# Patient Record
Sex: FEMALE | Race: WHITE | NOT HISPANIC OR LATINO | Employment: OTHER | URBAN - METROPOLITAN AREA
[De-identification: names, ages, dates, MRNs, and addresses within clinical notes are randomized per-mention and may not be internally consistent; named-entity substitution may affect disease eponyms.]

---

## 2017-04-13 ENCOUNTER — APPOINTMENT (OUTPATIENT)
Dept: GENERAL RADIOLOGY | Facility: HOSPITAL | Age: 62
End: 2017-04-13

## 2017-04-13 ENCOUNTER — HOSPITAL ENCOUNTER (INPATIENT)
Facility: HOSPITAL | Age: 62
LOS: 5 days | Discharge: HOME | End: 2017-04-18
Attending: EMERGENCY MEDICINE | Admitting: FAMILY MEDICINE

## 2017-04-13 DIAGNOSIS — S72.001A CLOSED RIGHT HIP FRACTURE, INITIAL ENCOUNTER (HCC): ICD-10-CM

## 2017-04-13 DIAGNOSIS — S72.21XA SUBTROCHANTERIC FRACTURE OF RIGHT FEMUR, CLOSED, INITIAL ENCOUNTER (HCC): Primary | ICD-10-CM

## 2017-04-13 DIAGNOSIS — R52 PAIN: ICD-10-CM
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
## 2017-04-13 LAB
ABO GROUP BLD: NORMAL
ABO GROUP BLD: NORMAL
ALBUMIN SERPL-MCNC: 4.2 G/DL (ref 3.5–5.2)
ALBUMIN/GLOB SERPL: 1.4 G/DL
ALP SERPL-CCNC: 118 U/L (ref 40–129)
ALT SERPL W P-5'-P-CCNC: 17 U/L (ref 5–33)
ANION GAP SERPL CALCULATED.3IONS-SCNC: 14.5 MMOL/L
APTT PPP: 24.7 SECONDS (ref 24.3–38.1)
AST SERPL-CCNC: 22 U/L (ref 5–32)
BASOPHILS # BLD AUTO: 0.03 10*3/MM3 (ref 0–0.2)
BASOPHILS NFR BLD AUTO: 0.3 % (ref 0–2)
BILIRUB SERPL-MCNC: 0.4 MG/DL (ref 0.2–1.2)
BLD GP AB SCN SERPL QL: NEGATIVE
BUN BLD-MCNC: 12 MG/DL (ref 8–23)
BUN/CREAT SERPL: 11.5 (ref 7–25)
CALCIUM SPEC-SCNC: 9.1 MG/DL (ref 8.8–10.5)
CHLORIDE SERPL-SCNC: 103 MMOL/L (ref 98–107)
CO2 SERPL-SCNC: 24.5 MMOL/L (ref 22–29)
CREAT BLD-MCNC: 1.04 MG/DL (ref 0.57–1)
DEPRECATED RDW RBC AUTO: 42.3 FL (ref 37–54)
EOSINOPHIL # BLD AUTO: 0.12 10*3/MM3 (ref 0.1–0.3)
EOSINOPHIL NFR BLD AUTO: 1.3 % (ref 0–4)
ERYTHROCYTE [DISTWIDTH] IN BLOOD BY AUTOMATED COUNT: 13.4 % (ref 11.5–14.5)
GFR SERPL CREATININE-BSD FRML MDRD: 54 ML/MIN/1.73
GLOBULIN UR ELPH-MCNC: 2.9 GM/DL
GLUCOSE BLD-MCNC: 131 MG/DL (ref 65–99)
HCT VFR BLD AUTO: 44.2 % (ref 37–47)
HGB BLD-MCNC: 14.5 G/DL (ref 12–16)
IMM GRANULOCYTES # BLD: 0.03 10*3/MM3 (ref 0–0.03)
IMM GRANULOCYTES NFR BLD: 0.3 % (ref 0–0.5)
INR PPP: 0.94 (ref 0.9–1.1)
LYMPHOCYTES # BLD AUTO: 2.08 10*3/MM3 (ref 0.6–4.8)
LYMPHOCYTES NFR BLD AUTO: 23 % (ref 20–45)
MCH RBC QN AUTO: 28.3 PG (ref 27–31)
MCHC RBC AUTO-ENTMCNC: 32.8 G/DL (ref 31–37)
MCV RBC AUTO: 86.2 FL (ref 81–99)
MONOCYTES # BLD AUTO: 0.58 10*3/MM3 (ref 0–1)
MONOCYTES NFR BLD AUTO: 6.4 % (ref 3–8)
NEUTROPHILS # BLD AUTO: 6.2 10*3/MM3 (ref 1.5–8.3)
NEUTROPHILS NFR BLD AUTO: 68.7 % (ref 45–70)
NRBC BLD MANUAL-RTO: 0 /100 WBC (ref 0–0)
PLATELET # BLD AUTO: 291 10*3/MM3 (ref 140–500)
PMV BLD AUTO: 9.2 FL (ref 7.4–10.4)
POTASSIUM BLD-SCNC: 4.2 MMOL/L (ref 3.5–5.2)
PROT SERPL-MCNC: 7.1 G/DL (ref 6–8.5)
PROTHROMBIN TIME: 12.6 SECONDS (ref 12.1–15)
RBC # BLD AUTO: 5.13 10*6/MM3 (ref 4.2–5.4)
RH BLD: POSITIVE
RH BLD: POSITIVE
SODIUM BLD-SCNC: 142 MMOL/L (ref 136–145)
WBC NRBC COR # BLD: 9.04 10*3/MM3 (ref 4.8–10.8)

## 2017-04-13 PROCEDURE — 93010 ELECTROCARDIOGRAM REPORT: CPT | Performed by: INTERNAL MEDICINE

## 2017-04-13 PROCEDURE — 71010 HC CHEST PA OR AP: CPT

## 2017-04-13 PROCEDURE — 85730 THROMBOPLASTIN TIME PARTIAL: CPT | Performed by: EMERGENCY MEDICINE

## 2017-04-13 PROCEDURE — 80053 COMPREHEN METABOLIC PANEL: CPT | Performed by: EMERGENCY MEDICINE

## 2017-04-13 PROCEDURE — 99284 EMERGENCY DEPT VISIT MOD MDM: CPT

## 2017-04-13 PROCEDURE — 25010000002 HYDROMORPHONE PER 4 MG: Performed by: ORTHOPAEDIC SURGERY

## 2017-04-13 PROCEDURE — 86901 BLOOD TYPING SEROLOGIC RH(D): CPT

## 2017-04-13 PROCEDURE — 25010000002 HYDROMORPHONE PER 4 MG

## 2017-04-13 PROCEDURE — 86850 RBC ANTIBODY SCREEN: CPT | Performed by: ORTHOPAEDIC SURGERY

## 2017-04-13 PROCEDURE — 0QS606Z REPOSITION RIGHT UPPER FEMUR WITH INTRAMEDULLARY INTERNAL FIXATION DEVICE, OPEN APPROACH: ICD-10-PCS | Performed by: ORTHOPAEDIC SURGERY

## 2017-04-13 PROCEDURE — 85610 PROTHROMBIN TIME: CPT | Performed by: EMERGENCY MEDICINE

## 2017-04-13 PROCEDURE — 93005 ELECTROCARDIOGRAM TRACING: CPT | Performed by: FAMILY MEDICINE

## 2017-04-13 PROCEDURE — 73502 X-RAY EXAM HIP UNI 2-3 VIEWS: CPT

## 2017-04-13 PROCEDURE — 86900 BLOOD TYPING SEROLOGIC ABO: CPT | Performed by: ORTHOPAEDIC SURGERY

## 2017-04-13 PROCEDURE — 99284 EMERGENCY DEPT VISIT MOD MDM: CPT | Performed by: EMERGENCY MEDICINE

## 2017-04-13 PROCEDURE — 86900 BLOOD TYPING SEROLOGIC ABO: CPT

## 2017-04-13 PROCEDURE — 85025 COMPLETE CBC W/AUTO DIFF WBC: CPT | Performed by: EMERGENCY MEDICINE

## 2017-04-13 PROCEDURE — 86901 BLOOD TYPING SEROLOGIC RH(D): CPT | Performed by: ORTHOPAEDIC SURGERY

## 2017-04-13 RX ORDER — SODIUM CHLORIDE 0.9 % (FLUSH) 0.9 %
1-10 SYRINGE (ML) INJECTION AS NEEDED
Status: DISCONTINUED | OUTPATIENT
Start: 2017-04-13 | End: 2017-04-14

## 2017-04-13 RX ORDER — CALCIUM CARBONATE 200(500)MG
1 TABLET,CHEWABLE ORAL 2 TIMES DAILY PRN
Status: DISCONTINUED | OUTPATIENT
Start: 2017-04-13 | End: 2017-04-18 | Stop reason: HOSPADM

## 2017-04-13 RX ORDER — ONDANSETRON 2 MG/ML
4 INJECTION INTRAMUSCULAR; INTRAVENOUS EVERY 6 HOURS PRN
Status: DISCONTINUED | OUTPATIENT
Start: 2017-04-13 | End: 2017-04-18 | Stop reason: HOSPADM

## 2017-04-13 RX ORDER — SODIUM CHLORIDE, SODIUM LACTATE, POTASSIUM CHLORIDE, CALCIUM CHLORIDE 600; 310; 30; 20 MG/100ML; MG/100ML; MG/100ML; MG/100ML
30 INJECTION, SOLUTION INTRAVENOUS CONTINUOUS
Status: DISCONTINUED | OUTPATIENT
Start: 2017-04-13 | End: 2017-04-14

## 2017-04-13 RX ORDER — ADENOSINE 3 MG/ML
INJECTION, SOLUTION INTRAVENOUS
Status: DISCONTINUED
Start: 2017-04-13 | End: 2017-04-13 | Stop reason: WASHOUT

## 2017-04-13 RX ORDER — BISACODYL 5 MG/1
5 TABLET, DELAYED RELEASE ORAL DAILY PRN
Status: DISCONTINUED | OUTPATIENT
Start: 2017-04-13 | End: 2017-04-18 | Stop reason: HOSPADM

## 2017-04-13 RX ORDER — ACETAMINOPHEN 325 MG/1
650 TABLET ORAL EVERY 4 HOURS PRN
Status: DISCONTINUED | OUTPATIENT
Start: 2017-04-13 | End: 2017-04-14

## 2017-04-13 RX ORDER — ASPIRIN 81 MG/1
81 TABLET ORAL DAILY
COMMUNITY
End: 2017-04-18 | Stop reason: HOSPADM

## 2017-04-13 RX ORDER — LABETALOL HYDROCHLORIDE 5 MG/ML
INJECTION, SOLUTION INTRAVENOUS
Status: DISCONTINUED
Start: 2017-04-13 | End: 2017-04-13 | Stop reason: WASHOUT

## 2017-04-13 RX ORDER — SODIUM CHLORIDE 9 MG/ML
75 INJECTION, SOLUTION INTRAVENOUS CONTINUOUS
Status: DISCONTINUED | OUTPATIENT
Start: 2017-04-13 | End: 2017-04-14

## 2017-04-13 RX ORDER — ONDANSETRON 4 MG/1
4 TABLET, ORALLY DISINTEGRATING ORAL EVERY 6 HOURS PRN
Status: DISCONTINUED | OUTPATIENT
Start: 2017-04-13 | End: 2017-04-18 | Stop reason: HOSPADM

## 2017-04-13 RX ORDER — ONDANSETRON 4 MG/1
4 TABLET, FILM COATED ORAL EVERY 6 HOURS PRN
Status: DISCONTINUED | OUTPATIENT
Start: 2017-04-13 | End: 2017-04-14

## 2017-04-13 RX ADMIN — SODIUM CHLORIDE, POTASSIUM CHLORIDE, SODIUM LACTATE AND CALCIUM CHLORIDE 30 ML/HR: 600; 310; 30; 20 INJECTION, SOLUTION INTRAVENOUS at 20:39

## 2017-04-13 RX ADMIN — HYDROMORPHONE HYDROCHLORIDE 1 MG: 1 INJECTION, SOLUTION INTRAMUSCULAR; INTRAVENOUS; SUBCUTANEOUS at 23:17

## 2017-04-13 RX ADMIN — SODIUM CHLORIDE 75 ML/HR: 9 INJECTION, SOLUTION INTRAVENOUS at 22:16

## 2017-04-13 RX ADMIN — HYDROMORPHONE HYDROCHLORIDE 1 MG: 1 INJECTION, SOLUTION INTRAMUSCULAR; INTRAVENOUS; SUBCUTANEOUS at 20:39

## 2017-04-14 ENCOUNTER — ANESTHESIA EVENT (OUTPATIENT)
Dept: PERIOP | Facility: HOSPITAL | Age: 62
End: 2017-04-14

## 2017-04-14 ENCOUNTER — APPOINTMENT (OUTPATIENT)
Dept: GENERAL RADIOLOGY | Facility: HOSPITAL | Age: 62
End: 2017-04-14

## 2017-04-14 ENCOUNTER — ANESTHESIA (OUTPATIENT)
Dept: PERIOP | Facility: HOSPITAL | Age: 62
End: 2017-04-14

## 2017-04-14 PROBLEM — S72.21XA: Status: ACTIVE | Noted: 2017-04-14

## 2017-04-14 LAB
ANION GAP SERPL CALCULATED.3IONS-SCNC: 12.1 MMOL/L
BASOPHILS # BLD AUTO: 0.01 10*3/MM3 (ref 0–0.2)
BASOPHILS NFR BLD AUTO: 0.1 % (ref 0–2)
BUN BLD-MCNC: 16 MG/DL (ref 8–23)
BUN/CREAT SERPL: 12.8 (ref 7–25)
CALCIUM SPEC-SCNC: 7.6 MG/DL (ref 8.8–10.5)
CHLORIDE SERPL-SCNC: 103 MMOL/L (ref 98–107)
CO2 SERPL-SCNC: 24.9 MMOL/L (ref 22–29)
CREAT BLD-MCNC: 1.25 MG/DL (ref 0.57–1)
DEPRECATED RDW RBC AUTO: 45.3 FL (ref 37–54)
EOSINOPHIL # BLD AUTO: 0 10*3/MM3 (ref 0.1–0.3)
EOSINOPHIL NFR BLD AUTO: 0 % (ref 0–4)
ERYTHROCYTE [DISTWIDTH] IN BLOOD BY AUTOMATED COUNT: 13.7 % (ref 11.5–14.5)
GFR SERPL CREATININE-BSD FRML MDRD: 43 ML/MIN/1.73
GLUCOSE BLD-MCNC: 163 MG/DL (ref 65–99)
HCT VFR BLD AUTO: 30.1 % (ref 37–47)
HGB BLD-MCNC: 9.4 G/DL (ref 12–16)
IMM GRANULOCYTES # BLD: 0.02 10*3/MM3 (ref 0–0.03)
IMM GRANULOCYTES NFR BLD: 0.2 % (ref 0–0.5)
LYMPHOCYTES # BLD AUTO: 0.48 10*3/MM3 (ref 0.6–4.8)
LYMPHOCYTES # BLD MANUAL: 0.18 10*3/MM3 (ref 0.6–4.8)
LYMPHOCYTES NFR BLD AUTO: 5.3 % (ref 20–45)
LYMPHOCYTES NFR BLD MANUAL: 2 % (ref 20–45)
LYMPHOCYTES NFR BLD MANUAL: 6 % (ref 3–8)
MCH RBC QN AUTO: 28.1 PG (ref 27–31)
MCHC RBC AUTO-ENTMCNC: 31.2 G/DL (ref 31–37)
MCV RBC AUTO: 90.1 FL (ref 81–99)
MONOCYTES # BLD AUTO: 0.55 10*3/MM3 (ref 0–1)
MONOCYTES # BLD AUTO: 0.55 10*3/MM3 (ref 0–1)
MONOCYTES NFR BLD AUTO: 6 % (ref 3–8)
NEUTROPHILS # BLD AUTO: 8.07 10*3/MM3 (ref 1.5–8.3)
NEUTROPHILS # BLD AUTO: 8.4 10*3/MM3 (ref 1.5–8.3)
NEUTROPHILS NFR BLD AUTO: 88.4 % (ref 45–70)
NEUTROPHILS NFR BLD MANUAL: 87 % (ref 45–70)
NEUTS BAND NFR BLD MANUAL: 5 % (ref 0–5)
NRBC BLD MANUAL-RTO: 0 /100 WBC (ref 0–0)
PLATELET # BLD AUTO: 209 10*3/MM3 (ref 140–500)
PMV BLD AUTO: 9.2 FL (ref 7.4–10.4)
POTASSIUM BLD-SCNC: 3.6 MMOL/L (ref 3.5–5.2)
RBC # BLD AUTO: 3.34 10*6/MM3 (ref 4.2–5.4)
RBC MORPH BLD: NORMAL
SCAN SLIDE: NORMAL
SMALL PLATELETS BLD QL SMEAR: ADEQUATE
SODIUM BLD-SCNC: 140 MMOL/L (ref 136–145)
TROPONIN T SERPL-MCNC: <0.01 NG/ML (ref 0–0.03)
WBC MORPH BLD: NORMAL
WBC NRBC COR # BLD: 9.13 10*3/MM3 (ref 4.8–10.8)

## 2017-04-14 PROCEDURE — 25010000002 DEXAMETHASONE PER 1 MG: Performed by: NURSE ANESTHETIST, CERTIFIED REGISTERED

## 2017-04-14 PROCEDURE — 80048 BASIC METABOLIC PNL TOTAL CA: CPT | Performed by: FAMILY MEDICINE

## 2017-04-14 PROCEDURE — C1713 ANCHOR/SCREW BN/BN,TIS/BN: HCPCS | Performed by: ORTHOPAEDIC SURGERY

## 2017-04-14 PROCEDURE — 99254 IP/OBS CNSLTJ NEW/EST MOD 60: CPT | Performed by: ORTHOPAEDIC SURGERY

## 2017-04-14 PROCEDURE — P9041 ALBUMIN (HUMAN),5%, 50ML: HCPCS | Performed by: NURSE ANESTHETIST, CERTIFIED REGISTERED

## 2017-04-14 PROCEDURE — 25010000002 MIDAZOLAM PER 1 MG: Performed by: NURSE ANESTHETIST, CERTIFIED REGISTERED

## 2017-04-14 PROCEDURE — 27245 TREAT THIGH FRACTURE: CPT | Performed by: ORTHOPAEDIC SURGERY

## 2017-04-14 PROCEDURE — 25010000002 ALBUMIN HUMAN 5% PER 50 ML: Performed by: NURSE ANESTHETIST, CERTIFIED REGISTERED

## 2017-04-14 PROCEDURE — 73501 X-RAY EXAM HIP UNI 1 VIEW: CPT

## 2017-04-14 PROCEDURE — 27245 TREAT THIGH FRACTURE: CPT | Performed by: SPECIALIST/TECHNOLOGIST, OTHER

## 2017-04-14 PROCEDURE — 84484 ASSAY OF TROPONIN QUANT: CPT | Performed by: FAMILY MEDICINE

## 2017-04-14 PROCEDURE — 73552 X-RAY EXAM OF FEMUR 2/>: CPT

## 2017-04-14 PROCEDURE — 25010000003 CEFAZOLIN PER 500 MG: Performed by: ORTHOPAEDIC SURGERY

## 2017-04-14 PROCEDURE — 25010000002 HYDROMORPHONE PER 4 MG: Performed by: ORTHOPAEDIC SURGERY

## 2017-04-14 PROCEDURE — 25010000002 PROPOFOL 10 MG/ML EMULSION: Performed by: NURSE ANESTHETIST, CERTIFIED REGISTERED

## 2017-04-14 PROCEDURE — 94799 UNLISTED PULMONARY SVC/PX: CPT

## 2017-04-14 PROCEDURE — 25010000002 PHENYLEPHRINE PER 1 ML: Performed by: NURSE ANESTHETIST, CERTIFIED REGISTERED

## 2017-04-14 PROCEDURE — 85025 COMPLETE CBC W/AUTO DIFF WBC: CPT | Performed by: FAMILY MEDICINE

## 2017-04-14 PROCEDURE — 25010000002 ONDANSETRON PER 1 MG: Performed by: NURSE ANESTHETIST, CERTIFIED REGISTERED

## 2017-04-14 PROCEDURE — 85007 BL SMEAR W/DIFF WBC COUNT: CPT | Performed by: FAMILY MEDICINE

## 2017-04-14 DEVICE — LONG NAIL KIT R1.5, TI, RIGHT
Type: IMPLANTABLE DEVICE | Site: TROCHANTER | Status: FUNCTIONAL
Brand: GAMMA

## 2017-04-14 DEVICE — IMPLANTABLE DEVICE: Type: IMPLANTABLE DEVICE | Site: TROCHANTER | Status: FUNCTIONAL

## 2017-04-14 DEVICE — LOCKING SCREW, FULLY THREADED: Type: IMPLANTABLE DEVICE | Site: TROCHANTER | Status: FUNCTIONAL

## 2017-04-14 DEVICE — OPTIUM DBM PUTTY
Type: IMPLANTABLE DEVICE | Site: TROCHANTER | Status: FUNCTIONAL
Brand: OPTIUM®

## 2017-04-14 DEVICE — LAG SCREW, TI
Type: IMPLANTABLE DEVICE | Site: TROCHANTER | Status: FUNCTIONAL
Brand: GAMMA

## 2017-04-14 DEVICE — SET SCREW, TI
Type: IMPLANTABLE DEVICE | Site: TROCHANTER | Status: FUNCTIONAL
Brand: GAMMA

## 2017-04-14 RX ORDER — ASPIRIN 325 MG
325 TABLET ORAL EVERY 12 HOURS
Status: DISCONTINUED | OUTPATIENT
Start: 2017-04-15 | End: 2017-04-18 | Stop reason: HOSPADM

## 2017-04-14 RX ORDER — OXYCODONE HYDROCHLORIDE 5 MG/1
10 TABLET ORAL EVERY 4 HOURS PRN
Status: DISCONTINUED | OUTPATIENT
Start: 2017-04-14 | End: 2017-04-18 | Stop reason: HOSPADM

## 2017-04-14 RX ORDER — ONDANSETRON 2 MG/ML
4 INJECTION INTRAMUSCULAR; INTRAVENOUS ONCE AS NEEDED
Status: COMPLETED | OUTPATIENT
Start: 2017-04-14 | End: 2017-04-14

## 2017-04-14 RX ORDER — BUPIVACAINE HYDROCHLORIDE 5 MG/ML
INJECTION, SOLUTION EPIDURAL; INTRACAUDAL AS NEEDED
Status: DISCONTINUED | OUTPATIENT
Start: 2017-04-14 | End: 2017-04-14 | Stop reason: SURG

## 2017-04-14 RX ORDER — PROPOFOL 10 MG/ML
VIAL (ML) INTRAVENOUS CONTINUOUS PRN
Status: DISCONTINUED | OUTPATIENT
Start: 2017-04-14 | End: 2017-04-14 | Stop reason: SURG

## 2017-04-14 RX ORDER — OXYCODONE HCL 10 MG/1
10 TABLET, FILM COATED, EXTENDED RELEASE ORAL ONCE
Status: COMPLETED | OUTPATIENT
Start: 2017-04-14 | End: 2017-04-14

## 2017-04-14 RX ORDER — SODIUM CHLORIDE, SODIUM LACTATE, POTASSIUM CHLORIDE, CALCIUM CHLORIDE 600; 310; 30; 20 MG/100ML; MG/100ML; MG/100ML; MG/100ML
50 INJECTION, SOLUTION INTRAVENOUS CONTINUOUS
Status: DISCONTINUED | OUTPATIENT
Start: 2017-04-14 | End: 2017-04-14

## 2017-04-14 RX ORDER — MIDAZOLAM HYDROCHLORIDE 1 MG/ML
INJECTION INTRAMUSCULAR; INTRAVENOUS
Status: DISPENSED
Start: 2017-04-14 | End: 2017-04-14

## 2017-04-14 RX ORDER — ACETAMINOPHEN 500 MG
1000 TABLET ORAL 4 TIMES DAILY
Status: DISCONTINUED | OUTPATIENT
Start: 2017-04-14 | End: 2017-04-14

## 2017-04-14 RX ORDER — OXYCODONE HCL 10 MG/1
TABLET, FILM COATED, EXTENDED RELEASE ORAL
Status: COMPLETED
Start: 2017-04-14 | End: 2017-04-14

## 2017-04-14 RX ORDER — FAMOTIDINE 10 MG/ML
20 INJECTION, SOLUTION INTRAVENOUS
Status: DISCONTINUED | OUTPATIENT
Start: 2017-04-14 | End: 2017-04-14

## 2017-04-14 RX ORDER — MAGNESIUM HYDROXIDE 1200 MG/15ML
LIQUID ORAL AS NEEDED
Status: DISCONTINUED | OUTPATIENT
Start: 2017-04-14 | End: 2017-04-14 | Stop reason: HOSPADM

## 2017-04-14 RX ORDER — DEXAMETHASONE SODIUM PHOSPHATE 4 MG/ML
8 INJECTION, SOLUTION INTRA-ARTICULAR; INTRALESIONAL; INTRAMUSCULAR; INTRAVENOUS; SOFT TISSUE ONCE AS NEEDED
Status: COMPLETED | OUTPATIENT
Start: 2017-04-14 | End: 2017-04-14

## 2017-04-14 RX ORDER — SENNA AND DOCUSATE SODIUM 50; 8.6 MG/1; MG/1
2 TABLET, FILM COATED ORAL 2 TIMES DAILY
Status: DISCONTINUED | OUTPATIENT
Start: 2017-04-14 | End: 2017-04-18 | Stop reason: HOSPADM

## 2017-04-14 RX ORDER — PREGABALIN 75 MG/1
75 CAPSULE ORAL EVERY 12 HOURS SCHEDULED
Status: DISCONTINUED | OUTPATIENT
Start: 2017-04-14 | End: 2017-04-14

## 2017-04-14 RX ORDER — DULOXETIN HYDROCHLORIDE 60 MG/1
60 CAPSULE, DELAYED RELEASE ORAL DAILY
Status: DISCONTINUED | OUTPATIENT
Start: 2017-04-14 | End: 2017-04-18 | Stop reason: HOSPADM

## 2017-04-14 RX ORDER — ACETAMINOPHEN 500 MG
1000 TABLET ORAL EVERY 6 HOURS
Status: DISCONTINUED | OUTPATIENT
Start: 2017-04-14 | End: 2017-04-18 | Stop reason: HOSPADM

## 2017-04-14 RX ORDER — MIDAZOLAM HYDROCHLORIDE 1 MG/ML
2 INJECTION INTRAMUSCULAR; INTRAVENOUS
Status: DISCONTINUED | OUTPATIENT
Start: 2017-04-14 | End: 2017-04-14

## 2017-04-14 RX ORDER — MIDAZOLAM HYDROCHLORIDE 1 MG/ML
1 INJECTION INTRAMUSCULAR; INTRAVENOUS
Status: DISCONTINUED | OUTPATIENT
Start: 2017-04-14 | End: 2017-04-14

## 2017-04-14 RX ORDER — CELECOXIB 100 MG/1
CAPSULE ORAL
Status: COMPLETED
Start: 2017-04-14 | End: 2017-04-14

## 2017-04-14 RX ORDER — SODIUM CHLORIDE 9 MG/ML
500 INJECTION, SOLUTION INTRAVENOUS ONCE
Status: COMPLETED | OUTPATIENT
Start: 2017-04-14 | End: 2017-04-14

## 2017-04-14 RX ORDER — ACETAMINOPHEN 10 MG/ML
1000 INJECTION, SOLUTION INTRAVENOUS ONCE
Status: COMPLETED | OUTPATIENT
Start: 2017-04-14 | End: 2017-04-14

## 2017-04-14 RX ORDER — PREGABALIN 75 MG/1
150 CAPSULE ORAL ONCE
Status: COMPLETED | OUTPATIENT
Start: 2017-04-14 | End: 2017-04-14

## 2017-04-14 RX ORDER — CELECOXIB 100 MG/1
200 CAPSULE ORAL 2 TIMES DAILY
Status: DISCONTINUED | OUTPATIENT
Start: 2017-04-14 | End: 2017-04-14

## 2017-04-14 RX ORDER — ALBUMIN, HUMAN INJ 5% 5 %
250 SOLUTION INTRAVENOUS ONCE
Status: COMPLETED | OUTPATIENT
Start: 2017-04-14 | End: 2017-04-14

## 2017-04-14 RX ORDER — IPRATROPIUM BROMIDE AND ALBUTEROL SULFATE 2.5; .5 MG/3ML; MG/3ML
3 SOLUTION RESPIRATORY (INHALATION) ONCE AS NEEDED
Status: DISCONTINUED | OUTPATIENT
Start: 2017-04-14 | End: 2017-04-14 | Stop reason: HOSPADM

## 2017-04-14 RX ORDER — NALOXONE HCL 0.4 MG/ML
0.4 VIAL (ML) INJECTION AS NEEDED
Status: DISCONTINUED | OUTPATIENT
Start: 2017-04-14 | End: 2017-04-14 | Stop reason: HOSPADM

## 2017-04-14 RX ORDER — CELECOXIB 100 MG/1
400 CAPSULE ORAL ONCE
Status: COMPLETED | OUTPATIENT
Start: 2017-04-14 | End: 2017-04-14

## 2017-04-14 RX ORDER — ONDANSETRON 2 MG/ML
4 INJECTION INTRAMUSCULAR; INTRAVENOUS ONCE AS NEEDED
Status: DISCONTINUED | OUTPATIENT
Start: 2017-04-14 | End: 2017-04-14 | Stop reason: HOSPADM

## 2017-04-14 RX ORDER — ACETAMINOPHEN 10 MG/ML
INJECTION, SOLUTION INTRAVENOUS
Status: COMPLETED
Start: 2017-04-14 | End: 2017-04-14

## 2017-04-14 RX ORDER — SODIUM CHLORIDE 9 MG/ML
75 INJECTION, SOLUTION INTRAVENOUS CONTINUOUS
Status: DISCONTINUED | OUTPATIENT
Start: 2017-04-14 | End: 2017-04-15

## 2017-04-14 RX ORDER — PREGABALIN 75 MG/1
CAPSULE ORAL
Status: COMPLETED
Start: 2017-04-14 | End: 2017-04-14

## 2017-04-14 RX ORDER — DIPHENHYDRAMINE HYDROCHLORIDE 50 MG/ML
12.5 INJECTION INTRAMUSCULAR; INTRAVENOUS
Status: DISCONTINUED | OUTPATIENT
Start: 2017-04-14 | End: 2017-04-14 | Stop reason: HOSPADM

## 2017-04-14 RX ORDER — DEXAMETHASONE SODIUM PHOSPHATE 4 MG/ML
INJECTION, SOLUTION INTRA-ARTICULAR; INTRALESIONAL; INTRAMUSCULAR; INTRAVENOUS; SOFT TISSUE
Status: DISPENSED
Start: 2017-04-14 | End: 2017-04-14

## 2017-04-14 RX ORDER — FAMOTIDINE 10 MG/ML
INJECTION, SOLUTION INTRAVENOUS
Status: COMPLETED
Start: 2017-04-14 | End: 2017-04-14

## 2017-04-14 RX ORDER — OXYCODONE HYDROCHLORIDE AND ACETAMINOPHEN 5; 325 MG/1; MG/1
1 TABLET ORAL ONCE AS NEEDED
Status: DISCONTINUED | OUTPATIENT
Start: 2017-04-14 | End: 2017-04-14 | Stop reason: HOSPADM

## 2017-04-14 RX ORDER — SODIUM CHLORIDE 0.9 % (FLUSH) 0.9 %
1-10 SYRINGE (ML) INJECTION AS NEEDED
Status: DISCONTINUED | OUTPATIENT
Start: 2017-04-14 | End: 2017-04-14

## 2017-04-14 RX ORDER — FAMOTIDINE 20 MG/1
20 TABLET, FILM COATED ORAL
Status: DISCONTINUED | OUTPATIENT
Start: 2017-04-14 | End: 2017-04-14

## 2017-04-14 RX ORDER — MIDAZOLAM HYDROCHLORIDE 1 MG/ML
1 INJECTION INTRAMUSCULAR; INTRAVENOUS
Status: DISCONTINUED | OUTPATIENT
Start: 2017-04-14 | End: 2017-04-14 | Stop reason: HOSPADM

## 2017-04-14 RX ORDER — BACITRACIN ZINC 500 [USP'U]/G
OINTMENT TOPICAL AS NEEDED
Status: DISCONTINUED | OUTPATIENT
Start: 2017-04-14 | End: 2017-04-14 | Stop reason: HOSPADM

## 2017-04-14 RX ADMIN — DULOXETINE HYDROCHLORIDE 60 MG: 60 CAPSULE, DELAYED RELEASE ORAL at 16:44

## 2017-04-14 RX ADMIN — EPHEDRINE SULFATE 5 MG: 50 INJECTION INTRAMUSCULAR; INTRAVENOUS; SUBCUTANEOUS at 11:33

## 2017-04-14 RX ADMIN — EPHEDRINE SULFATE 10 MG: 50 INJECTION INTRAMUSCULAR; INTRAVENOUS; SUBCUTANEOUS at 10:57

## 2017-04-14 RX ADMIN — BUPIVACAINE HYDROCHLORIDE 15 MG: 5 INJECTION, SOLUTION EPIDURAL; INTRACAUDAL; PERINEURAL at 09:45

## 2017-04-14 RX ADMIN — ACETAMINOPHEN 1000 MG: 500 TABLET ORAL at 20:30

## 2017-04-14 RX ADMIN — CELECOXIB 400 MG: 100 CAPSULE ORAL at 08:14

## 2017-04-14 RX ADMIN — OXYCODONE HYDROCHLORIDE 10 MG: 10 TABLET, FILM COATED, EXTENDED RELEASE ORAL at 08:14

## 2017-04-14 RX ADMIN — PREGABALIN 150 MG: 75 CAPSULE ORAL at 08:14

## 2017-04-14 RX ADMIN — EPHEDRINE SULFATE 10 MG: 50 INJECTION INTRAMUSCULAR; INTRAVENOUS; SUBCUTANEOUS at 10:49

## 2017-04-14 RX ADMIN — OXYCODONE HCL 10 MG: 10 TABLET, FILM COATED, EXTENDED RELEASE ORAL at 08:14

## 2017-04-14 RX ADMIN — PHENYLEPHRINE HYDROCHLORIDE 100 MCG: 10 INJECTION INTRAVENOUS at 11:06

## 2017-04-14 RX ADMIN — ACETAMINOPHEN 1000 MG: 10 INJECTION, SOLUTION INTRAVENOUS at 08:15

## 2017-04-14 RX ADMIN — FAMOTIDINE 20 MG: 10 INJECTION, SOLUTION INTRAVENOUS at 09:26

## 2017-04-14 RX ADMIN — EPHEDRINE SULFATE 10 MG: 50 INJECTION INTRAMUSCULAR; INTRAVENOUS; SUBCUTANEOUS at 11:02

## 2017-04-14 RX ADMIN — PROPOFOL 50 MCG/KG/MIN: 10 INJECTION, EMULSION INTRAVENOUS at 10:20

## 2017-04-14 RX ADMIN — SODIUM CHLORIDE, POTASSIUM CHLORIDE, SODIUM LACTATE AND CALCIUM CHLORIDE 100 ML/HR: 600; 310; 30; 20 INJECTION, SOLUTION INTRAVENOUS at 14:43

## 2017-04-14 RX ADMIN — SODIUM CHLORIDE, POTASSIUM CHLORIDE, SODIUM LACTATE AND CALCIUM CHLORIDE: 600; 310; 30; 20 INJECTION, SOLUTION INTRAVENOUS at 11:36

## 2017-04-14 RX ADMIN — HYDROMORPHONE HYDROCHLORIDE 1 MG: 1 INJECTION, SOLUTION INTRAMUSCULAR; INTRAVENOUS; SUBCUTANEOUS at 02:27

## 2017-04-14 RX ADMIN — DOCUSATE SODIUM AND SENNOSIDES 2 TABLET: 8.6; 5 TABLET, FILM COATED ORAL at 19:02

## 2017-04-14 RX ADMIN — PHENYLEPHRINE HYDROCHLORIDE 100 MCG: 10 INJECTION INTRAVENOUS at 11:57

## 2017-04-14 RX ADMIN — PHENYLEPHRINE HYDROCHLORIDE 50 MCG: 10 INJECTION INTRAVENOUS at 11:45

## 2017-04-14 RX ADMIN — ONDANSETRON 4 MG: 2 INJECTION, SOLUTION INTRAMUSCULAR; INTRAVENOUS at 09:27

## 2017-04-14 RX ADMIN — ALBUMIN HUMAN 250 ML: 0.05 INJECTION, SOLUTION INTRAVENOUS at 13:30

## 2017-04-14 RX ADMIN — PHENYLEPHRINE HYDROCHLORIDE 100 MCG: 10 INJECTION INTRAVENOUS at 12:22

## 2017-04-14 RX ADMIN — HYDROMORPHONE HYDROCHLORIDE 1 MG: 1 INJECTION, SOLUTION INTRAMUSCULAR; INTRAVENOUS; SUBCUTANEOUS at 06:13

## 2017-04-14 RX ADMIN — ALBUMIN HUMAN 250 ML: 0.05 INJECTION, SOLUTION INTRAVENOUS at 14:23

## 2017-04-14 RX ADMIN — SODIUM CHLORIDE, POTASSIUM CHLORIDE, SODIUM LACTATE AND CALCIUM CHLORIDE: 600; 310; 30; 20 INJECTION, SOLUTION INTRAVENOUS at 10:00

## 2017-04-14 RX ADMIN — CEFAZOLIN SODIUM 2 G: 2 SOLUTION INTRAVENOUS at 19:02

## 2017-04-14 RX ADMIN — DEXAMETHASONE SODIUM PHOSPHATE 8 MG: 4 INJECTION, SOLUTION INTRAMUSCULAR; INTRAVENOUS at 09:28

## 2017-04-14 RX ADMIN — PHENYLEPHRINE HYDROCHLORIDE 100 MCG: 10 INJECTION INTRAVENOUS at 12:37

## 2017-04-14 RX ADMIN — CEFAZOLIN SODIUM 2 G: 2 SOLUTION INTRAVENOUS at 10:16

## 2017-04-14 RX ADMIN — MIDAZOLAM HYDROCHLORIDE 1.5 MG: 1 INJECTION, SOLUTION INTRAMUSCULAR; INTRAVENOUS at 09:45

## 2017-04-14 RX ADMIN — PHENYLEPHRINE HYDROCHLORIDE 100 MCG: 10 INJECTION INTRAVENOUS at 11:19

## 2017-04-14 RX ADMIN — ACETAMINOPHEN 1000 MG: 500 TABLET ORAL at 16:44

## 2017-04-14 RX ADMIN — SODIUM CHLORIDE 500 ML: 9 INJECTION, SOLUTION INTRAVENOUS at 16:43

## 2017-04-14 RX ADMIN — FAMOTIDINE 20 MG: 10 INJECTION INTRAVENOUS at 09:26
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
## 2017-04-14 NOTE — PROGRESS NOTES
"Daily Progress Note:    Subjective: Patient reports pain some nausea no complaints    Flowsheet Rows         First Filed Value    Admission Height  62\" (157.5 cm) Documented at 04/13/2017 1935    Admission Weight  168 lb (76.2 kg) Documented at 04/13/2017 1935          Patient Vitals for the past 24 hrs:   BP Temp Temp src Pulse Resp SpO2 Height Weight   04/14/17 0649 132/84 97.6 °F (36.4 °C) Oral 93 18 94 % - -   04/14/17 0312 134/82 97.8 °F (36.6 °C) Oral 90 18 95 % - -   04/14/17 0008 136/82 97.9 °F (36.6 °C) Oral 87 18 94 % - -   04/13/17 2139 157/88 97.8 °F (36.6 °C) Oral 71 18 98 % - -   04/13/17 1958 152/95 - - - - 93 % - -   04/13/17 1935 156/92 97.4 °F (36.3 °C) - 82 16 96 % 62\" (157.5 cm) 168 lb (76.2 kg)         Intake/Output Summary (Last 24 hours) at 04/14/17 0701  Last data filed at 04/14/17 0649   Gross per 24 hour   Intake                0 ml   Output              300 ml   Net             -300 ml       Review of Systems   Constitutional: Negative for activity change, appetite change and fatigue.   Respiratory: Negative for cough, chest tightness, shortness of breath and wheezing.    Cardiovascular: Negative for chest pain.   Gastrointestinal: Negative for abdominal distention, abdominal pain, diarrhea, nausea and vomiting.   Genitourinary: Negative for frequency.   Musculoskeletal: Positive for arthralgias (r hip), gait problem and joint swelling.   Skin: Negative for rash.   Psychiatric/Behavioral: Negative for agitation.       Physical Exam   Constitutional: She appears well-developed and well-nourished.   HENT:   Head: Normocephalic.   Mouth/Throat: Oropharynx is clear and moist.   Eyes: Conjunctivae are normal.   Neck: Normal range of motion. No JVD present. No thyromegaly present.   Cardiovascular: Normal rate, regular rhythm and normal heart sounds.    No murmur heard.  Pulmonary/Chest: Effort normal and breath sounds normal. No respiratory distress. She has no wheezes. She has no rales. "   Abdominal: Soft. Bowel sounds are normal. She exhibits no distension. There is no tenderness. There is no guarding.   Musculoskeletal: She exhibits tenderness (r hip).   Neurological: She is alert.   Skin: Skin is warm and dry. No rash noted.   Nursing note and vitals reviewed.          Medication Review:   I have reviewed the patient's current medication list             Labs:    Results from last 7 days  Lab Units 04/13/17 2016   WBC 10*3/mm3 9.04   HEMOGLOBIN g/dL 14.5   HEMATOCRIT % 44.2   PLATELETS 10*3/mm3 291       Results from last 7 days  Lab Units 04/13/17 2016   SODIUM mmol/L 142   POTASSIUM mmol/L 4.2   CHLORIDE mmol/L 103   TOTAL CO2 mmol/L 24.5   BUN mg/dL 12   CREATININE mg/dL 1.04*   CALCIUM mg/dL 9.1   BILIRUBIN mg/dL 0.4   ALK PHOS U/L 118   ALT (SGPT) U/L 17   AST (SGOT) U/L 22   GLUCOSE mg/dL 131*           Lab Results (last 24 hours)     Procedure Component Value Units Date/Time    CBC & Differential [[id number]] Collected:  04/13/17 2016    Specimen:  Blood Updated:  04/13/17 2024    Narrative:       The following orders were created for panel order CBC & Differential.  Procedure                               Abnormality         Status                     ---------                               -----------         ------                     CBC Auto Differential[[id number]]         Normal              Final result                 Please view results for these tests on the individual orders.    CBC Auto Differential [[id number]]  (Normal) Collected:  04/13/17 2016    Specimen:  Blood from Hand, Right Updated:  04/13/17 2024     WBC 9.04 10*3/mm3      RBC 5.13 10*6/mm3      Hemoglobin 14.5 g/dL      Hematocrit 44.2 %      MCV 86.2 fL      MCH 28.3 pg      MCHC 32.8 g/dL      RDW 13.4 %      RDW-SD 42.3 fl      MPV 9.2 fL      Platelets 291 10*3/mm3      Neutrophil % 68.7 %      Lymphocyte % 23.0 %      Monocyte % 6.4 %      Eosinophil % 1.3 %      Basophil % 0.3 %      Immature Grans % 0.3 %       Neutrophils, Absolute 6.20 10*3/mm3      Lymphocytes, Absolute 2.08 10*3/mm3      Monocytes, Absolute 0.58 10*3/mm3      Eosinophils, Absolute 0.12 10*3/mm3      Basophils, Absolute 0.03 10*3/mm3      Immature Grans, Absolute 0.03 10*3/mm3      nRBC 0.0 /100 WBC     Protime-INR [[id number]]  (Normal) Collected:  04/13/17 2016    Specimen:  Blood from Hand, Right Updated:  04/13/17 2040     Protime 12.6 Seconds      INR 0.94    Narrative:       Therapeutic Ranges for INR: 2.0-3.0 (PT 20-30)                              2.5-3.5 (PT 25-34)    aPTT [[id number]]  (Normal) Collected:  04/13/17 2016    Specimen:  Blood from Hand, Right Updated:  04/13/17 2040     PTT 24.7 seconds     Narrative:       PTT = The equivalent PTT values for the therapeutic range of heparin levels at 0.1 to 0.7 U/ml are 53 to 110 seconds.    Comprehensive Metabolic Panel [[id number]]  (Abnormal) Collected:  04/13/17 2016    Specimen:  Blood from Hand, Right Updated:  04/13/17 2046     Glucose 131 (H) mg/dL      BUN 12 mg/dL      Creatinine 1.04 (H) mg/dL      Sodium 142 mmol/L      Potassium 4.2 mmol/L      Chloride 103 mmol/L      CO2 24.5 mmol/L      Calcium 9.1 mg/dL      Total Protein 7.1 g/dL      Albumin 4.20 g/dL      ALT (SGPT) 17 U/L      AST (SGOT) 22 U/L      Alkaline Phosphatase 118 U/L      Total Bilirubin 0.4 mg/dL      eGFR Non African Amer 54 (L) mL/min/1.73      Globulin 2.9 gm/dL      A/G Ratio 1.4 g/dL      BUN/Creatinine Ratio 11.5     Anion Gap 14.5 mmol/L               Radiology:  Imaging Results (last 24 hours)     Procedure Component Value Units Date/Time    XR Chest 1 View [[id number]] Updated:  04/13/17 2017    XR Hip With or Without Pelvis 2 - 3 View Right [[id number]] Updated:  04/13/17 2018          Cardiology:  ECG/EMG Results (last 24 hours)     Procedure Component Value Units Date/Time    ECG 12 Lead [[id number]] Collected:  04/13/17 2306     Updated:  04/13/17 6824          I have reviewed consult notes.    Assessment and  Plan:    1. R hip fracture for surgery today    2.Depression stable

## 2017-04-14 NOTE — PROGRESS NOTES
Continued Stay Note  SHIRA Retana     Patient Name: Raven Young  MRN: [id number]  Today's Date: 4/14/2017    Admit Date: 4/13/2017          Discharge Plan       04/14/17 1282    Case Management/Social Work Plan    Plan to be determined    Additional Comments attempt to screen patient, in OR/ Recovery - unable to screen. CM to follow for dc planning needs.              Discharge Codes     None            Alejandro Kapoor RN

## 2017-04-14 NOTE — PLAN OF CARE
Problem: Patient Care Overview (Adult)  Goal: Adult Individualization and Mutuality  Outcome: Ongoing (interventions implemented as appropriate)    04/14/17 0802   Individualization   Patient Specific Preferences none

## 2017-04-14 NOTE — ED PROVIDER NOTES
Subjective   History of Present Illness  History of Present Illness    Chief complaint: Right hip pain    Location: Fall at home    Quality/Severity:  Severe, sharp pain    Timing/Onset/Duration: Acute onset just prior to arrival    Modifying Factors: It hurts to move, feels better to remain still    Associated Symptoms: There has been no loss of consciousness.  There is no neck or back pain.  No chest pain or shortness of breath.  No abdominal pain.  No numbness, tingling, weakness, or change in bladder or bowel function.    Narrative: This 61-year-old white female fell at home.  She tripped.  She complains of right hip pain.  She had no loss of consciousness.  She has no neck or back pain.  No chest pain or shortness of breath.  No abdominal pain.  No numbness, tingling, weakness, or change in bladder or bowel function.  Not on blood thinners.    PCP:  Hever      Review of Systems   Constitutional: Negative for chills and fever.   HENT: Negative for congestion, ear pain and sore throat.    Eyes: Negative for pain, discharge and visual disturbance.   Respiratory: Negative for cough, chest tightness, shortness of breath and wheezing.    Cardiovascular: Negative for chest pain, palpitations and leg swelling.   Gastrointestinal: Negative for abdominal pain, nausea and vomiting.   Genitourinary: Negative for difficulty urinating and flank pain.   Musculoskeletal: Negative for back pain.   Skin: Negative for wound.   Neurological: Negative for weakness and headaches.   Hematological: Negative for adenopathy.   Psychiatric/Behavioral: Negative for confusion.        Medication List      ASK your doctor about these medications          aspirin 81 MG EC tablet       CYMBALTA PO           History reviewed. No pertinent past medical history.    No Known Allergies    Past Surgical History:   Procedure Laterality Date   • TUBAL ABDOMINAL LIGATION         History reviewed. No pertinent family history.    Social History     Social  History   • Marital status:      Spouse name: N/A   • Number of children: N/A   • Years of education: N/A     Social History Main Topics   • Smoking status: Never Smoker   • Smokeless tobacco: None   • Alcohol use No   • Drug use: No   • Sexual activity: Not Asked     Other Topics Concern   • None     Social History Narrative   • None           Objective   Physical Exam   Constitutional: She is oriented to person, place, and time. She appears well-developed and well-nourished. No distress.   ED Triage Vitals:  Temp: 97.4 °F (36.3 °C) (04/13/17 1935)  Heart Rate: 82 (04/13/17 1935)  Resp: 16 (04/13/17 1935)  BP: 156/92 (04/13/17 1935)  SpO2: 96 % (04/13/17 1935)  Temp src: n/a  Heart Rate Source: n/a  Patient Position: n/a  BP Location: n/a  FiO2 (%): n/a    The patient's vitals were reviewed by me.  Unless otherwise noted they are within normal limits.  The patient is hypertensive with a blood pressure 156/92.  She is in severe amount of pain.     HENT:   Head: Normocephalic and atraumatic.   Right Ear: External ear normal.   Left Ear: External ear normal.   Nose: Nose normal.   Mouth/Throat: Oropharynx is clear and moist.   Eyes: Conjunctivae and EOM are normal. Pupils are equal, round, and reactive to light. Right eye exhibits no discharge. Left eye exhibits no discharge. No scleral icterus.   Neck: Normal range of motion. Neck supple. No JVD present. No tracheal deviation present. No thyromegaly present.   There is no tenderness, deformity, or bony step-offs upon palpation the cervical, thoracic, lumbar sacrococcygeal spine.   Cardiovascular: Normal rate, regular rhythm, normal heart sounds and intact distal pulses.  Exam reveals no gallop and no friction rub.    No murmur heard.  Pulmonary/Chest: Effort normal and breath sounds normal. No stridor. No respiratory distress. She has no wheezes. She has no rales. She exhibits no tenderness.   Abdominal: Soft. Bowel sounds are normal. She exhibits no  distension and no mass. There is no tenderness. There is no rebound and no guarding. No hernia.   Musculoskeletal: Normal range of motion. She exhibits no edema or deformity.   There is severe amount of right hip tenderness.  The capillary refill is less than 2 seconds.  The sensation is intact.  There is a 2 posterior cells pedis and posterior tibial pulse.  Patient is able to flex and extend her foot.  There is no lacerations or abrasions.   Lymphadenopathy:     She has no cervical adenopathy.   Neurological: She is alert and oriented to person, place, and time. No cranial nerve deficit. She exhibits normal muscle tone. Coordination normal.   Skin: Skin is warm and dry. No rash noted. She is not diaphoretic. No erythema. No pallor.   Psychiatric: Her behavior is normal.   Nursing note and vitals reviewed.      Procedures         ED Course  ED Course   8:46 PM, 04/13/17:  Spoke Dr. Madden, he wants the patient admitted to Dr. Jean, he will do surgery on the patient.     8:46 PM, 04/13/17:  I spoke with Dr. Jean, he will admit the patient.                  MDM  XR Hip With or Without Pelvis 2 - 3 View Right    (Results Pending)   XR Chest 1 View    (Results Pending)     Labs Reviewed   PROTIME-INR - Normal    Narrative:     Therapeutic Ranges for INR: 2.0-3.0 (PT 20-30)                              2.5-3.5 (PT 25-34)   APTT - Normal    Narrative:     PTT = The equivalent PTT values for the therapeutic range of heparin levels at 0.1 to 0.7 U/ml are 53 to 110 seconds.   CBC WITH AUTO DIFFERENTIAL - Normal   COMPREHENSIVE METABOLIC PANEL   TYPE AND SCREEN   [facility] 2ND SPECIMEN VERIFICATION   CBC AND DIFFERENTIAL    Narrative:     The following orders were created for panel order CBC & Differential.  Procedure                               Abnormality         Status                     ---------                               -----------         ------                     CBC Auto Differential[[id number]]          Normal              Final result                 Please view results for these tests on the individual orders.     8:50 PM, 04/13/17:  Spoke with Dr. Madden, he will consult on the patient and take the patient to the operating room for repair of hip fracture tomorrow.  He wants the hospitalist admit.    8:51 PM, 04/13/17:  I spoke with Dr. Jean, he will admit the patient.    Final diagnoses:   Closed right hip fracture, initial encounter         ED Medications:  Medications   lactated ringers infusion (30 mL/hr Intravenous New Bag 4/13/17 2039)   HYDROmorphone (DILAUDID) injection 1 mg (not administered)   HYDROmorphone (DILAUDID) 1 MG/ML injection  - ADS Override Pull (1 mg  Given 4/13/17 2039)       New Medications:     Medication List      ASK your doctor about these medications          aspirin 81 MG EC tablet       CYMBALTA PO           Stopped Medications:     Medication List      ASK your doctor about these medications          aspirin 81 MG EC tablet       CYMBALTA PO             Final diagnoses:   Closed right hip fracture, initial encounter            Andres Collazo MD  04/13/17 7583

## 2017-04-14 NOTE — ANESTHESIA PREPROCEDURE EVALUATION
Anesthesia Evaluation     Nursing notes reviewed   history of anesthetic complications: PONV  NPO Status: > 8 hours   Airway   Mallampati: III  TM distance: >3 FB  Neck ROM: full  possible difficult intubation  Dental - normal exam     Pulmonary - negative pulmonary ROS and normal exam    breath sounds clear to auscultation  Cardiovascular - negative cardio ROS and normal exam  Exercise tolerance: excellent (>7 METS)    ECG reviewed  Rhythm: regular  Rate: normal        Neuro/Psych- negative ROS  GI/Hepatic/Renal/Endo - negative ROS     Musculoskeletal (-) negative ROS    Abdominal   (+) obese,    Substance History - negative use     OB/GYN negative ob/gyn ROS         Other      history of cancer (basal cell)                                Anesthesia Plan    ASA 2     spinal     Anesthetic plan and risks discussed with patient.  Use of blood products discussed with patient  Consented to blood products.
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
## 2017-04-14 NOTE — PLAN OF CARE
Problem: Perioperative Period (Adult)  Goal: Signs and Symptoms of Listed Potential Problems Will be Absent or Manageable (Perioperative Period)  Outcome: Ongoing (interventions implemented as appropriate)    04/14/17 0802   Perioperative Period   Problems Assessed (Perioperative Period) all   Problems Present (Perioperative Period) pain

## 2017-04-14 NOTE — ANESTHESIA POSTPROCEDURE EVALUATION
Patient: Raven Young    Procedure Summary     Date Anesthesia Start Anesthesia Stop Room / Location    04/14/17 1005 1324 [facility] / [facility]       Procedure Diagnosis Surgeon Provider    FEMUR INTRAMEDULLARY NAILING/RODDING long meir gamma nail (Right Hip) Subtrochanteric fracture of right femur, closed, initial encounter  (Subtrochanteric fracture of right femur, closed, initial encounter [S72.21XA]) MD Sanket Momin CRNA          Anesthesia Type: spinal  Last vitals  BP      Temp      Pulse     Resp      SpO2        Post Anesthesia Care and Evaluation    Patient location during evaluation: bedside  Patient participation: complete - patient participated  Level of consciousness: awake and alert  Pain score: 0  Pain management: adequate  Airway patency: patent  Anesthetic complications: No anesthetic complications    Cardiovascular status: acceptable  Respiratory status: acceptable  Hydration status: acceptable

## 2017-04-14 NOTE — PLAN OF CARE
Problem: Patient Care Overview (Adult)  Goal: Plan of Care Review  Outcome: Ongoing (interventions implemented as appropriate)    04/14/17 0802   Coping/Psychosocial Response Interventions   Plan Of Care Reviewed With patient   Patient Care Overview   Progress no change   Outcome Evaluation   Outcome Summary/Follow up Plan vss, waiting for procedure

## 2017-04-14 NOTE — H&P
HISTORY:  This is a 61-year-old white female patient of Andres Kathleen MD.  She was playing with her nephew and twisted her ankle, felt a pop and fell.  She developed severe pain, unable to bear weight.  She came to the emergency room and was found to have closed right displaced subtrochanteric femoral fracture. She was admitted for further evaluation and treatment.     PAST MEDICAL/SURGICAL HISTORY: She denies any significant medical problems. She only takes Cymbalta for depression. No other hospitalizations or surgeries.     FAMILY HISTORY: Positive for hypertension.     SOCIAL HISTORY: The patient does not smoke. No alcohol use, no illicit drug use.     MEDICATIONS:    1. Cymbalta 60 mg  daily  2. Baby aspirin.     REVIEW OF SYSTEMS: The patient denies any headache, sore throat, cough, congestion, shortness of breath, chest pain, nausea, vomiting, diarrhea, dysuria, or frequency.  No neurological complaints.     PHYSICAL EXAMINATION:  GENERAL: Reveals a very pleasant elderly white female in no acute distress.    VITAL SIGNS: Blood pressure 130/82, respirations 20, pulse 90, afebrile.   HEENT: Pupils are reactive. Extraocular movements are intact. Throat clear. Mucous membranes are moist.   NECK: Supple without adenopathy or carotid bruits.   CHEST: Clear without rales, rhonchi.   HEART: Regular rate and rhythm.  S1, S2. No S3 or S4.   ABDOMEN: Soft. Bowel sounds positive, no splenomegaly.    BACK AND SPINE: Normal.   EXTREMITIES: No clubbing, cyanosis, edema. She has tenderness palpation of the right lateral hip, has decreased range of movement. Neurovascular is intact distally.     DIAGNOSTIC DATA: Blood sugar was slightly elevated at 131.  BUN 12, creatinine 1.04. PT and INR, CBC, chest x-ray, EKG were all normal.  X-ray of the right hip showed a displaced right subtrochanteric femoral fracture.     ASSESSMENT:   1.  Right subtrochanteric femoral fracture.   2.  Depression.    PLAN:  At this time Orthopedics has  been consulted for surgery today. We will continue to monitor carefully. No other changes.       Samantha Jean M.D.*  [id number]  D:  04/14/2017 07:08:05  T:  04/14/2017 08:35:32   Job ID:  [id number]   Document ID: [id number]  cc:

## 2017-04-14 NOTE — PLAN OF CARE
Problem: Patient Care Overview (Adult)  Goal: Plan of Care Review  Outcome: Ongoing (interventions implemented as appropriate)    04/13/17 0479   Coping/Psychosocial Response Interventions   Plan Of Care Reviewed With patient   Patient Care Overview   Progress no change   Outcome Evaluation   Outcome Summary/Follow up Plan broke hip playing with grandkid, in pain medication given         Problem: Fractured Hip (Adult)  Goal: Signs and Symptoms of Listed Potential Problems Will be Absent or Manageable (Fractured Hip)  Outcome: Ongoing (interventions implemented as appropriate)

## 2017-04-15 ENCOUNTER — APPOINTMENT (OUTPATIENT)
Dept: GENERAL RADIOLOGY | Facility: HOSPITAL | Age: 62
End: 2017-04-15

## 2017-04-15 LAB
ANION GAP SERPL CALCULATED.3IONS-SCNC: 11.5 MMOL/L
BUN BLD-MCNC: 15 MG/DL (ref 8–23)
BUN/CREAT SERPL: 15.2 (ref 7–25)
CALCIUM SPEC-SCNC: 7.5 MG/DL (ref 8.8–10.5)
CHLORIDE SERPL-SCNC: 109 MMOL/L (ref 98–107)
CO2 SERPL-SCNC: 23.5 MMOL/L (ref 22–29)
CREAT BLD-MCNC: 0.99 MG/DL (ref 0.57–1)
DEPRECATED RDW RBC AUTO: 45.3 FL (ref 37–54)
DEPRECATED RDW RBC AUTO: 46.5 FL (ref 37–54)
ERYTHROCYTE [DISTWIDTH] IN BLOOD BY AUTOMATED COUNT: 13.7 % (ref 11.5–14.5)
ERYTHROCYTE [DISTWIDTH] IN BLOOD BY AUTOMATED COUNT: 13.9 % (ref 11.5–14.5)
GFR SERPL CREATININE-BSD FRML MDRD: 57 ML/MIN/1.73
GLUCOSE BLD-MCNC: 125 MG/DL (ref 65–99)
HCT VFR BLD AUTO: 26.6 % (ref 37–47)
HCT VFR BLD AUTO: 28 % (ref 37–47)
HGB BLD-MCNC: 8.5 G/DL (ref 12–16)
HGB BLD-MCNC: 8.7 G/DL (ref 12–16)
HYPOCHROMIA BLD QL: NORMAL
LYMPHOCYTES # BLD MANUAL: 1.29 10*3/MM3 (ref 0.6–4.8)
LYMPHOCYTES NFR BLD MANUAL: 20 % (ref 20–45)
LYMPHOCYTES NFR BLD MANUAL: 7 % (ref 3–8)
MACROCYTES BLD QL SMEAR: NORMAL
MCH RBC QN AUTO: 28.6 PG (ref 27–31)
MCH RBC QN AUTO: 28.8 PG (ref 27–31)
MCHC RBC AUTO-ENTMCNC: 31.1 G/DL (ref 31–37)
MCHC RBC AUTO-ENTMCNC: 32 G/DL (ref 31–37)
MCV RBC AUTO: 90.2 FL (ref 81–99)
MCV RBC AUTO: 92.1 FL (ref 81–99)
MONOCYTES # BLD AUTO: 0.45 10*3/MM3 (ref 0–1)
NEUTROPHILS # BLD AUTO: 4.72 10*3/MM3 (ref 1.5–8.3)
NEUTROPHILS NFR BLD MANUAL: 69 % (ref 45–70)
NEUTS BAND NFR BLD MANUAL: 4 % (ref 0–5)
PLAT MORPH BLD: NORMAL
PLATELET # BLD AUTO: 154 10*3/MM3 (ref 140–500)
PLATELET # BLD AUTO: 182 10*3/MM3 (ref 140–500)
PMV BLD AUTO: 10.1 FL (ref 7.4–10.4)
PMV BLD AUTO: 9.9 FL (ref 7.4–10.4)
POTASSIUM BLD-SCNC: 3.8 MMOL/L (ref 3.5–5.2)
RBC # BLD AUTO: 2.95 10*6/MM3 (ref 4.2–5.4)
RBC # BLD AUTO: 3.04 10*6/MM3 (ref 4.2–5.4)
SCAN SLIDE: NORMAL
SODIUM BLD-SCNC: 144 MMOL/L (ref 136–145)
WBC MORPH BLD: NORMAL
WBC NRBC COR # BLD: 6.31 10*3/MM3 (ref 4.8–10.8)
WBC NRBC COR # BLD: 6.46 10*3/MM3 (ref 4.8–10.8)

## 2017-04-15 PROCEDURE — 97165 OT EVAL LOW COMPLEX 30 MIN: CPT

## 2017-04-15 PROCEDURE — 97161 PT EVAL LOW COMPLEX 20 MIN: CPT

## 2017-04-15 PROCEDURE — 80048 BASIC METABOLIC PNL TOTAL CA: CPT | Performed by: FAMILY MEDICINE

## 2017-04-15 PROCEDURE — 85025 COMPLETE CBC W/AUTO DIFF WBC: CPT | Performed by: ORTHOPAEDIC SURGERY

## 2017-04-15 PROCEDURE — 73020 X-RAY EXAM OF SHOULDER: CPT

## 2017-04-15 PROCEDURE — 25010000003 CEFAZOLIN PER 500 MG: Performed by: ORTHOPAEDIC SURGERY

## 2017-04-15 PROCEDURE — 85027 COMPLETE CBC AUTOMATED: CPT | Performed by: FAMILY MEDICINE

## 2017-04-15 PROCEDURE — 99024 POSTOP FOLLOW-UP VISIT: CPT | Performed by: ORTHOPAEDIC SURGERY

## 2017-04-15 PROCEDURE — 85007 BL SMEAR W/DIFF WBC COUNT: CPT | Performed by: ORTHOPAEDIC SURGERY

## 2017-04-15 RX ADMIN — CEFAZOLIN SODIUM 2 G: 2 SOLUTION INTRAVENOUS at 03:05

## 2017-04-15 RX ADMIN — ASPIRIN 325 MG: 325 TABLET, COATED ORAL at 20:07

## 2017-04-15 RX ADMIN — DOCUSATE SODIUM AND SENNOSIDES 2 TABLET: 8.6; 5 TABLET, FILM COATED ORAL at 09:53

## 2017-04-15 RX ADMIN — ACETAMINOPHEN 1000 MG: 500 TABLET ORAL at 20:07

## 2017-04-15 RX ADMIN — ACETAMINOPHEN 1000 MG: 500 TABLET ORAL at 16:36

## 2017-04-15 RX ADMIN — DULOXETINE HYDROCHLORIDE 60 MG: 60 CAPSULE, DELAYED RELEASE ORAL at 09:53

## 2017-04-15 RX ADMIN — ACETAMINOPHEN 1000 MG: 500 TABLET ORAL at 03:35

## 2017-04-15 RX ADMIN — ASPIRIN 325 MG: 325 TABLET, COATED ORAL at 09:53

## 2017-04-15 RX ADMIN — ACETAMINOPHEN 1000 MG: 500 TABLET ORAL at 09:53

## 2017-04-15 RX ADMIN — SODIUM CHLORIDE 125 ML/HR: 9 INJECTION, SOLUTION INTRAVENOUS at 05:50

## 2017-04-15 NOTE — PROGRESS NOTES
Orthopedic Progress Note   Chief Complaint:  Status post ORIF right subtrochanteric femur fracture    Subjective     Interval History: Patient is postop day 1 this morning she is afebrile and hemodynamically stable.  She was hypotensive postoperatively yesterday but she stable this morning.hemoglobin is 8.7  She's had minimal Hemovac drainage.  Pain is well-controlled oral medication. She has to today unable put weight on that right leg.          Objective     Vital Signs  Temp:  [97.2 °F (36.2 °C)-98.4 °F (36.9 °C)] 97.5 °F (36.4 °C)  Heart Rate:  [] 98  Resp:  [15-20] 18  BP: ()/(43-68) 115/67  Body mass index is 30.97 kg/(m^2).    Intake/Output Summary (Last 24 hours) at 04/15/17 1009  Last data filed at 04/15/17 0957   Gross per 24 hour   Intake          5961.67 ml   Output             2650 ml   Net          3311.67 ml     I/O this shift:  In: 240 [P.O.:240]  Out: 300 [Urine:300]       Physical Exam:   General: patient awake, alert and cooperative   Cardiovascular: regular rhythm and rate   Pulm: clear to auscultation bilaterally   Abdomen: Benign.  Soft bowel sounds   Extremities: minimal Hemovac drainage from the right leg.  She has good distal pulses no motor deficit.  No sensory loss.  Dressing is dry.   Neurologic: Normal mood and behavior     Results Review:     I reviewed the patient's new clinical results.      WBC No results found for: WBCS   HGB Hemoglobin   Date Value Ref Range Status   04/15/2017 8.7 (L) 12.0 - 16.0 g/dL Final   04/15/2017 8.5 (L) 12.0 - 16.0 g/dL Final   04/14/2017 9.4 (L) 12.0 - 16.0 g/dL Final   04/13/2017 14.5 12.0 - 16.0 g/dL Final      HCT Hematocrit   Date Value Ref Range Status   04/15/2017 28.0 (L) 37.0 - 47.0 % Final   04/15/2017 26.6 (L) 37.0 - 47.0 % Final   04/14/2017 30.1 (L) 37.0 - 47.0 % Final   04/13/2017 44.2 37.0 - 47.0 % Final      Platlets No results found for: LABPLAT     PT/INR:    Protime   Date Value Ref Range Status   04/13/2017 12.6 12.1 - 15.0  Seconds Final   /  INR   Date Value Ref Range Status   04/13/2017 0.94 0.90 - 1.10 Final       Sodium Sodium   Date Value Ref Range Status   04/15/2017 144 136 - 145 mmol/L Final   04/14/2017 140 136 - 145 mmol/L Final   04/13/2017 142 136 - 145 mmol/L Final      Potassium Potassium   Date Value Ref Range Status   04/15/2017 3.8 3.5 - 5.2 mmol/L Final   04/14/2017 3.6 3.5 - 5.2 mmol/L Final   04/13/2017 4.2 3.5 - 5.2 mmol/L Final      Chloride Chloride   Date Value Ref Range Status   04/15/2017 109 (H) 98 - 107 mmol/L Final   04/14/2017 103 98 - 107 mmol/L Final   04/13/2017 103 98 - 107 mmol/L Final      Bicarbonate No results found for: PLASMABICARB   BUN BUN   Date Value Ref Range Status   04/15/2017 15 8 - 23 mg/dL Final   04/14/2017 16 8 - 23 mg/dL Final   04/13/2017 12 8 - 23 mg/dL Final      Creatinine Creatinine   Date Value Ref Range Status   04/15/2017 0.99 0.57 - 1.00 mg/dL Final   04/14/2017 1.25 (H) 0.57 - 1.00 mg/dL Final   04/13/2017 1.04 (H) 0.57 - 1.00 mg/dL Final      Calcium Calcium   Date Value Ref Range Status   04/15/2017 7.5 (L) 8.8 - 10.5 mg/dL Final   04/14/2017 7.6 (L) 8.8 - 10.5 mg/dL Final   04/13/2017 9.1 8.8 - 10.5 mg/dL Final      Magnesium  AST  ALT  Bilirubin, Total  AlkPhos  Albumin    Amylase  Lipase    Radiology: No results found for: MG  No components found for: AST.*  No components found for: ALT.*  No components found for: BILIRUBIN, TOTAL.*    No components found for: ALKPHOS.*  No components found for: ALBUMIN.*      No components found for: AMYLASE.*  No components found for: LIPASE.*            Imaging Results (most recent)     Procedure Component Value Units Date/Time    XR Chest 1 View [[id number]] Collected:  04/14/17 0808     Updated:  04/14/17 0810    Narrative:       INDICATION: Preoperative assessment for right hip fracture. Preoperative  assessment pulmonary function.      COMPARISON:  None available.     FINDINGS:  Single portable AP view of the chest.     Heart and  mediastinal contours are normal. The lungs are clear. No  pneumothorax or pleural effusion.       Impression:       No acute findings.     This report was finalized on 4/14/2017 8:08 AM by Dr. Duarte Ramirez MD.       XR Hip With or Without Pelvis 2 - 3 View Right [[id number]] Collected:  04/14/17 0808     Updated:  04/14/17 0812    Narrative:       INDICATION: Right hip fracture. Fall. Right hip pain.     FINDINGS: 2 views of the right hip without comparison. There is a  transverse fracture through the proximal right femoral diaphysis just  below the greater trochanter. There is approximate 90 degree angulation  of the fracture. No dislocation.       Impression:       Angulated fracture of the proximal right femur.     This report was finalized on 4/14/2017 8:10 AM by Dr. Duarte Ramirez MD.       XR Hip 1 View Without Pelvis Right (Surgery Only) [[id number]] Collected:  04/14/17 1448     Updated:  04/14/17 1452    Narrative:       INDICATION: Right femur fracture. ORIF.     FINDINGS: Single AP view of the right femur compared to 04/13/2017. The  transverse fracture through the proximal femur has been secured with a  intramedullary nail. There has been propagation of the fracture along  the femoral diaphysis. There is a. Fracture fragment along the medial  margin measuring 3.6 cm. Alignment is anatomic. There are 2 distally  locking screws.       Impression:       1. Anatomic alignment of the proximal right femur status post ORIF.  2. Propagation of the fracture into the more inferior aspect of the  proximal femoral diaphysis.     This report was finalized on 4/14/2017 2:50 PM by Dr. Duarte Ramirez MD.       XR Femur 2 View Right [[id number]] Collected:  04/14/17 1545     Updated:  04/14/17 1548    Narrative:       INDICATION: Right femur fracture ORIF.     FINDINGS: 6 fluoroscopic images of the right femur were submitted for  review. An intramedullary leah and gamma nail transfix the proximal femur  fracture. There are 2  distal locking screws. Alignment is anatomic.  Fluoroscopic time 26.38 minutes.     This report was finalized on 4/14/2017 3:46 PM by Dr. Duarte Ramirez MD.                  sodium chloride 75 mL/hr Last Rate: 75 mL/hr (04/15/17 0951)         Assessment/Plan     Patient Active Problem List   Diagnosis Code   • Closed right hip fracture S72.001A   • Subtrochanteric fracture of right femur S72.21XA       Continue PT OT.  Partial weightbearing right leg.  Patient will benefit from TCU stay.      Keyshawn Madden MD  04/15/17  10:09 AM

## 2017-04-15 NOTE — PLAN OF CARE
Problem: Patient Care Overview (Adult)  Goal: Plan of Care Review    04/15/17 5296   Coping/Psychosocial Response Interventions   Plan Of Care Reviewed With patient   Patient Care Overview   Progress improving   Outcome Evaluation   Outcome Summary/Follow up Plan pt had a fair day, she had her hemovac removed, dressing remains dry/intact. shoulder r was hurting from recent fall, see xray fx noted. dr. groves is aware. required no pain meds today, pain controlled with her tylenol every 6 hours         Problem: Fractured Hip (Adult)  Goal: Signs and Symptoms of Listed Potential Problems Will be Absent or Manageable (Fractured Hip)  Outcome: Ongoing (interventions implemented as appropriate)

## 2017-04-15 NOTE — PLAN OF CARE
Problem: Inpatient Occupational Therapy  Goal: Transfer Training Goal 1 LTG- OT    04/15/17 1051   Transfer Training OT LTG   Transfer Training OT LTG, Date Established 04/15/17   Transfer Training OT LTG, Time to Achieve 2 - 3 days   Transfer Training OT LTG, Activity Type sit to stand/stand to sit;toilet   Transfer Training OT LTG, [place] Level supervision required   Transfer Training OT LTG, Assist Device walker, rolling       Goal: Patient Education Goal STG- OT    04/15/17 1051   Patient Education OT STG   Patient Education OT STG, Date Established 04/15/17   Patient Education OT STG, Time to Achieve 2 - 3 days   Patient Education OT STG, Education Type adaptive equipment mgmt  (for improved ADL independence)   Patient Education OT STG, Education Understanding demonstrates adequately

## 2017-04-15 NOTE — PLAN OF CARE
Problem: Patient Care Overview (Adult)  Goal: Plan of Care Review    04/15/17 1052   Outcome Evaluation   Outcome Summary/Follow up Plan OT evaluation completed. Patient performed sit to stand with CGA, ADLs with min assit. Patient issued reacher for improved independence with painfree ADL tasks. OT to continue to follow while in hospital.

## 2017-04-15 NOTE — PLAN OF CARE
Problem: Inpatient Physical Therapy  Goal: Bed Mobility Goal STG- PT  Outcome: Ongoing (interventions implemented as appropriate)    04/15/17 1010   Bed Mobility PT STG   Bed Mobility PT STG, Date Established 04/15/17   Bed Mobility PT STG, Time to Achieve 2 days   Bed Mobility PT STG, Activity Type supine to sit/sit to supine   Bed Mobility PT STG, [place] Level supervision required       Goal: Transfer Training Goal 1 STG- PT  Outcome: Ongoing (interventions implemented as appropriate)    04/15/17 1010   Transfer Training PT STG   Transfer Training PT STG, Date Established 04/15/17   Transfer Training PT STG, Time to Achieve 2 days   Transfer Training PT STG, Activity Type sit to stand/stand to sit   Transfer Training PT STG, [place] Level supervision required   Transfer Training PT STG, Assist Device walker, standard       Goal: Gait Training Goal LTG- PT  Outcome: Ongoing (interventions implemented as appropriate)    04/15/17 1010   Gait Training PT LTG   Gait Training Goal PT LTG, Time to Achieve 3 days   Gait Training Goal PT LTG, [place] Level supervision required   Gait Training Goal PT LTG, Assist Device walker, standard   Gait Training Goal PT LTG, Distance to Achieve 50 feet, toe touch weightbearing right LE

## 2017-04-15 NOTE — PROGRESS NOTES
"Daily Progress Note:    Subjective: This transfer to ICU yesterday because of persistent postop hypotension.  This is improved and resolved she is sitting up in chair in no complaints    Flowsheet Rows         First Filed Value    Admission Height  62\" (157.5 cm) Documented at 04/13/2017 1935    Admission Weight  168 lb (76.2 kg) Documented at 04/13/2017 1935          Patient Vitals for the past 24 hrs:   BP Temp Temp src Pulse Resp SpO2 Weight   04/15/17 1112 105/55 98 °F (36.7 °C) Oral 93 18 100 % -   04/15/17 0957 - - - 98 - 98 % -   04/15/17 0645 115/67 97.5 °F (36.4 °C) Axillary 90 18 94 % 169 lb 5 oz (76.8 kg)   04/15/17 0505 111/63 - - 84 - 98 % -   04/15/17 0425 - 98.4 °F (36.9 °C) Oral - - - -   04/15/17 0405 105/65 - - 82 - 95 % -   04/15/17 0305 112/66 - - 88 - 93 % -   04/15/17 0205 101/62 - - 90 - 97 % -   04/15/17 0105 115/63 - - 91 - 95 % -   04/15/17 0005 109/60 - - 91 - 95 % -   04/14/17 2310 122/65 - - 95 - 97 % -   04/14/17 2205 111/65 - - 97 - 96 % -   04/14/17 2135 100/56 - - 96 - 93 % -   04/14/17 2105 107/59 - - 96 - 95 % -   04/14/17 2035 100/57 - - 93 - 92 % -   04/14/17 2005 95/54 - - 93 - 93 % -   04/14/17 1956 93/53 97.8 °F (36.6 °C) Axillary 91 18 93 % -   04/14/17 1935 93/53 - - 90 - 93 % -   04/14/17 1925 (!) 77/51 - - 91 - 92 % -   04/14/17 1920 (!) 87/43 - - 94 - 93 % -   04/14/17 1802 (!) 86/48 - - - - - -   04/14/17 1705 (!) 85/45 97.6 °F (36.4 °C) Oral 98 18 97 % -   04/14/17 1635 (!) 84/43 98 °F (36.7 °C) Oral 102 16 96 % -   04/14/17 1551 93/54 98 °F (36.7 °C) Oral 107 18 98 % -   04/14/17 1542 - - - 100 20 93 % -   04/14/17 1539 91/51 97.2 °F (36.2 °C) Oral 103 18 94 % -   04/14/17 1525 (!) 84/43 97.2 °F (36.2 °C) Oral 99 18 95 % -   04/14/17 1506 103/51 98.4 °F (36.9 °C) - 99 18 98 % -   04/14/17 1458 98/51 - - 98 - 95 % -   04/14/17 1450 101/50 - - 98 - 94 % -   04/14/17 1434 (!) 87/68 - - 97 - 97 % -   04/14/17 1430 98/61 - - 95 - 96 % -   04/14/17 1426 91/55 - - 96 - 98 % - "   04/14/17 1422 (!) 84/52 - - 95 - 97 % -   04/14/17 1418 (!) 84/51 - - 98 - 98 % -   04/14/17 1415 (!) 88/53 - - 93 - 99 % -   04/14/17 1412 (!) 89/59 - - 96 - 98 % -   04/14/17 1406 98/52 - - 91 - 97 % -   04/14/17 1402 (!) 78/53 - - 92 - 97 % -   04/14/17 1350 (!) 80/57 - - 86 15 99 % -   04/14/17 1346 (!) 81/51 - - 85 15 99 % -   04/14/17 1343 (!) 70/56 98.4 °F (36.9 °C) Temporal Art 96 15 97 % -   04/14/17 1342 (!) 70/56 - - 88 - 97 % -   04/14/17 1332 (!) 78/53 - - 96 16 97 % -   04/14/17 1327 (!) 77/53 - - 96 15 97 % -   04/14/17 1322 (!) 76/43 - - 94 15 97 % -   04/14/17 1317 (!) 77/59 - - 94 15 96 % -         Intake/Output Summary (Last 24 hours) at 04/15/17 1236  Last data filed at 04/15/17 0957   Gross per 24 hour   Intake          4461.67 ml   Output             2350 ml   Net          2111.67 ml       Review of Systems   Constitutional: Negative for activity change, appetite change and fatigue.   Respiratory: Negative for cough, chest tightness, shortness of breath and wheezing.    Cardiovascular: Negative for chest pain.   Gastrointestinal: Negative for abdominal distention, abdominal pain, diarrhea, nausea and vomiting.   Genitourinary: Negative for frequency.   Skin: Negative for rash.   Psychiatric/Behavioral: Negative for agitation.       Physical Exam   Constitutional: She appears well-developed and well-nourished.   HENT:   Head: Normocephalic.   Mouth/Throat: Oropharynx is clear and moist.   Eyes: Conjunctivae are normal.   Neck: Normal range of motion. No JVD present. No thyromegaly present.   Cardiovascular: Normal rate, regular rhythm and normal heart sounds.    No murmur heard.  Pulmonary/Chest: Effort normal and breath sounds normal. No respiratory distress. She has no wheezes. She has no rales.   Abdominal: Soft. Bowel sounds are normal. She exhibits no distension. There is no tenderness. There is no guarding.   Musculoskeletal: She exhibits tenderness (r hip).   Neurological: She is alert.    Skin: Skin is warm and dry. No rash noted.   Nursing note and vitals reviewed.          Medication Review:   I have reviewed the patient's current medication list      acetaminophen 1,000 mg Oral Q6H   aspirin 325 mg Oral Q12H   DULoxetine 60 mg Oral Daily   sennosides-docusate sodium 2 tablet Oral BID           Labs:    Results from last 7 days  Lab Units 04/15/17  0558 04/15/17  0400 04/14/17 1709   WBC 10*3/mm3 6.31 6.46 9.13   HEMOGLOBIN g/dL 8.7* 8.5* 9.4*   HEMATOCRIT % 28.0* 26.6* 30.1*   PLATELETS 10*3/mm3 154 182 209   MONOCYTES % %  --  7.0 6.0       Results from last 7 days  Lab Units 04/15/17  0558 04/14/17  1709 04/13/17  2016   SODIUM mmol/L 144 140 142   POTASSIUM mmol/L 3.8 3.6 4.2   CHLORIDE mmol/L 109* 103 103   TOTAL CO2 mmol/L 23.5 24.9 24.5   BUN mg/dL 15 16 12   CREATININE mg/dL 0.99 1.25* 1.04*   CALCIUM mg/dL 7.5* 7.6* 9.1   BILIRUBIN mg/dL  --   --  0.4   ALK PHOS U/L  --   --  118   ALT (SGPT) U/L  --   --  17   AST (SGOT) U/L  --   --  22   GLUCOSE mg/dL 125* 163* 131*           Lab Results (last 24 hours)     Procedure Component Value Units Date/Time    Basic Metabolic Panel [[id number]]  (Abnormal) Collected:  04/14/17 1709    Specimen:  Blood Updated:  04/14/17 1731     Glucose 163 (H) mg/dL      BUN 16 mg/dL      Creatinine 1.25 (H) mg/dL      Sodium 140 mmol/L      Potassium 3.6 mmol/L      Chloride 103 mmol/L      CO2 24.9 mmol/L      Calcium 7.6 (L) mg/dL      eGFR Non African Amer 43 (L) mL/min/1.73      BUN/Creatinine Ratio 12.8     Anion Gap 12.1 mmol/L     Narrative:       GFR Normal >60  Chronic Kidney Disease <60  Kidney Failure <15    Troponin [[id number]]  (Normal) Collected:  04/14/17 1709    Specimen:  Blood Updated:  04/14/17 1731     Troponin T <0.010 ng/mL     Narrative:       Troponin T Reference Ranges:  Less than 0.03 ng/mL:    Negative for AMI  0.03 to 0.09 ng/mL:      Indeterminant for AMI  Greater than 0.09 ng/mL: Positive for AMI    CBC Auto Differential  [[id number]]  (Abnormal) Collected:  04/14/17 1709    Specimen:  Blood Updated:  04/14/17 1734     WBC 9.13 10*3/mm3      RBC 3.34 (L) 10*6/mm3      Hemoglobin 9.4 (L) g/dL      Hematocrit 30.1 (L) %      MCV 90.1 fL      MCH 28.1 pg      MCHC 31.2 g/dL      RDW 13.7 %      RDW-SD 45.3 fl      MPV 9.2 fL      Platelets 209 10*3/mm3      Neutrophil % 88.4 (H) %      Lymphocyte % 5.3 (L) %      Monocyte % 6.0 %      Eosinophil % 0.0 %      Basophil % 0.1 %      Immature Grans % 0.2 %      Neutrophils, Absolute 8.07 10*3/mm3      Lymphocytes, Absolute 0.48 (L) 10*3/mm3      Monocytes, Absolute 0.55 10*3/mm3      Eosinophils, Absolute 0.00 (L) 10*3/mm3      Basophils, Absolute 0.01 10*3/mm3      Immature Grans, Absolute 0.02 10*3/mm3      nRBC 0.0 /100 WBC     Manual Differential [[id number]]  (Abnormal) Collected:  04/14/17 1709    Specimen:  Blood Updated:  04/14/17 1735     Neutrophil % 87.0 (H) %      Lymphocyte % 2.0 (L) %      Monocyte % 6.0 %      Bands %  5.0 %      Neutrophils Absolute 8.40 (H) 10*3/mm3      Lymphocytes Absolute 0.18 (L) 10*3/mm3      Monocytes Absolute 0.55 10*3/mm3      RBC Morphology Normal     WBC Morphology Normal     Platelet Estimate Adequate    CBC & Differential [[id number]] Collected:  04/14/17 1709    Specimen:  Blood Updated:  04/14/17 1735    Narrative:       The following orders were created for panel order CBC & Differential.  Procedure                               Abnormality         Status                     ---------                               -----------         ------                     Manual Differential[[id number]]           Abnormal            Final result               Scan Slide[[id number]]                                        Final result               CBC Auto Differential[[id number]]         Abnormal            Final result                 Please view results for these tests on the individual orders.    Scan Slide [[id number]] Collected:  04/14/17 1709    Specimen:  Blood  Updated:  04/14/17 1735     Scan Slide --      See Manual Differential Results       CBC & Differential [[id number]] Collected:  04/15/17 0400    Specimen:  Blood Updated:  04/15/17 0545    Narrative:       The following orders were created for panel order CBC & Differential.  Procedure                               Abnormality         Status                     ---------                               -----------         ------                     Manual Differential[[id number]]                               Final result               Scan Slide[[id number]]                                        Final result               CBC Auto Differential[[id number]]         Abnormal            Final result                 Please view results for these tests on the individual orders.    CBC Auto Differential [[id number]]  (Abnormal) Collected:  04/15/17 0400    Specimen:  Blood Updated:  04/15/17 0545     WBC 6.46 10*3/mm3      RBC 2.95 (L) 10*6/mm3      Hemoglobin 8.5 (L) g/dL      Hematocrit 26.6 (L) %      MCV 90.2 fL      MCH 28.8 pg      MCHC 32.0 g/dL      RDW 13.7 %      RDW-SD 45.3 fl      MPV 9.9 fL      Platelets 182 10*3/mm3     Scan Slide [[id number]] Collected:  04/15/17 0400    Specimen:  Blood Updated:  04/15/17 0545     Scan Slide --      See Manual Differential Results       Manual Differential [[id number]] Collected:  04/15/17 0400    Specimen:  Blood Updated:  04/15/17 0545     Neutrophil % 69.0 %      Lymphocyte % 20.0 %      Monocyte % 7.0 %      Bands %  4.0 %      Neutrophils Absolute 4.72 10*3/mm3      Lymphocytes Absolute 1.29 10*3/mm3      Monocytes Absolute 0.45 10*3/mm3      Hypochromia Slight/1+     Macrocytes Slight/1+     WBC Morphology Normal     Platelet Morphology Normal    CBC (No Diff) [[id number]]  (Abnormal) Collected:  04/15/17 0558    Specimen:  Blood Updated:  04/15/17 0601     WBC 6.31 10*3/mm3      RBC 3.04 (L) 10*6/mm3      Hemoglobin 8.7 (L) g/dL      Hematocrit 28.0 (L) %      MCV 92.1 fL      MCH  28.6 pg      MCHC 31.1 g/dL      RDW 13.9 %      RDW-SD 46.5 fl      MPV 10.1 fL      Platelets 154 10*3/mm3     Basic Metabolic Panel [[id number]]  (Abnormal) Collected:  04/15/17 0558    Specimen:  Blood Updated:  04/15/17 0616     Glucose 125 (H) mg/dL      BUN 15 mg/dL      Creatinine 0.99 mg/dL      Sodium 144 mmol/L      Potassium 3.8 mmol/L      Chloride 109 (H) mmol/L      CO2 23.5 mmol/L      Calcium 7.5 (L) mg/dL      eGFR Non African Amer 57 (L) mL/min/1.73      BUN/Creatinine Ratio 15.2     Anion Gap 11.5 mmol/L     Narrative:       GFR Normal >60  Chronic Kidney Disease <60  Kidney Failure <15              Radiology:  Imaging Results (last 24 hours)     Procedure Component Value Units Date/Time    XR Hip 1 View Without Pelvis Right (Surgery Only) [[id number]] Collected:  04/14/17 1448     Updated:  04/14/17 1452    Narrative:       INDICATION: Right femur fracture. ORIF.     FINDINGS: Single AP view of the right femur compared to 04/13/2017. The  transverse fracture through the proximal femur has been secured with a  intramedullary nail. There has been propagation of the fracture along  the femoral diaphysis. There is a. Fracture fragment along the medial  margin measuring 3.6 cm. Alignment is anatomic. There are 2 distally  locking screws.       Impression:       1. Anatomic alignment of the proximal right femur status post ORIF.  2. Propagation of the fracture into the more inferior aspect of the  proximal femoral diaphysis.     This report was finalized on 4/14/2017 2:50 PM by Dr. Duarte Ramirez MD.       XR Femur 2 View Right [[id number]] Collected:  04/14/17 1545     Updated:  04/14/17 1548    Narrative:       INDICATION: Right femur fracture ORIF.     FINDINGS: 6 fluoroscopic images of the right femur were submitted for  review. An intramedullary leah and gamma nail transfix the proximal femur  fracture. There are 2 distal locking screws. Alignment is anatomic.  Fluoroscopic time 26.38 minutes.     This  "report was finalized on 4/14/2017 3:46 PM by Dr. Duarte Ramirez MD.       XR Shoulder 1 View Left [[id number]] Updated:  04/15/17 1119          Cardiology:  ECG/EMG Results (last 24 hours)     ** No results found for the last 24 hours. **          I have reviewed consult notes.    Assessment and Plan:    1. Postoperative hypotension has resolved.  We will transfer to [facility] today    2.  Postoperative anemia will continue monitor H&H's    3.  Postoperatively #1 \"internal fixation right femoral fracture        "

## 2017-04-15 NOTE — PLAN OF CARE
Problem: Patient Care Overview (Adult)  Goal: Plan of Care Review  Outcome: Ongoing (interventions implemented as appropriate)    04/15/17 1008   Coping/Psychosocial Response Interventions   Plan Of Care Reviewed With patient   Patient Care Overview   Progress progress toward functional goals as expected   Outcome Evaluation   Outcome Summary/Follow up Plan PT: physical therapy evaluation completed. Patient able to ambulate 3 feet forward and perform pivot transfer BSC to chair with min A and second person assist for equipment. She is able to maintain toe-touch weightbearing restrictions on right LE during transfers and ambulation. Minimal c/o pain. Recomend home with assist/home health up on discharge. REcommend standard walker upon discharge home. PT to see while in hospital to progress safety and independence with functional mobility.

## 2017-04-15 NOTE — PROGRESS NOTES
Acute Care - Occupational Therapy Initial Evaluation   Deya Causey     Patient Name: Raven Young  : 1955  MRN: [id number]  Today's Date: 4/15/2017  Onset of Illness/Injury or Date of Surgery Date: 17  Date of Referral to OT: 04/15/17  Referring Physician: Dr Madden    Admit Date: 2017       ICD-10-CM ICD-9-CM   1. Subtrochanteric fracture of right femur, closed, initial encounter S72.21XA 820.22   2. Closed right hip fracture, initial encounter S72.001A 820.8   3. Fall, initial encounter W19.XXXA E888.9   4. Pain R52 780.96     Patient Active Problem List   Diagnosis   • Closed right hip fracture   • Subtrochanteric fracture of right femur     Past Medical History:   Diagnosis Date   • Cancer     basal cell     Past Surgical History:   Procedure Laterality Date   • TUBAL ABDOMINAL LIGATION            OT ASSESSMENT FLOWSHEET (last 72 hours)      OT Evaluation       04/15/17 1000           Document Type evaluation  -EN    Subjective Information agree to therapy;complains of;pain  -EN    Patient Effort, Rehab Treatment good  -EN    Symptoms Noted During/After Treatment none  -EN       Patient Profile Review yes  -EN    Onset of Illness/Injury or Date of Surgery Date 17  -EN    Referring Physician Dr Madden  -EN    General Observations Patient sitting in recliner. Sister and nurse in room.  -EN    Pertinent History Of Current Problem Patient was playing soccer in her living room with family and twisted and felt a pop in her right hip. Brought to ER and diagnosed with a displaced subtrochanteric right femur fracture. Is now s/p right hip gamma nailing.  -EN    Precautions/Limitations --   WBAT per verbal order from Dr. 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  -EN    Living Arrangements house  -EN    Home Accessibility no concerns;ramps present at home  -EN    Stair Railings at Home     Type of Financial/Environmental Concern     Transportation Available     Living Environment Comment One level home with elevated toilet, ramp into home. 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 Patient reports she has 2 wheelchairs and standard wallker that are her spouses.   -EN       Date of Referral to OT 04/15/17  -EN    Functional Level At Time Of Evaluation Patient stood with CGA  with walker. Patinet with good UE ROM/strength. Min assist for adls. Issued a reacher for improved painfree ADL independence.  -EN    Patient/Family Goals Statement To return home  -EN    Criteria for Skilled Therapeutic Interventions Met yes;treatment indicated  -EN    Rehab Potential good, to achieve stated therapy goals  -EN    Therapy Frequency 3-5 times/wk  -EN    Predicted Duration of Therapy Intervention (days/wks) will continue to assess  -EN    Anticipated Equipment Needs At Discharge --   rolling walker  -EN    Anticipated Discharge Disposition --   will continue to assess  -EN       Ambulation 0-->independent  -EN    Transferring 0-->independent  -EN    Toileting 0-->independent  -EN    Bathing 0-->independent  -EN    Dressing 0-->independent  -EN    Eating 0-->independent  -EN    Communication 0-->understands/communicates without difficulty  -EN    Swallowing        Pain Assessment 0-10  -EN    Pain Score 1  -EN    Post Pain Score     Pain Type Acute pain;Surgical pain  -EN    Pain Location Hip  -EN    Pain Orientation Right  -EN    Pain Descriptors Aching  -EN    Pain Frequency Constant/continuous  -EN    Pain Intervention(s) Medication  (See MAR);Repositioned  -EN    Response to Interventions        Current Cognitive/Communication Assessment functional  -EN    Orientation Status oriented x 4  -EN    Follows Commands/Answers Questions 100% of the time  -EN    Personal Safety WNL/WFL  -EN    Personal Safety Interventions gait belt;nonskid shoes/slippers when out of bed  -EN       General ROM Detail B UE AROM WFL  -EN       General MMT Assessment Detail B UE strength WFL  -EN       Extremity Weight-Bearing Status     Right Lower Extremity Weight-Bearing        Bed Mobility, Assistive Device     Bed Mob, Supine to Sit, Lyles     Bed Mobility, Comment deferred, up in chair  -EN       Transfers, Bed-Chair Lyles     Transfers, Bed-Chair-Bed, Assist Device     Transfers, Sit-Stand Lyles contact guard assist  -EN    Transfers, Stand-Sit Lyles contact guard assist  -EN    Transfers, Sit-Stand-Sit, Assist Device standard walker  -EN    Transfer, Comment Following evaluation OT spoke to Dr. Madden for clarification of hip precautions. Dr. Madden reported patient could weight bear as tolerated and had no other precautions.   -EN       LB Bathing Assess/Train, Comment        Right Lower Extremity        Sensory Impairment        Planned Therapy Interventions adaptive equipment training;ADL retraining;transfer training  -EN       Pre-Treatment Position sitting in chair/recliner  -EN    Post Treatment Position chair  -EN    In Chair with family/caregiver;reclined;call light within reach   notified MD  -EN      User Key  (r) = Recorded By, (t) = Taken By, (c) = Cosigned By    Initials Name Effective Dates     Adelaide Call, PT 08/11/15 -     EN Kaitlynn Myles, OTR 06/22/16 -     TL Esha Moffett, RALPH 06/16/16 -     CP Mercy Bradshaw, RALPH 06/16/16 -            Occupational Therapy Education     Title: PT OT SLP Therapies (Done)     Topic: Occupational Therapy (Done)     Point: ADL training (Done)    Description: Instruct  learner(s) on proper safety adaptation and remediation techniques during self care or transfers.   Instruct in proper use of assistive devices.    Learning Progress Summary    Learner Readiness Method Response Comment Documented by Status   Patient Acceptance E VU Patient educated on use of reacher for painfree ADLs and safety with transfers. EN 04/15/17 1000 Done                      User Key     Initials Effective Dates Name Provider Type Discipline    EN 06/22/16 -  Kaitlynn Myles OTR Occupational Therapist OT                  OT Recommendation and Plan  Anticipated Equipment Needs At Discharge:  (rolling walker)  Anticipated Discharge Disposition:  (will continue to assess)  Planned Therapy Interventions: adaptive equipment training, ADL retraining, transfer training  Therapy Frequency: 3-5 times/wk  Plan of Care Review  Outcome Summary/Follow up Plan: OT evaluation completed. Patient performed sit to stand with CGA, ADLs with min assit. Patient issued reacher for improved independence with pain free ADL tasks. OT to continue to follow while in hospital.          OT Goals       04/15/17 1051          Transfer Training OT LTG    Transfer Training OT LTG, Date Established 04/15/17  -EN      Transfer Training OT LTG, Time to Achieve 2 - 3 days  -EN      Transfer Training OT LTG, Activity Type sit to stand/stand to sit;toilet  -EN      Transfer Training OT LTG, [place] Level supervision required  -EN      Transfer Training OT LTG, Assist Device walker, rolling  -EN      Patient Education OT STG    Patient Education OT STG, Date Established 04/15/17  -EN      Patient Education OT STG, Time to Achieve 2 - 3 days  -EN      Patient Education OT STG, Education Type adaptive equipment mgmt   for improved ADL independence  -EN      Patient Education OT STG, Education Understanding demonstrates adequately  -EN        User Key  (r) = Recorded By, (t) = Taken By, (c) = Cosigned By    Initials Name Provider Type     ANDRE Buckley Occupational Therapist                Outcome Measures       04/15/17 1000 04/15/17 0100       How much help from another person do you currently need...    Turning from your back to your side while in flat bed without using bedrails? 3  -LH      Moving from lying on back to sitting on the side of a flat bed without bedrails? 3  -LH      Moving to and from a bed to a chair (including a wheelchair)? 3  -LH      Standing up from a chair using your arms (e.g., wheelchair, bedside chair)? 3  -LH      Climbing 3-5 steps with a railing? 2  -LH      To walk in hospital room? 3  -LH      AM-PAC 6 Clicks Score 17  -LH      How much help from another is currently needed...    Putting on and taking off regular lower body clothing? 3  -EN      Bathing (including washing, rinsing, and drying) 3  -EN      Toileting (which includes using toilet bed pan or urinal) 3  -EN      Putting on and taking off regular upper body clothing 4  -EN      Taking care of personal grooming (such as brushing teeth) 4  -EN      Eating meals 4  -EN      Score 21  -EN      Functional Assessment    Outcome Measure Options AM-PAC 6 Clicks Basic Mobility (PT)  - AM-PAC 6 Clicks Daily Activity (OT)  -EN       User Key  (r) = Recorded By, (t) = Taken By, (c) = Cosigned By    Initials Name Provider Type     Adelaide Call, PT Physical Therapist    ANDRE Buckley Occupational Therapist          Time Calculation:   OT Start Time: 1000    Therapy Charges for Today     Code Description Service Date Service Provider Modifiers Qty    [id number]  OT EVAL LOW COMPLEXITY 2 4/15/2017 ANDRE Tee GO 1               ANDRE Blakely  4/15/2017

## 2017-04-15 NOTE — PROGRESS NOTES
Patient: Raven Young  Procedure(s) with comments:  FEMUR INTRAMEDULLARY NAILING/RODDING long meir gamma nail 7fr hemovac placement - SPECIAL EQUIPMENT:meir long gamma nail  ASSISTANT NEEDED:yes  EXPAREL 20 ML PLACED ON STERILE FIELD FOR SURGEON INJECTION (FOR INFILTRATION INTO THE SURGICAL SITE)yes  c-aem  Anesthesia type: [unfilled]    Patient location: ICU  Vitals:    04/15/17 0505 04/15/17 0645 04/15/17 0957 04/15/17 1112   BP: 111/63 115/67  105/55   BP Location:  Left arm  Left arm   Patient Position:  Lying  Lying   Pulse: 84 90 98 93   Resp:  18  18   Temp:  97.5 °F (36.4 °C)  98 °F (36.7 °C)   TempSrc:  Axillary  Oral   SpO2: 98% 94% 98% 100%   Weight:  169 lb 5 oz (76.8 kg)     Height:         Level of consciousness: awake, alert and oriented    Post-anesthesia pain: adequate analgesia  Airway patency: patent  Respiratory: unassisted  Cardiovascular: stable and blood pressure at baseline  Hydration: euvolemic    Anesthetic complications: yes: Pt was admitted to the ICU post-op for hypotension.  Today is sitting up in bed with normal BP.

## 2017-04-15 NOTE — PLAN OF CARE
Problem: Patient Care Overview (Adult)  Goal: Discharge Needs Assessment  Outcome: Ongoing (interventions implemented as appropriate)    04/13/17 2150 04/15/17 1000 04/15/17 1129   Discharge Needs Assessment   Discharge Planning Comments --  --  CM spoke with patient at bedside. Patient's  is disabled in a WC at home. She is primary care provider for him. Patient would like to do rehab at [facility]. Referral made to Stacey Webb. Once patient is released to go back home, her sister will be living with her providing support. Patient has walker, cane. NO 02, HH. Uses [facility], denies trouble paying for meds. Denies information on living ortega. Will continue to follow through to discharge.    Living Environment   Transportation Available family or friend will provide;car --  --    Self-Care   Equipment Currently Used at Home --  none --

## 2017-04-16 LAB
ANION GAP SERPL CALCULATED.3IONS-SCNC: 12.1 MMOL/L
BASOPHILS # BLD AUTO: 0.03 10*3/MM3 (ref 0–0.2)
BASOPHILS NFR BLD AUTO: 0.4 % (ref 0–2)
BUN BLD-MCNC: 13 MG/DL (ref 8–23)
BUN/CREAT SERPL: 14 (ref 7–25)
CALCIUM SPEC-SCNC: 8.1 MG/DL (ref 8.8–10.5)
CHLORIDE SERPL-SCNC: 107 MMOL/L (ref 98–107)
CO2 SERPL-SCNC: 24.9 MMOL/L (ref 22–29)
CREAT BLD-MCNC: 0.93 MG/DL (ref 0.57–1)
DEPRECATED RDW RBC AUTO: 46.4 FL (ref 37–54)
EOSINOPHIL # BLD AUTO: 0.2 10*3/MM3 (ref 0.1–0.3)
EOSINOPHIL NFR BLD AUTO: 2.7 % (ref 0–4)
ERYTHROCYTE [DISTWIDTH] IN BLOOD BY AUTOMATED COUNT: 14.1 % (ref 11.5–14.5)
GFR SERPL CREATININE-BSD FRML MDRD: 61 ML/MIN/1.73
GLUCOSE BLD-MCNC: 149 MG/DL (ref 65–99)
HCT VFR BLD AUTO: 27.5 % (ref 37–47)
HGB BLD-MCNC: 8.7 G/DL (ref 12–16)
IMM GRANULOCYTES # BLD: 0.02 10*3/MM3 (ref 0–0.03)
IMM GRANULOCYTES NFR BLD: 0.3 % (ref 0–0.5)
LYMPHOCYTES # BLD AUTO: 1.35 10*3/MM3 (ref 0.6–4.8)
LYMPHOCYTES NFR BLD AUTO: 18.2 % (ref 20–45)
MCH RBC QN AUTO: 28.6 PG (ref 27–31)
MCHC RBC AUTO-ENTMCNC: 31.6 G/DL (ref 31–37)
MCV RBC AUTO: 90.5 FL (ref 81–99)
MONOCYTES # BLD AUTO: 0.51 10*3/MM3 (ref 0–1)
MONOCYTES NFR BLD AUTO: 6.9 % (ref 3–8)
NEUTROPHILS # BLD AUTO: 5.29 10*3/MM3 (ref 1.5–8.3)
NEUTROPHILS NFR BLD AUTO: 71.5 % (ref 45–70)
NRBC BLD MANUAL-RTO: 0 /100 WBC (ref 0–0)
PLATELET # BLD AUTO: 207 10*3/MM3 (ref 140–500)
PMV BLD AUTO: 9.9 FL (ref 7.4–10.4)
POTASSIUM BLD-SCNC: 3.4 MMOL/L (ref 3.5–5.2)
RBC # BLD AUTO: 3.04 10*6/MM3 (ref 4.2–5.4)
SODIUM BLD-SCNC: 144 MMOL/L (ref 136–145)
WBC NRBC COR # BLD: 7.4 10*3/MM3 (ref 4.8–10.8)

## 2017-04-16 PROCEDURE — 80048 BASIC METABOLIC PNL TOTAL CA: CPT | Performed by: FAMILY MEDICINE

## 2017-04-16 PROCEDURE — 85025 COMPLETE CBC W/AUTO DIFF WBC: CPT | Performed by: ORTHOPAEDIC SURGERY

## 2017-04-16 PROCEDURE — 97110 THERAPEUTIC EXERCISES: CPT

## 2017-04-16 PROCEDURE — 97116 GAIT TRAINING THERAPY: CPT

## 2017-04-16 PROCEDURE — 99024 POSTOP FOLLOW-UP VISIT: CPT | Performed by: ORTHOPAEDIC SURGERY

## 2017-04-16 RX ORDER — IRON POLYSACCHARIDE COMPLEX 150 MG
150 CAPSULE ORAL 2 TIMES DAILY
Status: DISCONTINUED | OUTPATIENT
Start: 2017-04-16 | End: 2017-04-18 | Stop reason: HOSPADM

## 2017-04-16 RX ORDER — POTASSIUM CHLORIDE 20 MEQ/1
40 TABLET, EXTENDED RELEASE ORAL ONCE
Status: COMPLETED | OUTPATIENT
Start: 2017-04-16 | End: 2017-04-16

## 2017-04-16 RX ADMIN — DULOXETINE HYDROCHLORIDE 60 MG: 60 CAPSULE, DELAYED RELEASE ORAL at 08:06

## 2017-04-16 RX ADMIN — DOCUSATE SODIUM AND SENNOSIDES 2 TABLET: 8.6; 5 TABLET, FILM COATED ORAL at 20:21

## 2017-04-16 RX ADMIN — ASPIRIN 325 MG: 325 TABLET, COATED ORAL at 08:05

## 2017-04-16 RX ADMIN — ACETAMINOPHEN 1000 MG: 500 TABLET ORAL at 16:44

## 2017-04-16 RX ADMIN — Medication 150 MG: at 14:07

## 2017-04-16 RX ADMIN — ACETAMINOPHEN 1000 MG: 500 TABLET ORAL at 03:27

## 2017-04-16 RX ADMIN — ASPIRIN 325 MG: 325 TABLET, COATED ORAL at 20:20

## 2017-04-16 RX ADMIN — ACETAMINOPHEN 1000 MG: 500 TABLET ORAL at 22:54

## 2017-04-16 RX ADMIN — Medication 150 MG: at 20:20

## 2017-04-16 RX ADMIN — ACETAMINOPHEN 1000 MG: 500 TABLET ORAL at 08:06

## 2017-04-16 RX ADMIN — POTASSIUM CHLORIDE 40 MEQ: 20 TABLET, EXTENDED RELEASE ORAL at 14:07

## 2017-04-16 NOTE — PLAN OF CARE
Problem: Patient Care Overview (Adult)  Goal: Plan of Care Review  Outcome: Ongoing (interventions implemented as appropriate)    04/16/17 1650   Coping/Psychosocial Response Interventions   Plan Of Care Reviewed With patient   Patient Care Overview   Progress progress towards functional goals is fair   Outcome Evaluation   Outcome Summary/Follow up Plan pt had a fair day, was up with PT and walked in zepeda with assist of x2 and walker. sit in chair ate breakfast, then rested this afternoon. dressing to hip changed 7 day dressing applied. hoping to go home in the am. pain a 3 with movement. tylenol 1000 given every 6 hours and has held pt with her pain         Problem: Fractured Hip (Adult)  Goal: Signs and Symptoms of Listed Potential Problems Will be Absent or Manageable (Fractured Hip)  Outcome: Ongoing (interventions implemented as appropriate)

## 2017-04-16 NOTE — PROGRESS NOTES
"Daily Progress Note:    Subjective: Patient doing well without complaints and up in a chair    Flowsheet Rows         First Filed Value    Admission Height  62\" (157.5 cm) Documented at 04/13/2017 1935    Admission Weight  168 lb (76.2 kg) Documented at 04/13/2017 1935          Patient Vitals for the past 24 hrs:   BP Temp Temp src Pulse Resp SpO2   04/16/17 1147 91/58 97.4 °F (36.3 °C) Oral 97 18 99 %   04/16/17 0808 91/56 98.1 °F (36.7 °C) Oral 98 20 92 %   04/16/17 0640 - 98.4 °F (36.9 °C) - - - -   04/16/17 0405 117/64 - - - - -   04/16/17 0005 108/61 - - - - -   04/15/17 2305 108/58 - - - - -   04/15/17 2205 107/61 - - - - -   04/15/17 2105 108/71 - - - - -   04/15/17 2005 90/48 98.1 °F (36.7 °C) - - - -   04/15/17 1905 100/58 - - - - -   04/15/17 1844 100/52 98.4 °F (36.9 °C) Oral 106 18 90 %   04/15/17 1531 98/56 98.4 °F (36.9 °C) Oral 103 18 100 %         Intake/Output Summary (Last 24 hours) at 04/16/17 1223  Last data filed at 04/16/17 0920   Gross per 24 hour   Intake          1360.83 ml   Output                0 ml   Net          1360.83 ml       Review of Systems   Constitutional: Negative for activity change, appetite change and fatigue.   Respiratory: Negative for cough, chest tightness, shortness of breath and wheezing.    Cardiovascular: Negative for chest pain.   Gastrointestinal: Negative for abdominal distention, abdominal pain, diarrhea, nausea and vomiting.   Genitourinary: Negative for frequency.   Skin: Negative for rash.   Psychiatric/Behavioral: Negative for agitation.       Physical Exam   Constitutional: She appears well-developed and well-nourished.   HENT:   Head: Normocephalic.   Mouth/Throat: Oropharynx is clear and moist.   Eyes: Conjunctivae are normal.   Neck: Normal range of motion. No JVD present. No thyromegaly present.   Cardiovascular: Normal rate, regular rhythm and normal heart sounds.    No murmur heard.  Pulmonary/Chest: Effort normal and breath sounds normal. No " respiratory distress. She has no wheezes. She has no rales.   Abdominal: Soft. Bowel sounds are normal. She exhibits no distension. There is no tenderness. There is no guarding.   Musculoskeletal: She exhibits tenderness (r hip, l shoulder).   Neurological: She is alert.   Skin: Skin is warm and dry. No rash noted.   Nursing note and vitals reviewed.          Medication Review:   I have reviewed the patient's current medication list      acetaminophen 1,000 mg Oral Q6H   aspirin 325 mg Oral Q12H   DULoxetine 60 mg Oral Daily   potassium chloride 40 mEq Oral Once   sennosides-docusate sodium 2 tablet Oral BID           Labs:    Results from last 7 days  Lab Units 04/16/17  0921 04/15/17  0558 04/15/17  0400 04/14/17  1709   WBC 10*3/mm3 7.40 6.31 6.46 9.13   HEMOGLOBIN g/dL 8.7* 8.7* 8.5* 9.4*   HEMATOCRIT % 27.5* 28.0* 26.6* 30.1*   PLATELETS 10*3/mm3 207 154 182 209   MONOCYTES % %  --   --  7.0 6.0       Results from last 7 days  Lab Units 04/16/17  0921 04/15/17  0558 04/14/17  1709 04/13/17 2016   SODIUM mmol/L 144 144 140 142   POTASSIUM mmol/L 3.4* 3.8 3.6 4.2   CHLORIDE mmol/L 107 109* 103 103   TOTAL CO2 mmol/L 24.9 23.5 24.9 24.5   BUN mg/dL 13 15 16 12   CREATININE mg/dL 0.93 0.99 1.25* 1.04*   CALCIUM mg/dL 8.1* 7.5* 7.6* 9.1   BILIRUBIN mg/dL  --   --   --  0.4   ALK PHOS U/L  --   --   --  118   ALT (SGPT) U/L  --   --   --  17   AST (SGOT) U/L  --   --   --  22   GLUCOSE mg/dL 149* 125* 163* 131*           Lab Results (last 24 hours)     Procedure Component Value Units Date/Time    CBC Auto Differential [[id number]]  (Abnormal) Collected:  04/16/17 0921    Specimen:  Blood Updated:  04/16/17 0935     WBC 7.40 10*3/mm3      RBC 3.04 (L) 10*6/mm3      Hemoglobin 8.7 (L) g/dL      Hematocrit 27.5 (L) %      MCV 90.5 fL      MCH 28.6 pg      MCHC 31.6 g/dL      RDW 14.1 %      RDW-SD 46.4 fl      MPV 9.9 fL      Platelets 207 10*3/mm3      Neutrophil % 71.5 (H) %      Lymphocyte % 18.2 (L) %      Monocyte %  6.9 %      Eosinophil % 2.7 %      Basophil % 0.4 %      Immature Grans % 0.3 %      Neutrophils, Absolute 5.29 10*3/mm3      Lymphocytes, Absolute 1.35 10*3/mm3      Monocytes, Absolute 0.51 10*3/mm3      Eosinophils, Absolute 0.20 10*3/mm3      Basophils, Absolute 0.03 10*3/mm3      Immature Grans, Absolute 0.02 10*3/mm3      nRBC 0.0 /100 WBC     CBC & Differential [[id number]] Collected:  04/16/17 0921    Specimen:  Blood Updated:  04/16/17 0935    Narrative:       The following orders were created for panel order CBC & Differential.  Procedure                               Abnormality         Status                     ---------                               -----------         ------                     Scan Slide[[id number]]                                                                   CBC Auto Differential[[id number]]         Abnormal            Final result                 Please view results for these tests on the individual orders.    Basic Metabolic Panel [[id number]]  (Abnormal) Collected:  04/16/17 0921    Specimen:  Blood Updated:  04/16/17 0944     Glucose 149 (H) mg/dL      BUN 13 mg/dL      Creatinine 0.93 mg/dL      Sodium 144 mmol/L      Potassium 3.4 (L) mmol/L      Chloride 107 mmol/L      CO2 24.9 mmol/L      Calcium 8.1 (L) mg/dL      eGFR Non African Amer 61 mL/min/1.73      BUN/Creatinine Ratio 14.0     Anion Gap 12.1 mmol/L     Narrative:       GFR Normal >60  Chronic Kidney Disease <60  Kidney Failure <15              Radiology:  Imaging Results (last 24 hours)     Procedure Component Value Units Date/Time    XR Shoulder 1 View Left [[id number]] Collected:  04/16/17 0754     Updated:  04/16/17 0757    Narrative:       EXAM: Left shoulder 04/15/2017     HISTORY: Pain after fall one day ago     TECHNIQUE: Single view      COMPARISON: None     FINDINGS: Distal clavicle fracture possibly involving the  acromioclavicular joint. Question coincident acromioclavicular  separation       Impression:        Distal clavicle fracture about 50% cranially displaced  question coincident AC joint separation     This report was finalized on 4/16/2017 7:55 AM by Dr. Donta Enriquez MD.             Cardiology:  ECG/EMG Results (last 24 hours)     ** No results found for the last 24 hours. **          I have reviewed consult notes.    Assessment and Plan:    1. Postoperative hypotension has resolved.  [facility]    2.  Postoperative anemia will continue monitor H&H's    3.  Postoperative day #2 internal fixation right femoral fracture    4 left clavicle fracture

## 2017-04-16 NOTE — PROGRESS NOTES
Acute Care - Physical Therapy Treatment Note   Deya Causey     Patient Name: Raven Young  : 1955  MRN: [id number]  Today's Date: 2017  Onset of Illness/Injury or Date of Surgery Date: 17  Date of Referral to PT: 17  Referring Physician: Dr Madden    Admit Date: 2017    Visit Dx:    ICD-10-CM ICD-9-CM   1. Subtrochanteric fracture of right femur, closed, initial encounter S72.21XA 820.22   2. Closed right hip fracture, initial encounter S72.001A 820.8   3. Fall, initial encounter W19.XXXA E888.9   4. Pain R52 780.96     Patient Active Problem List   Diagnosis   • Closed right hip fracture   • Subtrochanteric fracture of right femur               Adult Rehabilitation Note       17 1000 04/15/17 1000       Rehab Assessment/Intervention    Discipline physical therapist  -CC      Document Type therapy note (daily note)  -CC      Subjective Information agree to therapy;complains of;pain   some nausea   -CC      Patient Effort, Rehab Treatment good  -CC      Symptoms Noted During/After Treatment other (see comments)   Pt c/o nausea and vomited once sat in chair-notified nursing  -CC      Symptoms Noted Comment Pt stated she felt better after vomiting-nursing notified  -CC      Precautions/Limitations --   MD 17 -PWB RLE  -CC      Specific Treatment Considerations Pt had been complaining of  L shoulder  Pain s/p fall and xray showed distal clavicular non displaced FX- switched pt to Rwx for less WB demands on LUE with use of walker- pt reported less discomfort  L shoulder  -CC      Patient Response to Treatment Pt felt okay after vomiting   -CC      Equipment Issued to Patient --   recommended RWX vs standard  -CC      Recorded by [CC] Jena Walker, PT      Pain Assessment    Pain Assessment 0-10  -CC      Pain Score 2  -CC      Pain Type Acute pain;Surgical pain  -CC      Pain Location Hip  -CC      Pain Orientation Right  -CC      Pain Descriptors Aching  -CC      Pain  Frequency Constant/continuous  -CC      Pain Intervention(s) Medication (See MAR)  -CC      Recorded by [CC] Jena Walker, PT      Cognitive Assessment/Intervention    Current Cognitive/Communication Assessment functional  -CC      Orientation Status oriented x 4  -CC      Follows Commands/Answers Questions 100% of the time  -CC      Personal Safety WNL/WFL  -CC      Personal Safety Interventions gait belt;nonskid shoes/slippers when out of bed  -CC      Recorded by [CC] Jena Walker, PT      Mobility Assessment/Training    Extremity Weight-Bearing Status left upper extremity;right lower extremity  -CC      Left Upper Extremity Weight-Bearing weight-bearing as tolerated   with walker  -CC      Right Lower Extremity Weight-Bearing partial weight-bearing  -CC      Recorded by [CC] Jena Walker, PT      Bed Mobility, Assessment/Treatment    Bed Mob, Supine to Sit, [place] independent  -CC      Bed Mobility, Comment Pt did not need bed rails or assistance with RLE to get to EOB  -CC      Recorded by [CC] Jena Walker, PT      Transfer Assessment/Treatment    Transfers, Bed-Chair [place] contact guard assist  -CC      Transfers, Sit-Stand [place] contact guard assist  -CC      Transfers, Stand-Sit [place] contact guard assist  -CC      Transfers, Sit-Stand-Sit, Assist Device rolling walker  -CC      Transfer, Comment Pt doing well  and managing  RWX safely and following PWB with RLE  -CC      Recorded by [CC] Jena Walker, PT      Gait Assessment/Treatment    Gait, [place] Level contact guard assist  -CC      Gait, Assistive Device rolling walker  -CC      Gait, Distance (Feet) 25  -CC      Gait, Maintain Weight Bearing Status able to maintain weight bearing status  -CC      Gait, Comment Improved in all areas today  -CC      Recorded by [CC] Jena Walker, PT      Therapy Exercises    Right Lower Extremity AAROM:;AROM:;10  reps;supine;heel slides;glut sets;quad sets   Abduction only  -CC --   Abduction only  -CC     Recorded by [CC] Jena Walker, PT [CC] Jena Walker PT     Positioning and Restraints    Pre-Treatment Position in bed  -CC      Post Treatment Position chair  -CC      In Chair notified nsg;reclined;call light within reach;legs elevated  -CC      Recorded by [CC] Jena Walker, PT        User Key  (r) = Recorded By, (t) = Taken By, (c) = Cosigned By    Initials Name Effective Dates    CC Jena Walker, PT 08/11/15 -                 IP PT Goals       04/15/17 1010          Bed Mobility PT STG    Bed Mobility PT STG, Date Established 04/15/17  -      Bed Mobility PT STG, Time to Achieve 2 days  -      Bed Mobility PT STG, Activity Type supine to sit/sit to supine  -      Bed Mobility PT STG, [place] Level supervision required  -      Transfer Training PT STG    Transfer Training PT STG, Date Established 04/15/17  -      Transfer Training PT STG, Time to Achieve 2 days  -      Transfer Training PT STG, Activity Type sit to stand/stand to sit  -      Transfer Training PT STG, [place] Level supervision required  -      Transfer Training PT STG, Assist Device walker, standard  -      Gait Training PT LTG    Gait Training Goal PT LTG, Time to Achieve 3 days  -      Gait Training Goal PT LTG, [place] Level supervision required  -      Gait Training Goal PT LTG, Assist Device walker, standard  -      Gait Training Goal PT LTG, Distance to Achieve 50 feet, toe touch weightbearing right LE  -        User Key  (r) = Recorded By, (t) = Taken By, (c) = Cosigned By    Initials Name Provider Type     Adelaide Call PT Physical Therapist                  PT Recommendation and Plan  Anticipated Equipment Needs At Discharge: walker  Anticipated Discharge Disposition: home, home with assist  Planned Therapy Interventions: gait training, home exercise  program, patient/family education, strengthening, transfer training  PT Frequency: 2 times/day, daily  Plan of Care Review  Plan Of Care Reviewed With: patient  Progress: progress toward functional goals as expected  Outcome Summary/Follow up Plan: Pt shows improvement from last PT session. Pt requires only supervision with bed mobilty and transfers sit to stand. Pt also keeping WB status PWB RLE  and ambulating safely with RWX - Pt also tolerated increase ambulation distance today/25 ft and able to perform TE satisfactorily.  Recommend a RWX for discharge home with HEP and  HH/PT assessment          Outcome Measures       04/16/17 1000 04/15/17 1000 04/15/17 0100    How much help from another person do you currently need...    Turning from your back to your side while in flat bed without using bedrails? 4  -CC 3  -LH     Moving from lying on back to sitting on the side of a flat bed without bedrails? 4  -CC 3  -LH     Moving to and from a bed to a chair (including a wheelchair)? 3  -CC 3  -LH     Standing up from a chair using your arms (e.g., wheelchair, bedside chair)? 3  -CC 3  -LH     Climbing 3-5 steps with a railing? 3  -CC 2  -LH     To walk in hospital room? 3  -CC 3  -LH     AM-PAC 6 Clicks Score 20  -CC 17  -LH     How much help from another is currently needed...    Putting on and taking off regular lower body clothing?  3  -EN     Bathing (including washing, rinsing, and drying)  3  -EN     Toileting (which includes using toilet bed pan or urinal)  3  -EN     Putting on and taking off regular upper body clothing  4  -EN     Taking care of personal grooming (such as brushing teeth)  4  -EN     Eating meals  4  -EN     Score  21  -EN     Functional Assessment    Outcome Measure Options  AM-PAC 6 Clicks Basic Mobility (PT)  -LH AM-PAC 6 Clicks Daily Activity (OT)  -EN      User Key  (r) = Recorded By, (t) = Taken By, (c) = Cosigned By    Initials Name Provider Type    SHIRLEY Walker, PT  Physical Therapist    MARTHA Call, PT Physical Therapist    SAVITA Myles, OTR Occupational Therapist           Time Calculation:         PT Charges       04/16/17 1057          Time Calculation    Start Time 0915  -CC      Stop Time 0950  -CC      Time Calculation (min) 35 min  -CC        User Key  (r) = Recorded By, (t) = Taken By, (c) = Cosigned By    Initials Name Provider Type    CC Jena Walker, PT Physical Therapist          Therapy Charges for Today     Code Description Service Date Service Provider Modifiers Qty    90820374992 HC GAIT TRAINING EA 15 MIN 4/16/2017 Jena Walker, PT GP 1    06310663225 HC PT THER SUPP EA 15 MIN 4/16/2017 Jena Walker, PT GP 1    86720937331 HC PT THER PROC EA 15 MIN 4/16/2017 Jena Walker, PT GP 1          PT G-Codes  Outcome Measure Options: AM-PAC 6 Clicks Basic Mobility (PT)    Jena Walker, PT  4/16/2017

## 2017-04-16 NOTE — PROGRESS NOTES
Orthopedic Progress Note   Chief Complaint:  Status post ORIF right subtrochanteric femur fracture, left lateral clavicle fracture    Subjective     Interval History: Patient is postop day 2 surgery to the right femur.  She is doing well from that standpoint she is afebrile.  Hemoglobin is pending.  X-ray yesterday of her left shoulder shows a mildly displaced left distal clavicular fracture.  She is doing well pain well-controlled oral medication.          Objective     Vital Signs  Temp:  [98 °F (36.7 °C)-98.4 °F (36.9 °C)] 98.4 °F (36.9 °C)  Heart Rate:  [] 106  Resp:  [18] 18  BP: ()/(48-71) 117/64  Body mass index is 30.97 kg/(m^2).    Intake/Output Summary (Last 24 hours) at 04/16/17 0711  Last data filed at 04/16/17 0600   Gross per 24 hour   Intake          1360.83 ml   Output              300 ml   Net          1060.83 ml             Physical Exam:   General: patient awake, alert and cooperative   Cardiovascular: regular rhythm and rate   Pulm: clear to auscultation bilaterally   Abdomen: Benign.  Soft bowel sounds   Extremities:  Dressing change to the right femur.  The wound is completely benign.  There is no erythema and there is no drainage.  There is swelling to the thigh as expected there is no motor deficit good distal pulses.  No sensory loss Left upper extremity shows tenderness over the acromioclavicular joint as expected but there is no motor deficit good distal pulses no sensory loss. Neurologic: Normal mood and behavior     Results Review:     I reviewed the patient's new clinical results.      WBC No results found for: WBCS   HGB Hemoglobin   Date Value Ref Range Status   04/15/2017 8.7 (L) 12.0 - 16.0 g/dL Final   04/15/2017 8.5 (L) 12.0 - 16.0 g/dL Final   04/14/2017 9.4 (L) 12.0 - 16.0 g/dL Final   04/13/2017 14.5 12.0 - 16.0 g/dL Final      HCT Hematocrit   Date Value Ref Range Status   04/15/2017 28.0 (L) 37.0 - 47.0 % Final   04/15/2017 26.6 (L) 37.0 - 47.0 % Final    04/14/2017 30.1 (L) 37.0 - 47.0 % Final   04/13/2017 44.2 37.0 - 47.0 % Final      Platlets No results found for: LABPLAT     PT/INR:    Protime   Date Value Ref Range Status   04/13/2017 12.6 12.1 - 15.0 Seconds Final   /  INR   Date Value Ref Range Status   04/13/2017 0.94 0.90 - 1.10 Final       Sodium Sodium   Date Value Ref Range Status   04/15/2017 144 136 - 145 mmol/L Final   04/14/2017 140 136 - 145 mmol/L Final   04/13/2017 142 136 - 145 mmol/L Final      Potassium Potassium   Date Value Ref Range Status   04/15/2017 3.8 3.5 - 5.2 mmol/L Final   04/14/2017 3.6 3.5 - 5.2 mmol/L Final   04/13/2017 4.2 3.5 - 5.2 mmol/L Final      Chloride Chloride   Date Value Ref Range Status   04/15/2017 109 (H) 98 - 107 mmol/L Final   04/14/2017 103 98 - 107 mmol/L Final   04/13/2017 103 98 - 107 mmol/L Final      Bicarbonate No results found for: PLASMABICARB   BUN BUN   Date Value Ref Range Status   04/15/2017 15 8 - 23 mg/dL Final   04/14/2017 16 8 - 23 mg/dL Final   04/13/2017 12 8 - 23 mg/dL Final      Creatinine Creatinine   Date Value Ref Range Status   04/15/2017 0.99 0.57 - 1.00 mg/dL Final   04/14/2017 1.25 (H) 0.57 - 1.00 mg/dL Final   04/13/2017 1.04 (H) 0.57 - 1.00 mg/dL Final      Calcium Calcium   Date Value Ref Range Status   04/15/2017 7.5 (L) 8.8 - 10.5 mg/dL Final   04/14/2017 7.6 (L) 8.8 - 10.5 mg/dL Final   04/13/2017 9.1 8.8 - 10.5 mg/dL Final      Magnesium  AST  ALT  Bilirubin, Total  AlkPhos  Albumin    Amylase  Lipase    Radiology: No results found for: MG  No components found for: AST.*  No components found for: ALT.*  No components found for: BILIRUBIN, TOTAL.*    No components found for: ALKPHOS.*  No components found for: ALBUMIN.*      No components found for: AMYLASE.*  No components found for: LIPASE.*            Imaging Results (most recent)     Procedure Component Value Units Date/Time    XR Chest 1 View [[id number]] Collected:  04/14/17 0808     Updated:  04/14/17 0810    Narrative:        INDICATION: Preoperative assessment for right hip fracture. Preoperative  assessment pulmonary function.      COMPARISON:  None available.     FINDINGS:  Single portable AP view of the chest.     Heart and mediastinal contours are normal. The lungs are clear. No  pneumothorax or pleural effusion.       Impression:       No acute findings.     This report was finalized on 4/14/2017 8:08 AM by Dr. Duarte Ramirez MD.       XR Hip With or Without Pelvis 2 - 3 View Right [[id number]] Collected:  04/14/17 0808     Updated:  04/14/17 0812    Narrative:       INDICATION: Right hip fracture. Fall. Right hip pain.     FINDINGS: 2 views of the right hip without comparison. There is a  transverse fracture through the proximal right femoral diaphysis just  below the greater trochanter. There is approximate 90 degree angulation  of the fracture. No dislocation.       Impression:       Angulated fracture of the proximal right femur.     This report was finalized on 4/14/2017 8:10 AM by Dr. Duarte Ramirez MD.       XR Hip 1 View Without Pelvis Right (Surgery Only) [[id number]] Collected:  04/14/17 1448     Updated:  04/14/17 1452    Narrative:       INDICATION: Right femur fracture. ORIF.     FINDINGS: Single AP view of the right femur compared to 04/13/2017. The  transverse fracture through the proximal femur has been secured with a  intramedullary nail. There has been propagation of the fracture along  the femoral diaphysis. There is a. Fracture fragment along the medial  margin measuring 3.6 cm. Alignment is anatomic. There are 2 distally  locking screws.       Impression:       1. Anatomic alignment of the proximal right femur status post ORIF.  2. Propagation of the fracture into the more inferior aspect of the  proximal femoral diaphysis.     This report was finalized on 4/14/2017 2:50 PM by Dr. Duarte Ramirez MD.       XR Femur 2 View Right [[id number]] Collected:  04/14/17 1545     Updated:  04/14/17 1548    Narrative:        INDICATION: Right femur fracture ORIF.     FINDINGS: 6 fluoroscopic images of the right femur were submitted for  review. An intramedullary leah and gamma nail transfix the proximal femur  fracture. There are 2 distal locking screws. Alignment is anatomic.  Fluoroscopic time 26.38 minutes.     This report was finalized on 4/14/2017 3:46 PM by Dr. Duarte Ramirez MD.       XR Shoulder 1 View Left [[id number]] Updated:  04/15/17 1119                    Assessment/Plan     Patient Active Problem List   Diagnosis Code   • Closed right hip fracture S72.001A   • Subtrochanteric fracture of right femur S72.21XA     Continue PT OT partial weightbearing right leg.  Again patient will benefit from TCU rehabilitation.  Hemoglobin pending.        Keyshawn Madden MD  04/16/17  7:11 AM

## 2017-04-17 LAB
ANION GAP SERPL CALCULATED.3IONS-SCNC: 7.5 MMOL/L
BUN BLD-MCNC: 12 MG/DL (ref 8–23)
BUN/CREAT SERPL: 14.5 (ref 7–25)
CALCIUM SPEC-SCNC: 7.8 MG/DL (ref 8.8–10.5)
CHLORIDE SERPL-SCNC: 108 MMOL/L (ref 98–107)
CO2 SERPL-SCNC: 28.5 MMOL/L (ref 22–29)
CREAT BLD-MCNC: 0.83 MG/DL (ref 0.57–1)
DEPRECATED RDW RBC AUTO: 46.7 FL (ref 37–54)
ERYTHROCYTE [DISTWIDTH] IN BLOOD BY AUTOMATED COUNT: 14.4 % (ref 11.5–14.5)
GFR SERPL CREATININE-BSD FRML MDRD: 70 ML/MIN/1.73
GLUCOSE BLD-MCNC: 111 MG/DL (ref 65–99)
HCT VFR BLD AUTO: 24.8 % (ref 37–47)
HGB BLD-MCNC: 7.8 G/DL (ref 12–16)
MCH RBC QN AUTO: 28.2 PG (ref 27–31)
MCHC RBC AUTO-ENTMCNC: 31.5 G/DL (ref 31–37)
MCV RBC AUTO: 89.5 FL (ref 81–99)
PLATELET # BLD AUTO: 211 10*3/MM3 (ref 140–500)
PMV BLD AUTO: 10.3 FL (ref 7.4–10.4)
POTASSIUM BLD-SCNC: 3.7 MMOL/L (ref 3.5–5.2)
RBC # BLD AUTO: 2.77 10*6/MM3 (ref 4.2–5.4)
SODIUM BLD-SCNC: 144 MMOL/L (ref 136–145)
WBC NRBC COR # BLD: 7.81 10*3/MM3 (ref 4.8–10.8)

## 2017-04-17 PROCEDURE — 97535 SELF CARE MNGMENT TRAINING: CPT

## 2017-04-17 PROCEDURE — 97116 GAIT TRAINING THERAPY: CPT

## 2017-04-17 PROCEDURE — 97110 THERAPEUTIC EXERCISES: CPT

## 2017-04-17 PROCEDURE — 80048 BASIC METABOLIC PNL TOTAL CA: CPT | Performed by: FAMILY MEDICINE

## 2017-04-17 PROCEDURE — 85027 COMPLETE CBC AUTOMATED: CPT | Performed by: FAMILY MEDICINE

## 2017-04-17 PROCEDURE — 99024 POSTOP FOLLOW-UP VISIT: CPT | Performed by: ORTHOPAEDIC SURGERY

## 2017-04-17 RX ADMIN — Medication 150 MG: at 17:27

## 2017-04-17 RX ADMIN — ACETAMINOPHEN 1000 MG: 500 TABLET ORAL at 11:03

## 2017-04-17 RX ADMIN — DOCUSATE SODIUM AND SENNOSIDES 2 TABLET: 8.6; 5 TABLET, FILM COATED ORAL at 09:18

## 2017-04-17 RX ADMIN — ACETAMINOPHEN 1000 MG: 500 TABLET ORAL at 04:27

## 2017-04-17 RX ADMIN — Medication 150 MG: at 09:18

## 2017-04-17 RX ADMIN — ACETAMINOPHEN 1000 MG: 500 TABLET ORAL at 21:38

## 2017-04-17 RX ADMIN — ACETAMINOPHEN 1000 MG: 500 TABLET ORAL at 17:27

## 2017-04-17 RX ADMIN — ASPIRIN 325 MG: 325 TABLET, COATED ORAL at 20:40

## 2017-04-17 RX ADMIN — ONDANSETRON 4 MG: 4 TABLET, ORALLY DISINTEGRATING ORAL at 17:30

## 2017-04-17 RX ADMIN — DULOXETINE HYDROCHLORIDE 60 MG: 60 CAPSULE, DELAYED RELEASE ORAL at 09:18

## 2017-04-17 RX ADMIN — ASPIRIN 325 MG: 325 TABLET, COATED ORAL at 09:18

## 2017-04-17 RX ADMIN — DOCUSATE SODIUM AND SENNOSIDES 2 TABLET: 8.6; 5 TABLET, FILM COATED ORAL at 17:27

## 2017-04-17 NOTE — PROGRESS NOTES
Orthopedic Progress Note   Chief Complaint:  Status post ORIF right subtrochanteric femur fracture, left lateral clavicle fracture    Subjective     Interval History: Patient is postop day 3 to the procedure to the right femur she's continued to do well.  She is afebrile.  Hemoglobin this morning is 7.8.  She is alert and oriented ×3.  Ambulating using a walker.  Pain well-controlled oral medication.          Objective     Vital Signs  Temp:  [97.4 °F (36.3 °C)-99.3 °F (37.4 °C)] 98.5 °F (36.9 °C)  Heart Rate:  [] 96  Resp:  [16-20] 16  BP: ()/(50-65) 106/65  Body mass index is 30.97 kg/(m^2).    Intake/Output Summary (Last 24 hours) at 04/17/17 0645  Last data filed at 04/16/17 0920   Gross per 24 hour   Intake              240 ml   Output                0 ml   Net              240 ml             Physical Exam:   General: patient awake, alert and cooperative   Cardiovascular: regular rhythm and rate   Pulm: clear to auscultation bilaterally   Abdomen: Benign.  Soft bowel sounds   Extremities: Right lower extremity is good distal pulses.  No motor loss.  No sensory loss.  Dressing and wound is benign.  Left upper extremity shows no motor loss good distal pulses.  Is able to use the left arm to assist with ambulation using a walker.   Neurologic: Normal mood and behavior     Results Review:     I reviewed the patient's new clinical results.      WBC No results found for: WBCS   HGB Hemoglobin   Date Value Ref Range Status   04/17/2017 7.8 (L) 12.0 - 16.0 g/dL Final   04/16/2017 8.7 (L) 12.0 - 16.0 g/dL Final   04/15/2017 8.7 (L) 12.0 - 16.0 g/dL Final   04/15/2017 8.5 (L) 12.0 - 16.0 g/dL Final   04/14/2017 9.4 (L) 12.0 - 16.0 g/dL Final      HCT Hematocrit   Date Value Ref Range Status   04/17/2017 24.8 (L) 37.0 - 47.0 % Final   04/16/2017 27.5 (L) 37.0 - 47.0 % Final   04/15/2017 28.0 (L) 37.0 - 47.0 % Final   04/15/2017 26.6 (L) 37.0 - 47.0 % Final   04/14/2017 30.1 (L) 37.0 - 47.0 % Final       Platlets No results found for: LABPLAT     PT/INR:  No results found for: PROTIME/No results found for: INR    Sodium Sodium   Date Value Ref Range Status   04/17/2017 144 136 - 145 mmol/L Final   04/16/2017 144 136 - 145 mmol/L Final   04/15/2017 144 136 - 145 mmol/L Final   04/14/2017 140 136 - 145 mmol/L Final      Potassium Potassium   Date Value Ref Range Status   04/17/2017 3.7 3.5 - 5.2 mmol/L Final   04/16/2017 3.4 (L) 3.5 - 5.2 mmol/L Final   04/15/2017 3.8 3.5 - 5.2 mmol/L Final   04/14/2017 3.6 3.5 - 5.2 mmol/L Final      Chloride Chloride   Date Value Ref Range Status   04/17/2017 108 (H) 98 - 107 mmol/L Final   04/16/2017 107 98 - 107 mmol/L Final   04/15/2017 109 (H) 98 - 107 mmol/L Final   04/14/2017 103 98 - 107 mmol/L Final      Bicarbonate No results found for: PLASMABICARB   BUN BUN   Date Value Ref Range Status   04/17/2017 12 8 - 23 mg/dL Final   04/16/2017 13 8 - 23 mg/dL Final   04/15/2017 15 8 - 23 mg/dL Final   04/14/2017 16 8 - 23 mg/dL Final      Creatinine Creatinine   Date Value Ref Range Status   04/17/2017 0.83 0.57 - 1.00 mg/dL Final   04/16/2017 0.93 0.57 - 1.00 mg/dL Final   04/15/2017 0.99 0.57 - 1.00 mg/dL Final   04/14/2017 1.25 (H) 0.57 - 1.00 mg/dL Final      Calcium Calcium   Date Value Ref Range Status   04/17/2017 7.8 (L) 8.8 - 10.5 mg/dL Final   04/16/2017 8.1 (L) 8.8 - 10.5 mg/dL Final   04/15/2017 7.5 (L) 8.8 - 10.5 mg/dL Final   04/14/2017 7.6 (L) 8.8 - 10.5 mg/dL Final      Magnesium  AST  ALT  Bilirubin, Total  AlkPhos  Albumin    Amylase  Lipase    Radiology: No results found for: MG  No components found for: AST.*  No components found for: ALT.*  No components found for: BILIRUBIN, TOTAL.*    No components found for: ALKPHOS.*  No components found for: ALBUMIN.*      No components found for: AMYLASE.*  No components found for: LIPASE.*            Imaging Results (most recent)     Procedure Component Value Units Date/Time    XR Chest 1 View [[id number]] Collected:   04/14/17 0808     Updated:  04/14/17 0810    Narrative:       INDICATION: Preoperative assessment for right hip fracture. Preoperative  assessment pulmonary function.      COMPARISON:  None available.     FINDINGS:  Single portable AP view of the chest.     Heart and mediastinal contours are normal. The lungs are clear. No  pneumothorax or pleural effusion.       Impression:       No acute findings.     This report was finalized on 4/14/2017 8:08 AM by Dr. Duarte Ramirez MD.       XR Hip With or Without Pelvis 2 - 3 View Right [[id number]] Collected:  04/14/17 0808     Updated:  04/14/17 0812    Narrative:       INDICATION: Right hip fracture. Fall. Right hip pain.     FINDINGS: 2 views of the right hip without comparison. There is a  transverse fracture through the proximal right femoral diaphysis just  below the greater trochanter. There is approximate 90 degree angulation  of the fracture. No dislocation.       Impression:       Angulated fracture of the proximal right femur.     This report was finalized on 4/14/2017 8:10 AM by Dr. Duarte Ramirez MD.       XR Hip 1 View Without Pelvis Right (Surgery Only) [[id number]] Collected:  04/14/17 1448     Updated:  04/14/17 1452    Narrative:       INDICATION: Right femur fracture. ORIF.     FINDINGS: Single AP view of the right femur compared to 04/13/2017. The  transverse fracture through the proximal femur has been secured with a  intramedullary nail. There has been propagation of the fracture along  the femoral diaphysis. There is a. Fracture fragment along the medial  margin measuring 3.6 cm. Alignment is anatomic. There are 2 distally  locking screws.       Impression:       1. Anatomic alignment of the proximal right femur status post ORIF.  2. Propagation of the fracture into the more inferior aspect of the  proximal femoral diaphysis.     This report was finalized on 4/14/2017 2:50 PM by Dr. Duarte Ramirez MD.       XR Femur 2 View Right [[id number]] Collected:   04/14/17 1545     Updated:  04/14/17 1548    Narrative:       INDICATION: Right femur fracture ORIF.     FINDINGS: 6 fluoroscopic images of the right femur were submitted for  review. An intramedullary leah and gamma nail transfix the proximal femur  fracture. There are 2 distal locking screws. Alignment is anatomic.  Fluoroscopic time 26.38 minutes.     This report was finalized on 4/14/2017 3:46 PM by Dr. Duarte Ramirez MD.       XR Shoulder 1 View Left [[id number]] Collected:  04/16/17 0754     Updated:  04/16/17 0757    Narrative:       EXAM: Left shoulder 04/15/2017     HISTORY: Pain after fall one day ago     TECHNIQUE: Single view      COMPARISON: None     FINDINGS: Distal clavicle fracture possibly involving the  acromioclavicular joint. Question coincident acromioclavicular  separation       Impression:       Distal clavicle fracture about 50% cranially displaced  question coincident AC joint separation     This report was finalized on 4/16/2017 7:55 AM by Dr. Donta Enriquez MD.                       Assessment/Plan     Patient Active Problem List   Diagnosis Code   • Closed right hip fracture S72.001A   • Subtrochanteric fracture of right femur S72.21XA       Patient wishes to go home when she is ready at this point I'm not sure.  She certainly can't care for her  is in a wheelchair.  Await PT recommendation as to whether she is ready to go home needs TCU.      Keyshawn Madden MD  04/17/17  6:45 AM

## 2017-04-17 NOTE — PROGRESS NOTES
"Daily Progress Note:    Subjective: Patient doing well without complaints and up in a chair    Flowsheet Rows         First Filed Value    Admission Height  62\" (157.5 cm) Documented at 04/13/2017 1935    Admission Weight  168 lb (76.2 kg) Documented at 04/13/2017 1935          Patient Vitals for the past 24 hrs:   BP Temp Temp src Pulse Resp SpO2   04/17/17 0425 106/65 98.5 °F (36.9 °C) Oral 96 16 97 %   04/16/17 2254 102/55 98.5 °F (36.9 °C) Oral 103 18 90 %   04/16/17 2017 97/60 99.3 °F (37.4 °C) Oral 105 20 91 %   04/16/17 1548 (!) 84/50 98.3 °F (36.8 °C) Oral 105 18 93 %   04/16/17 1147 91/58 97.4 °F (36.3 °C) Oral 97 18 99 %   04/16/17 0808 91/56 98.1 °F (36.7 °C) Oral 98 20 92 %         Intake/Output Summary (Last 24 hours) at 04/17/17 0740  Last data filed at 04/16/17 0920   Gross per 24 hour   Intake              240 ml   Output                0 ml   Net              240 ml       Review of Systems   Constitutional: Negative for activity change, appetite change and fatigue.   Respiratory: Negative for cough, chest tightness, shortness of breath and wheezing.    Cardiovascular: Negative for chest pain.   Gastrointestinal: Negative for abdominal distention, abdominal pain, diarrhea, nausea and vomiting.   Genitourinary: Negative for frequency.   Skin: Negative for rash.   Psychiatric/Behavioral: Negative for agitation.       Physical Exam   Constitutional: She appears well-developed and well-nourished.   HENT:   Head: Normocephalic.   Mouth/Throat: Oropharynx is clear and moist.   Eyes: Conjunctivae are normal.   Neck: Normal range of motion. No JVD present. No thyromegaly present.   Cardiovascular: Normal rate, regular rhythm and normal heart sounds.    No murmur heard.  Pulmonary/Chest: Effort normal and breath sounds normal. No respiratory distress. She has no wheezes. She has no rales.   Abdominal: Soft. Bowel sounds are normal. She exhibits no distension. There is no tenderness. There is no guarding. "   Musculoskeletal: She exhibits tenderness (r hip, l shoulder).   Neurological: She is alert.   Skin: Skin is warm and dry. No rash noted.   Nursing note and vitals reviewed.          Medication Review:   I have reviewed the patient's current medication list      acetaminophen 1,000 mg Oral Q6H   aspirin 325 mg Oral Q12H   DULoxetine 60 mg Oral Daily   iron polysaccharides 150 mg Oral BID   sennosides-docusate sodium 2 tablet Oral BID           Labs:    Results from last 7 days  Lab Units 04/17/17  0335 04/16/17  0921 04/15/17  0558 04/15/17  0400 04/14/17  1709   WBC 10*3/mm3 7.81 7.40 6.31 6.46 9.13   HEMOGLOBIN g/dL 7.8* 8.7* 8.7* 8.5* 9.4*   HEMATOCRIT % 24.8* 27.5* 28.0* 26.6* 30.1*   PLATELETS 10*3/mm3 211 207 154 182 209   MONOCYTES % %  --   --   --  7.0 6.0       Results from last 7 days  Lab Units 04/17/17  0335 04/16/17  0921 04/15/17  0558  04/13/17  2016   SODIUM mmol/L 144 144 144  < > 142   POTASSIUM mmol/L 3.7 3.4* 3.8  < > 4.2   CHLORIDE mmol/L 108* 107 109*  < > 103   TOTAL CO2 mmol/L 28.5 24.9 23.5  < > 24.5   BUN mg/dL 12 13 15  < > 12   CREATININE mg/dL 0.83 0.93 0.99  < > 1.04*   CALCIUM mg/dL 7.8* 8.1* 7.5*  < > 9.1   BILIRUBIN mg/dL  --   --   --   --  0.4   ALK PHOS U/L  --   --   --   --  118   ALT (SGPT) U/L  --   --   --   --  17   AST (SGOT) U/L  --   --   --   --  22   GLUCOSE mg/dL 111* 149* 125*  < > 131*   < > = values in this interval not displayed.        Lab Results (last 24 hours)     Procedure Component Value Units Date/Time    CBC Auto Differential [[id number]]  (Abnormal) Collected:  04/16/17 0921    Specimen:  Blood Updated:  04/16/17 0935     WBC 7.40 10*3/mm3      RBC 3.04 (L) 10*6/mm3      Hemoglobin 8.7 (L) g/dL      Hematocrit 27.5 (L) %      MCV 90.5 fL      MCH 28.6 pg      MCHC 31.6 g/dL      RDW 14.1 %      RDW-SD 46.4 fl      MPV 9.9 fL      Platelets 207 10*3/mm3      Neutrophil % 71.5 (H) %      Lymphocyte % 18.2 (L) %      Monocyte % 6.9 %      Eosinophil % 2.7 %       Basophil % 0.4 %      Immature Grans % 0.3 %      Neutrophils, Absolute 5.29 10*3/mm3      Lymphocytes, Absolute 1.35 10*3/mm3      Monocytes, Absolute 0.51 10*3/mm3      Eosinophils, Absolute 0.20 10*3/mm3      Basophils, Absolute 0.03 10*3/mm3      Immature Grans, Absolute 0.02 10*3/mm3      nRBC 0.0 /100 WBC     CBC & Differential [[id number]] Collected:  04/16/17 0921    Specimen:  Blood Updated:  04/16/17 0935    Narrative:       The following orders were created for panel order CBC & Differential.  Procedure                               Abnormality         Status                     ---------                               -----------         ------                     Scan Slide[[id number]]                                                                   CBC Auto Differential[[id number]]         Abnormal            Final result                 Please view results for these tests on the individual orders.    Basic Metabolic Panel [[id number]]  (Abnormal) Collected:  04/16/17 0921    Specimen:  Blood Updated:  04/16/17 0944     Glucose 149 (H) mg/dL      BUN 13 mg/dL      Creatinine 0.93 mg/dL      Sodium 144 mmol/L      Potassium 3.4 (L) mmol/L      Chloride 107 mmol/L      CO2 24.9 mmol/L      Calcium 8.1 (L) mg/dL      eGFR Non African Amer 61 mL/min/1.73      BUN/Creatinine Ratio 14.0     Anion Gap 12.1 mmol/L     Narrative:       GFR Normal >60  Chronic Kidney Disease <60  Kidney Failure <15    CBC (No Diff) [[id number]]  (Abnormal) Collected:  04/17/17 0335    Specimen:  Blood Updated:  04/17/17 0433     WBC 7.81 10*3/mm3      RBC 2.77 (L) 10*6/mm3      Hemoglobin 7.8 (L) g/dL      Hematocrit 24.8 (L) %      MCV 89.5 fL      MCH 28.2 pg      MCHC 31.5 g/dL      RDW 14.4 %      RDW-SD 46.7 fl      MPV 10.3 fL      Platelets 211 10*3/mm3     Basic Metabolic Panel [[id number]]  (Abnormal) Collected:  04/17/17 0335    Specimen:  Blood Updated:  04/17/17 0459     Glucose 111 (H) mg/dL      BUN 12 mg/dL       Creatinine 0.83 mg/dL      Sodium 144 mmol/L      Potassium 3.7 mmol/L      Chloride 108 (H) mmol/L      CO2 28.5 mmol/L      Calcium 7.8 (L) mg/dL      eGFR Non African Amer 70 mL/min/1.73      BUN/Creatinine Ratio 14.5     Anion Gap 7.5 mmol/L     Narrative:       GFR Normal >60  Chronic Kidney Disease <60  Kidney Failure <15              Radiology:  Imaging Results (last 24 hours)     Procedure Component Value Units Date/Time    XR Shoulder 1 View Left [[id number]] Collected:  04/16/17 0754     Updated:  04/16/17 0757    Narrative:       EXAM: Left shoulder 04/15/2017     HISTORY: Pain after fall one day ago     TECHNIQUE: Single view      COMPARISON: None     FINDINGS: Distal clavicle fracture possibly involving the  acromioclavicular joint. Question coincident acromioclavicular  separation       Impression:       Distal clavicle fracture about 50% cranially displaced  question coincident AC joint separation     This report was finalized on 4/16/2017 7:55 AM by Dr. Donta Enriquez MD.             Cardiology:  ECG/EMG Results (last 24 hours)     ** No results found for the last 24 hours. **          I have reviewed consult notes.    Assessment and Plan:    1. Postoperative hypotension has resolved.     2.  Postoperative Blood loss anemia hemoglobin dropped to 7.8 today we'll continue to monitor    3.  Postoperative day #3 right supple trochanteric femoral fractures status post ORIF    4 left clavicle fracture

## 2017-04-17 NOTE — PLAN OF CARE
Problem: Patient Care Overview (Adult)  Goal: Plan of Care Review  Outcome: Ongoing (interventions implemented as appropriate)    04/17/17 0249   Coping/Psychosocial Response Interventions   Plan Of Care Reviewed With patient   Patient Care Overview   Progress progress toward functional goals as expected   Outcome Evaluation   Outcome Summary/Follow up Plan VSS; pt hoping for discharge today       Goal: Adult Individualization and Mutuality  Outcome: Ongoing (interventions implemented as appropriate)  Goal: Discharge Needs Assessment  Outcome: Ongoing (interventions implemented as appropriate)    Problem: Fractured Hip (Adult)  Goal: Signs and Symptoms of Listed Potential Problems Will be Absent or Manageable (Fractured Hip)  Outcome: Ongoing (interventions implemented as appropriate)    04/17/17 0249   Fractured Hip   Problems Assessed (Fractured Hip) all   Problems Present (Fractured Hip) pain;functional deficit/self-care deficit

## 2017-04-17 NOTE — PROGRESS NOTES
Acute Care - Occupational Therapy Treatment Note   Deya Causey     Patient Name: Raven Young  : 1955  MRN: [id number]  Today's Date: 2017  Onset of Illness/Injury or Date of Surgery Date: 17  Date of Referral to OT: 04/15/17  Referring Physician: Dr Madden      Admit Date: 2017    Visit Dx:     ICD-10-CM ICD-9-CM   1. Subtrochanteric fracture of right femur, closed, initial encounter S72.21XA 820.22   2. Closed right hip fracture, initial encounter S72.001A 820.8   3. Fall, initial encounter W19.XXXA E888.9   4. Pain R52 780.96     Patient Active Problem List   Diagnosis   • Closed right hip fracture   • Subtrochanteric fracture of right femur             Adult Rehabilitation Note       17 1115       Discipline occupational therapist  -SD,MH,SD2    Document Type therapy note (daily note)  -SD,MH,SD2    Subjective Information agree to therapy;no complaints  -SD,MH,SD2    Patient Effort, Rehab Treatment     Specific Treatment Considerations Education provided on home safety techniques.  -SD,MH,SD2    Recorded by [SD,MH,SD2] ANDRE Quezada (r) Maryann Desai OT Student (t) ANDRE Quezada (c)    Pre-Treatment Position bed    Post Treatment Position bed    In Bed Supine, Call light within reach, encouraged to call for assist, family present.    Recorded by MARÍA De León      User Key  (r) = Recorded By, (t) = Taken By, (c) = Cosigned By    Initials Name Effective Dates    CC Jena Walker, PT 08/11/15 -     CARYN Jacobsen OTR 16 -     JW Jessa Waldrondorie, PT 12/01/15 -     JAYCOB Desai OT Student 17 -                 OT Goals       17 1126    Patient Education OT STG, Date Established     Patient Education OT STG, Time to Achieve     Patient Education OT STG, Education Type     Patient Education OT STG, Education Understanding     Patient Education OT STG, Date Goal Reviewed 17  -SD (r) JAYCOB (t) SD (c)    Patient  Education OT STG Outcome goal met  -SD (r)  (t) SD (c)      User Key  (r) = Recorded By, (t) = Taken By, (c) = Cosigned By    Initials Name Provider Type    EN Kaitlynn Myles, OTR Occupational Therapist    CARYN Jacobsen, OTR Occupational Therapist     Maryann Desai, OT Student OT Student          Occupational Therapy Education     Title: PT OT SLP Therapies (Done)     Topic: Occupational Therapy (Done)     Point: ADL training (Done)    Description: Instruct learner(s) on proper safety adaptation and remediation techniques during self care or transfers.   Instruct in proper use of assistive devices.    Learning Progress Summary    Learner Readiness Method Response Comment Documented by Status   Patient Acceptance E VU Pt/sister educated on home safety techniques and need for assist with IADL activities/heavy lifting.  04/17/17 1125 Done    Acceptance E ROSALINA ESCOBAR   04/16/17 1050 Done    Acceptance H,E LUZ,NR Will issue handout prior to discharge for HEP and to practice in her room independently  04/16/17 1049 Done    Acceptance E VU,DU Ptt performed well with RWX vs standard wx  04/16/17 1047 Done    Acceptance E VU Patient educated on use of reacher for painfree ADLs and safety with transfers.  04/15/17 1000 Done   Family Acceptance E VU Pt/sister educated on home safety techniques and need for assist with IADL activities/heavy lifting.  04/17/17 1125 Done                      User Key     Initials Effective Dates Name Provider Type Discipline     08/11/15 -  Jena Walker, PT Physical Therapist PT     06/22/16 -  Kaitlynn Myles, OTR Occupational Therapist OT     02/14/17 -  Maryann Desai, OT Student OT Student OT                  OT Recommendation and Plan  Anticipated Equipment Needs At Discharge:  (rolling walker)  Anticipated Discharge Disposition:  (will continue to assess)  Planned Therapy Interventions: adaptive equipment training, ADL retraining, transfer  training  Therapy Frequency: 3-5 times/wk  Plan of Care Review  Plan Of Care Reviewed With: patient  Progress: progress toward functional goals as expected  Outcome Summary/Follow up Plan: OT: Pt/sister educated on home safety techniques and assist for IADL activities/heavy lifting. Pt stated that she has no concerns managing her daily routine upon discharge at home and  that her sister will be there to assist her as needed. At this time recommend assist at home with IADL activities.         Initials Name Provider Type    CC Jena Walker, PT Physical Therapist    MARTHA Call, PT Physical Therapist    EN Kaitlynn Myles, OTR Occupational Therapist    YVONNE Barragan, PT Physical Therapist         Time Calculation:        Therapy Charges for Today     Code Description Service Date Service Provider Modifiers Qty    23504756710 HC OT SELF CARE/MGMT/TRAIN EA 15 MIN 4/17/2017 Maryann Desai, OT Student GO 1               Maryann Desai OT Student  4/17/2017

## 2017-04-17 NOTE — PLAN OF CARE
Problem: Patient Care Overview (Adult)  Goal: Plan of Care Review  Outcome: Ongoing (interventions implemented as appropriate)    04/17/17 1622   Coping/Psychosocial Response Interventions   Plan Of Care Reviewed With patient   Patient Care Overview   Progress improving       Goal: Adult Individualization and Mutuality  Outcome: Ongoing (interventions implemented as appropriate)  Goal: Discharge Needs Assessment  Outcome: Ongoing (interventions implemented as appropriate)    04/17/17 1622   Discharge Needs Assessment   Discharge Planning Comments Pt is A/D tomorrow to go home with assistance from her sister         Problem: Fractured Hip (Adult)  Goal: Signs and Symptoms of Listed Potential Problems Will be Absent or Manageable (Fractured Hip)  Outcome: Ongoing (interventions implemented as appropriate)    04/17/17 1622   Fractured Hip   Problems Assessed (Fractured Hip) all   Problems Present (Fractured Hip) pain

## 2017-04-17 NOTE — PROGRESS NOTES
Acute Care - Physical Therapy Treatment Note   Angelica     Patient Name: Raven Young  : 1955  MRN: [id number]  Today's Date: 2017  Onset of Illness/Injury or Date of Surgery Date: 17  Date of Referral to PT: 17  Referring Physician: Dr Madden    Admit Date: 2017    Visit Dx:    ICD-10-CM ICD-9-CM   1. Subtrochanteric fracture of right femur, closed, initial encounter S72.21XA 820.22   2. Closed right hip fracture, initial encounter S72.001A 820.8   3. Fall, initial encounter W19.XXXA E888.9   4. Pain R52 780.96     Patient Active Problem List   Diagnosis   • Closed right hip fracture   • Subtrochanteric fracture of right femur               Adult Rehabilitation Note       17 1323         Discipline physical therapist  -JW    Document Type therapy note (daily note)  -JW    Subjective Information agree to therapy;complains of;pain  -JW    Patient Effort, Rehab Treatment good  -JW    Precautions/Limitations fall precautions   PWB 50% right LE  -JW    Specific Treatment Considerations     Recorded by [JW] Jessa Barragan, PT       Pain Assessment 0-10  -JW    Pain Score 2  -JW    Post Pain Score 2  -JW    Pain Type Acute pain;Surgical pain  -JW    Pain Location Hip  -JW    Pain Orientation Right  -JW    Pain Descriptors Aching;Dull  -JW    Pain Frequency Intermittent  -JW    Pain Intervention(s) Repositioned;Ambulation/increased activity  -JW    Response to Interventions tolerated  -JW    Recorded by [JW] Jesas Barragan, PT       Bed Mob, Supine to Sit, [place] conditional independence  -JW    Recorded by [JW] Jessa Barragan, PT       Transfers, Sit-Stand [place] conditional independence  -JW    Transfers, Stand-Sit [place] conditional independence  -JW    Transfers, Sit-Stand-Sit, Assist Device rolling walker  -JW    Recorded by [JW] Jessa Barragan, PT       Gait, [place] Level supervision required;verbal cues required  -JW    Gait, Assistive Device rolling  walker  -    Gait, Distance (Feet) 110  -    Gait, Gait Pattern Analysis swing-through gait  -    Gait, Gait Deviations nish decreased;right:;decreased heel strike;forward flexed posture  -    Gait, Maintain Weight Bearing Status able to maintain weight bearing status  -    Gait, Comment pt requires verbal cues for safe management of walker during turns/direction changes  -JW    Recorded by [JW] Jessa Barragan, PT       Right Lower Extremity --   pt reports doing LE exercises prior to PT arrival  -    RLE Other Reps --   pt declines need for written HEP  -JW    Recorded by [YVONNE] Jessa Barragan PT       Pre-Treatment Position in bed  -JW    Post Treatment Position chair  -JW    In Bed     In Chair call light within reach;encouraged to call for assist;reclined  -JW    Recorded by [VYONNE] Jessa Barragan PT            User Key  (r) = Recorded By, (t) = Taken By, (c) = Cosigned By    Initials Name Effective Dates    CC Jena Walker, PT 08/11/15 -     CARYN Jacobsen, OTR 06/22/16 -     YVONNE Barragan, PT 12/01/15 -      Maryann Desai, OT Student 02/14/17 -                 IP PT Goals       04/15/17 1010          Bed Mobility PT STG    Bed Mobility PT STG, Date Established 04/15/17  -      Bed Mobility PT STG, Time to Achieve 2 days  -      Bed Mobility PT STG, Activity Type supine to sit/sit to supine  -      Bed Mobility PT STG, [place] Level supervision required  -      Transfer Training PT STG    Transfer Training PT STG, Date Established 04/15/17  -      Transfer Training PT STG, Time to Achieve 2 days  -      Transfer Training PT STG, Activity Type sit to stand/stand to sit  -      Transfer Training PT STG, [place] Level supervision required  -      Transfer Training PT STG, Assist Device walker, standard  -      Gait Training PT LTG    Gait Training Goal PT LTG, Time to Achieve 3 days  -      Gait Training Goal PT LTG, [place] Level supervision  required  -      Gait Training Goal PT LTG, Assist Device walker, standard  -      Gait Training Goal PT LTG, Distance to Achieve 50 feet, toe touch weightbearing right LE  -        User Key  (r) = Recorded By, (t) = Taken By, (c) = Cosigned By    Initials Name Provider Type     Adelaide Call, PT Physical Therapist          Physical Therapy Education     Title: PT OT SLP Therapies (Done)     Topic: Physical Therapy (Done)     Point: Mobility training (Done)    Learning Progress Summary    Learner Readiness Method Response Comment Documented by Status   Patient Acceptance E [facility] 04/17/17 1352 Done    Acceptance E [facility] 04/17/17 0929 Done    Acceptance E DU,VU   04/16/17 1050 Done    Acceptance H,E DU,NR Will issue handout prior to discharge for HEP and to practice in her room independently  04/16/17 1049 Done    Acceptance E VU,DU Ptt performed well with RWX vs standard wx  04/16/17 1047 Done    Acceptance E,TB,D VU,DU Patient educated on weight bearing restrictions, encouraged to do ankle pumps and quad sets independently in chair/bed  04/15/17 1007 Done               Point: Home exercise program (Done)    Learning Progress Summary    Learner Readiness Method Response Comment Documented by Status   Patient Acceptance E [facility] 04/17/17 1352 Done    Acceptance E [facility] 04/17/17 0929 Done    Acceptance E DU,VU   04/16/17 1050 Done    Acceptance H,E DU,NR Will issue handout prior to discharge for HEP and to practice in her room independently  04/16/17 1049 Done    Acceptance E VU,DU Ptt performed well with RWX vs standard wx  04/16/17 1047 Done    Acceptance E,TB,D VU,DU Patient educated on weight bearing restrictions, encouraged to do ankle pumps and quad sets independently in chair/bed  04/15/17 1007 Done               Point: Precautions (Done)    Learning Progress Summary    Learner Readiness Method Response Comment Documented by Status   Patient Acceptance E DU,VU   04/16/17 1050  Done    Acceptance H,E KINSEY ESCOBAR Will issue handout prior to discharge for HEP and to practice in her room independently  04/16/17 1049 Done    Acceptance E LUZ ROMANO Ptt performed well with RWX vs standard wx  04/16/17 1047 Done    Acceptance E,TB,D LUZ ROMANO Patient educated on weight bearing restrictions, encouraged to do ankle pumps and quad sets independently in chair/bed  04/15/17 1007 Done                      User Key     Initials Effective Dates Name Provider Type Discipline     08/11/15 -  Jena Walker, PT Physical Therapist PT     08/11/15 -  Adelaide Call, PT Physical Therapist PT     12/01/15 -  Jessa Barragan, PT Physical Therapist PT                    PT Recommendation and Plan  Anticipated Equipment Needs At Discharge: walker  Anticipated Discharge Disposition: home, home with assist  Planned Therapy Interventions: gait training, home exercise program, patient/family education, strengthening, transfer training  PT Frequency: 2 times/day, daily  Plan of Care Review  Plan Of Care Reviewed With: patient  Outcome Summary/Follow up Plan: PT: Patient with improved gait distance during PM session.  Patient able to perform gait with rolling walker x110 feet with supervision.  patient performs all bed mobility/transfers with conditional independence.  patient continues to report no concerns regarding return home at this time.          Outcome Measures       04/17/17 1323 04/17/17 0845 04/16/17 1000    How much help from another person do you currently need...    Turning from your back to your side while in flat bed without using bedrails? 4  -JW 4  -JW 4  -CC    Moving from lying on back to sitting on the side of a flat bed without bedrails? 4  -JW 4  -JW 4  -CC    Moving to and from a bed to a chair (including a wheelchair)? 4  -JW 3  -JW 3  -CC    Standing up from a chair using your arms (e.g., wheelchair, bedside chair)? 3  -JW 3  -JW 3  -CC    Climbing 3-5 steps with a railing? 3  -JW 3   -JW 3  -CC    To walk in hospital room? 3  -JW 3  -JW 3  -CC    AM-PAC 6 Clicks Score 21  -JW 20  -JW 20  -CC    Functional Assessment    Outcome Measure Options AM-PAC 6 Clicks Basic Mobility (PT)  -JW AM-PAC 6 Clicks Basic Mobility (PT)  -JW       04/15/17 1000 04/15/17 0100       How much help from another person do you currently need...    Turning from your back to your side while in flat bed without using bedrails? 3  -LH      Moving from lying on back to sitting on the side of a flat bed without bedrails? 3  -LH      Moving to and from a bed to a chair (including a wheelchair)? 3  -LH      Standing up from a chair using your arms (e.g., wheelchair, bedside chair)? 3  -LH      Climbing 3-5 steps with a railing? 2  -LH      To walk in hospital room? 3  -LH      AM-PAC 6 Clicks Score 17  -LH      How much help from another is currently needed...    Putting on and taking off regular lower body clothing? 3  -EN      Bathing (including washing, rinsing, and drying) 3  -EN      Toileting (which includes using toilet bed pan or urinal) 3  -EN      Putting on and taking off regular upper body clothing 4  -EN      Taking care of personal grooming (such as brushing teeth) 4  -EN      Eating meals 4  -EN      Score 21  -EN      Functional Assessment    Outcome Measure Options AM-PAC 6 Clicks Basic Mobility (PT)  - AM-PAC 6 Clicks Daily Activity (OT)  -EN       User Key  (r) = Recorded By, (t) = Taken By, (c) = Cosigned By    Initials Name Provider Type    CC Jena Walker, PT Physical Therapist     Adelaide Call, PT Physical Therapist    EN Kaitlynn Myles, OTR Occupational Therapist    YVONNE Barragan, PT Physical Therapist           Time Calculation:         PT Charges       04/17/17 1353 04/17/17 0947       Time Calculation    Start Time 1323  -JW 0845  -     Stop Time 1338  - 0905  -     Time Calculation (min) 15 min  -JW 20 min  -     PT Received On 04/17/17  -JW 04/17/17  -     PT -  Next Appointment 04/18/17  -YVONNE 04/17/17  -YVONNE       User Key  (r) = Recorded By, (t) = Taken By, (c) = Cosigned By    Initials Name Provider Type    YVONNE Barragan PT Physical Therapist          Therapy Charges for Today     Code Description Service Date Service Provider Modifiers Qty    [id number]  PT THER PROC EA 15 MIN 4/17/2017 Jessa Barragan, PT GP 1    [id number] HC GAIT TRAINING EA 15 MIN 4/17/2017 Jessa Barragan, PT GP 1          PT G-Codes  Outcome Measure Options: AM-PAC 6 Clicks Basic Mobility (PT)    Jessa Barragan, PT  4/17/2017

## 2017-04-17 NOTE — PLAN OF CARE
Problem: Patient Care Overview (Adult)  Goal: Plan of Care Review  Outcome: Ongoing (interventions implemented as appropriate)    04/17/17 0292   Coping/Psychosocial Response Interventions   Plan Of Care Reviewed With patient   Outcome Evaluation   Outcome Summary/Follow up Plan PT: Patient with improved gait distance during PM session. Patient able to perform gait with rolling walker x110 feet with supervision. patient performs all bed mobility/transfers with conditional independence. patient continues to report no concerns regarding return home at this time.

## 2017-04-17 NOTE — PLAN OF CARE
Problem: Patient Care Overview (Adult)  Goal: Plan of Care Review  Outcome: Ongoing (interventions implemented as appropriate)    04/17/17 1126   Coping/Psychosocial Response Interventions   Plan Of Care Reviewed With patient   Patient Care Overview   Progress progress toward functional goals as expected   Outcome Evaluation   Outcome Summary/Follow up Plan OT: Pt/sister educated on home safety techniques and assist for IADL activities/heavy lifting. Pt stated that she has no concerns managing her daily routine upon discharge at home and that her sister will be able to assist her as needed.          Problem: Inpatient Occupational Therapy  Goal: Patient Education Goal STG- OT  Outcome: Outcome(s) achieved Date Met:  04/17/17

## 2017-04-17 NOTE — NURSING NOTE
Continued Stay Note  SHIRA Retana     Patient Name: Raven Young  MRN: [id number]  Today's Date: 4/17/2017    Admit Date: 4/13/2017          Discharge Plan       04/17/17 0856    Case Management/Social Work Plan    Plan plan is home with sister and home health    Additional Comments spoke with patient and sister at bedside r/t discharge planning. patient states the plan is for home. HH list given to patient for review. will continue to follow.               Discharge Codes     None            Madai Benson RN

## 2017-04-17 NOTE — PLAN OF CARE
Problem: Patient Care Overview (Adult)  Goal: Plan of Care Review  Outcome: Ongoing (interventions implemented as appropriate)    04/17/17 0949   Coping/Psychosocial Response Interventions   Plan Of Care Reviewed With patient   Outcome Evaluation   Outcome Summary/Follow up Plan PT: Patient performs gait with supervision x75 feet with rolling walker. Patient performs supine HEP for LE strengthening. Patient with improved gait distance today compared to previous sessions. At this time, recommend return home with home health. Patient and sister verbalize no concerns regarding return home.

## 2017-04-17 NOTE — PROGRESS NOTES
Acute Care - Physical Therapy Treatment Note   Deya Causey     Patient Name: Raven Young  : 1955  MRN: [id number]  Today's Date: 2017  Onset of Illness/Injury or Date of Surgery Date: 17  Date of Referral to PT: 17  Referring Physician: Dr Madden    Admit Date: 2017    Visit Dx:    ICD-10-CM ICD-9-CM   1. Subtrochanteric fracture of right femur, closed, initial encounter S72.21XA 820.22   2. Closed right hip fracture, initial encounter S72.001A 820.8   3. Fall, initial encounter W19.XXXA E888.9   4. Pain R52 780.96     Patient Active Problem List   Diagnosis   • Closed right hip fracture   • Subtrochanteric fracture of right femur               Adult Rehabilitation Note       17 0845 17 1000 04/15/17 1000    Rehab Assessment/Intervention    Discipline physical therapist  -JW physical therapist  -CC     Document Type therapy note (daily note)  -JW therapy note (daily note)  -CC     Subjective Information agree to therapy;complains of;pain  -JW agree to therapy;complains of;pain   some nausea   -CC     Patient Effort, Rehab Treatment good  -JW good  -CC     Symptoms Noted During/After Treatment  other (see comments)   Pt c/o nausea and vomited once sat in chair-notified nursing  -CC     Symptoms Noted Comment  Pt stated she felt better after vomiting-nursing notified  -CC     Precautions/Limitations  --   MD 17 -PWB RLE  -CC     Specific Treatment Considerations  Pt had been complaining of  L shoulder pain fall and xray showed distal clavicular non displaced FX- switched pt to Rwx for less WB demands on LUE with use of wallker pt reported less discomfort  L shoulder  -CC     Patient Response to Treatment  Pt felt okay after vomiting   -CC     Equipment Issued to Patient  --   recommended RWX vs standard  -CC     Recorded by [JW] Jessa Barragan, PT [CC] Jena Walker, PT     Pain Assessment    Pain Assessment 0-10  -JW 0-10  -CC     Pain Score 2  -JW 2  -CC      Post Pain Score 3  -      Pain Type Acute pain;Surgical pain  - Acute pain;Surgical pain  -     Pain Location Hip  - Hip  -     Pain Orientation Right  - Right  -     Pain Descriptors Dull;Aching  - Aching  -     Pain Frequency Intermittent  - Constant/continuous  -     Pain Intervention(s) Repositioned;Ambulation/increased activity  - Medication (See MAR)  -     Response to Interventions tolerated  -      Recorded by [JW] Jessa Barragan, PT [CC] Jena Walker, PT     Cognitive Assessment/Intervention    Current Cognitive/Communication Assessment  functional  -     Orientation Status  oriented x 4  -CC     Follows Commands/Answers Questions  100% of the time  -     Personal Safety  WNL/WFL  -     Personal Safety Interventions  gait belt;nonskid shoes/slippers when out of bed  -CC     Recorded by  [CC] Jena Walker, PT     Mobility Assessment/Training    Extremity Weight-Bearing Status  left upper extremity;right lower extremity  -     Left Upper Extremity Weight-Bearing  weight-bearing as tolerated   with walker  -     Right Lower Extremity Weight-Bearing  partial weight-bearing  -CC     Recorded by  [CC] Jena Walker, PT     Bed Mobility, Assessment/Treatment    Bed Mob, Supine to Sit, Nancy conditional independence  - independent  -     Bed Mob, Sit to Supine, Nancy conditional independence  -      Bed Mobility, Comment head of bed flat,no rails  - Pt did not need bed rails or assistance with RLE to get to EOB  -CC     Recorded by [JW] Jessa Barragan, PT [CC] Jena Walker, PT     Transfer Assessment/Treatment    Transfers, Bed-Chair Nancy  contact guard assist  -CC     Transfers, Sit-Stand Nancy supervision required  - contact guard assist  -CC     Transfers, Stand-Sit Nancy supervision required  - contact guard assist  -CC     Transfers, Sit-Stand-Sit, Assist Device rolling walker  -  rolling walker  -CC     Transfer, Comment  Pt doing well  and managing  RWX safely and following PWB with RLE  -CC     Recorded by [JW] Jessa Barragan, PT [CC] Jena Walker, PT     Gait Assessment/Treatment    Gait, [place] Level verbal cues required;supervision required  -JW contact guard assist  -CC     Gait, Assistive Device rolling walker  -JW rolling walker  -CC     Gait, Distance (Feet) 75  -JW 25  -CC     Gait, Gait Pattern Analysis swing-through gait  -      Gait, Gait Deviations nish decreased;forward flexed posture;right:;decreased heel strike  -      Gait, Maintain Weight Bearing Status able to maintain weight bearing status  - able to maintain weight bearing status  -     Gait, Comment pt requires verbal cues for gait mechanics and upright posture  - Improved in all areas today  -CC     Recorded by [JW] Jessa Barragan, PT [CC] Jena Walker, PT     Therapy Exercises    Right Lower Extremity AROM:;10 reps;supine;ankle pumps/circles;glut sets;heel slides;hip abduction/adduction;quad sets  - AAROM:;AROM:;10 reps;supine;heel slides;glut sets;quad sets   Abduction only  -CC --   Abduction only  -CC    Recorded by [JW] Jessa Barragan, PT [CC] Jena Walker, PT [CC] Jena Walker, PT    Positioning and Restraints    Pre-Treatment Position in bed  -JW in bed  -CC     Post Treatment Position bed  -JW chair  -CC     In Bed supine;call light within reach;encouraged to call for assist;with family/caregiver  -JW      In Chair  notified nsg;reclined;call light within reach;legs elevated  -CC     Recorded by [JW] Jessa Barragan, PT [CC] Jena Walker, PT       User Key  (r) = Recorded By, (t) = Taken By, (c) = Cosigned By    Initials Name Effective Dates    CC Jena Walker, PT 08/11/15 -     JW Jessa Barragan PT 12/01/15 -                 IP PT Goals       04/15/17 1010          Bed Mobility PT STG    Bed Mobility PT STG, Date  Established 04/15/17  -      Bed Mobility PT STG, Time to Achieve 2 days  -      Bed Mobility PT STG, Activity Type supine to sit/sit to supine  -      Bed Mobility PT STG, [place] Level supervision required  -      Transfer Training PT STG    Transfer Training PT STG, Date Established 04/15/17  -      Transfer Training PT STG, Time to Achieve 2 days  -      Transfer Training PT STG, Activity Type sit to stand/stand to sit  -      Transfer Training PT STG, [place] Level supervision required  -      Transfer Training PT STG, Assist Device walker, standard  -      Gait Training PT LTG    Gait Training Goal PT LTG, Time to Achieve 3 days  -      Gait Training Goal PT LTG, [place] Level supervision required  -      Gait Training Goal PT LTG, Assist Device walker, standard  -      Gait Training Goal PT LTG, Distance to Achieve 50 feet, toe touch weightbearing right LE  -LH        User Key  (r) = Recorded By, (t) = Taken By, (c) = Cosigned By    Initials Name Provider Type     Adelaide Call, PT Physical Therapist          Physical Therapy Education     Title: PT OT SLP Therapies (Done)     Topic: Physical Therapy (Done)     Point: Mobility training (Done)    Learning Progress Summary    Learner Readiness Method Response Comment Documented by Status   Patient Acceptance E ROSALINA   04/17/17 0929 Done    Acceptance E ROSALINA ESCOBAR   04/16/17 1050 Done    Acceptance H,E KINSEY ESCOBAR Will issue handout prior to discharge for HEP and to practice in her room independently  04/16/17 1049 Done    Acceptance E LUZ ROMANO Ptt performed well with RWX vs standard wx  04/16/17 1047 Done    Acceptance E,TB,D LUZ ROMANO Patient educated on weight bearing restrictions, encouraged to do ankle pumps and quad sets independently in chair/bed  04/15/17 1007 Done               Point: Home exercise program (Done)    Learning Progress Summary    Learner Readiness Method Response Comment Documented by Status   Patient  Acceptance E ROSALINA   04/17/17 0929 Done    Acceptance E DU,VU   04/16/17 1050 Done    Acceptance H,E LUZ,NR Will issue handout prior to discharge for HEP and to practice in her room independently  04/16/17 1049 Done    Acceptance E VUDU Ptt performed well with RWX vs standard wx  04/16/17 1047 Done    Acceptance E,TB,D VU,DU Patient educated on weight bearing restrictions, encouraged to do ankle pumps and quad sets independently in chair/bed  04/15/17 1007 Done               Point: Precautions (Done)    Learning Progress Summary    Learner Readiness Method Response Comment Documented by Status   Patient Acceptance E DU,VU   04/16/17 1050 Done    Acceptance H,E LUZ,NR Will issue handout prior to discharge for HEP and to practice in her room independently  04/16/17 1049 Done    Acceptance E LUZ ROMANO Ptt performed well with RWX vs standard wx  04/16/17 1047 Done    Acceptance E,TB,D VULUZ Patient educated on weight bearing restrictions, encouraged to do ankle pumps and quad sets independently in chair/bed  04/15/17 1007 Done                      User Key     Initials Effective Dates Name Provider Type Discipline     08/11/15 -  Jena Walker, PT Physical Therapist PT     08/11/15 -  Adelaide Call, PT Physical Therapist PT     12/01/15 -  Jessa Barragan, PT Physical Therapist PT                    PT Recommendation and Plan  Anticipated Equipment Needs At Discharge: walker  Anticipated Discharge Disposition: home, home with assist  Planned Therapy Interventions: gait training, home exercise program, patient/family education, strengthening, transfer training  PT Frequency: 2 times/day, daily  Plan of Care Review  Plan Of Care Reviewed With: patient  Outcome Summary/Follow up Plan: PT: Patient performs gait with supervision x75 feet with rolling walker.  Patient performs supine HEP for LE strengthening.  Patient with improved gait distance today compared to previous sessions.  At this time,  recommend return home with home health.  Patient and sister verbalize no concerns regarding return home.          Outcome Measures       04/17/17 0845 04/16/17 1000 04/15/17 1000    How much help from another person do you currently need...    Turning from your back to your side while in flat bed without using bedrails? 4  -JW 4  -CC 3  -LH    Moving from lying on back to sitting on the side of a flat bed without bedrails? 4  -JW 4  -CC 3  -LH    Moving to and from a bed to a chair (including a wheelchair)? 3  -JW 3  -CC 3  -LH    Standing up from a chair using your arms (e.g., wheelchair, bedside chair)? 3  -JW 3  -CC 3  -LH    Climbing 3-5 steps with a railing? 3  - 3  -CC 2  -LH    To walk in hospital room? 3  -JW 3  -CC 3  -LH    AM-PAC 6 Clicks Score 20  -JW 20  -CC 17  -LH    How much help from another is currently needed...    Putting on and taking off regular lower body clothing?   3  -EN    Bathing (including washing, rinsing, and drying)   3  -EN    Toileting (which includes using toilet bed pan or urinal)   3  -EN    Putting on and taking off regular upper body clothing   4  -EN    Taking care of personal grooming (such as brushing teeth)   4  -EN    Eating meals   4  -EN    Score   21  -EN    Functional Assessment    Outcome Measure Options AM-PAC 6 Clicks Basic Mobility (PT)  -  AM-PAC 6 Clicks Basic Mobility (PT)  -      04/15/17 0100          Functional Assessment    Outcome Measure Options AM-PAC 6 Clicks Daily Activity (OT)  -EN        User Key  (r) = Recorded By, (t) = Taken By, (c) = Cosigned By    Initials Name Provider Type    CC Jena Walker, PT Physical Therapist    LH Adelaide Call, PT Physical Therapist    EN Kaitlynn Myles, OTR Occupational Therapist    YVONNE Barragan PT Physical Therapist           Time Calculation:         PT Charges       04/17/17 0947          Time Calculation    Start Time 0845  -      Stop Time 0905  -      Time Calculation (min) 20  min  -YVONNE      PT Received On 04/17/17  -YVONNE      PT - Next Appointment 04/17/17  -YVONNE        User Key  (r) = Recorded By, (t) = Taken By, (c) = Cosigned By    Initials Name Provider Type    YVONNE Barragan PT Physical Therapist          Therapy Charges for Today     Code Description Service Date Service Provider Modifiers Qty    19840326864 HC PT THER PROC EA 15 MIN 4/17/2017 Jessa Barragan, PT GP 1          PT G-Codes  Outcome Measure Options: AM-PAC 6 Clicks Basic Mobility (PT)    Jessa Barragan PT  4/17/2017

## 2017-04-18 VITALS
WEIGHT: 169.31 LBS | TEMPERATURE: 98.5 F | BODY MASS INDEX: 31.16 KG/M2 | HEART RATE: 91 BPM | HEIGHT: 62 IN | RESPIRATION RATE: 16 BRPM | SYSTOLIC BLOOD PRESSURE: 113 MMHG | DIASTOLIC BLOOD PRESSURE: 74 MMHG | OXYGEN SATURATION: 95 %

## 2017-04-18 LAB
ANION GAP SERPL CALCULATED.3IONS-SCNC: 10 MMOL/L
BUN BLD-MCNC: 13 MG/DL (ref 8–23)
BUN/CREAT SERPL: 15.1 (ref 7–25)
CALCIUM SPEC-SCNC: 7.7 MG/DL (ref 8.8–10.5)
CHLORIDE SERPL-SCNC: 106 MMOL/L (ref 98–107)
CO2 SERPL-SCNC: 27 MMOL/L (ref 22–29)
CREAT BLD-MCNC: 0.86 MG/DL (ref 0.57–1)
DEPRECATED RDW RBC AUTO: 46.7 FL (ref 37–54)
ERYTHROCYTE [DISTWIDTH] IN BLOOD BY AUTOMATED COUNT: 14.5 % (ref 11.5–14.5)
GFR SERPL CREATININE-BSD FRML MDRD: 67 ML/MIN/1.73
GLUCOSE BLD-MCNC: 99 MG/DL (ref 65–99)
HCT VFR BLD AUTO: 24.5 % (ref 37–47)
HGB BLD-MCNC: 7.7 G/DL (ref 12–16)
MCH RBC QN AUTO: 28.1 PG (ref 27–31)
MCHC RBC AUTO-ENTMCNC: 31.4 G/DL (ref 31–37)
MCV RBC AUTO: 89.4 FL (ref 81–99)
PLATELET # BLD AUTO: 246 10*3/MM3 (ref 140–500)
PMV BLD AUTO: 9.8 FL (ref 7.4–10.4)
POTASSIUM BLD-SCNC: 3.7 MMOL/L (ref 3.5–5.2)
RBC # BLD AUTO: 2.74 10*6/MM3 (ref 4.2–5.4)
SODIUM BLD-SCNC: 143 MMOL/L (ref 136–145)
WBC NRBC COR # BLD: 6.87 10*3/MM3 (ref 4.8–10.8)

## 2017-04-18 PROCEDURE — 80048 BASIC METABOLIC PNL TOTAL CA: CPT | Performed by: FAMILY MEDICINE

## 2017-04-18 PROCEDURE — 99024 POSTOP FOLLOW-UP VISIT: CPT | Performed by: ORTHOPAEDIC SURGERY

## 2017-04-18 PROCEDURE — 85027 COMPLETE CBC AUTOMATED: CPT | Performed by: FAMILY MEDICINE

## 2017-04-18 RX ORDER — ASPIRIN 325 MG
325 TABLET ORAL EVERY 12 HOURS
Qty: 60 TABLET | Refills: 0 | Status: SHIPPED | OUTPATIENT
Start: 2017-04-18 | End: 2017-08-17 | Stop reason: HOSPADM

## 2017-04-18 RX ORDER — OXYCODONE AND ACETAMINOPHEN 7.5; 325 MG/1; MG/1
1 TABLET ORAL EVERY 4 HOURS PRN
Qty: 50 TABLET | Refills: 0 | Status: ON HOLD | OUTPATIENT
Start: 2017-04-18 | End: 2017-08-17

## 2017-04-18 RX ORDER — IRON POLYSACCHARIDE COMPLEX 150 MG
150 CAPSULE ORAL 2 TIMES DAILY
Qty: 60 CAPSULE | Refills: 2 | Status: SHIPPED | OUTPATIENT
Start: 2017-04-18 | End: 2017-08-24

## 2017-04-18 RX ADMIN — DOCUSATE SODIUM AND SENNOSIDES 2 TABLET: 8.6; 5 TABLET, FILM COATED ORAL at 08:00

## 2017-04-18 RX ADMIN — DULOXETINE HYDROCHLORIDE 60 MG: 60 CAPSULE, DELAYED RELEASE ORAL at 08:00

## 2017-04-18 RX ADMIN — ONDANSETRON 4 MG: 4 TABLET, ORALLY DISINTEGRATING ORAL at 08:02

## 2017-04-18 RX ADMIN — ASPIRIN 325 MG: 325 TABLET, COATED ORAL at 08:00

## 2017-04-18 RX ADMIN — Medication 150 MG: at 08:00

## 2017-04-18 NOTE — PROGRESS NOTES
"Daily Progress Note:    Subjective: Patient doing well without complaints and up in a chair    Flowsheet Rows         First Filed Value    Admission Height  62\" (157.5 cm) Documented at 04/13/2017 1935    Admission Weight  168 lb (76.2 kg) Documented at 04/13/2017 1935          Patient Vitals for the past 24 hrs:   BP Temp Temp src Pulse Resp SpO2   04/18/17 0628 113/74 98.5 °F (36.9 °C) Oral 91 16 95 %   04/18/17 0334 104/68 98.4 °F (36.9 °C) Oral 88 16 97 %   04/17/17 2300 117/68 97 °F (36.1 °C) Oral 103 16 96 %   04/17/17 1952 99/55 98.4 °F (36.9 °C) Oral 93 18 94 %   04/17/17 1500 115/72 97.5 °F (36.4 °C) Oral 80 18 98 %   04/17/17 1100 100/72 98.7 °F (37.1 °C) Oral 93 20 99 %         Intake/Output Summary (Last 24 hours) at 04/18/17 0645  Last data filed at 04/17/17 2300   Gross per 24 hour   Intake              240 ml   Output                0 ml   Net              240 ml       Review of Systems   Constitutional: Negative for activity change, appetite change and fatigue.   Respiratory: Negative for cough, chest tightness, shortness of breath and wheezing.    Cardiovascular: Negative for chest pain.   Gastrointestinal: Negative for abdominal distention, abdominal pain, diarrhea, nausea and vomiting.   Genitourinary: Negative for frequency.   Skin: Negative for rash.   Psychiatric/Behavioral: Negative for agitation.       Physical Exam   Constitutional: She appears well-developed and well-nourished.   HENT:   Head: Normocephalic.   Mouth/Throat: Oropharynx is clear and moist.   Eyes: Conjunctivae are normal.   Neck: Normal range of motion. No JVD present. No thyromegaly present.   Cardiovascular: Normal rate, regular rhythm and normal heart sounds.    No murmur heard.  Pulmonary/Chest: Effort normal and breath sounds normal. No respiratory distress. She has no wheezes. She has no rales.   Abdominal: Soft. Bowel sounds are normal. She exhibits no distension. There is no tenderness. There is no guarding. "   Musculoskeletal: She exhibits tenderness (r hip, l shoulder).   Neurological: She is alert.   Skin: Skin is warm and dry. No rash noted.   Nursing note and vitals reviewed.          Medication Review:   I have reviewed the patient's current medication list      acetaminophen 1,000 mg Oral Q6H   aspirin 325 mg Oral Q12H   DULoxetine 60 mg Oral Daily   iron polysaccharides 150 mg Oral BID   sennosides-docusate sodium 2 tablet Oral BID           Labs:    Results from last 7 days  Lab Units 04/18/17  0336 04/17/17  0335 04/16/17  0921  04/15/17  0400 04/14/17  1709   WBC 10*3/mm3 6.87 7.81 7.40  < > 6.46 9.13   HEMOGLOBIN g/dL 7.7* 7.8* 8.7*  < > 8.5* 9.4*   HEMATOCRIT % 24.5* 24.8* 27.5*  < > 26.6* 30.1*   PLATELETS 10*3/mm3 246 211 207  < > 182 209   MONOCYTES % %  --   --   --   --  7.0 6.0   < > = values in this interval not displayed.    Results from last 7 days  Lab Units 04/18/17 0336 04/17/17 0335 04/16/17 0921 04/13/17 2016   SODIUM mmol/L 143 144 144  < > 142   POTASSIUM mmol/L 3.7 3.7 3.4*  < > 4.2   CHLORIDE mmol/L 106 108* 107  < > 103   TOTAL CO2 mmol/L 27.0 28.5 24.9  < > 24.5   BUN mg/dL 13 12 13  < > 12   CREATININE mg/dL 0.86 0.83 0.93  < > 1.04*   CALCIUM mg/dL 7.7* 7.8* 8.1*  < > 9.1   BILIRUBIN mg/dL  --   --   --   --  0.4   ALK PHOS U/L  --   --   --   --  118   ALT (SGPT) U/L  --   --   --   --  17   AST (SGOT) U/L  --   --   --   --  22   GLUCOSE mg/dL 99 111* 149*  < > 131*   < > = values in this interval not displayed.        Lab Results (last 24 hours)     Procedure Component Value Units Date/Time    CBC (No Diff) [[id number]]  (Abnormal) Collected:  04/18/17 0336    Specimen:  Blood Updated:  04/18/17 0437     WBC 6.87 10*3/mm3      RBC 2.74 (L) 10*6/mm3      Hemoglobin 7.7 (L) g/dL      Hematocrit 24.5 (L) %      MCV 89.4 fL      MCH 28.1 pg      MCHC 31.4 g/dL      RDW 14.5 %      RDW-SD 46.7 fl      MPV 9.8 fL      Platelets 246 10*3/mm3     Basic Metabolic Panel [[id number]]   (Abnormal) Collected:  04/18/17 0336    Specimen:  Blood Updated:  04/18/17 0523     Glucose 99 mg/dL      BUN 13 mg/dL      Creatinine 0.86 mg/dL      Sodium 143 mmol/L      Potassium 3.7 mmol/L      Chloride 106 mmol/L      CO2 27.0 mmol/L      Calcium 7.7 (L) mg/dL      eGFR Non African Amer 67 mL/min/1.73      BUN/Creatinine Ratio 15.1     Anion Gap 10.0 mmol/L     Narrative:       GFR Normal >60  Chronic Kidney Disease <60  Kidney Failure <15              Radiology:  Imaging Results (last 24 hours)     ** No results found for the last 24 hours. **          Cardiology:  ECG/EMG Results (last 24 hours)     ** No results found for the last 24 hours. **          I have reviewed consult notes.    Assessment and Plan:    1. Postoperative hypotension has resolved.     2.  Postoperative Blood loss anemia hemoglobin 7.7 okay for discharge today    3.  right sub trochanteric femoral fractures status post ORIF    4 left clavicle fracture

## 2017-04-18 NOTE — PROGRESS NOTES
Orthopedic Progress Note   Chief Complaint: Status post ORIF right subtrochanteric femur fracture, left lateral clavicular fracture      Subjective     Interval History: Patient is postop day 4 from the above-stated procedures to the right femur and she is doing well at this time.  Pain well-controlled oral medication.  She is afebrile.  Hemoglobin remained stable at 7.7.  She is asymptomatic.  Ambulatory with a walker.  Discharge home today.  We'll see in the office in approximately one week.          Objective     Vital Signs  Temp:  [97 °F (36.1 °C)-98.7 °F (37.1 °C)] 98.5 °F (36.9 °C)  Heart Rate:  [] 91  Resp:  [16-20] 16  BP: ()/(55-74) 113/74  Body mass index is 30.97 kg/(m^2).    Intake/Output Summary (Last 24 hours) at 04/18/17 0632  Last data filed at 04/17/17 2300   Gross per 24 hour   Intake              240 ml   Output                0 ml   Net              240 ml     I/O this shift:  In: 240 [P.O.:240]  Out: -        Physical Exam:   General: patient awake, alert and cooperative   Cardiovascular: regular rhythm and rate   Pulm: clear to auscultation bilaterally   Abdomen: Benign.  Soft bowel sounds   Extremities: Right lower extremity shows good distal pulses no motor deficit.  No sensory loss.  Wound is benign.  Left upper extremity no motor loss good distal pulses.   Neurologic: Normal mood and behavior     Results Review:     I reviewed the patient's new clinical results.      WBC No results found for: WBCS   HGB Hemoglobin   Date Value Ref Range Status   04/18/2017 7.7 (L) 12.0 - 16.0 g/dL Final   04/17/2017 7.8 (L) 12.0 - 16.0 g/dL Final   04/16/2017 8.7 (L) 12.0 - 16.0 g/dL Final      HCT Hematocrit   Date Value Ref Range Status   04/18/2017 24.5 (L) 37.0 - 47.0 % Final   04/17/2017 24.8 (L) 37.0 - 47.0 % Final   04/16/2017 27.5 (L) 37.0 - 47.0 % Final      Platlets No results found for: LABPLAT     PT/INR:  No results found for: PROTIME/No results found for: INR    Sodium Sodium    Date Value Ref Range Status   04/18/2017 143 136 - 145 mmol/L Final   04/17/2017 144 136 - 145 mmol/L Final   04/16/2017 144 136 - 145 mmol/L Final      Potassium Potassium   Date Value Ref Range Status   04/18/2017 3.7 3.5 - 5.2 mmol/L Final   04/17/2017 3.7 3.5 - 5.2 mmol/L Final   04/16/2017 3.4 (L) 3.5 - 5.2 mmol/L Final      Chloride Chloride   Date Value Ref Range Status   04/18/2017 106 98 - 107 mmol/L Final   04/17/2017 108 (H) 98 - 107 mmol/L Final   04/16/2017 107 98 - 107 mmol/L Final      Bicarbonate No results found for: PLASMABICARB   BUN BUN   Date Value Ref Range Status   04/18/2017 13 8 - 23 mg/dL Final   04/17/2017 12 8 - 23 mg/dL Final   04/16/2017 13 8 - 23 mg/dL Final      Creatinine Creatinine   Date Value Ref Range Status   04/18/2017 0.86 0.57 - 1.00 mg/dL Final   04/17/2017 0.83 0.57 - 1.00 mg/dL Final   04/16/2017 0.93 0.57 - 1.00 mg/dL Final      Calcium Calcium   Date Value Ref Range Status   04/18/2017 7.7 (L) 8.8 - 10.5 mg/dL Final   04/17/2017 7.8 (L) 8.8 - 10.5 mg/dL Final   04/16/2017 8.1 (L) 8.8 - 10.5 mg/dL Final      Magnesium  AST  ALT  Bilirubin, Total  AlkPhos  Albumin    Amylase  Lipase    Radiology: No results found for: MG  No components found for: AST.*  No components found for: ALT.*  No components found for: BILIRUBIN, TOTAL.*    No components found for: ALKPHOS.*  No components found for: ALBUMIN.*      No components found for: AMYLASE.*  No components found for: LIPASE.*            Imaging Results (most recent)     Procedure Component Value Units Date/Time    XR Chest 1 View [[id number]] Collected:  04/14/17 0808     Updated:  04/14/17 0810    Narrative:       INDICATION: Preoperative assessment for right hip fracture. Preoperative  assessment pulmonary function.      COMPARISON:  None available.     FINDINGS:  Single portable AP view of the chest.     Heart and mediastinal contours are normal. The lungs are clear. No  pneumothorax or pleural effusion.       Impression:        No acute findings.     This report was finalized on 4/14/2017 8:08 AM by Dr. Duarte Ramirez MD.       XR Hip With or Without Pelvis 2 - 3 View Right [[id number]] Collected:  04/14/17 0808     Updated:  04/14/17 0812    Narrative:       INDICATION: Right hip fracture. Fall. Right hip pain.     FINDINGS: 2 views of the right hip without comparison. There is a  transverse fracture through the proximal right femoral diaphysis just  below the greater trochanter. There is approximate 90 degree angulation  of the fracture. No dislocation.       Impression:       Angulated fracture of the proximal right femur.     This report was finalized on 4/14/2017 8:10 AM by Dr. Duarte Ramirez MD.       XR Hip 1 View Without Pelvis Right (Surgery Only) [[id number]] Collected:  04/14/17 1448     Updated:  04/14/17 1452    Narrative:       INDICATION: Right femur fracture. ORIF.     FINDINGS: Single AP view of the right femur compared to 04/13/2017. The  transverse fracture through the proximal femur has been secured with a  intramedullary nail. There has been propagation of the fracture along  the femoral diaphysis. There is a. Fracture fragment along the medial  margin measuring 3.6 cm. Alignment is anatomic. There are 2 distally  locking screws.       Impression:       1. Anatomic alignment of the proximal right femur status post ORIF.  2. Propagation of the fracture into the more inferior aspect of the  proximal femoral diaphysis.     This report was finalized on 4/14/2017 2:50 PM by Dr. Duarte Ramirez MD.       XR Femur 2 View Right [[id number]] Collected:  04/14/17 1545     Updated:  04/14/17 1548    Narrative:       INDICATION: Right femur fracture ORIF.     FINDINGS: 6 fluoroscopic images of the right femur were submitted for  review. An intramedullary leah and gamma nail transfix the proximal femur  fracture. There are 2 distal locking screws. Alignment is anatomic.  Fluoroscopic time 26.38 minutes.     This report was finalized on  4/14/2017 3:46 PM by Dr. Duarte Ramirez MD.       XR Shoulder 1 View Left [[id number]] Collected:  04/16/17 0754     Updated:  04/16/17 0757    Narrative:       EXAM: Left shoulder 04/15/2017     HISTORY: Pain after fall one day ago     TECHNIQUE: Single view      COMPARISON: None     FINDINGS: Distal clavicle fracture possibly involving the  acromioclavicular joint. Question coincident acromioclavicular  separation       Impression:       Distal clavicle fracture about 50% cranially displaced  question coincident AC joint separation     This report was finalized on 4/16/2017 7:55 AM by Dr. Donta Enriquez MD.                       Assessment/Plan     Patient Active Problem List   Diagnosis Code   • Closed right hip fracture S72.001A   • Subtrochanteric fracture of right femur S72.21XA       Discharge home today.  Patient will use 1 adult aspirin twice a day for DVT prophylaxis.  We'll leave prescription for Percocet for pain control.  Was seen the office in 1 week.      Keyshawn Madden MD  04/18/17  6:32 AM

## 2017-04-18 NOTE — DISCHARGE INSTR - ACTIVITY
[employer] offers you secure online access to your medical records. With [employer], you can use the Internet to schedule appointments, see your test results and medical history, get medication refills, and message your care team securely.

## 2017-04-18 NOTE — PLAN OF CARE
Problem: Patient Care Overview (Adult)  Goal: Plan of Care Review  Outcome: Ongoing (interventions implemented as appropriate)    Problem: Fractured Hip (Adult)  Goal: Signs and Symptoms of Listed Potential Problems Will be Absent or Manageable (Fractured Hip)  Outcome: Ongoing (interventions implemented as appropriate)    Problem: Perioperative Period (Adult)  Goal: Signs and Symptoms of Listed Potential Problems Will be Absent or Manageable (Perioperative Period)  Outcome: Ongoing (interventions implemented as appropriate)

## 2017-04-18 NOTE — DISCHARGE SUMMARY
DATE OF ADMISSION:  04/13/2017   DATE OF DISCHARGE: 04/18/2017    DISCHARGE DIAGNOSES:    1.  Right subtrochanteric displaced femoral fracture.   2.  Left midshaft clavicle fracture.   3.  Acute postoperative blood loss anemia.   4.  Acute postoperative hypotension resolved.   5.  Depression.     HISTORY/HOSPITAL COURSE: The patient is a 61-year-old white female who apparently was playing with her nephew, tripped and fell and injured her right hip.  She was found to have a subtrochanteric femoral fracture. The patient was admitted.  The patient was a relatively healthy female.  Preoperative evaluation was unremarkable.  She was taken to the operating room on 04/14/2017, underwent an open reduction and internal fixation.  Postoperative course was immediately complicated by intra and postoperative hypotension with mild elevation of renal function. The patient was initially admitted to the floor, transferred to the ICU and aggressively hydrated with fluid boluses. Within 24 hours, her blood pressure improved. There was no further deterioration of renal functions. Hemoglobin on 04/15/2017 was down to 8.7, remained stable. IV fluids were discontinued. The patient had adequate pain control. She also started complaining of some left shoulder pain. X-ray showed a left clavicle fracture, midshaft minimally displaced.  Orthopedics felt it could be managed conservatively. The patient's blood pressure stabilized.  She continued to improve. Her hemoglobin dropped down to 7.8 on 04/17/17, probably postoperative blood loss anemia and aggressive fluid boluses. She remained stable at 7.7. She was started on iron supplement, 150 b.i.d.     CONDITION:  She was discharged home in satisfactory condition.     FOLLOWUP:  Followup with Keyshawn Madden MD in 1 week, Andres Kathleen MD in 1 week.    DISCHARGE MEDICATIONS:    1.  She is to take her home medicines she came in on except for aspirin 325 b.i.d. until she sees Dr. Madden.   2.  Niferex  150 b.i.d.   3.  Percocet for pain.     Samantha Jean M.D.*  YARIEL/andreina  D:  04/18/2017 06:50:57   T:  04/18/2017 07:27:59   Job ID:  [id number]  Document ID:  [id number]  cc:  Keyshawn Madden M.D.

## 2017-04-18 NOTE — PROGRESS NOTES
Continued Stay Note  SHIRA Retana     Patient Name: Raven Young  MRN: [id number]  Today's Date: 4/18/2017    Admit Date: 4/13/2017          Discharge Plan       04/18/17 1348    Case Management/Social Work Plan    Plan plan home with sister and home health    Additional Comments Attempted to follow up with pt regarding HH choice, pt had been discharged. Call placed to pt's home [phone], pt had not yet arrived home. Spoke with pt via phone @ [phone]. Pt states she does not need home health to follow. Pt states she knows the exercises she needs to do and has  a walker, bsc and elevated commode seat at home. Encouraged her to have home health evaluate and follow for correct  exercise treatment, pt declines. Contact information provided if pt should have additional needs or questions. Will notify Dr Madden of pt declining home health care.               Discharge Codes     None        Expected Discharge Date and Time     Expected Discharge Date Expected Discharge Time    Apr 18, 2017             Alejandro Kapoor RN

## 2017-04-18 NOTE — THERAPY DISCHARGE NOTE
Acute Care - Physical Therapy Discharge Summary   Deya Causey       Patient Name: Raven Young  : 1955  MRN: [id number]    Today's Date: 2017  Onset of Illness/Injury or Date of Surgery Date: 17    Date of Referral to PT: 17  Referring Physician: Dr Madden      Admit Date: 2017      PT Recommendation and Plan    Visit Dx:    ICD-10-CM ICD-9-CM   1. Subtrochanteric fracture of right femur, closed, initial encounter S72.21XA 820.22   2. Closed right hip fracture, initial encounter S72.001A 820.8   3. Fall, initial encounter W19.XXXA E888.9   4. Pain R52 780.96             Outcome Measures       17 1323 17 0845 17 1000    How much help from another person do you currently need...    Turning from your back to your side while in flat bed without using bedrails? 4  -JW 4  -JW 4  -CC    Moving from lying on back to sitting on the side of a flat bed without bedrails? 4  -JW 4  -JW 4  -CC    Moving to and from a bed to a chair (including a wheelchair)? 4  -JW 3  -JW 3  -CC    Standing up from a chair using your arms (e.g., wheelchair, bedside chair)? 3  -JW 3  -JW 3  -CC    Climbing 3-5 steps with a railing? 3  -JW 3  -JW 3  -CC    To walk in hospital room? 3  -JW 3  -JW 3  -CC    AM-PAC 6 Clicks Score 21  -JW 20  -JW 20  -CC    Functional Assessment    Outcome Measure Options AM-PAC 6 Clicks Basic Mobility (PT)  - AM-PAC 6 Clicks Basic Mobility (PT)  -       User Key  (r) = Recorded By, (t) = Taken By, (c) = Cosigned By    Initials Name Provider Type    SHIRLEY Walker, PT Physical Therapist    YVONNE Barragan, PT Physical Therapist                      IP PT Goals       17 1153 04/15/17 1010       Bed Mobility PT STG    Bed Mobility PT STG, Date Established  04/15/17  -     Bed Mobility PT STG, Time to Achieve  2 days  -     Bed Mobility PT STG, Activity Type  supine to sit/sit to supine  -LH     Bed Mobility PT STG, [place] Level  supervision  required  -LH     Bed Mobility PT STG, Date Goal Reviewed 04/18/17  -BP      Bed Mobility PT STG, Outcome goal met  -BP      Transfer Training PT STG    Transfer Training PT STG, Date Established  04/15/17  -LH     Transfer Training PT STG, Time to Achieve  2 days  -LH     Transfer Training PT STG, Activity Type  sit to stand/stand to sit  -LH     Transfer Training PT STG, [place] Level  supervision required  -LH     Transfer Training PT STG, Assist Device  walker, standard  -LH     Transfer Training PT STG, Date Goal Reviewed 04/18/17  -BP      Transfer Training PT STG, Outcome goal met  -BP      Gait Training PT LTG    Gait Training Goal PT LTG, Time to Achieve  3 days  -LH     Gait Training Goal PT LTG, [place] Level  supervision required  -LH     Gait Training Goal PT LTG, Assist Device  walker, standard  -LH     Gait Training Goal PT LTG, Distance to Achieve  50 feet, toe touch weightbearing right LE  -LH     Gait Training Goal PT LTG, Date Goal Reviewed 04/18/17  -BP      Gait Training Goal PT LTG, Outcome goal partially met   110 ft 50% PWB. Weight bearing status changed to PWB,  -BP        User Key  (r) = Recorded By, (t) = Taken By, (c) = Cosigned By    Initials Name Provider Type     Adelaide Call, PT Physical Therapist    BP Nery Tolliver, PT Physical Therapist              PT Discharge Summary  Anticipated Discharge Disposition: home with home health  Reason for Discharge: Discharge from facility  Outcomes Achieved: Patient able to partially acheive established goals  Discharge Destination: Home with home health   Patient seen by PT during hospital stay. Patient completes bed mobility and transfers with conditional independence. Patient performs gait 110 feet with supervision 50% PWB with use of RW. Patient met 2/3 goals. Weight bearing status changed to PWB from TTWB. Patient discharged home with home health PT.       Nery Tolliver, PT   4/18/2017

## 2017-04-18 NOTE — PLAN OF CARE
Problem: Inpatient Physical Therapy  Goal: Bed Mobility Goal STG- PT  Outcome: Outcome(s) achieved Date Met:  04/18/17    04/15/17 1010 04/18/17 1153   Bed Mobility PT STG   Bed Mobility PT STG, Date Established 04/15/17 --    Bed Mobility PT STG, Time to Achieve 2 days --    Bed Mobility PT STG, Activity Type supine to sit/sit to supine --    Bed Mobility PT STG, [place] Level supervision required --    Bed Mobility PT STG, Date Goal Reviewed --  04/18/17   Bed Mobility PT STG, Outcome --  goal met       Goal: Transfer Training Goal 1 STG- PT  Outcome: Outcome(s) achieved Date Met:  04/18/17    04/15/17 1010 04/18/17 1153   Transfer Training PT STG   Transfer Training PT STG, Date Established 04/15/17 --    Transfer Training PT STG, Time to Achieve 2 days --    Transfer Training PT STG, Activity Type sit to stand/stand to sit --    Transfer Training PT STG, [place] Level supervision required --    Transfer Training PT STG, Assist Device walker, standard --    Transfer Training PT STG, Date Goal Reviewed --  04/18/17   Transfer Training PT STG, Outcome --  goal met       Goal: Gait Training Goal LTG- PT  Outcome: Unable to achieve outcome(s) by discharge Date Met:  04/18/17    04/15/17 1010 04/18/17 1153   Gait Training PT LTG   Gait Training Goal PT LTG, Time to Achieve 3 days --    Gait Training Goal PT LTG, [place] Level supervision required --    Gait Training Goal PT LTG, Assist Device walker, standard --    Gait Training Goal PT LTG, Distance to Achieve 50 feet, toe touch weightbearing right LE --    Gait Training Goal PT LTG, Date Goal Reviewed --  04/18/17   Gait Training Goal PT LTG, Outcome --  goal partially met  (110 ft 50% PWB. Weight bearing status changed to PWB,)

## 2017-04-21 ENCOUNTER — TELEPHONE (OUTPATIENT)
Dept: ORTHOPEDIC SURGERY | Facility: CLINIC | Age: 62
End: 2017-04-21

## 2017-04-21 NOTE — TELEPHONE ENCOUNTER
States her leg is swelling down her leg and into her foot.  She states she is not having any calf pain.  She wanted to know if she could apply ice.    Told her ice for 15-20 minutes 3 times a day and elevate leg as much as possible.  Any other recommendations?

## 2017-04-27 ENCOUNTER — OFFICE VISIT (OUTPATIENT)
Dept: ORTHOPEDIC SURGERY | Facility: CLINIC | Age: 62
End: 2017-04-27

## 2017-04-27 DIAGNOSIS — S72.21XA SUBTROCHANTERIC FRACTURE OF RIGHT FEMUR, CLOSED, INITIAL ENCOUNTER (HCC): Primary | ICD-10-CM

## 2017-04-27 DIAGNOSIS — Z09 STATUS POST ORTHOPEDIC SURGERY, FOLLOW-UP EXAM: ICD-10-CM

## 2017-04-27 PROCEDURE — 99024 POSTOP FOLLOW-UP VISIT: CPT | Performed by: ORTHOPAEDIC SURGERY

## 2017-04-27 RX ORDER — DULOXETIN HYDROCHLORIDE 60 MG/1
CAPSULE, DELAYED RELEASE ORAL
Refills: 4 | COMMUNITY
Start: 2017-04-04 | End: 2017-12-12

## 2017-04-27 NOTE — PROGRESS NOTES
Subjective: Status post ORIF right subtrochanteric femur fracture     Patient ID: Raven Young is a 61 y.o. female.    Chief Complaint:    History of Present Illness patient is 2 weeks out doing extremely well ambulating freely with a walker with minimal pain in that right hip.  She is off most if not all of her pain medication.  Resort very little discomfort or limitations.       Social History     Occupational History   • Not on file.     Social History Main Topics   • Smoking status: Never Smoker   • Smokeless tobacco: Never Used   • Alcohol use Yes      Comment: rare   • Drug use: No   • Sexual activity: Defer      Review of Systems   Constitutional: Negative for chills, diaphoresis, fever and unexpected weight change.   HENT: Negative for hearing loss, nosebleeds, sore throat and tinnitus.    Eyes: Negative for pain and visual disturbance.   Respiratory: Negative for cough, shortness of breath and wheezing.    Cardiovascular: Negative for chest pain and palpitations.   Gastrointestinal: Negative for abdominal pain, diarrhea, nausea and vomiting.   Endocrine: Negative for cold intolerance, heat intolerance and polydipsia.   Genitourinary: Negative for difficulty urinating, dysuria and hematuria.   Musculoskeletal: Negative for arthralgias, joint swelling and myalgias.   Skin: Negative for rash and wound.   Allergic/Immunologic: Negative for environmental allergies.   Neurological: Negative for dizziness, syncope and numbness.   Hematological: Does not bruise/bleed easily.   Psychiatric/Behavioral: Negative for dysphoric mood and sleep disturbance. The patient is not nervous/anxious.    All other systems reviewed and are negative.        Past Medical History:   Diagnosis Date   • Cancer     basal cell   • Depression with anxiety      Past Surgical History:   Procedure Laterality Date   • AR OPEN FIX INTER/SUBTROCH FX,IMPLNT Right 4/14/2017    Procedure: FEMUR INTRAMEDULLARY NAILING/RODDING long meir gamma  nail 7fr hemovac placement;  Surgeon: Keyshawn Madden MD;  Location: [facility];  Service: Orthopedics   • TUBAL ABDOMINAL LIGATION       History reviewed. No pertinent family history.      Objective:  There were no vitals filed for this visit.  There were no vitals filed for this visit.  There is no height or weight on file to calculate BMI.       Ortho Exam  patient is alert and oriented.  Examination of the wound shows a completely benign.  There is no erythema and no swelling no drainage.  She has no motor deficit good distal pulses.  Her calf remains nontender with a negative Homans.  No sensory loss.    Assessment:       1. Subtrochanteric fracture of right femur, closed, initial encounter    2. Status post orthopedic surgery, follow-up exam          Plan:     patient can DC the aspirin twice a day now taking only once a day.  Skin staples have been removed a new dressing applied.  Return in 2 weeks with an x-ray of her right femur hip to knee      Work Status:    DANIA query complete.    Orders:  No orders of the defined types were placed in this encounter.      Medications:  New Medications Ordered This Visit   Medications   • DULoxetine (CYMBALTA) 60 MG capsule     Sig: TAKE 1 CAPSULE DAILY.     Refill:  4       Followup:  Return in about 2 weeks (around 5/11/2017).          Dragon transcription disclaimer     Much of this encounter note is an electronic transcription/translation of spoken language to printed text. The electronic translation of spoken language may permit erroneous, or at times, nonsensical words or phrases to be inadvertently transcribed. Although I have reviewed the note for such errors, some may still exist.

## 2017-05-11 ENCOUNTER — OFFICE VISIT (OUTPATIENT)
Dept: ORTHOPEDIC SURGERY | Facility: CLINIC | Age: 62
End: 2017-05-11

## 2017-05-11 VITALS — BODY MASS INDEX: 30.91 KG/M2 | WEIGHT: 168 LBS | HEIGHT: 62 IN

## 2017-05-11 DIAGNOSIS — Z09 STATUS POST ORTHOPEDIC SURGERY, FOLLOW-UP EXAM: ICD-10-CM

## 2017-05-11 DIAGNOSIS — S72.21XA SUBTROCHANTERIC FRACTURE OF RIGHT FEMUR, CLOSED, INITIAL ENCOUNTER (HCC): ICD-10-CM

## 2017-05-11 DIAGNOSIS — R52 PAIN: Primary | ICD-10-CM

## 2017-05-11 PROCEDURE — 73552 X-RAY EXAM OF FEMUR 2/>: CPT | Performed by: ORTHOPAEDIC SURGERY

## 2017-05-11 PROCEDURE — 99024 POSTOP FOLLOW-UP VISIT: CPT | Performed by: ORTHOPAEDIC SURGERY

## 2017-06-12 ENCOUNTER — OFFICE VISIT (OUTPATIENT)
Dept: ORTHOPEDIC SURGERY | Facility: CLINIC | Age: 62
End: 2017-06-12

## 2017-06-12 DIAGNOSIS — S72.21XA SUBTROCHANTERIC FRACTURE OF RIGHT FEMUR, CLOSED, INITIAL ENCOUNTER (HCC): Primary | ICD-10-CM

## 2017-06-12 PROCEDURE — 99024 POSTOP FOLLOW-UP VISIT: CPT | Performed by: ORTHOPAEDIC SURGERY

## 2017-06-12 PROCEDURE — 73552 X-RAY EXAM OF FEMUR 2/>: CPT | Performed by: ORTHOPAEDIC SURGERY

## 2017-06-12 RX ORDER — MELOXICAM 7.5 MG/1
7.5 TABLET ORAL DAILY
Qty: 30 TABLET | Refills: 5 | Status: SHIPPED | OUTPATIENT
Start: 2017-06-12 | End: 2017-08-17 | Stop reason: HOSPADM

## 2017-06-12 NOTE — PROGRESS NOTES
Subjective: Status post IM rodding right subtrochanteric femur fracture     Patient ID: Raven Young is a 61 y.o. female.    Chief Complaint: Right leg pain    History of Present Illness patient is 2 months out progressing well although still having some pain in that leg which is not unexpected.  Also having some soreness in the knee.  She is walking without a cane or crutch but does have a slight L couch and gait.       Social History     Occupational History   • Not on file.     Social History Main Topics   • Smoking status: Never Smoker   • Smokeless tobacco: Never Used   • Alcohol use Yes      Comment: rare   • Drug use: No   • Sexual activity: Defer      Review of Systems   Constitutional: Negative for chills, diaphoresis, fever and unexpected weight change.   HENT: Negative for hearing loss, nosebleeds, sore throat and tinnitus.    Eyes: Negative for pain and visual disturbance.   Respiratory: Negative for cough, shortness of breath and wheezing.    Cardiovascular: Negative for chest pain and palpitations.   Gastrointestinal: Negative for abdominal pain, diarrhea, nausea and vomiting.   Endocrine: Negative for cold intolerance, heat intolerance and polydipsia.   Genitourinary: Negative for difficulty urinating, dysuria and hematuria.   Musculoskeletal: Positive for arthralgias. Negative for joint swelling and myalgias.   Skin: Negative for rash and wound.   Allergic/Immunologic: Negative for environmental allergies.   Neurological: Negative for dizziness, syncope and numbness.   Hematological: Does not bruise/bleed easily.   Psychiatric/Behavioral: Negative for dysphoric mood and sleep disturbance. The patient is not nervous/anxious.          Past Medical History:   Diagnosis Date   • Cancer     basal cell   • Depression with anxiety      Past Surgical History:   Procedure Laterality Date   • DC OPEN FIX INTER/SUBTROCH FX,IMPLNT Right 4/14/2017    Procedure: FEMUR INTRAMEDULLARY NAILING/RODDING long meir  gamma nail 7fr hemovac placement;  Surgeon: Keyshawn Madden MD;  Location: [facility];  Service: Orthopedics   • TUBAL ABDOMINAL LIGATION       No family history on file.      Objective:  There were no vitals filed for this visit.  There were no vitals filed for this visit.  There is no height or weight on file to calculate BMI.       Ortho Exam  AP lateral of the right femur does show the excellent alignment of the fracture some early callus formation but the fracture line still visible.  Compared to previous x-rays further callus is noted.  On exam she is alert and oriented ×3.  His no motor deficit good distal pulses the right lower extremity.  Her calf is nontender with a negative Homans.  His no sensory loss.  The knee has full range of motion 0-125° with no crepitus.  No instability noted.  No effusion erythema.  The skin is cool to touch.    Assessment:       1. Subtrochanteric fracture of right femur, closed, initial encounter          Plan:      I think she is progressing nicely.  I am going to add Mobic for the knee pain I think the soreness in the knee is just secondary to the stress added to the knee with ambulation.  I did advise her if this still some tenderness in the leg to use a cane to assist with ambulation.  Return to see me in a month with a repeat x-ray of her right femur hip to knee.      Work Status:    [employer] query complete.    Orders:  Orders Placed This Encounter   Procedures   • XR Femur 2 View Right       Medications:  New Medications Ordered This Visit   Medications   • meloxicam (MOBIC) 7.5 MG tablet     Sig: Take 1 tablet by mouth Daily.     Dispense:  30 tablet     Refill:  5       Followup:  Return in about 4 weeks (around 7/10/2017).          Dragon transcription disclaimer     Much of this encounter note is an electronic transcription/translation of spoken language to printed text. The electronic translation of spoken language may permit erroneous, or at times, nonsensical words or  phrases to be inadvertently transcribed. Although I have reviewed the note for such errors, some may still exist.

## 2017-07-06 ENCOUNTER — OFFICE VISIT (OUTPATIENT)
Dept: ORTHOPEDIC SURGERY | Facility: CLINIC | Age: 62
End: 2017-07-06

## 2017-07-06 DIAGNOSIS — R52 PAIN: Primary | ICD-10-CM

## 2017-07-06 DIAGNOSIS — Z09 STATUS POST ORTHOPEDIC SURGERY, FOLLOW-UP EXAM: ICD-10-CM

## 2017-07-06 DIAGNOSIS — S72.21XA SUBTROCHANTERIC FRACTURE OF RIGHT FEMUR, CLOSED, INITIAL ENCOUNTER (HCC): ICD-10-CM

## 2017-07-06 PROCEDURE — 99024 POSTOP FOLLOW-UP VISIT: CPT | Performed by: ORTHOPAEDIC SURGERY

## 2017-07-06 PROCEDURE — 73552 X-RAY EXAM OF FEMUR 2/>: CPT | Performed by: ORTHOPAEDIC SURGERY

## 2017-07-06 NOTE — PROGRESS NOTES
Subjective: Right subtrochanteric femur fracture     Patient ID: Raven Young is a 61 y.o. female.    Chief Complaint:    History of Present Illness patient is 11 weeks out from the right subtrochanteric femur fracture treated with IM rodding.  Still having some soreness in the thigh although she is ambulating without cane or crutch.  Her discomfort is with heavy activity not having any pain at rest.       Social History     Occupational History   • Not on file.     Social History Main Topics   • Smoking status: Never Smoker   • Smokeless tobacco: Never Used   • Alcohol use Yes      Comment: rare   • Drug use: No   • Sexual activity: Defer      Review of Systems   Constitutional: Negative for chills, diaphoresis, fever and unexpected weight change.   HENT: Negative for hearing loss, nosebleeds, sore throat and tinnitus.    Eyes: Negative for pain and visual disturbance.   Respiratory: Negative for cough, shortness of breath and wheezing.    Cardiovascular: Negative for chest pain and palpitations.   Gastrointestinal: Negative for abdominal pain, diarrhea, nausea and vomiting.   Endocrine: Negative for cold intolerance, heat intolerance and polydipsia.   Genitourinary: Negative for difficulty urinating, dysuria and hematuria.   Musculoskeletal: Positive for arthralgias. Negative for joint swelling and myalgias.   Skin: Negative for rash and wound.   Allergic/Immunologic: Negative for environmental allergies.   Neurological: Negative for dizziness, syncope and numbness.   Hematological: Does not bruise/bleed easily.   Psychiatric/Behavioral: Negative for dysphoric mood and sleep disturbance. The patient is not nervous/anxious.    All other systems reviewed and are negative.        Past Medical History:   Diagnosis Date   • Cancer     basal cell   • Depression with anxiety      Past Surgical History:   Procedure Laterality Date   • NM OPEN FIX INTER/SUBTROCH FX,IMPLNT Right 4/14/2017    Procedure: FEMUR  INTRAMEDULLARY NAILING/RODDING long meir gamma nail 7fr hemovac placement;  Surgeon: Keyshawn Madden MD;  Location: [facility];  Service: Orthopedics   • TUBAL ABDOMINAL LIGATION       No family history on file.      Objective:  There were no vitals filed for this visit.  There were no vitals filed for this visit.  There is no height or weight on file to calculate BMI.       Ortho Exam  AP lateral view of the right femur shows the fracture line still evident.  There is really no visible progressive healing evident on the plain x-ray.  On exam that she is alert and oriented ×3.  His no motor deficit good distal pulses.  No sensory loss.  Calf is nontender negative Homans.  Quad function is 5 over 5.    Assessment:       1. Pain    2. Subtrochanteric fracture of right femur, closed, initial encounter    3. Status post orthopedic surgery, follow-up exam          Plan:      that she is now 3 months out and the fracture line is not completely healed and I don't see abundant callus formation on the get a bone stimulator applied this evening can get further union of the fracture she still having pain on a daily basis.  Return to see me in 4-6 weeks with repeat x-ray after the bone stimulator has been applied.      Work Status:    [employer] query complete.    Orders:  Orders Placed This Encounter   Procedures   • XR Femur 2 View Right       Medications:  No orders of the defined types were placed in this encounter.      Followup:  Return in about 6 weeks (around 8/17/2017).          Dragon transcription disclaimer     Much of this encounter note is an electronic transcription/translation of spoken language to printed text. The electronic translation of spoken language may permit erroneous, or at times, nonsensical words or phrases to be inadvertently transcribed. Although I have reviewed the note for such errors, some may still exist.

## 2017-08-11 ENCOUNTER — HOSPITAL ENCOUNTER (EMERGENCY)
Facility: HOSPITAL | Age: 62
Discharge: HOME | End: 2017-08-11
Attending: EMERGENCY MEDICINE | Admitting: EMERGENCY MEDICINE

## 2017-08-11 ENCOUNTER — TELEPHONE (OUTPATIENT)
Dept: ORTHOPEDIC SURGERY | Facility: CLINIC | Age: 62
End: 2017-08-11

## 2017-08-11 ENCOUNTER — APPOINTMENT (OUTPATIENT)
Dept: CT IMAGING | Facility: HOSPITAL | Age: 62
End: 2017-08-11
Attending: EMERGENCY MEDICINE

## 2017-08-11 VITALS
SYSTOLIC BLOOD PRESSURE: 132 MMHG | WEIGHT: 168 LBS | OXYGEN SATURATION: 97 % | BODY MASS INDEX: 31.72 KG/M2 | HEIGHT: 61 IN | HEART RATE: 104 BPM | DIASTOLIC BLOOD PRESSURE: 85 MMHG | RESPIRATION RATE: 18 BRPM | TEMPERATURE: 98.2 F

## 2017-08-11 DIAGNOSIS — M25.551 RIGHT HIP PAIN: Primary | ICD-10-CM

## 2017-08-11 PROCEDURE — 73700 CT LOWER EXTREMITY W/O DYE: CPT

## 2017-08-11 PROCEDURE — 99282 EMERGENCY DEPT VISIT SF MDM: CPT | Performed by: EMERGENCY MEDICINE

## 2017-08-11 PROCEDURE — 99283 EMERGENCY DEPT VISIT LOW MDM: CPT

## 2017-08-11 NOTE — ED PROVIDER NOTES
Subjective   Patient is a 61 y.o. female presenting with lower extremity pain.   History provided by:  Patient  Lower Extremity Issue   Location:  Hip  Lower extremity injury: April 2017.    Hip location:  R hip  Pain details:     Quality:  Throbbing    Severity:  Moderate    Timing:  Constant    Progression:  Worsening  Prior injury to area:  Yes  Relieved by: improved but not relieved by nonweightbearing.  Worsened by:  Bearing weight  Ineffective treatments:  None tried  Associated symptoms: decreased ROM (Secondary to pain)    Associated symptoms: no back pain, no fever, no muscle weakness, no neck pain, no numbness, no stiffness, no swelling and no tingling    Risk factors: no concern for non-accidental trauma, no frequent fractures and no obesity      HPI Narrative:Ms. Young is a 60 yo WF Presents secondary to right hip pain.  Patient sustained a right hip fracture in April of this year.  She underwent placement of IM nail and pin.  She is under the care of Dr. Keyshawn Gibbons.  A bone stimulator was placed on the patient yesterday.  Patient has had continued pain since her surgery however this is worsened in the past week.  Patient removed the bone stimulator this morning.  She called Dr. Madden's office and was told come to the ER for evaluation. Patient denies any recent injury.        Review of Systems   Constitutional: Negative.  Negative for appetite change, diaphoresis and fever.   HENT: Negative.    Eyes: Negative.    Respiratory: Negative for cough, chest tightness, shortness of breath and wheezing.    Cardiovascular: Negative for chest pain, palpitations and leg swelling.   Genitourinary: Negative.  Negative for difficulty urinating, flank pain, frequency and hematuria.   Musculoskeletal: Positive for joint swelling (Right hip since fracture). Negative for back pain, neck pain and stiffness.   Skin: Negative.  Negative for color change, pallor and rash.   Neurological: Negative.  Negative for  "dizziness, seizures, syncope and headaches.   Psychiatric/Behavioral: Negative.  Negative for agitation, behavioral problems and hallucinations.       Past Medical History:   Diagnosis Date   • Cancer     basal cell   • Depression with anxiety        No Known Allergies    Past Surgical History:   Procedure Laterality Date   • [place] OPEN FIX INTER/SUBTROCH FX,IMPLNT Right 4/14/2017    Procedure: FEMUR INTRAMEDULLARY NAILING/RODDING long meir gamma nail 7fr hemovac placement;  Surgeon: Keyshawn Madden MD;  Location: [facility];  Service: Orthopedics   • TUBAL ABDOMINAL LIGATION         History reviewed. No pertinent family history.    Social History     Social History   • Marital status:      Spouse name: N/A   • Number of children: N/A   • Years of education: N/A     Social History Main Topics   • Smoking status: Never Smoker   • Smokeless tobacco: Never Used   • Alcohol use Yes      Comment: rare   • Drug use: No   • Sexual activity: Defer     Other Topics Concern   • None     Social History Narrative           Objective   Physical Exam   Constitutional: She is oriented to person, place, and time. She appears well-developed and well-nourished. No distress.   61-year-old white female laying in bed.  She appears in good overall health.  Patient is friendly and cooperative.  She does not appear in any distress.   Musculoskeletal:        Right hip: She exhibits decreased range of motion (Secondary to pain) and tenderness (Mild lateral aspect). She exhibits normal strength, no swelling and no crepitus.   Neurological: She is alert and oriented to person, place, and time.   Skin: Skin is warm and dry. She is not diaphoretic.   Psychiatric: She has a normal mood and affect. Her behavior is normal.   Nursing note and vitals reviewed.        Procedures         ED Course  ED Course   Comment By Time   08/11/17  2:02 PM  Patient had nontraumatic right hip fracture earlier this year.  Family describes as \"the worst Dr. " "Shai had ever seen\".  No new trauma but worsened pain.  Will obtain CT right hip. Trey Velarde MD 08/11 1403   08/11/17  3:51 PM  CT shows no new fracture however does not show any evidence of one callus or healing.  Patient went back to work earlier this week.  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  Unless she has been on her feet and up and down more last few days and then since her surgery.  I feel this is etiology of patient's pain.  She has crutches and pain medication at home.  I recommend she use crutches until seen by Dr. Gibbons at her scheduled appointment Monday. Trey Velarde MD 08/11 1552      Ct Lower Extremity Right Without Contrast    Result Date: 8/11/2017  Narrative: RIGHT HIP CT WITHOUT CONTRAST  INDICATION: Worsening right hip pain over the past week. Previous surgery 04/20/2017  PROCEDURE: Unenhanced CT of the right hip. Radiation dose reduction techniques were utilized, including automated exposure control and exposure modulation based on body size.  COMPARISON: Right hip series 04/14/2017  FINDINGS:  Right hip hardware in place. Trabecular prominence in the right hip and proximal femur probably representing bone remodeling. Previously demonstrated fracture fragments are still visible. There is no dislocation or asymmetrically prominent lucency surrounding the hardware. No new fractures seen. No drainable fluid collection.       Impression: Right hip intramedullary leah remains in place. No definite acute change compared with 04/14/2017.  Fracture line shown on the previous study are still visible on the current study with no significant bridging callus.  This report was finalized on 8/11/2017 3:11 PM by Dr. Henry Matos MD.                  MDM  Number of Diagnoses or Management Options  Right hip pain: established and worsening     Amount and/or Complexity of Data Reviewed  Tests in the radiology section of CPT®: reviewed and ordered    Risk of Complications, Morbidity, and/or " Mortality  Presenting problems: moderate  Diagnostic procedures: moderate  Management options: low    Patient Progress  Patient progress: stable      Final diagnoses:   Right hip pain            Trey Velarde MD  08/11/17 2858

## 2017-08-11 NOTE — TELEPHONE ENCOUNTER
Hip has been hurting, she states since she had the stimulator the pain has been worse.   Echo, nurse practioner, recommends pt go to the ER or stay on a walker till she can be seen on Monday.

## 2017-08-11 NOTE — DISCHARGE INSTRUCTIONS
Use crutches for ambulation until seen by Dr. Madden on Monday.  Medication as directed.  Return to ED for worsening symptoms, medical emergencies.    Hypertension  Hypertension, commonly called high blood pressure, is when the force of blood pumping through your arteries is too strong. Your arteries are the blood vessels that carry blood from your heart throughout your body. A blood pressure reading consists of a higher number over a lower number, such as 110/72. The higher number (systolic) is the pressure inside your arteries when your heart pumps. The lower number (diastolic) is the pressure inside your arteries when your heart relaxes. Ideally you want your blood pressure below 120/80.  Hypertension forces your heart to work harder to pump blood. Your arteries may become narrow or stiff. Having untreated or uncontrolled hypertension can cause heart attack, stroke, kidney disease, and other problems.  RISK FACTORS  Some risk factors for high blood pressure are controllable. Others are not.   Risk factors you cannot control include:   · Race. You may be at higher risk if you are .  · Age. Risk increases with age.  · Gender. Men are at higher risk than women before age 45 years. After age 65, women are at higher risk than men.  Risk factors you can control include:  · Not getting enough exercise or physical activity.  · Being overweight.  · Getting too much fat, sugar, calories, or salt in your diet.  · Drinking too much alcohol.  SIGNS AND SYMPTOMS  Hypertension does not usually cause signs or symptoms. Extremely high blood pressure (hypertensive crisis) may cause headache, anxiety, shortness of breath, and nosebleed.  DIAGNOSIS  To check if you have hypertension, your health care provider will measure your blood pressure while you are seated, with your arm held at the level of your heart. It should be measured at least twice using the same arm. Certain conditions can cause a difference in blood  pressure between your right and left arms. A blood pressure reading that is higher than normal on one occasion does not mean that you need treatment. If it is not clear whether you have high blood pressure, you may be asked to return on a different day to have your blood pressure checked again. Or, you may be asked to monitor your blood pressure at home for 1 or more weeks.  TREATMENT  Treating high blood pressure includes making lifestyle changes and possibly taking medicine. Living a healthy lifestyle can help lower high blood pressure. You may need to change some of your habits.  Lifestyle changes may include:  · Following the DASH diet. This diet is high in fruits, vegetables, and whole grains. It is low in salt, red meat, and added sugars.  · Keep your sodium intake below 2,300 mg per day.  · Getting at least 30-45 minutes of aerobic exercise at least 4 times per week.  · Losing weight if necessary.  · Not smoking.  · Limiting alcoholic beverages.  · Learning ways to reduce stress.  Your health care provider may prescribe medicine if lifestyle changes are not enough to get your blood pressure under control, and if one of the following is true:  · You are 18-59 years of age and your systolic blood pressure is above 140.  · You are 60 years of age or older, and your systolic blood pressure is above 150.  · Your diastolic blood pressure is above 90.  · You have diabetes, and your systolic blood pressure is over 140 or your diastolic blood pressure is over 90.  · You have kidney disease and your blood pressure is above 140/90.  · You have heart disease and your blood pressure is above 140/90.  Your personal target blood pressure may vary depending on your medical conditions, your age, and other factors.  HOME CARE INSTRUCTIONS  · Have your blood pressure rechecked as directed by your health care provider.    · Take medicines only as directed by your health care provider. Follow the directions carefully. Blood  pressure medicines must be taken as prescribed. The medicine does not work as well when you skip doses. Skipping doses also puts you at risk for problems.  · Do not smoke.    · Monitor your blood pressure at home as directed by your health care provider.   SEEK MEDICAL CARE IF:   · You think you are having a reaction to medicines taken.  · You have recurrent headaches or feel dizzy.  · You have swelling in your ankles.  · You have trouble with your vision.  SEEK IMMEDIATE MEDICAL CARE IF:  · You develop a severe headache or confusion.  · You have unusual weakness, numbness, or feel faint.  · You have severe chest or abdominal pain.  · You vomit repeatedly.  · You have trouble breathing.  MAKE SURE YOU:   · Understand these instructions.  · Will watch your condition.  · Will get help right away if you are not doing well or get worse.     This information is not intended to replace advice given to you by your health care provider. Make sure you discuss any questions you have with your health care provider.     Document Released: 12/18/2006 Document Revised: 05/03/2016 Document Reviewed: 10/10/2014  [employer] Interactive Patient Education ©2017 [employer]

## 2017-08-14 ENCOUNTER — OFFICE VISIT (OUTPATIENT)
Dept: ORTHOPEDIC SURGERY | Facility: CLINIC | Age: 62
End: 2017-08-14

## 2017-08-14 ENCOUNTER — PREP FOR SURGERY (OUTPATIENT)
Dept: OTHER | Facility: HOSPITAL | Age: 62
End: 2017-08-14

## 2017-08-14 ENCOUNTER — HOSPITAL ENCOUNTER (INPATIENT)
Facility: HOSPITAL | Age: 62
LOS: 3 days | Discharge: HOME | End: 2017-08-17
Attending: ORTHOPAEDIC SURGERY | Admitting: ORTHOPAEDIC SURGERY

## 2017-08-14 DIAGNOSIS — S72.22XK CLOSED LEFT SUBTROCHANTERIC FEMUR FRACTURE, WITH NONUNION, SUBSEQUENT ENCOUNTER: ICD-10-CM

## 2017-08-14 DIAGNOSIS — R52 PAIN: Primary | ICD-10-CM

## 2017-08-14 DIAGNOSIS — Z09 STATUS POST ORTHOPEDIC SURGERY, FOLLOW-UP EXAM: ICD-10-CM

## 2017-08-14 DIAGNOSIS — S72.22XK CLOSED LEFT SUBTROCHANTERIC FEMUR FRACTURE, WITH NONUNION, SUBSEQUENT ENCOUNTER: Primary | ICD-10-CM

## 2017-08-14 DIAGNOSIS — S72.21XS CLOSED DISPLACED SUBTROCHANTERIC FRACTURE OF RIGHT FEMUR, SEQUELA: Primary | ICD-10-CM

## 2017-08-14 DIAGNOSIS — S72.21XA SUBTROCHANTERIC FRACTURE OF RIGHT FEMUR, CLOSED, INITIAL ENCOUNTER (HCC): ICD-10-CM

## 2017-08-14 DIAGNOSIS — S72.21XS CLOSED DISPLACED SUBTROCHANTERIC FRACTURE OF RIGHT FEMUR, SEQUELA: ICD-10-CM

## 2017-08-14 PROBLEM — S72.22XA CLOSED LEFT SUBTROCHANTERIC FEMUR FRACTURE: Status: ACTIVE | Noted: 2017-08-14

## 2017-08-14 PROBLEM — S72.23XK: Status: ACTIVE | Noted: 2017-08-14

## 2017-08-14 LAB
ABO GROUP BLD: NORMAL
ANION GAP SERPL CALCULATED.3IONS-SCNC: 13 MMOL/L
BACTERIA UR QL AUTO: ABNORMAL /HPF
BILIRUB UR QL STRIP: NEGATIVE
BLD GP AB SCN SERPL QL: NEGATIVE
BUN BLD-MCNC: 16 MG/DL (ref 8–23)
BUN/CREAT SERPL: 18.4 (ref 7–25)
CALCIUM SPEC-SCNC: 8.6 MG/DL (ref 8.8–10.5)
CHLORIDE SERPL-SCNC: 100 MMOL/L (ref 98–107)
CLARITY UR: ABNORMAL
CO2 SERPL-SCNC: 25 MMOL/L (ref 22–29)
COLOR UR: ABNORMAL
CREAT BLD-MCNC: 0.87 MG/DL (ref 0.57–1)
DEPRECATED RDW RBC AUTO: 47.3 FL (ref 37–54)
EOSINOPHIL # BLD MANUAL: 0.07 10*3/MM3 (ref 0.1–0.3)
EOSINOPHIL NFR BLD MANUAL: 1 % (ref 0–4)
ERYTHROCYTE [DISTWIDTH] IN BLOOD BY AUTOMATED COUNT: 15.6 % (ref 11.5–14.5)
GFR SERPL CREATININE-BSD FRML MDRD: 66 ML/MIN/1.73
GLUCOSE BLD-MCNC: 126 MG/DL (ref 65–99)
GLUCOSE UR STRIP-MCNC: NEGATIVE MG/DL
HCT VFR BLD AUTO: 42.9 % (ref 37–47)
HGB BLD-MCNC: 13.8 G/DL (ref 12–16)
HGB UR QL STRIP.AUTO: ABNORMAL
HYALINE CASTS UR QL AUTO: ABNORMAL /LPF
INR PPP: 0.93 (ref 0.9–1.1)
KETONES UR QL STRIP: NEGATIVE
LEUKOCYTE ESTERASE UR QL STRIP.AUTO: NEGATIVE
LYMPHOCYTES # BLD MANUAL: 2.12 10*3/MM3 (ref 0.6–4.8)
LYMPHOCYTES NFR BLD MANUAL: 30 % (ref 20–45)
LYMPHOCYTES NFR BLD MANUAL: 8 % (ref 3–8)
MCH RBC QN AUTO: 27 PG (ref 27–31)
MCHC RBC AUTO-ENTMCNC: 32.2 G/DL (ref 31–37)
MCV RBC AUTO: 84 FL (ref 81–99)
MONOCYTES # BLD AUTO: 0.56 10*3/MM3 (ref 0–1)
NEUTROPHILS # BLD AUTO: 4.3 10*3/MM3 (ref 1.5–8.3)
NEUTROPHILS NFR BLD MANUAL: 61 % (ref 45–70)
NITRITE UR QL STRIP: NEGATIVE
PH UR STRIP.AUTO: <=5 [PH] (ref 4.5–8)
PLATELET # BLD AUTO: 256 10*3/MM3 (ref 140–500)
PMV BLD AUTO: 9 FL (ref 7.4–10.4)
POTASSIUM BLD-SCNC: 3.7 MMOL/L (ref 3.5–5.2)
PROT UR QL STRIP: NEGATIVE
PROTHROMBIN TIME: 12.5 SECONDS (ref 12.1–15)
RBC # BLD AUTO: 5.11 10*6/MM3 (ref 4.2–5.4)
RBC # UR: ABNORMAL /HPF
RBC MORPH BLD: NORMAL
REF LAB TEST METHOD: ABNORMAL
RH BLD: POSITIVE
SMALL PLATELETS BLD QL SMEAR: ADEQUATE
SODIUM BLD-SCNC: 138 MMOL/L (ref 136–145)
SP GR UR STRIP: 1.02 (ref 1–1.03)
SQUAMOUS #/AREA URNS HPF: ABNORMAL /HPF
UROBILINOGEN UR QL STRIP: ABNORMAL
WBC MORPH BLD: NORMAL
WBC NRBC COR # BLD: 7.05 10*3/MM3 (ref 4.8–10.8)
WBC UR QL AUTO: ABNORMAL /HPF

## 2017-08-14 PROCEDURE — 85007 BL SMEAR W/DIFF WBC COUNT: CPT | Performed by: ORTHOPAEDIC SURGERY

## 2017-08-14 PROCEDURE — S0260 H&P FOR SURGERY: HCPCS | Performed by: ORTHOPAEDIC SURGERY

## 2017-08-14 PROCEDURE — 86901 BLOOD TYPING SEROLOGIC RH(D): CPT | Performed by: ORTHOPAEDIC SURGERY

## 2017-08-14 PROCEDURE — 80048 BASIC METABOLIC PNL TOTAL CA: CPT | Performed by: ORTHOPAEDIC SURGERY

## 2017-08-14 PROCEDURE — 85610 PROTHROMBIN TIME: CPT | Performed by: ORTHOPAEDIC SURGERY

## 2017-08-14 PROCEDURE — 73552 X-RAY EXAM OF FEMUR 2/>: CPT | Performed by: ORTHOPAEDIC SURGERY

## 2017-08-14 PROCEDURE — 85027 COMPLETE CBC AUTOMATED: CPT | Performed by: ORTHOPAEDIC SURGERY

## 2017-08-14 PROCEDURE — 81001 URINALYSIS AUTO W/SCOPE: CPT | Performed by: ORTHOPAEDIC SURGERY

## 2017-08-14 PROCEDURE — 86900 BLOOD TYPING SEROLOGIC ABO: CPT | Performed by: ORTHOPAEDIC SURGERY

## 2017-08-14 PROCEDURE — 81003 URINALYSIS AUTO W/O SCOPE: CPT | Performed by: ORTHOPAEDIC SURGERY

## 2017-08-14 PROCEDURE — 86850 RBC ANTIBODY SCREEN: CPT | Performed by: ORTHOPAEDIC SURGERY

## 2017-08-14 PROCEDURE — 99024 POSTOP FOLLOW-UP VISIT: CPT | Performed by: ORTHOPAEDIC SURGERY

## 2017-08-14 RX ORDER — SODIUM CHLORIDE 9 MG/ML
30 INJECTION, SOLUTION INTRAVENOUS CONTINUOUS
Status: DISCONTINUED | OUTPATIENT
Start: 2017-08-14 | End: 2017-08-17 | Stop reason: HOSPADM

## 2017-08-14 RX ORDER — NALOXONE HCL 0.4 MG/ML
0.4 VIAL (ML) INJECTION
Status: CANCELLED | OUTPATIENT
Start: 2017-08-14

## 2017-08-14 RX ORDER — VANCOMYCIN HYDROCHLORIDE
15 ONCE
Status: CANCELLED | OUTPATIENT
Start: 2017-08-14 | End: 2017-08-14

## 2017-08-14 RX ORDER — SODIUM CHLORIDE 0.9 % (FLUSH) 0.9 %
1-10 SYRINGE (ML) INJECTION AS NEEDED
Status: CANCELLED | OUTPATIENT
Start: 2017-08-14

## 2017-08-14 RX ORDER — NALOXONE HCL 0.4 MG/ML
0.4 VIAL (ML) INJECTION
Status: DISCONTINUED | OUTPATIENT
Start: 2017-08-14 | End: 2017-08-17 | Stop reason: HOSPADM

## 2017-08-14 RX ORDER — OXYCODONE HCL 10 MG/1
10 TABLET, FILM COATED, EXTENDED RELEASE ORAL ONCE
Status: CANCELLED | OUTPATIENT
Start: 2017-08-15

## 2017-08-14 RX ORDER — PREGABALIN 75 MG/1
150 CAPSULE ORAL ONCE
Status: CANCELLED | OUTPATIENT
Start: 2017-08-15

## 2017-08-14 RX ORDER — SODIUM CHLORIDE 0.9 % (FLUSH) 0.9 %
1-10 SYRINGE (ML) INJECTION AS NEEDED
Status: DISCONTINUED | OUTPATIENT
Start: 2017-08-14 | End: 2017-08-17 | Stop reason: HOSPADM

## 2017-08-14 RX ORDER — SODIUM CHLORIDE 9 MG/ML
30 INJECTION, SOLUTION INTRAVENOUS CONTINUOUS
Status: CANCELLED | OUTPATIENT
Start: 2017-08-14

## 2017-08-14 RX ADMIN — SODIUM CHLORIDE 30 ML/HR: 900 INJECTION, SOLUTION INTRAVENOUS at 13:46

## 2017-08-14 NOTE — H&P
Orthopedic Surgery    Patient Care Team:  Andres Kathleen MD as PCP - General (Family Medicine)    CHIEF COMPLAINT: Right hip pain    HISTORY OF PRESENT ILLNESS: 61-year-old female is 4 months status post having nailing of a right subtrochanteric femur fracture done well with ambulating with minimal assistance until last Friday when she developed increasing pain and discomfort in her hip making angulation difficult if not impossible.  Seen in emergency room here where a CT scan showed delayed union at the fracture site and in the office today and x-ray showed fracture of the intramedullary device with a delayed union.  She is being admitted this time to go removal of the failed intramedullary leah and re-rodding of the subtrochanteric femur fracture.  Have discussed with the patient the risk of surgery include infection and the need for multiple procedures to eradicate infection, nerve injury and paralysis of the leg, vascular injury, persistent delayed and nonunion the need for further surgery to the leg.  She understands and agrees to proceed.          Past Medical History:   Diagnosis Date   • Cancer     basal cell   • Depression with anxiety      Past Surgical History:   Procedure Laterality Date   • [place] OPEN FIX INTER/SUBTROCH FX,IMPLNT Right 4/14/2017    Procedure: FEMUR INTRAMEDULLARY NAILING/RODDING long meir gamma nail 7fr hemovac placement;  Surgeon: Keyshawn Madden MD;  Location: [facility];  Service: Orthopedics   • TUBAL ABDOMINAL LIGATION       No family history on file.  Social History   Substance Use Topics   • Smoking status: Never Smoker   • Smokeless tobacco: Never Used   • Alcohol use Yes      Comment: rare     Prescriptions Prior to Admission   Medication Sig Dispense Refill Last Dose   • aspirin 325 MG tablet Take 1 tablet by mouth Every 12 (Twelve) Hours. 60 tablet 0 Taking   • DULoxetine (CYMBALTA) 60 MG capsule TAKE 1 CAPSULE DAILY.  4 Taking   • iron polysaccharides (NIFEREX) 150 MG capsule  "Take 1 capsule by mouth 2 (Two) Times a Day. 60 capsule 2 Taking   • meloxicam (MOBIC) 7.5 MG tablet Take 1 tablet by mouth Daily. 30 tablet 5 Taking   • oxyCODONE-acetaminophen (PERCOCET) 7.5-325 MG per tablet Take 1 tablet by mouth Every 4 (Four) Hours As Needed for Moderate Pain (4-6). 50 tablet 0 Taking     Allergies:  Review of patient's allergies indicates no known allergies.    REVIEW OF SYSTEMS:  Please see the above history of present illness for pertinent positives and negatives.  The remainder of the patient's systems have been reviewed and are negative.    Vital Signs  Temp:  [96.7 °F (35.9 °C)] 96.7 °F (35.9 °C)  Heart Rate:  [83] 83  Resp:  [18] 18  BP: (108)/(71) 108/71    Flowsheet Rows         First Filed Value    Admission Height  61\" (154.9 cm) Documented at 08/14/2017 1215    Admission Weight  167 lb 6.4 oz (75.9 kg) Documented at 08/14/2017 1215           Physical Exam:  Physical Exam   Constitutional: Patient appears well-developed and well-nourished and in no acute distress   HEENT:   Head: Normocephalic and atraumatic.   Eyes:  Pupils are equal, round, and reactive to light.  Mouth and Throat: Patient has moist mucous membranes. Oropharynx is clear of any erythema or exudate.     Neck: Neck supple. No JVD present. No thyromegaly present. No lymphadenopathy present.  Cardiovascular: Regular rate, regular rhythm.  Pulmonary/Chest: Lungs are clear to auscultation bilaterally.  Abdominal:benign,soft with bowel sounds  Musculoskeletal: Normal posture.  Extremities: The right leg shows good distal pulses no motor deficit.  No sensory loss.  Calf is nontender negative Homans.  There is pain with passive range of motion of the leg.  Neurological: Patient is alert and oriented.  Psychological:   Mood and behavior appropriate.  Skin: Skin is warm and dry.     Results Review:    I reviewed the patient's new clinical results.  Lab Results (most recent)     Procedure Component Value Units Date/Time    " Manual Differential [[id number]] Collected:  08/14/17 1224    Specimen:  Blood Updated:  08/14/17 1224    Protime-INR [[id number]] Collected:  08/14/17 1224    Specimen:  Blood Updated:  08/14/17 1224    Basic Metabolic Panel [[id number]] Collected:  08/14/17 1224    Specimen:  Blood Updated:  08/14/17 1224    CBC & Differential [[id number]] Collected:  08/14/17 1224    Specimen:  Blood Updated:  08/14/17 1234    Narrative:       The following orders were created for panel order CBC & Differential.  Procedure                               Abnormality         Status                     ---------                               -----------         ------                     Manual Differential[[id number]]                              In process                 CBC Auto Differential[[id number]]        Abnormal            Final result                 Please view results for these tests on the individual orders.    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

## 2017-08-14 NOTE — PROGRESS NOTES
Subjective: Right hip pain     Patient ID: Raven Young is a 61 y.o. female.    Chief Complaint:    History of Present Illness patient is 4 months status post IM rodding of right subtrochanteric hip fracture is doing well until Friday she began developing increasing pain and discomfort in the right hip.  Previous x-rays done show increasing callus at the central fracture site.  Seen in the ER a CT scan showed the fracture line still evident.  Seen today in the office because of the pain and x-ray was done in the office which showed the fracture is still ununited with breakage of the IM leah.       Social History     Occupational History   • Not on file.     Social History Main Topics   • Smoking status: Never Smoker   • Smokeless tobacco: Never Used   • Alcohol use Yes      Comment: rare   • Drug use: No   • Sexual activity: Defer      Review of Systems   Constitutional: Negative for chills, diaphoresis, fever and unexpected weight change.   HENT: Negative for hearing loss, nosebleeds, sore throat and tinnitus.    Eyes: Negative for pain and visual disturbance.   Respiratory: Negative for cough, shortness of breath and wheezing.    Cardiovascular: Negative for chest pain and palpitations.   Gastrointestinal: Negative for abdominal pain, diarrhea, nausea and vomiting.   Endocrine: Negative for cold intolerance, heat intolerance and polydipsia.   Genitourinary: Negative for difficulty urinating, dysuria and hematuria.   Musculoskeletal: Positive for arthralgias. Negative for joint swelling and myalgias.   Skin: Negative for rash and wound.   Allergic/Immunologic: Negative for environmental allergies.   Neurological: Negative for dizziness, syncope and numbness.   Hematological: Does not bruise/bleed easily.   Psychiatric/Behavioral: Negative for dysphoric mood and sleep disturbance. The patient is not nervous/anxious.    All other systems reviewed and are negative.        Past Medical History:   Diagnosis Date   •  Cancer     basal cell   • Depression with anxiety      Past Surgical History:   Procedure Laterality Date   • [place] OPEN FIX INTER/SUBTROCH FX,IMPLNT Right 4/14/2017    Procedure: FEMUR INTRAMEDULLARY NAILING/RODDING long meir gamma nail 7fr hemovac placement;  Surgeon: Keyshawn Madden MD;  Location: [facility];  Service: Orthopedics   • TUBAL ABDOMINAL LIGATION       No family history on file.      Objective:  There were no vitals filed for this visit.  There were no vitals filed for this visit.  There is no height or weight on file to calculate BMI.       Ortho Exam  AP of the right hip shows fracture of the leah at the level of the lag screw.  Fracture line is still evident.  Compared to previous x-ray shows the fracture of the leah and failure fixation.  She is alert and oriented ×3.  His no motor deficit good distal pulses no sensory loss.  Calf is nontender.    Assessment:       1. Pain    2. Subtrochanteric fracture of right femur, closed, initial encounter    3. Status post orthopedic surgery, follow-up exam          Plan:      limited to the hospital now and plan to proceed with revision of that fracture with removal of the fractured hardware tomorrow.  Discussed with the patient and she is in agreement.      Work Status:    [employer] query complete.    Orders:  Orders Placed This Encounter   Procedures   • XR Femur 2 View Right       Medications:  No orders of the defined types were placed in this encounter.      Followup:  Return in about 2 weeks (around 8/28/2017).          Dragon transcription disclaimer     Much of this encounter note is an electronic transcription/translation of spoken language to printed text. The electronic translation of spoken language may permit erroneous, or at times, nonsensical words or phrases to be inadvertently transcribed. Although I have reviewed the note for such errors, some may still exist.

## 2017-08-15 ENCOUNTER — APPOINTMENT (OUTPATIENT)
Dept: GENERAL RADIOLOGY | Facility: HOSPITAL | Age: 62
End: 2017-08-15

## 2017-08-15 ENCOUNTER — ANESTHESIA EVENT (OUTPATIENT)
Dept: PERIOP | Facility: HOSPITAL | Age: 62
End: 2017-08-15

## 2017-08-15 ENCOUNTER — ANESTHESIA (OUTPATIENT)
Dept: PERIOP | Facility: HOSPITAL | Age: 62
End: 2017-08-15

## 2017-08-15 LAB
HCT VFR BLD AUTO: 40.6 % (ref 37–47)
HGB BLD-MCNC: 12.6 G/DL (ref 12–16)

## 2017-08-15 PROCEDURE — 25010000002 MIDAZOLAM PER 1 MG: Performed by: NURSE ANESTHETIST, CERTIFIED REGISTERED

## 2017-08-15 PROCEDURE — 73502 X-RAY EXAM HIP UNI 2-3 VIEWS: CPT

## 2017-08-15 PROCEDURE — 94799 UNLISTED PULMONARY SVC/PX: CPT

## 2017-08-15 PROCEDURE — 25010000002 DIPHENHYDRAMINE PER 50 MG: Performed by: NURSE ANESTHETIST, CERTIFIED REGISTERED

## 2017-08-15 PROCEDURE — 25010000002 PHENYLEPHRINE PER 1 ML: Performed by: NURSE ANESTHETIST, CERTIFIED REGISTERED

## 2017-08-15 PROCEDURE — C1713 ANCHOR/SCREW BN/BN,TIS/BN: HCPCS | Performed by: ORTHOPAEDIC SURGERY

## 2017-08-15 PROCEDURE — 25010000002 HYDROMORPHONE PER 4 MG: Performed by: ORTHOPAEDIC SURGERY

## 2017-08-15 PROCEDURE — 0QP604Z REMOVAL OF INTERNAL FIXATION DEVICE FROM RIGHT UPPER FEMUR, OPEN APPROACH: ICD-10-PCS | Performed by: ORTHOPAEDIC SURGERY

## 2017-08-15 PROCEDURE — 25010000002 ONDANSETRON PER 1 MG: Performed by: NURSE ANESTHETIST, CERTIFIED REGISTERED

## 2017-08-15 PROCEDURE — 25010000003 CEFAZOLIN PER 500 MG: Performed by: ORTHOPAEDIC SURGERY

## 2017-08-15 PROCEDURE — 76000 FLUOROSCOPY <1 HR PHYS/QHP: CPT

## 2017-08-15 PROCEDURE — 85018 HEMOGLOBIN: CPT | Performed by: ORTHOPAEDIC SURGERY

## 2017-08-15 PROCEDURE — 85014 HEMATOCRIT: CPT | Performed by: ORTHOPAEDIC SURGERY

## 2017-08-15 PROCEDURE — 25010000002 VANCOMYCIN PER 500 MG: Performed by: ORTHOPAEDIC SURGERY

## 2017-08-15 PROCEDURE — 25010000002 DEXAMETHASONE PER 1 MG: Performed by: ANESTHESIOLOGY

## 2017-08-15 PROCEDURE — 73501 X-RAY EXAM HIP UNI 1 VIEW: CPT

## 2017-08-15 PROCEDURE — 0QH606Z INSERTION OF INTRAMEDULLARY INTERNAL FIXATION DEVICE INTO RIGHT UPPER FEMUR, OPEN APPROACH: ICD-10-PCS | Performed by: ORTHOPAEDIC SURGERY

## 2017-08-15 PROCEDURE — 25010000002 PROPOFOL 10 MG/ML EMULSION: Performed by: NURSE ANESTHETIST, CERTIFIED REGISTERED

## 2017-08-15 PROCEDURE — 73552 X-RAY EXAM OF FEMUR 2/>: CPT

## 2017-08-15 PROCEDURE — C1729 CATH, DRAINAGE: HCPCS | Performed by: ORTHOPAEDIC SURGERY

## 2017-08-15 PROCEDURE — 27245 TREAT THIGH FRACTURE: CPT | Performed by: ORTHOPAEDIC SURGERY

## 2017-08-15 DEVICE — DBX PUTTY, 5CC
Type: IMPLANTABLE DEVICE | Site: LEG | Status: FUNCTIONAL
Brand: DBX®

## 2017-08-15 DEVICE — LAG SCREW, TI
Type: IMPLANTABLE DEVICE | Site: LEG | Status: FUNCTIONAL
Brand: GAMMA

## 2017-08-15 DEVICE — LONG NAIL KIT R1.5, TI, RIGHT
Type: IMPLANTABLE DEVICE | Site: LEG | Status: FUNCTIONAL
Brand: GAMMA

## 2017-08-15 RX ORDER — FAMOTIDINE 20 MG/1
20 TABLET, FILM COATED ORAL EVERY 12 HOURS SCHEDULED
Status: DISCONTINUED | OUTPATIENT
Start: 2017-08-15 | End: 2017-08-15

## 2017-08-15 RX ORDER — DEXAMETHASONE SODIUM PHOSPHATE 4 MG/ML
4 INJECTION, SOLUTION INTRA-ARTICULAR; INTRALESIONAL; INTRAMUSCULAR; INTRAVENOUS; SOFT TISSUE ONCE
Status: COMPLETED | OUTPATIENT
Start: 2017-08-15 | End: 2017-08-15

## 2017-08-15 RX ORDER — SODIUM CHLORIDE, SODIUM LACTATE, POTASSIUM CHLORIDE, CALCIUM CHLORIDE 600; 310; 30; 20 MG/100ML; MG/100ML; MG/100ML; MG/100ML
INJECTION, SOLUTION INTRAVENOUS CONTINUOUS PRN
Status: DISCONTINUED | OUTPATIENT
Start: 2017-08-15 | End: 2017-08-15 | Stop reason: SURG

## 2017-08-15 RX ORDER — MIDAZOLAM HYDROCHLORIDE 1 MG/ML
1 INJECTION INTRAMUSCULAR; INTRAVENOUS
Status: DISCONTINUED | OUTPATIENT
Start: 2017-08-15 | End: 2017-08-15

## 2017-08-15 RX ORDER — LIDOCAINE HYDROCHLORIDE 20 MG/ML
INJECTION, SOLUTION INFILTRATION; PERINEURAL AS NEEDED
Status: DISCONTINUED | OUTPATIENT
Start: 2017-08-15 | End: 2017-08-15 | Stop reason: SURG

## 2017-08-15 RX ORDER — DIPHENHYDRAMINE HYDROCHLORIDE 50 MG/ML
12.5 INJECTION INTRAMUSCULAR; INTRAVENOUS ONCE
Status: COMPLETED | OUTPATIENT
Start: 2017-08-15 | End: 2017-08-15

## 2017-08-15 RX ORDER — DULOXETIN HYDROCHLORIDE 60 MG/1
60 CAPSULE, DELAYED RELEASE ORAL DAILY
Status: DISCONTINUED | OUTPATIENT
Start: 2017-08-15 | End: 2017-08-17 | Stop reason: HOSPADM

## 2017-08-15 RX ORDER — PREGABALIN 75 MG/1
150 CAPSULE ORAL ONCE
Status: COMPLETED | OUTPATIENT
Start: 2017-08-15 | End: 2017-08-15

## 2017-08-15 RX ORDER — BACITRACIN ZINC 500 [USP'U]/G
OINTMENT TOPICAL AS NEEDED
Status: DISCONTINUED | OUTPATIENT
Start: 2017-08-15 | End: 2017-08-15 | Stop reason: HOSPADM

## 2017-08-15 RX ORDER — SODIUM CHLORIDE 0.9 % (FLUSH) 0.9 %
1-10 SYRINGE (ML) INJECTION AS NEEDED
Status: CANCELLED | OUTPATIENT
Start: 2017-08-15

## 2017-08-15 RX ORDER — MIDAZOLAM HYDROCHLORIDE 1 MG/ML
2 INJECTION INTRAMUSCULAR; INTRAVENOUS
Status: DISCONTINUED | OUTPATIENT
Start: 2017-08-15 | End: 2017-08-15

## 2017-08-15 RX ORDER — PREGABALIN 75 MG/1
75 CAPSULE ORAL EVERY 12 HOURS SCHEDULED
Status: DISCONTINUED | OUTPATIENT
Start: 2017-08-15 | End: 2017-08-17 | Stop reason: HOSPADM

## 2017-08-15 RX ORDER — ACETAMINOPHEN 10 MG/ML
INJECTION, SOLUTION INTRAVENOUS
Status: COMPLETED
Start: 2017-08-15 | End: 2017-08-15

## 2017-08-15 RX ORDER — OXYCODONE HYDROCHLORIDE 5 MG/1
10 TABLET ORAL EVERY 4 HOURS PRN
Status: DISCONTINUED | OUTPATIENT
Start: 2017-08-15 | End: 2017-08-17 | Stop reason: HOSPADM

## 2017-08-15 RX ORDER — FAMOTIDINE 10 MG/ML
20 INJECTION, SOLUTION INTRAVENOUS EVERY 12 HOURS SCHEDULED
Status: DISCONTINUED | OUTPATIENT
Start: 2017-08-15 | End: 2017-08-15

## 2017-08-15 RX ORDER — SODIUM CHLORIDE 0.9 % (FLUSH) 0.9 %
1-10 SYRINGE (ML) INJECTION AS NEEDED
Status: DISCONTINUED | OUTPATIENT
Start: 2017-08-15 | End: 2017-08-15

## 2017-08-15 RX ORDER — DULOXETIN HYDROCHLORIDE 30 MG/1
CAPSULE, DELAYED RELEASE ORAL
Status: COMPLETED
Start: 2017-08-15 | End: 2017-08-15

## 2017-08-15 RX ORDER — FAMOTIDINE 10 MG/ML
20 INJECTION, SOLUTION INTRAVENOUS
Status: DISCONTINUED | OUTPATIENT
Start: 2017-08-15 | End: 2017-08-15

## 2017-08-15 RX ORDER — SODIUM CHLORIDE 9 MG/ML
INJECTION, SOLUTION INTRAVENOUS
Status: DISCONTINUED | OUTPATIENT
Start: 1840-12-31 | End: 2017-08-15 | Stop reason: HOSPADM

## 2017-08-15 RX ORDER — ONDANSETRON 2 MG/ML
4 INJECTION INTRAMUSCULAR; INTRAVENOUS ONCE AS NEEDED
Status: COMPLETED | OUTPATIENT
Start: 2017-08-15 | End: 2017-08-15

## 2017-08-15 RX ORDER — MAGNESIUM HYDROXIDE 1200 MG/15ML
LIQUID ORAL AS NEEDED
Status: DISCONTINUED | OUTPATIENT
Start: 2017-08-15 | End: 2017-08-15 | Stop reason: HOSPADM

## 2017-08-15 RX ORDER — PROPOFOL 10 MG/ML
VIAL (ML) INTRAVENOUS CONTINUOUS PRN
Status: DISCONTINUED | OUTPATIENT
Start: 2017-08-15 | End: 2017-08-15 | Stop reason: SURG

## 2017-08-15 RX ORDER — LIDOCAINE HYDROCHLORIDE 10 MG/ML
INJECTION, SOLUTION EPIDURAL; INFILTRATION; INTRACAUDAL; PERINEURAL
Status: DISPENSED
Start: 2017-08-15 | End: 2017-08-15

## 2017-08-15 RX ORDER — OXYCODONE HCL 10 MG/1
10 TABLET, FILM COATED, EXTENDED RELEASE ORAL ONCE
Status: COMPLETED | OUTPATIENT
Start: 2017-08-15 | End: 2017-08-15

## 2017-08-15 RX ORDER — ONDANSETRON 2 MG/ML
4 INJECTION INTRAMUSCULAR; INTRAVENOUS ONCE AS NEEDED
Status: CANCELLED | OUTPATIENT
Start: 2017-08-15

## 2017-08-15 RX ORDER — IRON POLYSACCHARIDE COMPLEX 150 MG
150 CAPSULE ORAL 2 TIMES DAILY
Status: DISCONTINUED | OUTPATIENT
Start: 2017-08-15 | End: 2017-08-17 | Stop reason: HOSPADM

## 2017-08-15 RX ORDER — ONDANSETRON 2 MG/ML
4 INJECTION INTRAMUSCULAR; INTRAVENOUS ONCE AS NEEDED
Status: DISCONTINUED | OUTPATIENT
Start: 2017-08-15 | End: 2017-08-15

## 2017-08-15 RX ORDER — BUPIVACAINE HYDROCHLORIDE 5 MG/ML
INJECTION, SOLUTION EPIDURAL; INTRACAUDAL AS NEEDED
Status: DISCONTINUED | OUTPATIENT
Start: 2017-08-15 | End: 2017-08-15 | Stop reason: SURG

## 2017-08-15 RX ORDER — VANCOMYCIN HYDROCHLORIDE 500 MG/10ML
INJECTION, POWDER, LYOPHILIZED, FOR SOLUTION INTRAVENOUS AS NEEDED
Status: DISCONTINUED | OUTPATIENT
Start: 2017-08-15 | End: 2017-08-15 | Stop reason: HOSPADM

## 2017-08-15 RX ORDER — ACETAMINOPHEN 500 MG
1000 TABLET ORAL 4 TIMES DAILY
Status: DISCONTINUED | OUTPATIENT
Start: 2017-08-15 | End: 2017-08-17 | Stop reason: HOSPADM

## 2017-08-15 RX ORDER — FENTANYL CITRATE 50 UG/ML
25 INJECTION, SOLUTION INTRAMUSCULAR; INTRAVENOUS
Status: DISCONTINUED | OUTPATIENT
Start: 2017-08-15 | End: 2017-08-15

## 2017-08-15 RX ADMIN — CEFAZOLIN SODIUM 2 G: 2 SOLUTION INTRAVENOUS at 13:27

## 2017-08-15 RX ADMIN — OXYCODONE HYDROCHLORIDE 10 MG: 10 TABLET, FILM COATED, EXTENDED RELEASE ORAL at 10:30

## 2017-08-15 RX ADMIN — EPHEDRINE SULFATE 5 MG: 50 INJECTION INTRAMUSCULAR; INTRAVENOUS; SUBCUTANEOUS at 14:21

## 2017-08-15 RX ADMIN — ACETAMINOPHEN 1000 MG: 500 TABLET, FILM COATED ORAL at 17:41

## 2017-08-15 RX ADMIN — PHENYLEPHRINE HYDROCHLORIDE 200 MCG: 10 INJECTION INTRAVENOUS at 15:24

## 2017-08-15 RX ADMIN — ACETAMINOPHEN 1000 MG: 500 TABLET, FILM COATED ORAL at 22:14

## 2017-08-15 RX ADMIN — ONDANSETRON 4 MG: 2 INJECTION, SOLUTION INTRAMUSCULAR; INTRAVENOUS at 12:57

## 2017-08-15 RX ADMIN — ACETAMINOPHEN 1000 MG: 10 INJECTION, SOLUTION INTRAVENOUS at 10:33

## 2017-08-15 RX ADMIN — PREGABALIN 150 MG: 75 CAPSULE ORAL at 10:29

## 2017-08-15 RX ADMIN — BUPIVACAINE HYDROCHLORIDE 3 ML: 5 INJECTION, SOLUTION EPIDURAL; INTRACAUDAL; PERINEURAL at 13:35

## 2017-08-15 RX ADMIN — PHENYLEPHRINE HYDROCHLORIDE 200 MCG: 10 INJECTION INTRAVENOUS at 14:53

## 2017-08-15 RX ADMIN — VANCOMYCIN HYDROCHLORIDE 1250 MG: 500 INJECTION, POWDER, LYOPHILIZED, FOR SOLUTION INTRAVENOUS at 10:37

## 2017-08-15 RX ADMIN — DIPHENHYDRAMINE HYDROCHLORIDE 12.5 MG: 50 INJECTION, SOLUTION INTRAMUSCULAR; INTRAVENOUS at 12:57

## 2017-08-15 RX ADMIN — OXYCODONE HYDROCHLORIDE 10 MG: 5 TABLET ORAL at 22:14

## 2017-08-15 RX ADMIN — SODIUM CHLORIDE, POTASSIUM CHLORIDE, SODIUM LACTATE AND CALCIUM CHLORIDE: 600; 310; 30; 20 INJECTION, SOLUTION INTRAVENOUS at 13:24

## 2017-08-15 RX ADMIN — DULOXETINE HYDROCHLORIDE 60 MG: 30 CAPSULE, DELAYED RELEASE ORAL at 17:42

## 2017-08-15 RX ADMIN — DEXAMETHASONE SODIUM PHOSPHATE 4 MG: 4 INJECTION, SOLUTION INTRAMUSCULAR; INTRAVENOUS at 13:02

## 2017-08-15 RX ADMIN — EPHEDRINE SULFATE 10 MG: 50 INJECTION INTRAMUSCULAR; INTRAVENOUS; SUBCUTANEOUS at 14:45

## 2017-08-15 RX ADMIN — PREGABALIN 75 MG: 75 CAPSULE ORAL at 22:14

## 2017-08-15 RX ADMIN — SODIUM CHLORIDE 1000 MG: 9 INJECTION, SOLUTION INTRAVENOUS at 15:44

## 2017-08-15 RX ADMIN — LIDOCAINE HYDROCHLORIDE 100 MG: 20 INJECTION, SOLUTION INFILTRATION; PERINEURAL at 13:55

## 2017-08-15 RX ADMIN — HYDROMORPHONE HYDROCHLORIDE 0.5 MG: 1 INJECTION, SOLUTION INTRAMUSCULAR; INTRAVENOUS; SUBCUTANEOUS at 00:13

## 2017-08-15 RX ADMIN — EPHEDRINE SULFATE 10 MG: 50 INJECTION INTRAMUSCULAR; INTRAVENOUS; SUBCUTANEOUS at 14:36

## 2017-08-15 RX ADMIN — EPHEDRINE SULFATE 10 MG: 50 INJECTION INTRAMUSCULAR; INTRAVENOUS; SUBCUTANEOUS at 14:09

## 2017-08-15 RX ADMIN — Medication 150 MG: at 17:42

## 2017-08-15 RX ADMIN — SODIUM CHLORIDE 1000 MG: 9 INJECTION, SOLUTION INTRAVENOUS at 13:58

## 2017-08-15 RX ADMIN — FAMOTIDINE 20 MG: 10 INJECTION INTRAVENOUS at 12:57

## 2017-08-15 RX ADMIN — CEFAZOLIN SODIUM 2 G: 2 SOLUTION INTRAVENOUS at 22:15

## 2017-08-15 RX ADMIN — MIDAZOLAM HYDROCHLORIDE 1 MG: 1 INJECTION, SOLUTION INTRAMUSCULAR; INTRAVENOUS at 12:58

## 2017-08-15 RX ADMIN — DULOXETINE HYDROCHLORIDE 60 MG: 60 CAPSULE, DELAYED RELEASE ORAL at 17:42

## 2017-08-15 RX ADMIN — SODIUM CHLORIDE 30 ML/HR: 900 INJECTION, SOLUTION INTRAVENOUS at 10:37

## 2017-08-15 RX ADMIN — PROPOFOL 25 MCG/KG/MIN: 10 INJECTION, EMULSION INTRAVENOUS at 13:58

## 2017-08-15 RX ADMIN — EPHEDRINE SULFATE 10 MG: 50 INJECTION INTRAMUSCULAR; INTRAVENOUS; SUBCUTANEOUS at 14:51

## 2017-08-15 NOTE — ANESTHESIA POSTPROCEDURE EVALUATION
Patient: Raven Young    Procedure Summary     Date Anesthesia Start Anesthesia Stop Room / Location    08/15/17 1324 1621 [facility] / [facility]       Procedure Diagnosis Surgeon Provider    HARDWARE REMOVAL long gamma nail  with long gamma nail right subtrochanteric femur fracture meir vanco powder  (Right ) Closed left subtrochanteric femur fracture, with nonunion, subsequent encounter  (Closed left subtrochanteric femur fracture, with nonunion, subsequent encounter [S72.22XK]) MD Pinky Momin CRNA          Anesthesia Type: spinal  Last vitals  BP        Temp        Pulse       Resp        SpO2          Post Anesthesia Care and Evaluation    Patient location during evaluation: bedside  Patient participation: complete - patient participated  Level of consciousness: awake and alert  Pain management: adequate  Airway patency: patent  Anesthetic complications: No anesthetic complications  PONV Status: none  Cardiovascular status: acceptable  Respiratory status: acceptable  Hydration status: acceptable

## 2017-08-15 NOTE — ANESTHESIA PREPROCEDURE EVALUATION
Anesthesia Evaluation     Patient summary reviewed and Nursing notes reviewed   history of anesthetic complications: PONV  NPO Solid Status: > 8 hours  NPO Liquid Status: > 8 hours     Airway   Mallampati: II  TM distance: >3 FB  Neck ROM: full  possible difficult intubation  Dental - normal exam     Pulmonary - negative pulmonary ROS and normal exam    breath sounds clear to auscultation  Cardiovascular - negative cardio ROS and normal exam    Rhythm: regular  Rate: normal      ROS comment: SINUS RHYTHM  EARLY PRECORDIAL R/S TRANSITION  NO PRIOR TRACING AVAILABLE FOR COMPARISON  Electronically Signed by:  Charo Deras ([facility]) 14-Apr-2017 07:31:29  Date and Time of Study: 2017-04-13 23:06:35    Neuro/Psych- negative ROS  GI/Hepatic/Renal/Endo - negative ROS     Musculoskeletal         ROS comment: Left leg  Abdominal  - normal exam   Substance History - negative use     OB/GYN negative ob/gyn ROS         Other                                        Anesthesia Plan    ASA 2     spinal     intravenous induction   Anesthetic plan and risks discussed with patient and spouse/significant other.

## 2017-08-15 NOTE — ANESTHESIA PROCEDURE NOTES
Spinal Block    Patient location during procedure: OR  Start Time: 8/15/2017 1:28 PM  Stop Time: 8/15/2017 1:35 PM  Indication:at surgeon's request and procedure for pain  Performed By  CRNA: AMAURI CHEUNG  Preanesthetic Checklist  Completed: patient identified, site marked, surgical consent, pre-op evaluation, timeout performed, IV checked, risks and benefits discussed and monitors and equipment checked  Spinal Block Prep:  Patient Position:left lateral decubitus  Sterile Tech:cap, gloves, mask and sterile barriers  Prep:Betadine  Patient Monitoring:blood pressure monitoring, continuous pulse oximetry and EKG  Spinal Block Procedure  Approach:midline  Guidance:landmark technique and palpation technique  Location:L3-L4  Needle Type:Pencan  Needle Gauge:27 G  Placement of Spinal needle event:cerebrospinal fluid aspirated  Paresthesia: no  Fluid Appearance:clear  Post Assessment  Patient Tolerance:patient tolerated the procedure well with no apparent complications  Complications no

## 2017-08-15 NOTE — PLAN OF CARE
Problem: Patient Care Overview (Adult)  Goal: Plan of Care Review  Outcome: Ongoing (interventions implemented as appropriate)    Problem: Pain, Acute (Adult)  Goal: Identify Related Risk Factors and Signs and Symptoms  Outcome: Ongoing (interventions implemented as appropriate)  Goal: Acceptable Pain Control/Comfort Level  Outcome: Ongoing (interventions implemented as appropriate)    Problem: Perioperative Period (Adult)  Goal: Signs and Symptoms of Listed Potential Problems Will be Absent or Manageable (Perioperative Period)  Outcome: Ongoing (interventions implemented as appropriate)

## 2017-08-15 NOTE — PLAN OF CARE
Problem: Perioperative Period (Adult)  Goal: Signs and Symptoms of Listed Potential Problems Will be Absent or Manageable (Perioperative Period)  Outcome: Ongoing (interventions implemented as appropriate)    08/15/17 1004   Perioperative Period   Problems Assessed (Perioperative Period) pain;infection;hypothermia;hypoxia/hypoxemia;hemorrhage   Problems Present (Perioperative Period) none         08/15/17 1004   Perioperative Period   Problems Assessed (Perioperative Period) pain;infection;hypothermia;hypoxia/hypoxemia;hemorrhage   Problems Present (Perioperative Period) none

## 2017-08-15 NOTE — OP NOTE
Preoperative Diagnosis failed gamma nailing right subtrochanteric femur fracture with hardware failure and delayed union:    Postoperative Diagnosis: Same    Procedure Performed:  Removal of failed broken hardware with repeat gamma nailing right subtrochanteric femur fracture with 11 x 340 mm 125° angle nail with 90 proximal lag screw    Surgeon: Chano      Assistant:  Efraín    Anesthesia: Spinal      Anesthetist: Pinky Gutiérrez    Drains: ×1    Complications: None     EBL-300 cc    Indications for procedure: 61-year-old female is 4 months status post gamma nailing for right subtrochanteric femur fracture initially was laid down bone and showed increasing callus on x-ray but developed immediate pain intensity pain this past Friday were partially is doing well.  X-rays done in the office yesterday showed fracture or breakage of the hardware with delayed union at the subtrochanteric femur fracture site          Operative Note: Patient brought to the operating room and after satisfactory anesthesia was obtained patient placed in the fracture table to right leg in traction.  Fluoroscopic evaluation at that time showed alignment of the fracture and visualization of the entire leah from greater trochanter down to the knee.  The right leg was then prepped and after the prep drive for 3 minutes was draped in sterile from the usual manner.  Timeout was completed before and after the prepping phase.  Going through the old lateral incision via blunt and sharp dissection the leg screw was identified and easily removed.  Incision was extended proximally in the tip of the greater trochanter was identified and the proximal portion of the leah which had fractured was then removed through the proximal wound.  The distal 2 screws through the nail was then removed through the distal incision .  The remainder leah was then removed once a closed leah was placed down the center of the leah in engaging it distally and then with a [place] was  removed once the old hardware hadn't completely removed the fracture was reamed one time with a 13 mm reamer.  A 11 x 3 40 nail which is 20 mm longer than the original with 125 mL guide was then inserted over the guidewire and the nail was inserted in the exact same place for the lag screw and the 90 mm lag screw was inserted into the femoral head and the proximal locking screw was inserted no distal screw was inserted at this time as there is type fixation distally with no rotation of the distal fragment on the first of a reevaluation in range of motion.  This will rule out compression at the fracture site.  10 cc of DBM was packed in around the fracture site.  All wounds were then irrigated copious amounts of Betadine solution normal saline and 2 g of vancomycin powder was packed in all wounds.  The exparel solution was then injected deep and superficial tissues.  The deep wound was closed with interrupted 2-0 Vicryl and then 0 strata fix.  The proximal deep wound was closed over Hemovac the deep fascial layer was closed and #2 Vicryl then 0 Vicryl and then 0 strata fix and 3-0 0 subcuticular strata fix.  All wounds were then closed with a prenenio Dermabond dressing.  Sterile dressing is applied and the patient transferred recovery mannitol procedure well.

## 2017-08-15 NOTE — PLAN OF CARE
Problem: Patient Care Overview (Adult)  Goal: Plan of Care Review  Outcome: Ongoing (interventions implemented as appropriate)    08/15/17 0308   Coping/Psychosocial Response Interventions   Plan Of Care Reviewed With patient   Patient Care Overview   Progress no change   Outcome Evaluation   Outcome Summary/Follow up Plan VSS, pain well controlled with prn Dilaudid. No other issues. patient has been NPO since midnight.         Problem: Pain, Acute (Adult)  Goal: Identify Related Risk Factors and Signs and Symptoms  Outcome: Ongoing (interventions implemented as appropriate)    08/15/17 0308   Pain, Acute   Related Risk Factors (Acute Pain) knowledge deficit;patient perception;positioning;surgery   Signs and Symptoms (Acute Pain) BADLs/IADLs reluctance/inability to perform;fear of reinjury;guarding/abnormal posturing/positioning;verbalization of pain descriptors       Goal: Acceptable Pain Control/Comfort Level  Outcome: Ongoing (interventions implemented as appropriate)    08/15/17 0308   Pain, Acute (Adult)   Acceptable Pain Control/Comfort Level making progress toward outcome

## 2017-08-16 ENCOUNTER — PREP FOR SURGERY (OUTPATIENT)
Dept: OTHER | Facility: HOSPITAL | Age: 62
End: 2017-08-16

## 2017-08-16 PROCEDURE — 99024 POSTOP FOLLOW-UP VISIT: CPT | Performed by: ORTHOPAEDIC SURGERY

## 2017-08-16 PROCEDURE — 25010000003 CEFAZOLIN PER 500 MG: Performed by: ORTHOPAEDIC SURGERY

## 2017-08-16 PROCEDURE — 25010000002 VANCOMYCIN PER 500 MG: Performed by: ORTHOPAEDIC SURGERY

## 2017-08-16 RX ADMIN — OXYCODONE HYDROCHLORIDE 10 MG: 5 TABLET ORAL at 09:08

## 2017-08-16 RX ADMIN — Medication 150 MG: at 18:40

## 2017-08-16 RX ADMIN — ACETAMINOPHEN 1000 MG: 500 TABLET, FILM COATED ORAL at 09:08

## 2017-08-16 RX ADMIN — OXYCODONE HYDROCHLORIDE 10 MG: 5 TABLET ORAL at 03:18

## 2017-08-16 RX ADMIN — CEFAZOLIN SODIUM 2 G: 2 SOLUTION INTRAVENOUS at 14:51

## 2017-08-16 RX ADMIN — PREGABALIN 75 MG: 75 CAPSULE ORAL at 20:59

## 2017-08-16 RX ADMIN — APIXABAN 2.5 MG: 2.5 TABLET, FILM COATED ORAL at 09:09

## 2017-08-16 RX ADMIN — ACETAMINOPHEN 1000 MG: 500 TABLET, FILM COATED ORAL at 20:59

## 2017-08-16 RX ADMIN — VANCOMYCIN HYDROCHLORIDE 1250 MG: 500 INJECTION, POWDER, LYOPHILIZED, FOR SOLUTION INTRAVENOUS at 01:50

## 2017-08-16 RX ADMIN — DULOXETINE HYDROCHLORIDE 60 MG: 60 CAPSULE, DELAYED RELEASE ORAL at 09:09

## 2017-08-16 RX ADMIN — CEFAZOLIN SODIUM 2 G: 2 SOLUTION INTRAVENOUS at 05:40

## 2017-08-16 RX ADMIN — ACETAMINOPHEN 1000 MG: 500 TABLET, FILM COATED ORAL at 18:40

## 2017-08-16 RX ADMIN — APIXABAN 2.5 MG: 2.5 TABLET, FILM COATED ORAL at 21:00

## 2017-08-16 RX ADMIN — Medication 150 MG: at 09:09

## 2017-08-16 RX ADMIN — ACETAMINOPHEN 1000 MG: 500 TABLET, FILM COATED ORAL at 12:25

## 2017-08-16 RX ADMIN — PREGABALIN 75 MG: 75 CAPSULE ORAL at 09:09

## 2017-08-16 NOTE — PROGRESS NOTES
Patient: Raven Young  Procedure(s):  HARDWARE REMOVAL long gamma nail  with long gamma nail right subtrochanteric femur fracture meir vanco powder   Anesthesia type: [unfilled]    Patient location: [facility] Surgical Floor  Last vitals:   Vitals:    08/16/17 0626   BP: 104/60   Pulse: 97   Resp: 20   Temp: 97 °F (36.1 °C)   SpO2: 94%     Level of consciousness: awake, alert and oriented    Post-anesthesia pain: adequate analgesia  Airway patency: patent  Respiratory: unassisted  Cardiovascular: stable and blood pressure at baseline  Hydration: euvolemic    Anesthetic complications: no

## 2017-08-16 NOTE — PLAN OF CARE
Problem: Patient Care Overview (Adult)  Goal: Plan of Care Review  Outcome: Ongoing (interventions implemented as appropriate)    08/16/17 1936   Coping/Psychosocial Response Interventions   Plan Of Care Reviewed With patient   Patient Care Overview   Progress improving   Outcome Evaluation   Outcome Summary/Follow up Plan Patient had minimal pain today. Medications given as ordered. SL this AM due to good PO intact. Continuous pulse ox DC at 1630 due to it being 24 hour post surgery. Patient up and ambulated in zepeda with weight bearing as tolerated. VSS.          Problem: Pain, Acute (Adult)  Goal: Acceptable Pain Control/Comfort Level  Outcome: Ongoing (interventions implemented as appropriate)

## 2017-08-16 NOTE — NURSING NOTE
Discharge Planning Assessment  SHIRA Retana     Patient Name: Raven Young  MRN: [id number]  Today's Date: 8/16/2017    Admit Date: 8/14/2017          Discharge Needs Assessment       08/16/17 1238    Living Environment    Lives With spouse    Living Arrangements house    Home Accessibility no concerns    Transportation Available car    Living Environment    Quality Of Family Relationships supportive    Able to Return to Prior Living Arrangements yes    Discharge Needs Assessment    Concerns To Be Addressed --   continue to assess    Readmission Within The Last 30 Days no previous admission in last 30 days    Discharge Planning Comments Sp[facility] with patient at [facility] to speak with vistor present. Face sheet verified. Patient states she is independent of ADLs including driving & working prior to admission. She denies use of DME, home 02, cpap/bipap - bust states her  has multiple DME including wc, walker, canes, elevated toilet seat and a ramp into thee home. She assists him as needed at home.She has not used home health or rehab services in the past. She uses [facility] and denies issues obtaining medications. She plans to return home and does not anticipate any needs at this time. Will continue to follow for dc planning needs.            Discharge Plan       08/16/17 1248    Case Management/Social Work Plan    Plan plan home, continue to assess needs    Patient/Family In Agreement With Plan yes    Additional Comments Spoke with patient at bedside, she is sitting up in [facility], permission given to speak with vistor present. Face sheet verified. Patient states she is independent of ADLs including driving & working prior to admission. She denies use of DME, home 02, cpap/bipap - but states her  has multiple DME including wc, walker, canes, elevated toilet seat and a ramp into the home. She assists him as needed at home. She has not used home health  or rehab services in the past. She uses [facility] and denies issues obtaining medications. She plans to return home and does not anticipate any needs at this time. Will continue to follow for dc planning needs        Discharge Placement     No information found                Demographic Summary       08/16/17 1237    Referral Information    Admission Type inpatient    Arrived From home or self-care    Referral Source admission list    Reason For Consult discharge planning    Contact Information    Permission Granted to Share Information With ;family/designee    Primary Care Physician Information    Name Dr Kathleen            Functional Status     None            Psychosocial     None            Abuse/Neglect     None            Legal     None            Substance Abuse     None            Patient Forms     None          Alejandro Kapoor RN

## 2017-08-16 NOTE — PROGRESS NOTES
Orthopedic Progress Note   Chief Complaint:  Status post removal broken hardware with re-rodding right subtrochanteric femur fracture    Subjective     Interval History: Patient is postop day 1 she is doing better.  States all of her preoperative pain has resolved.  She is afebrile hemodynamically stable.  Hemoglobin is pending.  Pain well-controlled oral medication          Objective     Vital Signs  Temp:  [97 °F (36.1 °C)-98.3 °F (36.8 °C)] 97 °F (36.1 °C)  Heart Rate:  [] 97  Resp:  [16-20] 20  BP: ()/(49-94) 104/60  Body mass index is 31.63 kg/(m^2).    Intake/Output Summary (Last 24 hours) at 08/16/17 0653  Last data filed at 08/16/17 0150   Gross per 24 hour   Intake             1210 ml   Output              900 ml   Net              310 ml     I/O this shift:  In: 250 [IV Piggyback:250]  Out: -        Physical Exam:   General: patient awake, alert and cooperative   Cardiovascular: regular rhythm and rate   Pulm: clear to auscultation bilaterally   Abdomen: Benign.  Soft bowel sounds   Extremities: Right leg has good distal pulses no motor deficit no sensory loss.  Her calf is nontender.  She's had 300 Hemovac drainage.   Neurologic: Normal mood and behavior     Results Review:     I reviewed the patient's new clinical results.      WBC No results found for: WBCS   HGB Hemoglobin   Date Value Ref Range Status   08/15/2017 12.6 12.0 - 16.0 g/dL Final   08/14/2017 13.8 12.0 - 16.0 g/dL Final      HCT Hematocrit   Date Value Ref Range Status   08/15/2017 40.6 37.0 - 47.0 % Final   08/14/2017 42.9 37.0 - 47.0 % Final      Platlets No results found for: LABPLAT     PT/INR:    Protime   Date Value Ref Range Status   08/14/2017 12.5 12.1 - 15.0 Seconds Final   /  INR   Date Value Ref Range Status   08/14/2017 0.93 0.90 - 1.10 Final       Sodium Sodium   Date Value Ref Range Status   08/14/2017 138 136 - 145 mmol/L Final      Potassium Potassium   Date Value Ref Range Status   08/14/2017 3.7 3.5 - 5.2  mmol/L Final      Chloride Chloride   Date Value Ref Range Status   08/14/2017 100 98 - 107 mmol/L Final      Bicarbonate No results found for: PLASMABICARB   BUN BUN   Date Value Ref Range Status   08/14/2017 16 8 - 23 mg/dL Final      Creatinine Creatinine   Date Value Ref Range Status   08/14/2017 0.87 0.57 - 1.00 mg/dL Final      Calcium Calcium   Date Value Ref Range Status   08/14/2017 8.6 (L) 8.8 - 10.5 mg/dL Final      Magnesium  AST  ALT  Bilirubin, Total  AlkPhos  Albumin    Amylase  Lipase    Radiology: No results found for: MG  No components found for: AST.*  No components found for: ALT.*  No components found for: BILIRUBIN, TOTAL.*    No components found for: ALKPHOS.*  No components found for: ALBUMIN.*      No components found for: AMYLASE.*  No components found for: LIPASE.*            Imaging Results (most recent)     Procedure Component Value Units Date/Time    XR Hip With or Without Pelvis 1 View Right [[id number]] Collected:  08/15/17 1657     Updated:  08/15/17 1700    Narrative:       POSTOP RIGHT HIP, 08/15/2017:     HISTORY:  Right femur fracture. Hardware replacement.     TECHNIQUE:  AP radiographs of the right hip and proximal femur were obtained  portably following surgery.     FINDINGS:  Intramedullary leah fixation hardware appears well positioned traversing  well aligned oblique fracture of the proximal femur below the  trochanters.     This report was finalized on 8/15/2017 4:58 PM by Dr. Ferdinand Salvador MD.       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  Begin weightbearing as tolerated.  Home possibly tomorrow.      Keyshawn Madden MD  08/16/17  6:53 AM

## 2017-08-16 NOTE — PLAN OF CARE
Problem: Patient Care Overview (Adult)  Goal: Discharge Needs Assessment  Outcome: Ongoing (interventions implemented as appropriate)    08/16/17 3975   Discharge Needs Assessment   Discharge Planning Comments Spoke with patient at bedside, she is sitting up in [facility], permission armando to speak with vistor present. Face sheet verified. Patient states she is independent of ADLs including driving & working prior to admission. She denies use of DME, home 02, cpap/bipap - bust states her  has multiple DME including wc, walker, canes, elevated toilet seat and a ramp into thee home. She assists him as needed at home.She has not used home health or rehab services in the past. She uses [facility] and denies issues obtaining medications. She plans to return home and does not anticipate any needs at this time. Will continue to follow for dc planning needs

## 2017-08-16 NOTE — PLAN OF CARE
Problem: Patient Care Overview (Adult)  Goal: Plan of Care Review  Outcome: Ongoing (interventions implemented as appropriate)    08/16/17 0142   Coping/Psychosocial Response Interventions   Plan Of Care Reviewed With patient;family   Patient Care Overview   Progress improving   Outcome Evaluation   Outcome Summary/Follow up Plan VSS, no pain issues, no other problems.          Problem: Pain, Acute (Adult)  Goal: Identify Related Risk Factors and Signs and Symptoms  Outcome: Ongoing (interventions implemented as appropriate)    08/16/17 0142   Pain, Acute   Related Risk Factors (Acute Pain) knowledge deficit;patient perception   Signs and Symptoms (Acute Pain) verbalization of pain descriptors       Goal: Acceptable Pain Control/Comfort Level  Outcome: Ongoing (interventions implemented as appropriate)    08/16/17 0142   Pain, Acute (Adult)   Acceptable Pain Control/Comfort Level making progress toward outcome         Problem: Perioperative Period (Adult)  Goal: Signs and Symptoms of Listed Potential Problems Will be Absent or Manageable (Perioperative Period)  Outcome: Ongoing (interventions implemented as appropriate)    08/16/17 0142   Perioperative Period   Problems Assessed (Perioperative Period) all   Problems Present (Perioperative Period) pain;situational response

## 2017-08-17 VITALS
HEART RATE: 92 BPM | WEIGHT: 167.4 LBS | DIASTOLIC BLOOD PRESSURE: 61 MMHG | SYSTOLIC BLOOD PRESSURE: 98 MMHG | OXYGEN SATURATION: 92 % | TEMPERATURE: 98.4 F | RESPIRATION RATE: 20 BRPM | BODY MASS INDEX: 31.6 KG/M2 | HEIGHT: 61 IN

## 2017-08-17 PROBLEM — S72.22XA CLOSED LEFT SUBTROCHANTERIC FEMUR FRACTURE: Status: ACTIVE | Noted: 2017-08-14

## 2017-08-17 LAB
BASOPHILS # BLD AUTO: 0.03 10*3/MM3 (ref 0–0.2)
BASOPHILS NFR BLD AUTO: 0.3 % (ref 0–2)
DEPRECATED RDW RBC AUTO: 50.6 FL (ref 37–54)
EOSINOPHIL # BLD AUTO: 0.39 10*3/MM3 (ref 0.1–0.3)
EOSINOPHIL NFR BLD AUTO: 4.2 % (ref 0–4)
ERYTHROCYTE [DISTWIDTH] IN BLOOD BY AUTOMATED COUNT: 15.9 % (ref 11.5–14.5)
HCT VFR BLD AUTO: 31.9 % (ref 37–47)
HGB BLD-MCNC: 9.8 G/DL (ref 12–16)
IMM GRANULOCYTES # BLD: 0.02 10*3/MM3 (ref 0–0.03)
IMM GRANULOCYTES NFR BLD: 0.2 % (ref 0–0.5)
LYMPHOCYTES # BLD AUTO: 2.17 10*3/MM3 (ref 0.6–4.8)
LYMPHOCYTES NFR BLD AUTO: 23.2 % (ref 20–45)
MCH RBC QN AUTO: 26.8 PG (ref 27–31)
MCHC RBC AUTO-ENTMCNC: 30.7 G/DL (ref 31–37)
MCV RBC AUTO: 87.2 FL (ref 81–99)
MONOCYTES # BLD AUTO: 0.78 10*3/MM3 (ref 0–1)
MONOCYTES NFR BLD AUTO: 8.3 % (ref 3–8)
NEUTROPHILS # BLD AUTO: 5.97 10*3/MM3 (ref 1.5–8.3)
NEUTROPHILS NFR BLD AUTO: 63.8 % (ref 45–70)
NRBC BLD MANUAL-RTO: 0 /100 WBC (ref 0–0)
PLATELET # BLD AUTO: 248 10*3/MM3 (ref 140–500)
PMV BLD AUTO: 9.5 FL (ref 7.4–10.4)
RBC # BLD AUTO: 3.66 10*6/MM3 (ref 4.2–5.4)
WBC NRBC COR # BLD: 9.36 10*3/MM3 (ref 4.8–10.8)

## 2017-08-17 PROCEDURE — 85025 COMPLETE CBC W/AUTO DIFF WBC: CPT | Performed by: ORTHOPAEDIC SURGERY

## 2017-08-17 PROCEDURE — 97161 PT EVAL LOW COMPLEX 20 MIN: CPT

## 2017-08-17 PROCEDURE — 99024 POSTOP FOLLOW-UP VISIT: CPT | Performed by: ORTHOPAEDIC SURGERY

## 2017-08-17 RX ORDER — OXYCODONE AND ACETAMINOPHEN 7.5; 325 MG/1; MG/1
1 TABLET ORAL EVERY 4 HOURS PRN
Qty: 50 TABLET | Refills: 0 | Status: SHIPPED | OUTPATIENT
Start: 2017-08-17 | End: 2017-12-12

## 2017-08-17 RX ORDER — PREGABALIN 75 MG/1
75 CAPSULE ORAL EVERY 12 HOURS SCHEDULED
Qty: 60 CAPSULE | Refills: 0 | Status: CANCELLED | OUTPATIENT
Start: 2017-08-17

## 2017-08-17 RX ORDER — PREGABALIN 75 MG/1
75 CAPSULE ORAL EVERY 12 HOURS SCHEDULED
Qty: 60 CAPSULE | Refills: 0 | Status: SHIPPED | OUTPATIENT
Start: 2017-08-17 | End: 2017-08-24

## 2017-08-17 RX ADMIN — Medication 150 MG: at 08:54

## 2017-08-17 RX ADMIN — ACETAMINOPHEN 1000 MG: 500 TABLET, FILM COATED ORAL at 08:54

## 2017-08-17 RX ADMIN — APIXABAN 2.5 MG: 2.5 TABLET, FILM COATED ORAL at 08:54

## 2017-08-17 RX ADMIN — DULOXETINE HYDROCHLORIDE 60 MG: 60 CAPSULE, DELAYED RELEASE ORAL at 08:54

## 2017-08-17 RX ADMIN — OXYCODONE HYDROCHLORIDE 5 MG: 5 TABLET ORAL at 08:54

## 2017-08-17 RX ADMIN — PREGABALIN 75 MG: 75 CAPSULE ORAL at 08:54

## 2017-08-17 NOTE — THERAPY DISCHARGE NOTE
Acute Care - Physical Therapy Initial Eval/Discharge  SHIRA Retana     Patient Name: Raven Young  : 1955  MRN: [id number]  Today's Date: 2017   Onset of Illness/Injury or Date of Surgery Date: 17  Date of Referral to PT: 17  Referring Physician: Dr. Madden      Admit Date: 2017    Visit Dx:    ICD-10-CM ICD-9-CM   1. Closed displaced subtrochanteric fracture of right femur, sequela S72.21XS 820.22   2. 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   • Closed left subtrochanteric femur fracture     Past Medical History:   Diagnosis Date   • Cancer     basal cell   • Depression with anxiety      Past Surgical History:   Procedure Laterality Date   • HARDWARE REMOVAL Right 8/15/2017    Procedure: HARDWARE REMOVAL long gamma nail  with long gamma nail right subtrochanteric femur fracture meir vanco powder ;  Surgeon: Keyshawn Madden MD;  Location:  [facility] OR;  Service:    • IM NAILING FEMORAL SHAFT RETROGRADE Right 08/15/2017    replacement leah  fx   • VA OPEN FIX INTER/SUBTROCH FX,IMPLNT Right 2017    Procedure: FEMUR INTRAMEDULLARY NAILING/RODDING long meir gamma nail 7fr hemovac placement;  Surgeon: Keyshawn Madden MD;  Location:  [facility] OR;  Service: Orthopedics   • TUBAL ABDOMINAL LIGATION            PT ASSESSMENT (last 72 hours)      PT Evaluation       17 0940 17 0854    Rehab Evaluation    Document Type evaluation  -BP     Subjective Information agree to therapy;no complaints  -BP     Patient Effort, Rehab Treatment excellent  -BP     Symptoms Noted During/After Treatment none  -BP     General Information    Patient Profile Review yes  -BP     Onset of Illness/Injury or Date of Surgery Date 17  -BP     Referring Physician Dr. Madden   -BP     General Observations Patient supine in bed, family present. In no acute distress  -BP     Pertinent History Of Current Problem Patient presents to the ED with R hip pain. Patient found to have failed hardware from previous repair. Patient now s/p hardward removal with repeat gamma nailing of R femur fracture. Referral received for PT on 8/17. Patient has been ambulating to the bathroom and within room since surgery. She states she has no concerns for return home and does not feel that she would benefit from home health or outpatient PT at discharge. Patient politely refuses ther ex. She states she has been through this before and does not have concerns.She agress to functional mobility evaluation.   -BP     Precautions/Limitations fall precautions  -BP     Prior Level of Function independent:;gait;transfer;ADL's;work;home management;cooking;driving;bed mobility  -BP     Equipment Currently Used at Home none   owns w/c, standard walker, cane, raised toilet does not use  -BP     Plans/Goals Discussed With patient and family;agreed upon  -BP     Risks Reviewed patient and family:;increased discomfort;LOB  -BP     Benefits Reviewed patient and family:;improve function;increase independence;increase strength  -BP     Barriers to Rehab none identified  -BP     Living Environment    Lives With spouse  -BP     Living Arrangements house  -BP     Home Accessibility ramps present at home;bed and bath on same level  -BP     Living Environment Comment Patient is a caregiver for her   -BP     Clinical Impression    Date of Referral to PT 08/17/17  -BP     Criteria for Skilled Therapeutic Interventions Met no   Patient being discharge home this morning  -BP     Pain Assessment    Pain Assessment No/denies pain  -BP     Cognitive Assessment/Intervention    Current Cognitive/Communication Assessment functional  -BP     Orientation Status oriented x 4  -BP     Follows Commands/Answers Questions 100% of the time;able to  follow multi-step instructions  -BP     Personal Safety WNL/WFL  -BP     Personal Safety Interventions gait belt;nonskid shoes/slippers when out of bed  -BP     ROM (Range of Motion)    General ROM Detail L LE AROM WFL. R knee and ankle AROM WFL. R knee AROM deferred  -BP     MMT (Manual Muscle Testing)    General MMT Assessment Detail L LE strength functional. R LE MMT deferred  -BP     Muscle Tone Assessment    Muscle Tone Assessment      RLE Muscle Tone Assessment      Bed Mobility, Assessment/Treatment    Bed Mobility, Assistive Device head of bed elevated  -BP     Bed Mob, Supine to Sit, [place] conditional independence  -BP     Bed Mob, Sit to Supine, [place] conditional independence  -BP     Transfer Assessment/Treatment    Transfers, Sit-Stand [place] supervision required  -BP     Transfers, Stand-Sit [place] supervision required;verbal cues required  -BP     Transfers, Sit-Stand-Sit, Assist Device rolling walker  -BP     Transfer, Comment verbal cues for AD placement with directional change to sit  -BP     Gait Assessment/Treatment    Gait, [place] Level supervision required;verbal cues required  -BP     Gait, Assistive Device rolling walker  -BP     Gait, Distance (Feet) 150  -BP     Gait, Gait Pattern Analysis swing-through gait  -BP     Gait, Gait Deviations nish decreased;forward flexed posture;right:;decreased heel strike;antalgic;left:;step length decreased  -BP     Gait, Impairments pain   Patient fearful to place weight through R LE  -BP     Gait, Comment Patient requires verbal cues for improved heel strike and foot flat on the R. Patient fearful to place weight through R LE secondary to previously failed hardward. With cues patient able to improve. No LOB noted. Patient demonstrates good safety with use of RW.   -BP     Motor Skills/Interventions    Additional Documentation Balance Skills Training (Group)  -BP     Balance Skills Training    Standing-Level of Assistance  Distant supervision  -BP     Static Standing Balance Support assistive device  -BP     Positioning and Restraints    Pre-Treatment Position in bed  -BP     Post Treatment Position bed  -BP     In Bed notified nsg;supine;call light within reach;encouraged to call for assist;with family/caregiver  -BP       User Key  (r) = Recorded By, (t) = Taken By, (c) = Cosigned By    Initials Name Provider Type    TP Esha Hampton, RN Registered Nurse    ROX Kapoor, RN Case Manager     Isabela Galvez, RN Registered Nurse    BP Nery Tolliver, PT Physical Therapist    LAURA Pope, RN Registered Nurse    KOBE Perrin, RN Registered Nurse    SW Alena Briones, RN Registered Nurse    CLAY Morales RN Registered Nurse          Physical Therapy Education     Title: PT OT SLP Therapies (Resolved)     Topic: Physical Therapy (Resolved)     Point: Mobility training (Resolved)    Learning Progress Summary    Learner Readiness Method Response Comment Documented by Status   Patient Acceptance E VU  BP 08/17/17 1230 Done   Family Acceptance E VU  BP 08/17/17 1230 Done               Point: Home exercise program (Resolved)    Learning Progress Summary    Learner Readiness Method Response Comment Documented by Status   Patient Acceptance E VU  BP 08/17/17 1230 Done   Family Acceptance E VU  BP 08/17/17 1230 Done                      User Key     Initials Effective Dates Name Provider Type [facility]    BP 12/01/15 -  Nery Tolliver, PT Physical Therapist PT                PT Recommendation and Plan  Anticipated Equipment Needs At Discharge: front wheeled walker (Patient states she is able to borrow one )  Anticipated Discharge Disposition: home  PT Frequency: evaluation only  Plan of Care Review  Plan Of Care Reviewed With: patient  Outcome Summary/Follow up Plan: PT Evaluation Complete: Patient performs supine to/from sit transfer with conditional independence, sit to/from stand transfer with supervision  and gait x 150 feet with supervision with use of RW. Patient requires verbal cues for improved gait mechanics however able to improve. patient demonstrates safe use of AD with mobility. Patient denies concerns for return home and refuses home health or outpatient PT. Patient being discharge home this morning. Recommend use of RW for mobility. No further PT recommendations.               Outcome Measures       08/17/17 0940          How much help from another person do you currently need...    Turning from your back to your side while in flat bed without using bedrails? 4  -BP      Moving from lying on back to sitting on the side of a flat bed without bedrails? 4  -BP      Moving to and from a bed to a chair (including a wheelchair)? 3  -BP      Standing up from a chair using your arms (e.g., wheelchair, bedside chair)? 3  -BP      Climbing 3-5 steps with a railing? 3  -BP      To walk in hospital room? 3  -BP      AM-PAC 6 Clicks Score 20  -BP      Functional Assessment    Outcome Measure Options AM-PAC 6 Clicks Basic Mobility (PT)  -BP        User Key  (r) = Recorded By, (t) = Taken By, (c) = Cosigned By    Initials Name Provider Type    BP Nery Tolliver PT Physical Therapist           Time Calculation:         PT Charges       08/17/17 1233          Time Calculation    Start Time 0940  -BP      PT Received On 08/17/17  -BP        User Key  (r) = Recorded By, (t) = Taken By, (c) = Cosigned By    Initials Name Provider Type    BP Nery Tolliver PT Physical Therapist          Therapy Charges for Today     Code Description Service Date Service Provider Modifiers Qty    [id number] HC PT EVAL LOW COMPLEXITY 2 8/17/2017 Nery Tolliver, PT GP 1          PT G-Codes  Outcome Measure Options: AM-PAC 6 Clicks Basic Mobility (PT)    PT Discharge Summary  Anticipated Discharge Disposition: home  Reason for Discharge: Discharge from facility  Outcomes Achieved: Discharge from facility occurred on same date as  evluation  Discharge Destination: Home    Nery Tolliver, PT  8/17/2017

## 2017-08-17 NOTE — PROGRESS NOTES
Orthopedic Progress Note   Chief Complaint: Status post removal of failed hardware right subtrochlear fracture was re-rodding.     Subjective     Interval History: Patient is postop day 2 doing well pain well-controlled oral medication.  She is afebrile hemodynamically stable.  Hemoglobin 9.8.  This progressed well in physical therapy and is ready for discharge          Objective     Vital Signs  Temp:  [97.3 °F (36.3 °C)-98.9 °F (37.2 °C)] 98.4 °F (36.9 °C)  Heart Rate:  [89-94] 92  Resp:  [20] 20  BP: ()/(55-62) 98/61  Body mass index is 31.63 kg/(m^2).    Intake/Output Summary (Last 24 hours) at 08/17/17 0659  Last data filed at 08/16/17 1300   Gross per 24 hour   Intake             1870 ml   Output              125 ml   Net             1745 ml             Physical Exam:   General: patient awake, alert and cooperative   Cardiovascular: regular rhythm and rate   Pulm: clear to auscultation bilaterally   Abdomen: Benign.  Soft bowel sounds   Extremities: Dressing was changed wound completely benign.  No erythema no drainage.  Good distal pulses no motor deficit Is nontender.   Neurologic: Normal mood and behavior     Results Review:     I reviewed the patient's new clinical results.      WBC No results found for: WBCS   HGB Hemoglobin   Date Value Ref Range Status   08/17/2017 9.8 (L) 12.0 - 16.0 g/dL Final   08/15/2017 12.6 12.0 - 16.0 g/dL Final   08/14/2017 13.8 12.0 - 16.0 g/dL Final      HCT Hematocrit   Date Value Ref Range Status   08/17/2017 31.9 (L) 37.0 - 47.0 % Final   08/15/2017 40.6 37.0 - 47.0 % Final   08/14/2017 42.9 37.0 - 47.0 % Final      Platlets No results found for: LABPLAT     PT/INR:    Protime   Date Value Ref Range Status   08/14/2017 12.5 12.1 - 15.0 Seconds Final   /  INR   Date Value Ref Range Status   08/14/2017 0.93 0.90 - 1.10 Final       Sodium Sodium   Date Value Ref Range Status   08/14/2017 138 136 - 145 mmol/L Final      Potassium Potassium   Date Value Ref Range Status    08/14/2017 3.7 3.5 - 5.2 mmol/L Final      Chloride Chloride   Date Value Ref Range Status   08/14/2017 100 98 - 107 mmol/L Final      Bicarbonate No results found for: PLASMABICARB   BUN BUN   Date Value Ref Range Status   08/14/2017 16 8 - 23 mg/dL Final      Creatinine Creatinine   Date Value Ref Range Status   08/14/2017 0.87 0.57 - 1.00 mg/dL Final      Calcium Calcium   Date Value Ref Range Status   08/14/2017 8.6 (L) 8.8 - 10.5 mg/dL Final      Magnesium  AST  ALT  Bilirubin, Total  AlkPhos  Albumin    Amylase  Lipase    Radiology: No results found for: MG  No components found for: AST.*  No components found for: ALT.*  No components found for: BILIRUBIN, TOTAL.*    No components found for: ALKPHOS.*  No components found for: ALBUMIN.*      No components found for: AMYLASE.*  No components found for: LIPASE.*            Imaging Results (most recent)     Procedure Component Value Units Date/Time    XR Hip With or Without Pelvis 1 View Right [[id number]] Collected:  08/15/17 1657     Updated:  08/15/17 1700    Narrative:       POSTOP RIGHT HIP, 08/15/2017:     HISTORY:  Right femur fracture. Hardware replacement.     TECHNIQUE:  AP radiographs of the right hip and proximal femur were obtained  portably following surgery.     FINDINGS:  Intramedullary leah fixation hardware appears well positioned traversing  well aligned oblique fracture of the proximal femur below the  trochanters.     This report was finalized on 8/15/2017 4:58 PM by Dr. Ferdinand Salvador MD.       FL C Arm During Surgery [[id number]] Updated:  08/15/17 1756               sodium chloride 30 mL/hr Last Rate: Stopped (08/16/17 0913)         Assessment/Plan     Patient Active Problem List   Diagnosis Code   • Closed right hip fracture S72.001A   • Subtrochanteric fracture of right femur S72.21XA   • Closed subtrochanteric fracture of femur with nonunion S72.23XK   • Closed left subtrochanteric femur fracture S72.22XA         Discharge home  today.    Keyshawn Madden MD  08/17/17  6:59 AM

## 2017-08-17 NOTE — PLAN OF CARE
Problem: Patient Care Overview (Adult)  Goal: Plan of Care Review    08/17/17 1230   Coping/Psychosocial Response Interventions   Plan Of Care Reviewed With patient   Outcome Evaluation   Outcome Summary/Follow up Plan PT Evaluation Complete: Patient performs supine to/from sit transfer with conditional independence, sit to/from stand transfer with supervision and gait x 150 feet with supervision with use of RW. Patient requires verbal cues for improved gait mechanics however able to improve. patient demonstrates safe use of AD with mobility. Patient denies concerns for return home and refuses home health or outpatient PT. Patient being discharge home this morning. Recommend use of RW for mobility. No further PT recommendations.

## 2017-08-17 NOTE — PLAN OF CARE
Problem: Patient Care Overview (Adult)  Goal: Plan of Care Review  Outcome: Outcome(s) achieved Date Met:  08/17/17

## 2017-08-17 NOTE — PLAN OF CARE
Problem: Patient Care Overview (Adult)  Goal: Plan of Care Review  Outcome: Ongoing (interventions implemented as appropriate)    08/17/17 0936   Coping/Psychosocial Response Interventions   Plan Of Care Reviewed With patient   Patient Care Overview   Progress improving   Outcome Evaluation   Outcome Summary/Follow up Plan Patient going home. Pain med given for ride home. Follow-up with Dr. Madden.       Goal: Adult Individualization and Mutuality  Outcome: Ongoing (interventions implemented as appropriate)    08/17/17 0936   Individualization   Patient Specific Goals Go home   Patient Specific Interventions Patient going home today       Goal: Discharge Needs Assessment  Outcome: Ongoing (interventions implemented as appropriate)    08/17/17 0936   Discharge Needs Assessment   Concerns To Be Addressed denies needs/concerns at this time         Problem: Pain, Acute (Adult)  Goal: Identify Related Risk Factors and Signs and Symptoms  Outcome: Outcome(s) achieved Date Met:  08/17/17 08/17/17 0647   Pain, Acute   Related Risk Factors (Acute Pain) knowledge deficit   Signs and Symptoms (Acute Pain) verbalization of pain descriptors       Goal: Acceptable Pain Control/Comfort Level  Outcome: Ongoing (interventions implemented as appropriate)    08/17/17 0936   Pain, Acute (Adult)   Acceptable Pain Control/Comfort Level making progress toward outcome         Problem: Perioperative Period (Adult)  Goal: Signs and Symptoms of Listed Potential Problems Will be Absent or Manageable (Perioperative Period)  Outcome: Ongoing (interventions implemented as appropriate)    08/17/17 0936   Perioperative Period   Problems Assessed (Perioperative Period) all   Problems Present (Perioperative Period) pain

## 2017-08-17 NOTE — PLAN OF CARE
Problem: Patient Care Overview (Adult)  Goal: Plan of Care Review    08/17/17 0647   Coping/Psychosocial Response Interventions   Plan Of Care Reviewed With patient   Patient Care Overview   Progress progress toward functional goals as expected   Outcome Evaluation   Outcome Summary/Follow up Plan Patient has done very well this shift. Ambulating to BR without any complaints. Plans to go home today.

## 2017-08-18 NOTE — DISCHARGE SUMMARY
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 female that was admitted to the hospital with a history of having undergone gamma nailing of her right subtrochanteric femur fracture proximal to 4 months ago.  Was doing well until several days ago she felt intense pain was seen in the office for x-rays confirmed the late union of the fracture with fracture or breakage of the hardware.  After prep evaluation she was admitted to the hospital and taken to the OR for removal of the broken hardware repeat gamma nailing with allografting to the fracture site.  Postoperative she is doing extremely well.  She is afebrile hemodynamically stable.  Ambulating with a walker pain well-controlled oral medications..    Discharge medications:    Raven Young   Home Medication Instructions PATRICE:[id number]    Printed on:08/18/17 8584   Medication Information                      apixaban (ELIQUIS) 2.5 MG tablet tablet  Take 1 tablet by mouth Every 12 (Twelve) Hours.             DULoxetine (CYMBALTA) 60 MG capsule  TAKE 1 CAPSULE DAILY.             iron polysaccharides (NIFEREX) 150 MG capsule  Take 1 capsule by mouth 2 (Two) Times a Day.             oxyCODONE-acetaminophen (PERCOCET) 7.5-325 MG per tablet  Take 1 tablet by mouth Every 4 (Four) Hours As Needed for Moderate Pain .             pregabalin (LYRICA) 75 MG capsule  Take 1 capsule by mouth Every 12 (Twelve) Hours.                  Discharge instructions:  Patient may shower.  She is to continue weightbearing as tolerated.  She'll take Eliquis 2.5 mg by mouth twice a day for the next 2 weeks for DVT prophylaxis.  She'll resume all of her other preoperative medications and resume her preoperative admission diet.  Patient is given a prescription for Percocet for pain control also take Lyrica 75 mg twice a day.      Follow-up appointment: Follow-up visit has been scheduled to 10 days to 2 weeks.

## 2017-08-18 NOTE — PAYOR COMM NOTE
"Jayce Young (61 y.o. Female)     ATTN: NURSE REVIEWER  AUTH#[id number]  FAXING DISCHARGE ORDER AND SUMMARY.      Date of Birth Social Security Number Address Home Phone MRN    1955  [address] [place] [id number] [phone] [id number]    [employer] Marital Status          None        Admission Date Admission Type Admitting Provider Attending Provider Department, Room/Bed    8/14/17 Elective Keyshawn Madden MD  [facility] MED SURG, 1415/1    Discharge Date Discharge Disposition Discharge Destination        8/17/2017 Home or Self Care             Attending Provider: (none)    Allergies:  No Known Allergies    Isolation:  None   Infection:  None   Code Status:  Prior    Ht:  61\" (154.9 cm)   Wt:  167 lb 6.4 oz (75.9 kg)    Admission Cmt:  None   Principal Problem:  Closed left subtrochanteric femur fracture [S72.22XA] More...                 Active Insurance as of 8/14/2017     Primary Coverage     Payor Plan Insurance Group Employer/Plan Group    [facility] [employer] CROSS [employer] EM[employer] [id number]     Payor Plan Address Payor Plan Phone Number Effective From Effective To    [address] [phone] 1/1/2015     [place] [address]       Subscriber Name Subscriber Birth Date Member ID       JAYCE YOUNG 1955 [id number]                 Emergency Contacts      (Rel.) Home Phone Work Phone Mobile Phone    Xin Arana (Sister) [phone] -- --               Discharge Summary      Keyshawn Madden MD at 8/17/2017 10:23 AM          Discharge Summary    Patient name: Jayce Young    Medical record number: [id number]    Admission date: 8/14/2017  Discharge date:  8/17/2017    Attending physician: Chano    Primary care physician: Andres Kathleen MD    Referring physician:     Consulting physician(s):    Condition on discharge: Stable    Primary Diagnoses: 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 female that was admitted to the hospital with a history of having undergone gamma nailing of her right subtrochanteric femur fracture proximal to 4 months ago.  Was doing well until several days ago she felt intense pain was seen in the office for x-rays confirmed the late union of the fracture with fracture or breakage of the hardware.  After prep evaluation she was admitted to the hospital and taken to the OR for removal of the broken hardware repeat gamma nailing with allografting to the fracture site.  Postoperative she is doing extremely well.  She is afebrile hemodynamically stable.  Ambulating with a walker pain well-controlled oral medications..    Discharge medications:    Raven Young   Home Medication Instructions PATRICE:[id number]    Printed on:08/18/17 5286   Medication Information                      apixaban (ELIQUIS) 2.5 MG tablet tablet  Take 1 tablet by mouth Every 12 (Twelve) Hours.             DULoxetine (CYMBALTA) 60 MG capsule  TAKE 1 CAPSULE DAILY.             iron polysaccharides (NIFEREX) 150 MG capsule  Take 1 capsule by mouth 2 (Two) Times a Day.             oxyCODONE-acetaminophen (PERCOCET) 7.5-325 MG per tablet  Take 1 tablet by mouth Every 4 (Four) Hours As Needed for Moderate Pain .             pregabalin (LYRICA) 75 MG capsule  Take 1 capsule by mouth Every 12 (Twelve) Hours.                 Discharge instructions:  Patient may shower.  She is to continue weightbearing as tolerated.  She'll take Eliquis 2.5 mg by mouth twice a day for the next 2 weeks for DVT prophylaxis.  She'll resume all of her other preoperative medications and resume her preoperative admission diet.  Patient is given a prescription for Percocet for pain control also take Lyrica 75 mg twice a day.      Follow-up  appointment: Follow-up visit has been scheduled to 10 days to 2 weeks.     Electronically signed by Keyshawn Madden MD at 8/18/2017  7:31 AM        Discharge Order     Start     Ordered    08/17/17 0743  Discharge patient  Once     Expected Discharge Date:  08/17/17    Discharge Disposition:  Home or Self Care        08/17/17 0742

## 2017-08-24 ENCOUNTER — OFFICE VISIT (OUTPATIENT)
Dept: ORTHOPEDIC SURGERY | Facility: CLINIC | Age: 62
End: 2017-08-24

## 2017-08-24 DIAGNOSIS — Z09 STATUS POST ORTHOPEDIC SURGERY, FOLLOW-UP EXAM: ICD-10-CM

## 2017-08-24 DIAGNOSIS — R52 PAIN: Primary | ICD-10-CM

## 2017-08-24 DIAGNOSIS — S72.21XK SUBTROCHANTERIC FRACTURE OF RIGHT FEMUR, CLOSED, WITH NONUNION, SUBSEQUENT ENCOUNTER: ICD-10-CM

## 2017-08-24 PROCEDURE — 99024 POSTOP FOLLOW-UP VISIT: CPT | Performed by: ORTHOPAEDIC SURGERY

## 2017-08-24 RX ORDER — GABAPENTIN 100 MG/1
300 CAPSULE ORAL 3 TIMES DAILY
Qty: 90 CAPSULE | Refills: 1 | Status: SHIPPED | OUTPATIENT
Start: 2017-08-24 | End: 2017-10-02 | Stop reason: SDUPTHER

## 2017-08-24 NOTE — PROGRESS NOTES
Subjective: Status post removal of broken hardware and repeat IM rodding right subtrochanteric femur fracture     Patient ID: Raven Young is a 62 y.o. female.    Chief Complaint:    History of Present Illness patient is 1 week out doing well ambulating in her home with a walker pain is well-controlled oral medication.       Social History     Occupational History   • Not on file.     Social History Main Topics   • Smoking status: Never Smoker   • Smokeless tobacco: Never Used   • Alcohol use Yes      Comment: rare   • Drug use: No   • Sexual activity: Defer      Review of Systems   Constitutional: Negative for chills, diaphoresis, fever and unexpected weight change.   HENT: Negative for hearing loss, nosebleeds, sore throat and tinnitus.    Eyes: Negative for pain and visual disturbance.   Respiratory: Negative for cough, shortness of breath and wheezing.    Cardiovascular: Negative for chest pain and palpitations.   Gastrointestinal: Negative for abdominal pain, diarrhea, nausea and vomiting.   Endocrine: Negative for cold intolerance, heat intolerance and polydipsia.   Genitourinary: Negative for difficulty urinating, dysuria and hematuria.   Musculoskeletal: Negative for arthralgias, joint swelling and myalgias.   Skin: Negative for rash and wound.   Allergic/Immunologic: Negative for environmental allergies.   Neurological: Negative for dizziness, syncope and numbness.   Hematological: Does not bruise/bleed easily.   Psychiatric/Behavioral: Negative for dysphoric mood and sleep disturbance. The patient is not nervous/anxious.          Past Medical History:   Diagnosis Date   • Cancer     basal cell   • Depression with anxiety      Past Surgical History:   Procedure Laterality Date   • HARDWARE REMOVAL Right 8/15/2017    Procedure: HARDWARE REMOVAL long gamma nail  with long gamma nail right subtrochanteric femur fracture meir vanco powder ;  Surgeon: Keyshawn Madden MD;  Location: [facility];  Service:    •  IM NAILING FEMORAL SHAFT RETROGRADE Right 08/15/2017    replacement leah  fx   • [place] OPEN FIX INTER/SUBTROCH FX,IMPLNT Right 4/14/2017    Procedure: FEMUR INTRAMEDULLARY NAILING/RODDING long meir gamma nail 7fr hemovac placement;  Surgeon: Keyshawn Madden MD;  Location: [facility];  Service: Orthopedics   • TUBAL ABDOMINAL LIGATION       Family History   Problem Relation Age of Onset   • Asthma Mother    • No Known Problems Father          Objective:  There were no vitals filed for this visit.  There were no vitals filed for this visit.  There is no height or weight on file to calculate BMI.       Ortho Exam  she is alert and oriented ×3.  The wound is completely benign.  His no motor deficit good distal pulses to the calf is nontender.    Assessment:       1. Pain    2. Status post orthopedic surgery, follow-up exam    3. Subtrochanteric fracture of right femur, closed, with nonunion, subsequent encounter          Plan:      she return next week with an x-ray of the right femur hip to knee  Insurance would not pay for the leg is reported around [employer]      Work Status:    [employer] query complete.    Orders:  No orders of the defined types were placed in this encounter.      Medications:  New Medications Ordered This Visit   Medications   • gabapentin (NEURONTIN) 100 MG capsule     Sig: Take 3 capsules by mouth 3 (Three) Times a Day.     Dispense:  90 capsule     Refill:  1       Followup:  Return in about 1 week (around 8/31/2017).          Dragon transcription disclaimer     Much of this encounter note is an electronic transcription/translation of spoken language to printed text. The electronic translation of spoken language may permit erroneous, or at times, nonsensical words or phrases to be inadvertently transcribed. Although I have reviewed the note for such errors, some may still exist.

## 2017-08-31 ENCOUNTER — OFFICE VISIT (OUTPATIENT)
Dept: ORTHOPEDIC SURGERY | Facility: CLINIC | Age: 62
End: 2017-08-31

## 2017-08-31 VITALS — HEIGHT: 61 IN | BODY MASS INDEX: 31.72 KG/M2 | WEIGHT: 168 LBS

## 2017-08-31 DIAGNOSIS — Z09 STATUS POST ORTHOPEDIC SURGERY, FOLLOW-UP EXAM: ICD-10-CM

## 2017-08-31 DIAGNOSIS — R52 PAIN: Primary | ICD-10-CM

## 2017-08-31 DIAGNOSIS — S72.21XK SUBTROCHANTERIC FRACTURE OF RIGHT FEMUR, CLOSED, WITH NONUNION, SUBSEQUENT ENCOUNTER: ICD-10-CM

## 2017-08-31 PROCEDURE — 99024 POSTOP FOLLOW-UP VISIT: CPT | Performed by: ORTHOPAEDIC SURGERY

## 2017-08-31 PROCEDURE — 73552 X-RAY EXAM OF FEMUR 2/>: CPT | Performed by: ORTHOPAEDIC SURGERY

## 2017-09-21 ENCOUNTER — OFFICE VISIT (OUTPATIENT)
Dept: ORTHOPEDIC SURGERY | Facility: CLINIC | Age: 62
End: 2017-09-21

## 2017-09-21 VITALS — HEIGHT: 61 IN | WEIGHT: 168 LBS | BODY MASS INDEX: 31.72 KG/M2

## 2017-09-21 DIAGNOSIS — S72.21XK SUBTROCHANTERIC FRACTURE OF RIGHT FEMUR, CLOSED, WITH NONUNION, SUBSEQUENT ENCOUNTER: ICD-10-CM

## 2017-09-21 DIAGNOSIS — Z98.890 POST-OPERATIVE STATE: Primary | ICD-10-CM

## 2017-09-21 DIAGNOSIS — R52 PAIN: ICD-10-CM

## 2017-09-21 DIAGNOSIS — Z09 STATUS POST ORTHOPEDIC SURGERY, FOLLOW-UP EXAM: ICD-10-CM

## 2017-09-21 PROCEDURE — 99024 POSTOP FOLLOW-UP VISIT: CPT | Performed by: ORTHOPAEDIC SURGERY

## 2017-09-21 PROCEDURE — 73552 X-RAY EXAM OF FEMUR 2/>: CPT | Performed by: ORTHOPAEDIC SURGERY

## 2017-09-21 RX ORDER — ASPIRIN 325 MG
325 TABLET, DELAYED RELEASE (ENTERIC COATED) ORAL DAILY
COMMUNITY
End: 2017-12-19 | Stop reason: HOSPADM

## 2017-09-21 NOTE — PROGRESS NOTES
Subjective: Status post redo IM rodding right subtrochanteric femur fracture     Patient ID: Raven Young is a 62 y.o. female.    Chief Complaint:    History of Present Illness patient is 5 weeks out from the second procedure and is doing well ambulating independently with a cane having less discomfort in the leg and hip.  Some soreness in the distal thigh but she is making progress with less pain is between passes.  She has been using the bone stimulator.       Social History     Occupational History   • Not on file.     Social History Main Topics   • Smoking status: Never Smoker   • Smokeless tobacco: Never Used   • Alcohol use Yes      Comment: rare   • Drug use: No   • Sexual activity: Defer      Review of Systems   Constitutional: Negative for chills, diaphoresis, fever and unexpected weight change.   HENT: Negative for hearing loss, nosebleeds, sore throat and tinnitus.    Eyes: Negative for pain and visual disturbance.   Respiratory: Negative for cough, shortness of breath and wheezing.    Cardiovascular: Negative for chest pain and palpitations.   Gastrointestinal: Negative for abdominal pain, diarrhea, nausea and vomiting.   Endocrine: Negative for cold intolerance, heat intolerance and polydipsia.   Genitourinary: Negative for difficulty urinating, dysuria and hematuria.   Musculoskeletal: Positive for arthralgias and myalgias. Negative for joint swelling.   Skin: Negative for rash and wound.   Allergic/Immunologic: Negative for environmental allergies.   Neurological: Negative for dizziness, syncope and numbness.   Hematological: Does not bruise/bleed easily.   Psychiatric/Behavioral: Negative for dysphoric mood and sleep disturbance. The patient is not nervous/anxious.          Past Medical History:   Diagnosis Date   • Cancer     basal cell   • Depression with anxiety      Past Surgical History:   Procedure Laterality Date   • HARDWARE REMOVAL Right 8/15/2017    Procedure: HARDWARE REMOVAL long gamma  nail  with long gamma nail right subtrochanteric femur fracture meir vanco powder ;  Surgeon: Keyshawn Madden MD;  Location:  [facility];  Service:    • IM NAILING FEMORAL SHAFT RETROGRADE Right 08/15/2017    replacement leah  fx   • [place] OPEN FIX INTER/SUBTROCH FX,IMPLNT Right 4/14/2017    Procedure: FEMUR INTRAMEDULLARY NAILING/RODDING long meir gamma nail 7fr hemovac placement;  Surgeon: Keyshawn Madden MD;  Location:  [facility];  Service: Orthopedics   • TUBAL ABDOMINAL LIGATION       Family History   Problem Relation Age of Onset   • Asthma Mother    • No Known Problems Father          Objective:  There were no vitals filed for this visit.  Last 3 weights    09/21/17  0916   Weight: 168 lb (76.2 kg)     Body mass index is 31.74 kg/(m^2).       Ortho Exam  AP lateral of that hip to evaluate healing shows increasing consolidation at the fracture site compared to the previous x-rays.  She is alert and oriented ×3.  His no motor deficit good distal pulses no sensory loss.  Her calf remains nontender with a negative Homans.    Assessment:       1. Post-operative state    2. Pain    3. Status post orthopedic surgery, follow-up exam    4. Subtrochanteric fracture of right femur, closed, with nonunion, subsequent encounter          Plan:        She is to continue using the aspirin 325 daily for DVT prophylaxis.  Continue weightbearing as tolerated.  Continue the bone stimulator.  Her request she is release return to work on September 9.  Return to see me in 4 weeks with a repeat x-ray of the hip.    Work Status:    [employer] query complete.    Orders:  Orders Placed This Encounter   Procedures   • XR Femur 2 View Right       Medications:      Followup:  Return in about 4 weeks (around 10/19/2017).          Dragon transcription disclaimer     Much of this encounter note is an electronic transcription/translation of spoken language to printed text. The electronic translation of spoken language may permit erroneous, or at times,  nonsensical words or phrases to be inadvertently transcribed. Although I have reviewed the note for such errors, some may still exist.

## 2017-10-02 RX ORDER — GABAPENTIN 100 MG/1
CAPSULE ORAL
Qty: 90 CAPSULE | Refills: 0 | Status: SHIPPED | OUTPATIENT
Start: 2017-10-02 | End: 2017-12-12

## 2017-10-19 ENCOUNTER — OFFICE VISIT (OUTPATIENT)
Dept: ORTHOPEDIC SURGERY | Facility: CLINIC | Age: 62
End: 2017-10-19

## 2017-10-19 VITALS — HEIGHT: 62 IN | BODY MASS INDEX: 30.36 KG/M2 | WEIGHT: 165 LBS

## 2017-10-19 DIAGNOSIS — R52 PAIN: Primary | ICD-10-CM

## 2017-10-19 DIAGNOSIS — S72.21XK SUBTROCHANTERIC FRACTURE OF RIGHT FEMUR, CLOSED, WITH NONUNION, SUBSEQUENT ENCOUNTER: ICD-10-CM

## 2017-10-19 DIAGNOSIS — Z09 STATUS POST ORTHOPEDIC SURGERY, FOLLOW-UP EXAM: ICD-10-CM

## 2017-10-19 PROCEDURE — 99024 POSTOP FOLLOW-UP VISIT: CPT | Performed by: ORTHOPAEDIC SURGERY

## 2017-10-19 NOTE — PROGRESS NOTES
Subjective: Status post IM rodding right subtrochanteric femur fracture     Patient ID: Raven Young is a 62 y.o. female.    Chief Complaint:    History of Present Illness patient is 2 months status post a second procedure for the subtrochanteric femur fracture.  She is doing well ambulating although she is having some discomfort with each step she takes.  She is off the pain medication.  She is slowly improving.       Social History     Occupational History   • Not on file.     Social History Main Topics   • Smoking status: Never Smoker   • Smokeless tobacco: Never Used   • Alcohol use Yes      Comment: rare   • Drug use: No   • Sexual activity: Defer      Review of Systems   Constitutional: Negative for chills, diaphoresis, fever and unexpected weight change.   HENT: Negative for hearing loss, nosebleeds, sore throat and tinnitus.    Eyes: Negative for pain and visual disturbance.   Respiratory: Negative for cough, shortness of breath and wheezing.    Cardiovascular: Negative for chest pain and palpitations.   Gastrointestinal: Negative for abdominal pain, diarrhea, nausea and vomiting.   Endocrine: Negative for cold intolerance, heat intolerance and polydipsia.   Genitourinary: Negative for difficulty urinating, dysuria and hematuria.   Musculoskeletal: Positive for arthralgias and myalgias. Negative for joint swelling.   Skin: Positive for wound. Negative for rash.   Allergic/Immunologic: Negative for environmental allergies.   Neurological: Negative for dizziness, syncope and numbness.   Hematological: Does not bruise/bleed easily.   Psychiatric/Behavioral: Negative for dysphoric mood and sleep disturbance. The patient is not nervous/anxious.          Past Medical History:   Diagnosis Date   • Cancer     basal cell   • Depression with anxiety      Past Surgical History:   Procedure Laterality Date   • HARDWARE REMOVAL Right 8/15/2017    Procedure: HARDWARE REMOVAL long gamma nail  with long gamma nail right  subtrochanteric femur fracture meir vanco powder ;  Surgeon: Keyshawn Madden MD;  Location:  [facility];  Service:    • IM NAILING FEMORAL SHAFT RETROGRADE Right 08/15/2017    replacement leah  fx   • [place] OPEN FIX INTER/SUBTROCH FX,IMPLNT Right 4/14/2017    Procedure: FEMUR INTRAMEDULLARY NAILING/RODDING long meir gamma nail 7fr hemovac placement;  Surgeon: Keyshawn Madden MD;  Location:  [facility];  Service: Orthopedics   • TUBAL ABDOMINAL LIGATION       Family History   Problem Relation Age of Onset   • Asthma Mother    • No Known Problems Father          Objective:  There were no vitals filed for this visit.  Last 3 weights    10/19/17  1549   Weight: 165 lb (74.8 kg)     Body mass index is 30.18 kg/(m^2).       Ortho Exam  repeat AP lateral of the right femur shows increasing callus formation at the fracture site compared to previous x-rays.  She is alert and oriented ×3.  She does have what appears to be sterile stitch abscess in the wound but there is no erythema and no swelling no drainage.  His no motor deficit good distal pulses.  She does walk with an antalgic gait secondary to muscle weakness.    Assessment:       1. Pain    2. Status post orthopedic surgery, follow-up exam    3. Subtrochanteric fracture of right femur, closed, with nonunion, subsequent encounter          Plan:      set up for physical therapy for strengthening program return in 4 weeks with a repeat x-ray.  Off for until seen      Work Status:    [employer] query complete.    Orders:  Orders Placed This Encounter   Procedures   • XR pelvis 1 or 2 vw   • Ambulatory Referral to Physical Therapy Evaluate and treat       Medications:  No orders of the defined types were placed in this encounter.      Followup:  Return in about 4 weeks (around 11/16/2017).          Dragon transcription disclaimer     Much of this encounter note is an electronic transcription/translation of spoken language to printed text. The electronic translation of spoken  language may permit erroneous, or at times, nonsensical words or phrases to be inadvertently transcribed. Although I have reviewed the note for such errors, some may still exist.

## 2017-10-23 ENCOUNTER — TELEPHONE (OUTPATIENT)
Dept: ORTHOPEDIC SURGERY | Facility: CLINIC | Age: 62
End: 2017-10-23

## 2017-10-23 NOTE — TELEPHONE ENCOUNTER
Patient calling stating that she needed her PT orders sent over to [facility], those have been re sent via [employer].    Thanks.

## 2017-11-08 ENCOUNTER — TELEPHONE (OUTPATIENT)
Dept: ORTHOPEDIC SURGERY | Facility: CLINIC | Age: 62
End: 2017-11-08

## 2017-11-08 NOTE — TELEPHONE ENCOUNTER
Troy with [facility] in [place] calling stating that the patient is having severe pain in her right knee while walking all hip pain from the ORIF of the Right Femur has resolved and she is fine with laying, sitting, standing but walking in just unbearable.    Per Dr. Madden add the patient on for tomorrow and have her stay off the knee, ice and elevate. Appt made and patient's  notified.     Thanks.

## 2017-11-09 ENCOUNTER — OFFICE VISIT (OUTPATIENT)
Dept: ORTHOPEDIC SURGERY | Facility: CLINIC | Age: 62
End: 2017-11-09

## 2017-11-09 DIAGNOSIS — S72.21XK SUBTROCHANTERIC FRACTURE OF RIGHT FEMUR, CLOSED, WITH NONUNION, SUBSEQUENT ENCOUNTER: ICD-10-CM

## 2017-11-09 DIAGNOSIS — R52 PAIN: Primary | ICD-10-CM

## 2017-11-09 DIAGNOSIS — Z09 STATUS POST ORTHOPEDIC SURGERY, FOLLOW-UP EXAM: ICD-10-CM

## 2017-11-09 DIAGNOSIS — M94.261 CHONDROMALACIA OF RIGHT KNEE: ICD-10-CM

## 2017-11-09 PROCEDURE — 20610 DRAIN/INJ JOINT/BURSA W/O US: CPT | Performed by: ORTHOPAEDIC SURGERY

## 2017-11-09 PROCEDURE — 99024 POSTOP FOLLOW-UP VISIT: CPT | Performed by: ORTHOPAEDIC SURGERY

## 2017-11-09 PROCEDURE — 73552 X-RAY EXAM OF FEMUR 2/>: CPT | Performed by: ORTHOPAEDIC SURGERY

## 2017-11-09 RX ORDER — MELOXICAM 7.5 MG/1
7.5 TABLET ORAL DAILY
Qty: 30 TABLET | Refills: 5 | Status: SHIPPED | OUTPATIENT
Start: 2017-11-09 | End: 2017-12-12

## 2017-11-09 RX ORDER — LIDOCAINE HYDROCHLORIDE 10 MG/ML
8 INJECTION, SOLUTION EPIDURAL; INFILTRATION; INTRACAUDAL; PERINEURAL
Status: COMPLETED | OUTPATIENT
Start: 2017-11-09 | End: 2017-11-09

## 2017-11-09 RX ORDER — TRIAMCINOLONE ACETONIDE 40 MG/ML
40 INJECTION, SUSPENSION INTRA-ARTICULAR; INTRAMUSCULAR
Status: COMPLETED | OUTPATIENT
Start: 2017-11-09 | End: 2017-11-09

## 2017-11-09 RX ORDER — IBUPROFEN 400 MG/1
400 TABLET ORAL EVERY 6 HOURS PRN
COMMUNITY
End: 2017-11-09

## 2017-11-09 RX ADMIN — LIDOCAINE HYDROCHLORIDE 8 ML: 10 INJECTION, SOLUTION EPIDURAL; INFILTRATION; INTRACAUDAL; PERINEURAL at 09:51

## 2017-11-09 RX ADMIN — TRIAMCINOLONE ACETONIDE 40 MG: 40 INJECTION, SUSPENSION INTRA-ARTICULAR; INTRAMUSCULAR at 09:51

## 2017-11-30 ENCOUNTER — OFFICE VISIT (OUTPATIENT)
Dept: ORTHOPEDIC SURGERY | Facility: CLINIC | Age: 62
End: 2017-11-30

## 2017-11-30 VITALS — BODY MASS INDEX: 30.36 KG/M2 | HEIGHT: 62 IN | WEIGHT: 165 LBS

## 2017-11-30 DIAGNOSIS — Z09 STATUS POST ORTHOPEDIC SURGERY, FOLLOW-UP EXAM: ICD-10-CM

## 2017-11-30 DIAGNOSIS — M94.261 CHONDROMALACIA OF RIGHT KNEE: ICD-10-CM

## 2017-11-30 DIAGNOSIS — R52 PAIN: Primary | ICD-10-CM

## 2017-11-30 DIAGNOSIS — S72.21XK SUBTROCHANTERIC FRACTURE OF RIGHT FEMUR, CLOSED, WITH NONUNION, SUBSEQUENT ENCOUNTER: ICD-10-CM

## 2017-11-30 PROCEDURE — 99214 OFFICE O/P EST MOD 30 MIN: CPT | Performed by: ORTHOPAEDIC SURGERY

## 2017-11-30 NOTE — PROGRESS NOTES
Subjective: Status post IM rodding for right subtrochanteric femur fracture, right knee chondromalacia, left trochanteric bursitis     Patient ID: Raven Young is a 62 y.o. female.    Chief Complaint:    History of Present Illness patient seen in follow-up regarding the right leg.  The cortisone injection given last visit helped but the pain has returned.  Still have some soreness in the thigh that is more distal than proximal.  Also developing left trochanteric bursitis type pain.       Social History     Occupational History   • Not on file.     Social History Main Topics   • Smoking status: Never Smoker   • Smokeless tobacco: Never Used   • Alcohol use Yes      Comment: rare   • Drug use: No   • Sexual activity: Defer      Review of Systems   Constitutional: Negative for chills, diaphoresis, fever and unexpected weight change.   HENT: Negative for hearing loss, nosebleeds, sore throat and tinnitus.    Eyes: Negative for pain and visual disturbance.   Respiratory: Negative for cough, shortness of breath and wheezing.    Cardiovascular: Negative for chest pain and palpitations.   Gastrointestinal: Negative for abdominal pain, diarrhea, nausea and vomiting.   Endocrine: Negative for cold intolerance, heat intolerance and polydipsia.   Genitourinary: Negative for difficulty urinating, dysuria and hematuria.   Musculoskeletal: Positive for arthralgias and myalgias. Negative for joint swelling.   Skin: Negative for rash and wound.   Allergic/Immunologic: Negative for environmental allergies.   Neurological: Negative for dizziness, syncope and numbness.   Hematological: Does not bruise/bleed easily.   Psychiatric/Behavioral: Negative for dysphoric mood and sleep disturbance. The patient is not nervous/anxious.          Past Medical History:   Diagnosis Date   • Cancer     basal cell   • Depression with anxiety      Past Surgical History:   Procedure Laterality Date   • HARDWARE REMOVAL Right 8/15/2017    Procedure:  HARDWARE REMOVAL long gamma nail  with long gamma nail right subtrochanteric femur fracture meir vanco powder ;  Surgeon: Keyshawn Madden MD;  Location:  [facility];  Service:    • IM NAILING FEMORAL SHAFT RETROGRADE Right 08/15/2017    replacement leah  fx   • [place] OPEN FIX INTER/SUBTROCH FX,IMPLNT Right 4/14/2017    Procedure: FEMUR INTRAMEDULLARY NAILING/RODDING long meir gamma nail 7fr hemovac placement;  Surgeon: Keyshawn Madden MD;  Location:  [facility];  Service: Orthopedics   • TUBAL ABDOMINAL LIGATION       Family History   Problem Relation Age of Onset   • Asthma Mother    • No Known Problems Father          Objective:  There were no vitals filed for this visit.  Last 3 weights    11/30/17  1547   Weight: 165 lb (74.8 kg)     Body mass index is 30.18 kg/(m^2).       Ortho Exam  she is alert and oriented ×3.  The knee shows no swelling effusion erythema today the skin is cool to touch.  She doesn't 0 125° of motion with mild patellofemoral crepitus but no instability no patella subluxation.  Quad function is 5/500 Is nontender negative Homans.  With regard to the hip is no tenderness palpation in the subtrochanteric area.  There is minimal tenderness with passive internal/external rotation of the leg.  There is no motor deficit good distal pulses no sensory loss.  With regard to the left hip she does have tenderness of the left greater trochanter and there is pain with abduction against resistance is no sensory loss and there is no motor deficit.  She's been taken the meloxicam faithfully without any GI side effects but no improvement in her symptoms.    Assessment:       1. Pain    2. Status post orthopedic surgery, follow-up exam    3. Subtrochanteric fracture of right femur, closed, with nonunion, subsequent encounter    4. Chondromalacia of right knee          Plan:      with regard to the subtrochanteric femur fracture I wanted get a repeat CT scan to see if that is fully healed as of yet.  Again she  required a second surgery for broken leah at the last x-ray shows increasing callus but I think a CT scan will fully evaluate that.  With regard to the knee after she returns depending on what that shows may need to do an MRI of the knee to evaluate for osteoarthritis or meniscal tear.  Also on return with regard to the left hip will inject the trochanteric bursa.  Discussed this treatment plan with the patient and she is in agreement.  CT of the hip is been ordered and will see back next week with the result.      Work Status:    [employer] query complete.    Orders:  Orders Placed This Encounter   Procedures   • CT femur right wo contrast       Medications:  No orders of the defined types were placed in this encounter.      Followup:  Return in about 1 week (around 12/7/2017).          Dragon transcription disclaimer     Much of this encounter note is an electronic transcription/translation of spoken language to printed text. The electronic translation of spoken language may permit erroneous, or at times, nonsensical words or phrases to be inadvertently transcribed. Although I have reviewed the note for such errors, some may still exist.

## 2017-12-05 ENCOUNTER — HOSPITAL ENCOUNTER (OUTPATIENT)
Dept: CT IMAGING | Facility: HOSPITAL | Age: 62
Discharge: HOME | End: 2017-12-05
Attending: ORTHOPAEDIC SURGERY | Admitting: ORTHOPAEDIC SURGERY

## 2017-12-05 DIAGNOSIS — Z09 STATUS POST ORTHOPEDIC SURGERY, FOLLOW-UP EXAM: ICD-10-CM

## 2017-12-05 DIAGNOSIS — S72.21XK SUBTROCHANTERIC FRACTURE OF RIGHT FEMUR, CLOSED, WITH NONUNION, SUBSEQUENT ENCOUNTER: ICD-10-CM

## 2017-12-05 PROCEDURE — 73700 CT LOWER EXTREMITY W/O DYE: CPT

## 2017-12-07 ENCOUNTER — OFFICE VISIT (OUTPATIENT)
Dept: ORTHOPEDIC SURGERY | Facility: CLINIC | Age: 62
End: 2017-12-07

## 2017-12-07 DIAGNOSIS — R52 PAIN: Primary | ICD-10-CM

## 2017-12-07 DIAGNOSIS — Z09 STATUS POST ORTHOPEDIC SURGERY, FOLLOW-UP EXAM: ICD-10-CM

## 2017-12-07 DIAGNOSIS — S72.22XK: ICD-10-CM

## 2017-12-07 DIAGNOSIS — S72.21XK SUBTROCHANTERIC FRACTURE OF RIGHT FEMUR, CLOSED, WITH NONUNION, SUBSEQUENT ENCOUNTER: ICD-10-CM

## 2017-12-07 PROCEDURE — 99213 OFFICE O/P EST LOW 20 MIN: CPT | Performed by: ORTHOPAEDIC SURGERY

## 2017-12-07 NOTE — PROGRESS NOTES
Subjective: Right thigh pain     Patient ID: Raven Young is a 62 y.o. female.    Chief Complaint:    History of Present Illness patient returns with results of the CT scan personally reviewed which show persistent nonunion and subtrochanteric femur cortisone report is no change in the healing from the CT done in August of this year.       Social History     Occupational History   • Not on file.     Social History Main Topics   • Smoking status: Never Smoker   • Smokeless tobacco: Never Used   • Alcohol use Yes      Comment: rare   • Drug use: No   • Sexual activity: Defer      Review of Systems   Constitutional: Negative for chills, diaphoresis, fever and unexpected weight change.   HENT: Negative for hearing loss, nosebleeds, sore throat and tinnitus.    Eyes: Negative for pain and visual disturbance.   Respiratory: Negative for cough, shortness of breath and wheezing.    Cardiovascular: Negative for chest pain and palpitations.   Gastrointestinal: Negative for abdominal pain, diarrhea, nausea and vomiting.   Endocrine: Negative for cold intolerance, heat intolerance and polydipsia.   Genitourinary: Negative for difficulty urinating, dysuria and hematuria.   Musculoskeletal: Positive for arthralgias. Negative for joint swelling and myalgias.   Skin: Negative for rash and wound.   Allergic/Immunologic: Negative for environmental allergies.   Neurological: Negative for dizziness, syncope and numbness.   Hematological: Does not bruise/bleed easily.   Psychiatric/Behavioral: Negative for dysphoric mood and sleep disturbance. The patient is not nervous/anxious.    All other systems reviewed and are negative.        Past Medical History:   Diagnosis Date   • Cancer     basal cell   • Depression with anxiety      Past Surgical History:   Procedure Laterality Date   • HARDWARE REMOVAL Right 8/15/2017    Procedure: HARDWARE REMOVAL long gamma nail  with long gamma nail right subtrochanteric femur fracture meir vanco  powder ;  Surgeon: Keyshawn Madden MD;  Location:  [facility];  Service:    • IM NAILING FEMORAL SHAFT RETROGRADE Right 08/15/2017    replacement leah  fx   • [place] OPEN FIX INTER/SUBTROCH FX,IMPLNT Right 4/14/2017    Procedure: FEMUR INTRAMEDULLARY NAILING/RODDING long meir gamma nail 7fr hemovac placement;  Surgeon: Keyshawn Madden MD;  Location:  [facility];  Service: Orthopedics   • TUBAL ABDOMINAL LIGATION       Family History   Problem Relation Age of Onset   • Asthma Mother    • No Known Problems Father          Objective:  There were no vitals filed for this visit.  There were no vitals filed for this visit.  There is no height or weight on file to calculate BMI.       Ortho Exam  she is alert and oriented ×3.  She persists in having moderate pain and discomfort with ambulation and at rest in that leg.  His no motor deficit good distal pulses no sensory loss.  Her calf is nontender negative Homans.  Quad function is +4 out of 5 secondary to pain hip abductors are +3 out of 5 secondary to pain.  The skin is cool to touch.  There is mild tenderness to palpation in the subtrochanteric area and the mid thigh area there is no crepitus noted.  She's been tolerating the meloxicam without any GI side effects.    Assessment:       1. Pain    2. Status post orthopedic surgery, follow-up exam    3. Subtrochanteric fracture of right femur, closed, with nonunion, subsequent encounter    4. Subtrochanteric fracture, closed, left, with nonunion, subsequent encounter          Plan:      I reviewed the x-ray findings CT findings with the patient and her sister.  Lengthy discussion regarding treatment options.  His been 4 months on a think further nonoperative management will allow this to heal.  We discussed proximal femoral replacement versus allografting with a strut graft to the lateral cortex.  My recommendation is to proceed with a strut allograft with cancellus autograft.  Discussed the risk and benefits of the surgery leading  to rehabilitation recovery.  Discussed risks no guarantee that this work and she still may need a proximal femoral replacement some point.  Discussed the need for further surgery , discussed vascular injury or nerve injury.  She understands and wishes to proceed.      Work Status:    DANIA query complete.    Orders:  No orders of the defined types were placed in this encounter.      Medications:  No orders of the defined types were placed in this encounter.      Followup:  Return in about 1 week (around 12/14/2017).          Dragon transcription disclaimer     Much of this encounter note is an electronic transcription/translation of spoken language to printed text. The electronic translation of spoken language may permit erroneous, or at times, nonsensical words or phrases to be inadvertently transcribed. Although I have reviewed the note for such errors, some may still exist.

## 2017-12-08 ENCOUNTER — PREP FOR SURGERY (OUTPATIENT)
Dept: OTHER | Facility: HOSPITAL | Age: 62
End: 2017-12-08

## 2017-12-08 DIAGNOSIS — S72.21XK: Primary | ICD-10-CM

## 2017-12-08 RX ORDER — OXYCODONE HCL 10 MG/1
10 TABLET, FILM COATED, EXTENDED RELEASE ORAL ONCE
Status: CANCELLED | OUTPATIENT
Start: 2017-12-15

## 2017-12-08 RX ORDER — PREGABALIN 75 MG/1
150 CAPSULE ORAL ONCE
Status: CANCELLED | OUTPATIENT
Start: 2017-12-15

## 2017-12-12 ENCOUNTER — APPOINTMENT (OUTPATIENT)
Dept: PREADMISSION TESTING | Facility: HOSPITAL | Age: 62
End: 2017-12-12

## 2017-12-12 VITALS
RESPIRATION RATE: 16 BRPM | BODY MASS INDEX: 30.36 KG/M2 | SYSTOLIC BLOOD PRESSURE: 131 MMHG | HEIGHT: 62 IN | DIASTOLIC BLOOD PRESSURE: 72 MMHG | WEIGHT: 165 LBS | OXYGEN SATURATION: 98 % | HEART RATE: 72 BPM

## 2017-12-12 LAB
DEPRECATED RDW RBC AUTO: 49.3 FL (ref 37–54)
ERYTHROCYTE [DISTWIDTH] IN BLOOD BY AUTOMATED COUNT: 16.1 % (ref 11.5–14.5)
HCT VFR BLD AUTO: 42.4 % (ref 37–47)
HGB BLD-MCNC: 13.8 G/DL (ref 12–16)
MCH RBC QN AUTO: 27.3 PG (ref 27–31)
MCHC RBC AUTO-ENTMCNC: 32.5 G/DL (ref 31–37)
MCV RBC AUTO: 84 FL (ref 81–99)
PLATELET # BLD AUTO: 303 10*3/MM3 (ref 140–500)
PMV BLD AUTO: 9.1 FL (ref 7.4–10.4)
RBC # BLD AUTO: 5.05 10*6/MM3 (ref 4.2–5.4)
WBC NRBC COR # BLD: 7.47 10*3/MM3 (ref 4.8–10.8)

## 2017-12-12 PROCEDURE — 93010 ELECTROCARDIOGRAM REPORT: CPT | Performed by: INTERNAL MEDICINE

## 2017-12-12 PROCEDURE — 36415 COLL VENOUS BLD VENIPUNCTURE: CPT

## 2017-12-12 PROCEDURE — 85027 COMPLETE CBC AUTOMATED: CPT | Performed by: ORTHOPAEDIC SURGERY

## 2017-12-12 PROCEDURE — 93005 ELECTROCARDIOGRAM TRACING: CPT

## 2017-12-12 RX ORDER — IBUPROFEN 400 MG/1
400 TABLET ORAL EVERY 6 HOURS PRN
COMMUNITY
End: 2017-12-19 | Stop reason: HOSPADM

## 2017-12-12 RX ORDER — DULOXETIN HYDROCHLORIDE 60 MG/1
60 CAPSULE, DELAYED RELEASE ORAL EVERY MORNING
COMMUNITY

## 2017-12-12 NOTE — PAT
Pt here for PAT visit.  Pre-op tests completed, chg soap given, and instructions reviewed.  Instructed clears until 2 hrs prior to arrival time, voiced understanding.

## 2017-12-12 NOTE — DISCHARGE INSTRUCTIONS
PRE-ADMISSION TESTING INSTRUCTIONS FOR ADULTS  No aspirin, advil, aleve, ibuprofen, naproxen, diet pills, decongestants, or herbal/vitamins between today and surgery.    Take your Cymbalta the morning of surgery.    General Instructions:    • Do not eat solid food after midnight the night before surgery.  No gum, mints, or hard candy after midnight the night before surgery.  • You may drink clear liquids the day of surgery up until 2 hours before your arrival time.  • Clear liquids are liquids you can see through. Nothing RED in color.    Plain water    Sports drinks  Sodas     Gelatin (Jell-O)  Fruit juices without pulp such as white grape juice and apple juice  Popsicles that contain no fruit or yogurt  Tea or coffee (no cream or milk added)    • It is beneficial for you to have a clear drink that contains carbohydrates just before you leave your house and before your fasting time begins.  We suggest a 20 ounce bottle of Gatorade or Powerade for non-diabetic patients or a 20 ounce bottle of G2 or Powerade Zero for diabetic patients.     • Patients who avoid smoking, chewing tobacco and alcohol for 4 weeks prior to surgery have a reduced risk of post-operative complications.  • Do not smoke, use chewing tobacco or drink alcohol the day of surgery    • Bring your C-PAP/ BI-PAP machine if you use one.  • Wear clean comfortable clothes and socks.  • Do not wear contact lenses, lotion, deodorant, or make-up.  Bring a case for your glasses if applicable.   • Bring crutches or walker if applicable.  • Leave all other valuables and jewelry at home.      Preventing a Surgical Site Infection:    • Shower the night before and on the morning of surgery using the chlorhexidine soap you were given.  Use a clean washcloth with the soap.  Place clean sheets on your bed after showering the night before surgery. Do not use the CHG soap on your hair, face, or private areas. Wash your body gently for five (5) minutes. Do not scrub  your skin too hard.  Dry with a clean towel and dress in clean clothing.    • Do not shave the surgical area for 10 days-2 weeks prior to surgery  because the razor can irritate skin and make it easier to develop an infection.    • Make sure you, your family, and all healthcare providers clean their hands with soap and water or an alcohol based hand  before caring for you or your wound.    • If at all possible, quit smoking as many days before surgery as you can.    Day of surgery:    Your surgeon’s office will advise you of your arrival time for the day of surgery.    Upon arrival, a Pre-op nurse and Anesthesia provider will review your health history, obtain vital signs, and answer questions you may have.  The only belongings needed at this time will be your home medications and if applicable your C-PAP/BI-PAP machine.  If you are staying overnight your family can leave the rest of your belongings in the car and bring them to your room later.  A Pre-op nurse will start an IV and you may receive medication in preparation for surgery, including something to help you relax.  Your family will be able to see you in the Pre-op area.  While you are in surgery your family should notify the waiting room  if they leave the waiting room area and provide a contact phone number.    IF you have any questions, you can call the Pre-Admission Department at [phone] or your surgeon's office.    Please be aware that surgery does come with discomfort.  We want to make every effort to control your discomfort so please discuss any uncontrolled symptoms with your nurse.   Your doctor will most likely have prescribed pain medications.      If you are going home after surgery, you will receive individualized written care instructions before being discharged.  A responsible adult (over the age of 18) must drive you to and from the hospital on the day of your surgery and stay with you for 24 hours after  anesthesia.    If you are staying overnight following surgery, you will be transported to your hospital room following the recovery period.  [facility] has all private rooms.    Deductibles and co-payments are collected on the day of service. Please be prepared to pay the required co-pay, deductible or deposit on the day of service as defined by your plan.

## 2017-12-14 ENCOUNTER — ANESTHESIA EVENT (OUTPATIENT)
Dept: PERIOP | Facility: HOSPITAL | Age: 62
End: 2017-12-14

## 2017-12-14 ENCOUNTER — OFFICE VISIT (OUTPATIENT)
Dept: ORTHOPEDIC SURGERY | Facility: CLINIC | Age: 62
End: 2017-12-14

## 2017-12-14 VITALS — HEIGHT: 61 IN | BODY MASS INDEX: 30.21 KG/M2 | WEIGHT: 160 LBS

## 2017-12-14 DIAGNOSIS — Z09 STATUS POST ORTHOPEDIC SURGERY, FOLLOW-UP EXAM: ICD-10-CM

## 2017-12-14 DIAGNOSIS — Z87.81 H/O NONUNION OF FRACTURE: ICD-10-CM

## 2017-12-14 DIAGNOSIS — R52 PAIN: Primary | ICD-10-CM

## 2017-12-14 PROCEDURE — S0260 H&P FOR SURGERY: HCPCS | Performed by: ORTHOPAEDIC SURGERY

## 2017-12-14 NOTE — H&P
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  Patient sustained an injury in April of this year resulting in a subtrochanteric femur fracture no underwent at that time a long gamma nailing.  In August of this year before the fracture hasn't completely the nail broke and she underwent repeat nailing of that fracture.  Recent CT scan confirmed the fracture still not healed and she has a delayed union status should be admitted this time to undergo the above stated procedure.  Have discussed with the patient the risk of surgery which include but not limited to infection and need for multiple procedures including implant removal to eradicate infection, nerve injury, vascular injury, delayed union, new fracture, and the need for further surgery specifically hip replacement in the future and she understands.  Understands the lengthy rehabilitation recovery which could be an additional 3-6 months.          Past Medical History:   Diagnosis Date   • Cancer     basal cell   • Depression with anxiety    • PONV (postoperative nausea and vomiting)      Past Surgical History:   Procedure Laterality Date   • HARDWARE REMOVAL Right 8/15/2017    Procedure: HARDWARE REMOVAL long gamma nail  with long gamma nail right subtrochanteric femur fracture meir vanco powder ;  Surgeon: Keyshawn Madden MD;  Location:  [facility] OR;  Service:    • IM NAILING FEMORAL SHAFT RETROGRADE Right 08/15/2017    replacement leah  fx   • PA OPEN FIX INTER/SUBTROCH FX,IMPLNT Right 4/14/2017    Procedure: FEMUR INTRAMEDULLARY NAILING/RODDING long meir gamma nail 7fr hemovac placement;  Surgeon: Keyshawn Madden MD;  Location: [facility] OR;  Service: Orthopedics   • TUBAL ABDOMINAL LIGATION        Family History   Problem Relation Age of Onset   • Asthma Mother    • No Known Problems Father      Social History   Substance Use Topics   • Smoking status: Never Smoker   • Smokeless tobacco: Never Used   • Alcohol use Yes      Comment: rare     No prescriptions prior to admission.     Allergies:  Review of patient's allergies indicates no known allergies.    REVIEW OF SYSTEMS:  Please see the above history of present illness for pertinent positives and negatives.  The remainder of the patient's systems have been reviewed and are negative.    Vital Signs            Physical Exam:  Physical Exam   Constitutional: Patient appears well-developed and well-nourished and in no acute distress   HEENT:   Head: Normocephalic and atraumatic.   Eyes:  Pupils are equal, round, and reactive to light.  Mouth and Throat: Patient has moist mucous membranes. Oropharynx is clear of any erythema or exudate.     Neck: Neck supple. No JVD present. No thyromegaly present. No lymphadenopathy present.  Cardiovascular: Regular rate, regular rhythm.  Pulmonary/Chest: Lungs are clear to auscultation bilaterally.  Abdominal:benign,soft with bowel sounds  Musculoskeletal: Normal posture.  Extremities: Right leg has good distal pulses and no motor deficit no sensory loss.  Her calf is nontender.  There is pain with passive range of motion localized to the proximal thigh.  All incisions are well healed and benign with no erythema   Neurological: Patient is alert and oriented.  Psychological:   Mood and behavior appropriate.  Skin: Skin is warm and dry.     Results Review:    I reviewed the patient's new clinical results.  Lab Results (most recent)     None          Imaging Results (most recent)     None          ECG/EMG Results (most recent)     None            Assessment/Plan delayed union right subtrochanteric femur fracture        I discussed the patients findings and my recommendations with patient and plan to proceed with open  fibular allografting with cancellus autograft and nonunion site right subtrochanteric femur fracture    Keyshawn Madden MD  12/14/17  4:07 PM

## 2017-12-14 NOTE — PROGRESS NOTES
Subjective: Right hip pain     Patient ID: Raven Young is a 62 y.o. female.    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     Social History Main Topics   • Smoking status: Never Smoker   • Smokeless tobacco: Never Used   • Alcohol use Yes      Comment: rare   • Drug use: No   • Sexual activity: Defer      Review of Systems   Constitutional: Negative for chills, diaphoresis, fever and unexpected weight change.   HENT: Negative for hearing loss, nosebleeds, sore throat and tinnitus.    Eyes: Negative for pain and visual disturbance.   Respiratory: Negative for cough, shortness of breath and wheezing.    Cardiovascular: Negative for chest pain and palpitations.   Gastrointestinal: Negative for abdominal pain, diarrhea, nausea and vomiting.   Endocrine: Negative for cold intolerance, heat intolerance and polydipsia.   Genitourinary: Negative for difficulty urinating, dysuria and hematuria.   Musculoskeletal: Positive for arthralgias and myalgias. Negative for joint swelling.   Skin: Negative for rash and wound.   Allergic/Immunologic: Negative for environmental allergies.   Neurological: Negative for dizziness, syncope and numbness.   Hematological: Does not bruise/bleed easily.   Psychiatric/Behavioral: Negative for dysphoric mood and sleep disturbance. The patient is not nervous/anxious.          Past Medical History:   Diagnosis Date   • Cancer     basal cell   • Depression with anxiety    • PONV (postoperative nausea and vomiting)      Past Surgical History:   Procedure Laterality Date   • HARDWARE REMOVAL Right 8/15/2017    Procedure: HARDWARE REMOVAL long gamma nail  with long gamma nail right subtrochanteric femur fracture meir vanco powder ;  Surgeon: Keyshawn Madden MD;  Location: [facility] OR;   Service:    • IM NAILING FEMORAL SHAFT RETROGRADE Right 08/15/2017    replacement leah  fx   • LA OPEN FIX INTER/SUBTROCH FX,IMPLNT Right 4/14/2017    Procedure: FEMUR INTRAMEDULLARY NAILING/RODDING long meir gamma nail 7fr hemovac placement;  Surgeon: Keyshawn Madden MD;  Location: [facility];  Service: Orthopedics   • TUBAL ABDOMINAL LIGATION       Family History   Problem Relation Age of Onset   • Asthma Mother    • No Known Problems Father          Objective:  There were no vitals filed for this visit.  Last 3 weights    12/14/17  1552   Weight: 72.6 kg (160 lb)     Body mass index is 30.23 kg/(m^2).       Ortho Exam  H&P completed    Assessment:       1. Pain    2. Status post orthopedic surgery, follow-up exam    3. H/O nonunion of fracture          Plan:        All questions answered    Work Status:    DANIA query complete.    Orders:  No orders of the defined types were placed in this encounter.      Medications:  No orders of the defined types were placed in this encounter.      Followup:  Return in about 2 weeks (around 12/28/2017).          Dragon transcription disclaimer     Much of this encounter note is an electronic transcription/translation of spoken language to printed text. The electronic translation of spoken language may permit erroneous, or at times, nonsensical words or phrases to be inadvertently transcribed. Although I have reviewed the note for such errors, some may still exist.

## 2017-12-15 ENCOUNTER — APPOINTMENT (OUTPATIENT)
Dept: GENERAL RADIOLOGY | Facility: HOSPITAL | Age: 62
End: 2017-12-15

## 2017-12-15 ENCOUNTER — ANESTHESIA (OUTPATIENT)
Dept: PERIOP | Facility: HOSPITAL | Age: 62
End: 2017-12-15

## 2017-12-15 ENCOUNTER — HOSPITAL ENCOUNTER (INPATIENT)
Facility: HOSPITAL | Age: 62
LOS: 4 days | Discharge: NURSING, REHAB OR LONG-TERM CARE | End: 2017-12-19
Attending: ORTHOPAEDIC SURGERY | Admitting: ORTHOPAEDIC SURGERY

## 2017-12-15 DIAGNOSIS — S72.21XK: ICD-10-CM

## 2017-12-15 LAB
HCT VFR BLD AUTO: 38.3 % (ref 37–47)
HGB BLD-MCNC: 12.1 G/DL (ref 12–16)

## 2017-12-15 PROCEDURE — C1713 ANCHOR/SCREW BN/BN,TIS/BN: HCPCS | Performed by: ORTHOPAEDIC SURGERY

## 2017-12-15 PROCEDURE — 94799 UNLISTED PULMONARY SVC/PX: CPT

## 2017-12-15 PROCEDURE — 85014 HEMATOCRIT: CPT | Performed by: ORTHOPAEDIC SURGERY

## 2017-12-15 PROCEDURE — 25010000002 MIDAZOLAM PER 1 MG: Performed by: NURSE ANESTHETIST, CERTIFIED REGISTERED

## 2017-12-15 PROCEDURE — 85018 HEMOGLOBIN: CPT | Performed by: ORTHOPAEDIC SURGERY

## 2017-12-15 PROCEDURE — 25010000002 HYDROMORPHONE PER 4 MG: Performed by: ORTHOPAEDIC SURGERY

## 2017-12-15 PROCEDURE — 25010000003 CEFAZOLIN PER 500 MG: Performed by: ORTHOPAEDIC SURGERY

## 2017-12-15 PROCEDURE — 25010000002 PHENYLEPHRINE PER 1 ML: Performed by: NURSE ANESTHETIST, CERTIFIED REGISTERED

## 2017-12-15 PROCEDURE — 0QR607Z REPLACEMENT OF RIGHT UPPER FEMUR WITH AUTOLOGOUS TISSUE SUBSTITUTE, OPEN APPROACH: ICD-10-PCS | Performed by: ORTHOPAEDIC SURGERY

## 2017-12-15 PROCEDURE — 73501 X-RAY EXAM HIP UNI 1 VIEW: CPT

## 2017-12-15 PROCEDURE — 0QU60JZ SUPPLEMENT RIGHT UPPER FEMUR WITH SYNTHETIC SUBSTITUTE, OPEN APPROACH: ICD-10-PCS | Performed by: ORTHOPAEDIC SURGERY

## 2017-12-15 PROCEDURE — 27472 REPAIR/GRAFT OF THIGH: CPT | Performed by: ORTHOPAEDIC SURGERY

## 2017-12-15 PROCEDURE — 0QBG0ZZ EXCISION OF RIGHT TIBIA, OPEN APPROACH: ICD-10-PCS | Performed by: ORTHOPAEDIC SURGERY

## 2017-12-15 PROCEDURE — C1776 JOINT DEVICE (IMPLANTABLE): HCPCS | Performed by: ORTHOPAEDIC SURGERY

## 2017-12-15 PROCEDURE — 25010000002 PROPOFOL 10 MG/ML EMULSION: Performed by: NURSE ANESTHETIST, CERTIFIED REGISTERED

## 2017-12-15 PROCEDURE — 25010000002 VANCOMYCIN PER 500 MG: Performed by: ORTHOPAEDIC SURGERY

## 2017-12-15 PROCEDURE — 25010000002 ONDANSETRON PER 1 MG: Performed by: NURSE ANESTHETIST, CERTIFIED REGISTERED

## 2017-12-15 PROCEDURE — 76000 FLUOROSCOPY <1 HR PHYS/QHP: CPT

## 2017-12-15 DEVICE — IMPLANTABLE DEVICE: Type: IMPLANTABLE DEVICE | Site: HIP | Status: FUNCTIONAL

## 2017-12-15 DEVICE — CABL W/SLV DALLMILES BEAD 2MM: Type: IMPLANTABLE DEVICE | Site: HIP | Status: FUNCTIONAL

## 2017-12-15 DEVICE — OPTIUM DBM PUTTY
Type: IMPLANTABLE DEVICE | Site: HIP | Status: FUNCTIONAL
Brand: OPTIUM®

## 2017-12-15 RX ORDER — ONDANSETRON 2 MG/ML
4 INJECTION INTRAMUSCULAR; INTRAVENOUS ONCE AS NEEDED
Status: COMPLETED | OUTPATIENT
Start: 2017-12-15 | End: 2017-12-15

## 2017-12-15 RX ORDER — SODIUM CHLORIDE, SODIUM LACTATE, POTASSIUM CHLORIDE, CALCIUM CHLORIDE 600; 310; 30; 20 MG/100ML; MG/100ML; MG/100ML; MG/100ML
9 INJECTION, SOLUTION INTRAVENOUS CONTINUOUS
Status: DISCONTINUED | OUTPATIENT
Start: 2017-12-15 | End: 2017-12-15

## 2017-12-15 RX ORDER — SODIUM CHLORIDE, SODIUM LACTATE, POTASSIUM CHLORIDE, CALCIUM CHLORIDE 600; 310; 30; 20 MG/100ML; MG/100ML; MG/100ML; MG/100ML
20 INJECTION, SOLUTION INTRAVENOUS CONTINUOUS
Status: DISCONTINUED | OUTPATIENT
Start: 2017-12-15 | End: 2017-12-19 | Stop reason: HOSPADM

## 2017-12-15 RX ORDER — SODIUM CHLORIDE 9 MG/ML
INJECTION, SOLUTION INTRAVENOUS AS NEEDED
Status: DISCONTINUED | OUTPATIENT
Start: 2017-12-15 | End: 2017-12-15 | Stop reason: HOSPADM

## 2017-12-15 RX ORDER — ONDANSETRON 2 MG/ML
4 INJECTION INTRAMUSCULAR; INTRAVENOUS EVERY 6 HOURS PRN
Status: DISCONTINUED | OUTPATIENT
Start: 2017-12-15 | End: 2017-12-19 | Stop reason: HOSPADM

## 2017-12-15 RX ORDER — MAGNESIUM HYDROXIDE 1200 MG/15ML
LIQUID ORAL AS NEEDED
Status: DISCONTINUED | OUTPATIENT
Start: 2017-12-15 | End: 2017-12-15 | Stop reason: HOSPADM

## 2017-12-15 RX ORDER — MEPERIDINE HYDROCHLORIDE 25 MG/ML
12.5 INJECTION INTRAMUSCULAR; INTRAVENOUS; SUBCUTANEOUS
Status: DISCONTINUED | OUTPATIENT
Start: 2017-12-15 | End: 2017-12-15 | Stop reason: HOSPADM

## 2017-12-15 RX ORDER — OXYCODONE HYDROCHLORIDE AND ACETAMINOPHEN 5; 325 MG/1; MG/1
1 TABLET ORAL ONCE AS NEEDED
Status: DISCONTINUED | OUTPATIENT
Start: 2017-12-15 | End: 2017-12-15 | Stop reason: HOSPADM

## 2017-12-15 RX ORDER — OXYCODONE AND ACETAMINOPHEN 10; 325 MG/1; MG/1
1 TABLET ORAL EVERY 4 HOURS PRN
Status: DISCONTINUED | OUTPATIENT
Start: 2017-12-15 | End: 2017-12-19 | Stop reason: HOSPADM

## 2017-12-15 RX ORDER — HYDROMORPHONE HCL 110MG/55ML
1 PATIENT CONTROLLED ANALGESIA SYRINGE INTRAVENOUS
Status: DISCONTINUED | OUTPATIENT
Start: 2017-12-15 | End: 2017-12-19 | Stop reason: HOSPADM

## 2017-12-15 RX ORDER — PREGABALIN 75 MG/1
150 CAPSULE ORAL ONCE
Status: COMPLETED | OUTPATIENT
Start: 2017-12-15 | End: 2017-12-15

## 2017-12-15 RX ORDER — ACETAMINOPHEN 650 MG/1
650 SUPPOSITORY RECTAL ONCE AS NEEDED
Status: DISCONTINUED | OUTPATIENT
Start: 2017-12-15 | End: 2017-12-15 | Stop reason: HOSPADM

## 2017-12-15 RX ORDER — DIPHENHYDRAMINE HYDROCHLORIDE 50 MG/ML
12.5 INJECTION INTRAMUSCULAR; INTRAVENOUS
Status: DISCONTINUED | OUTPATIENT
Start: 2017-12-15 | End: 2017-12-15 | Stop reason: HOSPADM

## 2017-12-15 RX ORDER — ACETAMINOPHEN 325 MG/1
650 TABLET ORAL ONCE AS NEEDED
Status: DISCONTINUED | OUTPATIENT
Start: 2017-12-15 | End: 2017-12-15 | Stop reason: HOSPADM

## 2017-12-15 RX ORDER — BUPIVACAINE HYDROCHLORIDE 2.5 MG/ML
INJECTION, SOLUTION EPIDURAL; INFILTRATION; INTRACAUDAL AS NEEDED
Status: DISCONTINUED | OUTPATIENT
Start: 2017-12-15 | End: 2017-12-15 | Stop reason: HOSPADM

## 2017-12-15 RX ORDER — MIDAZOLAM HYDROCHLORIDE 1 MG/ML
1 INJECTION INTRAMUSCULAR; INTRAVENOUS
Status: DISCONTINUED | OUTPATIENT
Start: 2017-12-15 | End: 2017-12-15 | Stop reason: HOSPADM

## 2017-12-15 RX ORDER — DULOXETIN HYDROCHLORIDE 60 MG/1
60 CAPSULE, DELAYED RELEASE ORAL DAILY
Status: DISCONTINUED | OUTPATIENT
Start: 2017-12-16 | End: 2017-12-19 | Stop reason: HOSPADM

## 2017-12-15 RX ORDER — LIDOCAINE HYDROCHLORIDE 10 MG/ML
0.5 INJECTION, SOLUTION EPIDURAL; INFILTRATION; INTRACAUDAL; PERINEURAL ONCE AS NEEDED
Status: COMPLETED | OUTPATIENT
Start: 2017-12-15 | End: 2017-12-15

## 2017-12-15 RX ORDER — VANCOMYCIN HYDROCHLORIDE 500 MG/10ML
INJECTION, POWDER, LYOPHILIZED, FOR SOLUTION INTRAVENOUS AS NEEDED
Status: DISCONTINUED | OUTPATIENT
Start: 2017-12-15 | End: 2017-12-15 | Stop reason: HOSPADM

## 2017-12-15 RX ORDER — FAMOTIDINE 10 MG/ML
20 INJECTION, SOLUTION INTRAVENOUS
Status: DISCONTINUED | OUTPATIENT
Start: 2017-12-15 | End: 2017-12-15 | Stop reason: HOSPADM

## 2017-12-15 RX ORDER — BUPIVACAINE HYDROCHLORIDE AND EPINEPHRINE 2.5; 5 MG/ML; UG/ML
INJECTION, SOLUTION INFILTRATION; PERINEURAL AS NEEDED
Status: DISCONTINUED | OUTPATIENT
Start: 2017-12-15 | End: 2017-12-15 | Stop reason: HOSPADM

## 2017-12-15 RX ORDER — NALOXONE HCL 0.4 MG/ML
0.4 VIAL (ML) INJECTION
Status: DISCONTINUED | OUTPATIENT
Start: 2017-12-15 | End: 2017-12-19 | Stop reason: HOSPADM

## 2017-12-15 RX ORDER — MIDAZOLAM HYDROCHLORIDE 1 MG/ML
2 INJECTION INTRAMUSCULAR; INTRAVENOUS
Status: DISCONTINUED | OUTPATIENT
Start: 2017-12-15 | End: 2017-12-15 | Stop reason: HOSPADM

## 2017-12-15 RX ORDER — PROPOFOL 10 MG/ML
VIAL (ML) INTRAVENOUS AS NEEDED
Status: DISCONTINUED | OUTPATIENT
Start: 2017-12-15 | End: 2017-12-15 | Stop reason: SURG

## 2017-12-15 RX ORDER — BUPIVACAINE HYDROCHLORIDE 5 MG/ML
INJECTION, SOLUTION EPIDURAL; INTRACAUDAL AS NEEDED
Status: DISCONTINUED | OUTPATIENT
Start: 2017-12-15 | End: 2017-12-15 | Stop reason: SURG

## 2017-12-15 RX ORDER — LIDOCAINE HYDROCHLORIDE 20 MG/ML
INJECTION, SOLUTION INFILTRATION; PERINEURAL AS NEEDED
Status: DISCONTINUED | OUTPATIENT
Start: 2017-12-15 | End: 2017-12-15 | Stop reason: SURG

## 2017-12-15 RX ORDER — ONDANSETRON 2 MG/ML
4 INJECTION INTRAMUSCULAR; INTRAVENOUS ONCE AS NEEDED
Status: DISCONTINUED | OUTPATIENT
Start: 2017-12-15 | End: 2017-12-15 | Stop reason: HOSPADM

## 2017-12-15 RX ORDER — OXYCODONE HCL 10 MG/1
10 TABLET, FILM COATED, EXTENDED RELEASE ORAL ONCE
Status: COMPLETED | OUTPATIENT
Start: 2017-12-15 | End: 2017-12-15

## 2017-12-15 RX ORDER — SODIUM CHLORIDE 0.9 % (FLUSH) 0.9 %
1-10 SYRINGE (ML) INJECTION AS NEEDED
Status: DISCONTINUED | OUTPATIENT
Start: 2017-12-15 | End: 2017-12-15 | Stop reason: HOSPADM

## 2017-12-15 RX ADMIN — SODIUM CHLORIDE, POTASSIUM CHLORIDE, SODIUM LACTATE AND CALCIUM CHLORIDE 20 ML/HR: 600; 310; 30; 20 INJECTION, SOLUTION INTRAVENOUS at 16:15

## 2017-12-15 RX ADMIN — OXYCODONE HYDROCHLORIDE 10 MG: 10 TABLET, FILM COATED, EXTENDED RELEASE ORAL at 09:41

## 2017-12-15 RX ADMIN — SODIUM CHLORIDE, POTASSIUM CHLORIDE, SODIUM LACTATE AND CALCIUM CHLORIDE: 600; 310; 30; 20 INJECTION, SOLUTION INTRAVENOUS at 11:50

## 2017-12-15 RX ADMIN — SODIUM CHLORIDE, POTASSIUM CHLORIDE, SODIUM LACTATE AND CALCIUM CHLORIDE 9 ML/HR: 600; 310; 30; 20 INJECTION, SOLUTION INTRAVENOUS at 09:42

## 2017-12-15 RX ADMIN — OXYCODONE HYDROCHLORIDE AND ACETAMINOPHEN 1 TABLET: 10; 325 TABLET ORAL at 22:13

## 2017-12-15 RX ADMIN — LIDOCAINE HYDROCHLORIDE 50 MG: 20 INJECTION, SOLUTION INFILTRATION; PERINEURAL at 12:34

## 2017-12-15 RX ADMIN — SODIUM CHLORIDE, POTASSIUM CHLORIDE, SODIUM LACTATE AND CALCIUM CHLORIDE: 600; 310; 30; 20 INJECTION, SOLUTION INTRAVENOUS at 12:31

## 2017-12-15 RX ADMIN — MIDAZOLAM HYDROCHLORIDE 1 MG: 1 INJECTION, SOLUTION INTRAMUSCULAR; INTRAVENOUS at 11:36

## 2017-12-15 RX ADMIN — PHENYLEPHRINE HYDROCHLORIDE 100 MCG: 10 INJECTION INTRAVENOUS at 14:14

## 2017-12-15 RX ADMIN — PHENYLEPHRINE HYDROCHLORIDE 100 MCG: 10 INJECTION INTRAVENOUS at 13:06

## 2017-12-15 RX ADMIN — HYDROMORPHONE HYDROCHLORIDE 1 MG: 2 INJECTION, SOLUTION INTRAMUSCULAR; INTRAVENOUS; SUBCUTANEOUS at 20:54

## 2017-12-15 RX ADMIN — BUPIVACAINE HYDROCHLORIDE 2.5 ML: 5 INJECTION, SOLUTION EPIDURAL; INTRACAUDAL; PERINEURAL at 11:50

## 2017-12-15 RX ADMIN — ONDANSETRON 4 MG: 2 INJECTION, SOLUTION INTRAMUSCULAR; INTRAVENOUS at 11:05

## 2017-12-15 RX ADMIN — HYDROMORPHONE HYDROCHLORIDE 1 MG: 2 INJECTION, SOLUTION INTRAMUSCULAR; INTRAVENOUS; SUBCUTANEOUS at 17:44

## 2017-12-15 RX ADMIN — CEFAZOLIN SODIUM 2 G: 2 SOLUTION INTRAVENOUS at 16:22

## 2017-12-15 RX ADMIN — PROPOFOL 100 MG: 10 INJECTION, EMULSION INTRAVENOUS at 12:35

## 2017-12-15 RX ADMIN — LIDOCAINE HYDROCHLORIDE 0.5 ML: 10 INJECTION, SOLUTION EPIDURAL; INFILTRATION; INTRACAUDAL; PERINEURAL at 09:41

## 2017-12-15 RX ADMIN — PHENYLEPHRINE HYDROCHLORIDE 100 MCG: 10 INJECTION INTRAVENOUS at 13:22

## 2017-12-15 RX ADMIN — FAMOTIDINE 20 MG: 10 INJECTION, SOLUTION INTRAVENOUS at 11:05

## 2017-12-15 RX ADMIN — SODIUM CHLORIDE 1000 MG: 900 INJECTION, SOLUTION INTRAVENOUS at 10:07

## 2017-12-15 RX ADMIN — PREGABALIN 150 MG: 75 CAPSULE ORAL at 09:45

## 2017-12-15 NOTE — PLAN OF CARE
Problem: Perioperative Period (Adult)  Goal: Signs and Symptoms of Listed Potential Problems Will be Absent or Manageable (Perioperative Period)  Outcome: Ongoing (interventions implemented as appropriate)    12/15/17 1526   Perioperative Period   Problems Assessed (Perioperative Period) all   Problems Present (Perioperative Period) none

## 2017-12-15 NOTE — PLAN OF CARE
Problem: Patient Care Overview (Adult)  Goal: Plan of Care Review  Outcome: Ongoing (interventions implemented as appropriate)    12/15/17 1002   Coping/Psychosocial Response Interventions   Plan Of Care Reviewed With patient   Patient Care Overview   Progress progress toward functional goals as expected       Goal: Adult Individualization and Mutuality  Outcome: Ongoing (interventions implemented as appropriate)    Problem: Perioperative Period (Adult)  Goal: Signs and Symptoms of Listed Potential Problems Will be Absent or Manageable (Perioperative Period)  Outcome: Ongoing (interventions implemented as appropriate)

## 2017-12-15 NOTE — ANESTHESIA PREPROCEDURE EVALUATION
Anesthesia Evaluation     Patient summary reviewed and Nursing notes reviewed   history of anesthetic complications: PONV  NPO Solid Status: > 8 hours  NPO Liquid Status: > 8 hours     Airway   Mallampati: II  TM distance: >3 FB  Neck ROM: full  Dental - normal exam     Pulmonary - negative pulmonary ROS and normal exam    breath sounds clear to auscultation  Cardiovascular - negative cardio ROS and normal exam  Exercise tolerance: good (4-7 METS)    Rhythm: regular  Rate: normal      ROS comment: SINUS RHYTHM  EARLY PRECORDIAL R/S TRANSITION  NO PRIOR TRACING AVAILABLE FOR COMPARISON  Electronically Signed by:  Charo Deras ([facility]) 14-Apr-2017 07:31:29  Date and Time of Study: 2017-04-13 23:06:35    Neuro/Psych- negative ROS  GI/Hepatic/Renal/Endo - negative ROS     Musculoskeletal (-) negative ROS        ROS comment: Left leg  Abdominal  - normal exam   Substance History - negative use     OB/GYN negative ob/gyn ROS         Other                                        Anesthesia Plan    ASA 2     spinal     intravenous induction   Anesthetic plan and risks discussed with patient.  Use of blood products discussed with patient  Consented to blood products.

## 2017-12-15 NOTE — PLAN OF CARE
Problem: Patient Care Overview (Adult)  Goal: Adult Individualization and Mutuality  Outcome: Ongoing (interventions implemented as appropriate)    12/15/17 1002 12/15/17 1525   Individualization   Patient Specific Preferences to floor this afternoon --    Patient Specific Goals --  to go home asap   Patient Specific Interventions --  pain control   Mutuality/Individual Preferences   What Anxieties, Fears or Concerns Do You Have About Your Health or Care? --  none shared   What Questions Do You Have About Your Health or Care? --  how long did it take?

## 2017-12-15 NOTE — ANESTHESIA PROCEDURE NOTES
Spinal Block    Patient location during procedure: pre-op  Performed By  CRNA: ELIZABETH DUFF  Preanesthetic Checklist  Completed: patient identified, site marked, surgical consent, pre-op evaluation, timeout performed, IV checked, risks and benefits discussed and monitors and equipment checked  Spinal Block Prep:  Patient Position:sitting  Sterile Tech:cap, gloves, mask and sterile barriers  Prep:Chloraprep  Patient Monitoring:blood pressure monitoring, continuous pulse oximetry and EKG  Spinal Block Procedure  Approach:midline  Guidance:landmark technique and palpation technique  Location:L4-L5  Needle Type:Pencan  Needle Gauge:27  Placement of Spinal needle event:cerebrospinal fluid aspirated  Paresthesia: no  Fluid Appearance:clear  Post Assessment  Patient Tolerance:patient tolerated the procedure well with no apparent complications  Complications no  Additional Notes  1% lido infiltrated skin.  3 attempts.  No paresthesias.  VSS

## 2017-12-15 NOTE — ANESTHESIA POSTPROCEDURE EVALUATION
Patient: Raven Young    Procedure Summary     Date Anesthesia Start Anesthesia Stop Room / Location    12/15/17 [id number] [facility] / [facility]       Procedure Diagnosis Surgeon Provider    FIBULA BONE GRAFT TO HIP with cancellus autograft the Jennifer-Miles cable  (Right Hip) Subtrochanteric fracture, closed, right, with nonunion, subsequent encounter  (Subtrochanteric fracture, closed, right, with nonunion, subsequent encounter [S72.21XK]) MD Elpidio Momin CRNA          Anesthesia Type: spinal  Last vitals  BP   106/62 (12/15/17 1545)   Temp   96.5 °F (35.8 °C) (12/15/17 1545)   Pulse   87 (12/15/17 1605)   Resp   18 (12/15/17 1605)     SpO2   94 % (12/15/17 1605)     Post Anesthesia Care and Evaluation    Patient location during evaluation: bedside  Patient participation: complete - patient participated  Level of consciousness: awake and alert  Pain score: 0  Pain management: adequate  Airway patency: patent  Anesthetic complications: No anesthetic complications    Cardiovascular status: acceptable  Respiratory status: acceptable  Hydration status: acceptable

## 2017-12-15 NOTE — INTERVAL H&P NOTE
H&P updated. The patient was examined and vital /79 (BP Location: Left arm, Patient Position: Lying)  Pulse 76  Temp 97.7 °F (36.5 °C) (Oral)   Resp 18  SpO2 92%

## 2017-12-15 NOTE — PLAN OF CARE
Problem: Patient Care Overview (Adult)  Goal: Plan of Care Review  Outcome: Ongoing (interventions implemented as appropriate)    12/15/17 6515   Coping/Psychosocial Response Interventions   Plan Of Care Reviewed With patient   Patient Care Overview   Progress improving   Outcome Evaluation   Outcome Summary/Follow up Plan denies severe pain or nausea

## 2017-12-15 NOTE — PLAN OF CARE
Problem: Patient Care Overview (Adult)  Goal: Plan of Care Review  Outcome: Ongoing (interventions implemented as appropriate)    12/15/17 1608   Coping/Psychosocial Response Interventions   Plan Of Care Reviewed With patient   Patient Care Overview   Progress no change   Outcome Evaluation   Outcome Summary/Follow up Plan Patient admitted to floor from PACU at 1545. Dressings dried and in place. Left SCD in place. BLE numb. No comlaint of pain. Family at bedside.        Goal: Discharge Needs Assessment  Outcome: Ongoing (interventions implemented as appropriate)    Problem: Perioperative Period (Adult)  Goal: Signs and Symptoms of Listed Potential Problems Will be Absent or Manageable (Perioperative Period)  Outcome: Ongoing (interventions implemented as appropriate)    Problem: Fall Risk (Adult)  Goal: Identify Related Risk Factors and Signs and Symptoms  Outcome: Ongoing (interventions implemented as appropriate)  Goal: Absence of Falls  Outcome: Ongoing (interventions implemented as appropriate)

## 2017-12-15 NOTE — OP NOTE
Preoperative Diagnosis: Nonunion right subtrochanteric femur fracture    Postoperative Diagnosis: Same    Procedure Performed:  Autologous bone grafting with fibular allograft strut graft to right subtrochanteric nonunion    Surgeon: Chano      Assistant:  Aric Arriola    Anesthesia: Gen.      Anesthetist: Paco Mcelroy    Drains: None    Complications: None    EBL: 100 cc        Indications for procedure: 62-year-old female subtrochanteric femur fracture and olecranon developing nonunion at the fracture site despite IM gamma nailing ×2.          Operative Note: Patient brought to the operating room in satisfactory anesthesia was obtained the patient was transferred to the fracture table and the right leg was placed in traction.  The leg was in a neutral position.  Timeouts completed then upon the patient procedure correctly.  The right hip and leg was then prepped and after the prep drive for 3 minutes was draped in a sterile from the usual manner timeout once again completed.  Attention first directed towards obtaining a cancellus allograft.  Through an anterior medial proximal tibial incision approximately 3 cm a lengthy anteromedial cortical bone of the tibia was identified and marked.  An elliptical her old full drill holes are made and then a cortical window was developed using the osteotome a cancellus bone was harvested.  Once sufficient bone was harvested DBM allografting was packed into the donor site the cortical window was replaced and the wound was closed with a 0 strata fix and a subcuticular closure of 3-0 strata fix.  Attention was now directed towards the subtrochanteric fracture.  With the fluoroscope the procedures carried out a lateral incision was made via blunt and sharp dissection soft tissue divided to the tensor fascia was identified and incised the entire length of incision.  Separate also dissection exposed the lateral cortex of the femur and with the aid of fluoroscope the area from the  flare of the greater trochanter distal to the fracture site was identified and exposed.  Nonunion site was identified and all the soft tissue and granulation tissue was removed from that fracture site.  The fibular strut graft was cut to fit to the lateral aspect measuring approximately 10 cm in length.  2 Dall mile cables were passed one above and one below the fracture site and then the cancellus allograft along with 5 cc of DBM allograft was mixed and packed in and around the nonunion site after thorough complete packing the fibular strut graft was placed laterally over the femur and the 2 Dall mile cables were tightened secured into place and for scar evaluation confirmed tight fixation.  Another 5 cc of DBM was packed in and around the fracture site.  100 cc of exparel solution which consisted of 40 of quarter percent plain Marcaine a quarter percent saline with 20 of Exparel injected deep and superficial tissues.  1 g of vancomycin powder massage.  Deep and superficial tissues.  The tensor fascia was closed with interrupted #2 Vicryl.  Subcutaneous was closed with 0 strata fix a subcuticular of 3-0 strata fix.   A perineno Dermabond dressing was applied.  Sterile dressing is applied to all wounds.  All counts were correct.  Patient awakened taken to recovery having tolerated procedure well.

## 2017-12-16 LAB
HCT VFR BLD AUTO: 39.4 % (ref 37–47)
HGB BLD-MCNC: 12.3 G/DL (ref 12–16)

## 2017-12-16 PROCEDURE — 25010000002 VANCOMYCIN PER 500 MG: Performed by: ORTHOPAEDIC SURGERY

## 2017-12-16 PROCEDURE — 85014 HEMATOCRIT: CPT | Performed by: ORTHOPAEDIC SURGERY

## 2017-12-16 PROCEDURE — 25010000002 HYDROMORPHONE PER 4 MG: Performed by: ORTHOPAEDIC SURGERY

## 2017-12-16 PROCEDURE — 94799 UNLISTED PULMONARY SVC/PX: CPT

## 2017-12-16 PROCEDURE — 25010000003 CEFAZOLIN PER 500 MG: Performed by: ORTHOPAEDIC SURGERY

## 2017-12-16 PROCEDURE — 85018 HEMOGLOBIN: CPT | Performed by: ORTHOPAEDIC SURGERY

## 2017-12-16 RX ADMIN — CEFAZOLIN SODIUM 2 G: 2 SOLUTION INTRAVENOUS at 01:23

## 2017-12-16 RX ADMIN — SODIUM CHLORIDE 1000 MG: 900 INJECTION, SOLUTION INTRAVENOUS at 23:04

## 2017-12-16 RX ADMIN — OXYCODONE HYDROCHLORIDE AND ACETAMINOPHEN 1 TABLET: 10; 325 TABLET ORAL at 03:48

## 2017-12-16 RX ADMIN — DULOXETINE HYDROCHLORIDE 60 MG: 60 CAPSULE, DELAYED RELEASE ORAL at 08:22

## 2017-12-16 RX ADMIN — OXYCODONE HYDROCHLORIDE AND ACETAMINOPHEN 1 TABLET: 10; 325 TABLET ORAL at 08:26

## 2017-12-16 RX ADMIN — OXYCODONE HYDROCHLORIDE AND ACETAMINOPHEN 1 TABLET: 10; 325 TABLET ORAL at 20:27

## 2017-12-16 RX ADMIN — HYDROMORPHONE HYDROCHLORIDE 1 MG: 2 INJECTION, SOLUTION INTRAMUSCULAR; INTRAVENOUS; SUBCUTANEOUS at 05:59

## 2017-12-16 RX ADMIN — CEFAZOLIN SODIUM 2 G: 2 SOLUTION INTRAVENOUS at 08:22

## 2017-12-16 RX ADMIN — SODIUM CHLORIDE, POTASSIUM CHLORIDE, SODIUM LACTATE AND CALCIUM CHLORIDE 20 ML/HR: 600; 310; 30; 20 INJECTION, SOLUTION INTRAVENOUS at 23:14

## 2017-12-16 RX ADMIN — OXYCODONE HYDROCHLORIDE AND ACETAMINOPHEN 1 TABLET: 10; 325 TABLET ORAL at 15:07

## 2017-12-16 RX ADMIN — RIVAROXABAN 10 MG: 10 TABLET, FILM COATED ORAL at 08:22

## 2017-12-16 RX ADMIN — HYDROMORPHONE HYDROCHLORIDE 1 MG: 2 INJECTION, SOLUTION INTRAMUSCULAR; INTRAVENOUS; SUBCUTANEOUS at 01:08

## 2017-12-16 NOTE — PLAN OF CARE
Problem: Patient Care Overview (Adult)  Goal: Discharge Needs Assessment  Outcome: Ongoing (interventions implemented as appropriate)    12/16/17 1006   Discharge Needs Assessment   Concerns To Be Addressed discharge planning concerns   Readmission Within The Last 30 Days no previous admission in last 30 days   Equipment Needed After Discharge (will continue to assess, she states if rolling walker is needed she can borrow.)   Discharge Planning Comments spoke with patient and sister at bedside. patient agreeable to speak to . patient lives with  in a one level home. she is the caregiver for . sister states she may have patient stay with her for recovery. nephew to help with . she has used outpatient PT @ [facility] in [place]. no HH or home O2/neb. she has the following DME: ramp, standard walker, quad cane, single point cane, BSC and shower chair. she states if rolling walker is needed she will borrow. independent with ADL's including shopping, meal prep and driving. uses [facility] [place] and denies having trouble paying for medications. does not have a living will and is not interested in any information. face sheet verified. plan in uncertain at this time will continue to follow.    Current Health   Anticipated Changes Related to Illness inability to care for someone else   Living Environment   Transportation Available family or friend will provide   Self-Care   Equipment Currently Used at Home (has ramp, standard walker, quad cane, single point cane, BSC, shower chair.)

## 2017-12-16 NOTE — PROGRESS NOTES
Patient: Raven Young  Procedure(s):  FIBULA BONE GRAFT TO HIP with cancellus autograft the Kitchfix   Anesthesia type: [unfilled]    Patient location: [facility] Surgical Floor  Last vitals:   Vitals:    12/16/17 0647   BP: 119/72   Pulse: 99   Resp: 18   Temp: 98.4 °F (36.9 °C)   SpO2: 94%     Level of consciousness: awake, alert and oriented    Post-anesthesia pain: adequate analgesia  Airway patency: patent  Respiratory: unassisted  Cardiovascular: stable and blood pressure at baseline  Hydration: euvolemic    Anesthetic complications: no

## 2017-12-16 NOTE — PROGRESS NOTES
POD# 1 s/p right hip nonunion ORIF    Subjective: Patient states moderate pain but controlled, denies numbness or tingling. No fevers or chills overnight.     Objective:  Vitals:    12/16/17 0343 12/16/17 0647 12/16/17 1136 12/16/17 1140   BP: 124/74 119/72 120/74    BP Location: Left arm  Left arm    Patient Position: Lying  Lying    Pulse: 96 99 92    Resp: 18 18 18    Temp: 98.2 °F (36.8 °C) 98.4 °F (36.9 °C) 99.5 °F (37.5 °C)    TempSrc: Oral  Oral    SpO2: 93% 94% 94% 95%   Weight:       Height:             Results from last 7 days  Lab Units 12/16/17  0419 12/15/17  1601 12/12/17  1534   WBC 10*3/mm3  --   --  7.47   HEMOGLOBIN g/dL 12.3 12.1 13.8   HEMATOCRIT % 39.4 38.3 42.4   PLATELETS 10*3/mm3  --   --  303             Exam:    Right lower extremity-dressing clean dry and intact    Flex and extend toes and ankle    Brisk cap refill all digits    Positive sensation right lower extremity   Compartments soft and easily compressible    Impression: s/p right hip nonunion ORIF     Plan:  1. PT/OT- ambulate   2. Pain control- Percocet  3. DVT prophylaxis- Xarelto  4. Wound care- remove dressing tomorrow  5. Disposition- home vs rehab once pain controlled

## 2017-12-17 LAB
HCT VFR BLD AUTO: 37 % (ref 37–47)
HGB BLD-MCNC: 11.4 G/DL (ref 12–16)

## 2017-12-17 PROCEDURE — 25010000002 HYDROMORPHONE PER 4 MG: Performed by: ORTHOPAEDIC SURGERY

## 2017-12-17 PROCEDURE — 85018 HEMOGLOBIN: CPT | Performed by: ORTHOPAEDIC SURGERY

## 2017-12-17 PROCEDURE — 97162 PT EVAL MOD COMPLEX 30 MIN: CPT

## 2017-12-17 PROCEDURE — 94799 UNLISTED PULMONARY SVC/PX: CPT

## 2017-12-17 PROCEDURE — 85014 HEMATOCRIT: CPT | Performed by: ORTHOPAEDIC SURGERY

## 2017-12-17 RX ADMIN — OXYCODONE HYDROCHLORIDE AND ACETAMINOPHEN 1 TABLET: 10; 325 TABLET ORAL at 06:34

## 2017-12-17 RX ADMIN — RIVAROXABAN 10 MG: 10 TABLET, FILM COATED ORAL at 08:49

## 2017-12-17 RX ADMIN — OXYCODONE HYDROCHLORIDE AND ACETAMINOPHEN 1 TABLET: 10; 325 TABLET ORAL at 23:07

## 2017-12-17 RX ADMIN — OXYCODONE HYDROCHLORIDE AND ACETAMINOPHEN 1 TABLET: 10; 325 TABLET ORAL at 18:21

## 2017-12-17 RX ADMIN — HYDROMORPHONE HYDROCHLORIDE 1 MG: 2 INJECTION, SOLUTION INTRAMUSCULAR; INTRAVENOUS; SUBCUTANEOUS at 09:32

## 2017-12-17 RX ADMIN — DULOXETINE HYDROCHLORIDE 60 MG: 60 CAPSULE, DELAYED RELEASE ORAL at 08:49

## 2017-12-17 NOTE — THERAPY EVALUATION
Acute Care - Physical Therapy Initial Evaluation   Deya Causey     Patient Name: Raven Young  : 1955  MRN: [id number]  Today's Date: 2017   Onset of Illness/Injury or Date of Surgery Date: 12/15/17  Date of Referral to PT: 17  Referring Physician: DANIEL      Admit Date: 12/15/2017     Visit Dx:    ICD-10-CM ICD-9-CM   1. 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   • Closed left subtrochanteric femur fracture   • Subtrochanteric fracture, closed, right, with nonunion, subsequent encounter     Past Medical History:   Diagnosis Date   • Cancer     basal cell   • Depression with anxiety    • PONV (postoperative nausea and vomiting)      Past Surgical History:   Procedure Laterality Date   • HARDWARE REMOVAL Right 8/15/2017    Procedure: HARDWARE REMOVAL long gamma nail  with long gamma nail right subtrochanteric femur fracture meir vanco powder ;  Surgeon: Keyshawn Madden MD;  Location:  [facility];  Service:    • IM NAILING FEMORAL SHAFT RETROGRADE Right 08/15/2017    replacement leah  fx   • SC OPEN FIX INTER/SUBTROCH FX,IMPLNT Right 2017    Procedure: FEMUR INTRAMEDULLARY NAILING/RODDING long meir gamma nail 7fr hemovac placement;  Surgeon: Keyshawn Madden MD;  Location:  [facility];  Service: Orthopedics   • TUBAL ABDOMINAL LIGATION            PT ASSESSMENT (last 72 hours)      PT Evaluation       17 0932 17 0909    Rehab Evaluation    Document Type  evaluation  -JAMIL    Subjective Information  agree to therapy;complains of;pain  -JAMIL    Patient Effort, Rehab Treatment  good  -JAMIL    Symptoms Noted During/After Treatment  increased pain  -JAMIL    General Information    Patient Profile Review  yes  -JAMIL    Onset of Illness/Injury or Date of Surgery Date  12/15/17   -JAMIL    Referring Physician  DANIEL  -JAMIL    General Observations  Pt supine in bed, HOB elevated  -JAMIL    Pertinent History Of Current Problem  Pt is a 62-y/o F who is s/p an ORIF on 12/15 secondary to a non-union of R subtrochanteric femur fracture sustained in April.  -JAMIL    Precautions/Limitations  fall precautions  -JAMIL    Prior Level of Function  independent:;gait;transfer;all household mobility;community mobility  -JAMIL    Plans/Goals Discussed With  patient and family;agreed upon  -JAMIL    Risks Reviewed  patient and family:;increased discomfort  -JAMIL    Benefits Reviewed  patient and family:;increase independence;increase strength;improve function  -JAMIL    Barriers to Rehab  none identified  -JAMIL    Living Environment    Lives With  spouse   on disability for CVA  -JAMIL    Living Arrangements  house  -JAMIL    Home Accessibility  bed and bath on same level;no concerns  -JAMIL    Living Environment Comment  Pt is planning on going home with sister initially.    -JAMIL    Clinical Impression    Date of Referral to PT  12/16/17  -JAMIL    PT Diagnosis  difficulty walking  -JAMIL    Prognosis  Good  -JAMIL    Functional Level At Time Of Evaluation  min A x 2  -JAMIL    Patient/Family Goals Statement  Pt wants to return home.  -JAMIL    Criteria for Skilled Therapeutic Interventions Met  yes;treatment indicated  -JAMIL    Pathology/Pathophysiology Noted (Describe Specifically for Each System)  musculoskeletal  -JAMIL    Impairments Found (describe specific impairments)  aerobic capacity/endurance;gait, locomotion, and balance  -JAMIL    Rehab Potential  good, to achieve stated therapy goals  -JAMIL    Predicted Duration of Therapy Intervention (days/wks)  3 days  -JAMIL    Pain Assessment    Pain Assessment  0-10  -JAMIL    Pain Score  1  -JAMIL    Post Pain Score  9  -JAMIL    Pain Type  Acute pain;Surgical pain  -JAMIL    Pain Location  Hip  -JAMIL    Pain Orientation  Right  -JAMIL    Pain Intervention(s)  Medication (See MAR)  -JAMIL    Cognitive Assessment/Intervention    Current  Cognitive/Communication Assessment  functional  -JAMIL    Orientation Status  oriented x 4  -JAMIL    Follows Commands/Answers Questions  100% of the time  -JAMIL    Personal Safety  WNL/WFL  -JAMIL    ROM (Range of Motion)    General ROM  no range of motion deficits identified  -JAMIL    MMT (Manual Muscle Testing)    General MMT Assessment  lower extremity strength deficits identified  -JAMIL    Lower Extremity    Lower Ext Manual Muscle Testing  left LE strength is WNL  -JAMIL    Lower Ext Manual Muscle Testing Detail  RLE strength 3-/5 globally  -    Muscle Tone Assessment    Muscle Tone Assessment RLE  -     RLE Muscle Tone Assessment moderately decreased tone  -     Mobility Assessment/Training    Extremity Weight-Bearing Status  right lower extremity  -JAMIL    Right Lower Extremity Weight-Bearing  touch down weight-bearing  -JAMIL    Bed Mobility, Assessment/Treatment    Bed Mob, Supine to Sit, Rand  minimum assist (75% patient effort);2 person assist required   assist to support RLE and for upper body  -JAMIL    Bed Mobility, Comment  Pt required support of RLE and extra time to tolerate sitting EOB due to pain.  -JAMIL    Transfer Assessment/Treatment    Transfers, Bed-Chair Rand  minimum assist (75% patient effort)  -JAMIL    Transfers, Bed-Chair-Bed, Assist Device  standard walker  -JAMIL    Transfers, Sit-Stand Rand  minimum assist (75% patient effort);2 person assist required  -JAMIL    Transfers, Stand-Sit Rand  minimum assist (75% patient effort)  -JAMIL    Transfers, Sit-Stand-Sit, Assist Device  standard walker  -JAMIL    Transfer, Comment  Cues for hand placement with sit->stand.  Pt's sister supported RLE during transfer.  Pt maintained static standing x 3 min while bedside chair was pulled up next to pt.  Pt performed stand-pivot transfer with std walker and sat in chair min A.  -JAMIL    Gait Assessment/Treatment    Gait, Comment  Pt declined gait this date, stating too much pain to amb.  -JAMIL    Positioning  and Restraints    Pre-Treatment Position  in bed  -JAMIL    Post Treatment Position  chair  -JAMIL    In Chair  notified nsg;reclined;call light within reach;encouraged to call for assist;with family/caregiver;with nsg  -JAMIL      12/16/17 2027 12/16/17 1006    General Information    Equipment Currently Used at Home  --   has ramp, standard walker, quad cane, single point cane, BSC, shower chair.  -AP    Living Environment    Lives With  spouse  -AP    Living Arrangements  house  -AP    Home Accessibility  no concerns;bed and bath on same level;ramps present at home  -AP    Stair Railings at Home  none  -AP    Type of Financial/Environmental Concern  none  -AP    Transportation Available  family or friend will provide  -AP    Muscle Tone Assessment    Muscle Tone Assessment RLE  -JA     RLE Muscle Tone Assessment moderately increased tone  -JA       12/16/17 0826 12/15/17 1545    Muscle Tone Assessment    Muscle Tone Assessment RLE  - RLE  -    RLE Muscle Tone Assessment moderately decreased tone  - mildly decreased tone  -SW      12/15/17 0931       General Information    Equipment Currently Used at Home none  -DJ     Living Environment    Lives With spouse  -DJ     Living Arrangements house  -DJ     Home Accessibility no concerns  -DJ     Stair Railings at Home none  -DJ     Type of Financial/Environmental Concern none  -DJ     Transportation Available none  -DJ       User Key  (r) = Recorded By, (t) = Taken By, (c) = Cosigned By    Initials Name Provider Type    ROX Reilly, RN Registered Nurse    JOHNSON Rueda, RN Registered Nurse    SCOTT Meyer, RN Registered Nurse    SIMONE Benson, RN Case Manager    JAMIL Patterson, PT Physical Therapist    SW Alena Briones, RN Registered Nurse          Physical Therapy Education     Title: PT OT SLP Therapies (Done)     Topic: Physical Therapy (Done)     Point: Mobility training (Done)    Learning Progress Summary    Learner Readiness Method  Response Comment Documented by Status   Patient Acceptance E VU Educated pt and sister on PT POC, DC recommendations, and WB status. JAMIL 12/17/17 1011 Done   Family Acceptance E VU Educated pt and sister on PT POC, DC recommendations, and WB status. JAMIL 12/17/17 1011 Done               Point: Home exercise program (Done)    Learning Progress Summary    Learner Readiness Method Response Comment Documented by Status   Patient Acceptance E VU Educated pt and sister on PT POC, DC recommendations, and WB status. JAMIL 12/17/17 1011 Done   Family Acceptance E VU Educated pt and sister on PT POC, DC recommendations, and WB status. JAMIL 12/17/17 1011 Done               Point: Body mechanics (Done)    Learning Progress Summary    Learner Readiness Method Response Comment Documented by Status   Patient Acceptance E VU Educated pt and sister on PT POC, DC recommendations, and WB status. JAMIL 12/17/17 1011 Done   Family Acceptance E VU Educated pt and sister on PT POC, DC recommendations, and WB status. JAMIL 12/17/17 1011 Done               Point: Precautions (Done)    Learning Progress Summary    Learner Readiness Method Response Comment Documented by Status   Patient Acceptance E VU Educated pt and sister on PT POC, DC recommendations, and WB status. JAMIL 12/17/17 1011 Done   Family Acceptance E VU Educated pt and sister on PT POC, DC recommendations, and WB status. JAMIL 12/17/17 1011 Done                      User Key     Initials Effective Dates Name Provider Type Discipline    JAMIL 05/12/16 -  Coni Patterson, PT Physical Therapist PT                PT Recommendation and Plan  Anticipated Discharge Disposition: skilled nursing facility (vs. Home with HH and family assist)  Planned Therapy Interventions: balance training, bed mobility training, gait training, home exercise program, strengthening, stretching, transfer training  PT Frequency: 2 times/day  Plan of Care Review  Plan Of Care Reviewed With: patient, sibling  Outcome  Summary/Follow up Plan: PT eval complete.  Pt presents with decline in functional mobility compared to PLOF.  Pt needing min A x 2 for supine->sit and to transfer from bed to chair with std walker.  Pt unable to amb this date, stating pain levels too high.  Pt already owns all required equipment at home and plans to DC to sister's home initially.  Recommend [facility] at this time, but may change to HHPT pending pt progress.  Recommend skilled PT in this setting at BID frequency.          IP PT Goals       12/17/17 1014          Bed Mobility PT LTG    Bed Mobility PT LTG, Date Established 12/17/17  -JAMIL      Bed Mobility PT LTG, Time to Achieve by discharge  -JAMIL      Bed Mobility PT LTG, Activity Type all bed mobility  -JAMIL      Bed Mobility PT LTG, [place] Level supervision required  -JAMIL      Transfer Training PT LTG    Transfer Training PT LTG, Date Established 12/17/17  -JAMIL      Transfer Training PT LTG, Time to Achieve by discharge  -JAMIL      Transfer Training PT LTG, Activity Type all transfers  -JAMIL      Transfer Training PT LTG, [place] Level supervision required  -JAMIL      Transfer Training PT LTG, Assist Device walker, standard  -JAMIL      Gait Training PT LTG    Gait Training Goal PT LTG, Date Established 12/17/17  -JAMIL      Gait Training Goal PT LTG, Time to Achieve by discharge  -JAMIL      Gait Training Goal PT LTG, [place] Level supervision required  -JAMIL      Gait Training Goal PT LTG, Assist Device walker, standard  -JAMIL      Gait Training Goal PT LTG, Distance to Achieve 100  -JAMIL      Patient Education PT LTG    Patient Education PT LTG, Date Established 12/17/17  -JAMIL      Patient Education PT LTG, Education Type precaution per surgeon   weightbearing status  -JAMIL      Patient Education PT LTG, Education Understanding verbalize understanding;demonstrate adequately  -JAMIL        User Key  (r) = Recorded By, (t) = Taken By, (c) = Cosigned By    Initials Name Provider Type    JAMIL Patterson, PT Physical  Therapist                Outcome Measures       12/17/17 1016          How much help from another person do you currently need...    Turning from your back to your side while in flat bed without using bedrails? 3  -JAMIL      Moving from lying on back to sitting on the side of a flat bed without bedrails? 3  -JAMIL      Moving to and from a bed to a chair (including a wheelchair)? 3  -JAMIL      Standing up from a chair using your arms (e.g., wheelchair, bedside chair)? 3  -JAMIL      Climbing 3-5 steps with a railing? 1  -JAMIL      To walk in hospital room? 2  -JAMIL      AM-PAC 6 Clicks Score 15  -JAMIL      Functional Assessment    Outcome Measure Options AM-PAC 6 Clicks Basic Mobility (PT)  -JAMIL        User Key  (r) = Recorded By, (t) = Taken By, (c) = Cosigned By    Initials Name Provider Type    JAMIL Patterson PT Physical Therapist           Time Calculation:         PT Charges       12/17/17 1017          Time Calculation    Start Time 0907  -JAMIL      Stop Time 0936  -JAIML      Time Calculation (min) 29 min  -JAMIL        User Key  (r) = Recorded By, (t) = Taken By, (c) = Cosigned By    Initials Name Provider Type    JAMIL Patterson PT Physical Therapist          Therapy Charges for Today     Code Description Service Date Service Provider Modifiers Qty    [id number] HC PT EVAL MOD COMPLEXITY 1 12/17/2017 Coni Patterson, PT GP 2          PT G-Codes  Outcome Measure Options: AM-PAC 6 Clicks Basic Mobility (PT)      Coni Patterson PT, DPT  12/17/2017

## 2017-12-17 NOTE — PLAN OF CARE
Problem: Patient Care Overview (Adult)  Goal: Plan of Care Review    12/17/17 1011   Coping/Psychosocial Response Interventions   Plan Of Care Reviewed With patient;sibling   Outcome Evaluation   Outcome Summary/Follow up Plan PT eval complete. Pt presents with decline in functional mobility compared to PLOF. Pt needing min A x 2 for supine->sit and to transfer from bed to chair with std walker. Pt unable to amb this date, stating pain levels too high. Pt already owns all required equipment at home and plans to DC to sister's home initially. Recommend [facility] at this time, but may change to HHPT pending pt progress. Recommend skilled PT in this setting at BID frequency.

## 2017-12-17 NOTE — NURSING NOTE
Continued Stay Note  SHIRA Retana     Patient Name: Raven Young  MRN: [id number]  Today's Date: 12/17/2017    Admit Date: 12/15/2017          Discharge Plan       12/17/17 1503    Case Management/Social Work Plan    Additional Comments Spoke with patient at bedside and with permission two sisters present.  Discussed dispostion options and per request referral made to Stacey Webb @[facility] rehab/skilled unit.  Will await evaluation on Monday 11/18.  Will need insurance decision.  will continue to follow.               Discharge Codes     None            Mica Leon RN  Correct date for evaluation is Monday12/18.  Stacey Webb aware of referral.

## 2017-12-17 NOTE — PLAN OF CARE
Problem: Inpatient Physical Therapy  Goal: Bed Mobility Goal LTG- PT    12/17/17 1014   Bed Mobility PT LTG   Bed Mobility PT LTG, Date Established 12/17/17   Bed Mobility PT LTG, Time to Achieve by discharge   Bed Mobility PT LTG, Activity Type all bed mobility   Bed Mobility PT LTG, [place] Level supervision required       Goal: Transfer Training Goal 1 LTG- PT    12/17/17 1014   Transfer Training PT LTG   Transfer Training PT LTG, Date Established 12/17/17   Transfer Training PT LTG, Time to Achieve by discharge   Transfer Training PT LTG, Activity Type all transfers   Transfer Training PT LTG, [place] Level supervision required   Transfer Training PT LTG, Assist Device walker, standard       Goal: Gait Training Goal LTG- PT    12/17/17 1014   Gait Training PT LTG   Gait Training Goal PT LTG, Date Established 12/17/17   Gait Training Goal PT LTG, Time to Achieve by discharge   Gait Training Goal PT LTG, [place] Level supervision required   Gait Training Goal PT LTG, Assist Device walker, standard   Gait Training Goal PT LTG, Distance to Achieve 100       Goal: Patient Education Goal LTG- PT    12/17/17 1014   Patient Education PT LTG   Patient Education PT LTG, Date Established 12/17/17   Patient Education PT LTG, Education Type precaution per surgeon  (weightbearing status)   Patient Education PT LTG, Education Understanding verbalize understanding;demonstrate adequately

## 2017-12-17 NOTE — PROGRESS NOTES
POD# 2 s/p right hip nonunion ORIF    Subjective: Patient states pain slightly better but aggravated with PT today. Denies numbness or tingling. No fevers or chills overnight.     Objective:  Vitals:    12/16/17 1938 12/17/17 0001 12/17/17 0358 12/17/17 0639   BP: 107/64 109/60 108/63 113/64   BP Location: Left arm Left arm Right arm Right arm   Patient Position: Lying Lying Lying Lying   Pulse: 102 108 103 112   Resp: 18 18 18 18   Temp: 98.3 °F (36.8 °C) 98.3 °F (36.8 °C) 98.5 °F (36.9 °C) 98.7 °F (37.1 °C)   TempSrc: Oral Oral Oral Oral   SpO2: 95% 91% 93% 96%   Weight:       Height:             Results from last 7 days  Lab Units 12/17/17  0746 12/16/17  0419 12/15/17  1601 12/12/17  1534   WBC 10*3/mm3  --   --   --  7.47   HEMOGLOBIN g/dL 11.4* 12.3 12.1 13.8   HEMATOCRIT % 37.0 39.4 38.3 42.4   PLATELETS 10*3/mm3  --   --   --  303             Exam:    Right lower extremity-incision clean dry and intact    Flex and extend toes and ankle    Brisk cap refill all digits    Positive sensation right lower extremity   Compartments soft and easily compressible    Impression: s/p right hip nonunion ORIF     Plan:  1. PT/OT- ambulate   2. Pain control- Percocet  3. DVT prophylaxis- Xarelto  4. Wound care- glue intact, leave open to air  5. Disposition- home vs rehab once pain controlled

## 2017-12-18 LAB
HCT VFR BLD AUTO: 34.7 % (ref 37–47)
HGB BLD-MCNC: 11.2 G/DL (ref 12–16)

## 2017-12-18 PROCEDURE — 99024 POSTOP FOLLOW-UP VISIT: CPT | Performed by: ORTHOPAEDIC SURGERY

## 2017-12-18 PROCEDURE — 97110 THERAPEUTIC EXERCISES: CPT

## 2017-12-18 PROCEDURE — 85018 HEMOGLOBIN: CPT | Performed by: ORTHOPAEDIC SURGERY

## 2017-12-18 PROCEDURE — 85014 HEMATOCRIT: CPT | Performed by: ORTHOPAEDIC SURGERY

## 2017-12-18 PROCEDURE — 97116 GAIT TRAINING THERAPY: CPT

## 2017-12-18 RX ORDER — GABAPENTIN 100 MG/1
200 CAPSULE ORAL EVERY 8 HOURS SCHEDULED
Qty: 900 CAPSULE | Refills: 0 | Status: SHIPPED | OUTPATIENT
Start: 2017-12-18 | End: 2017-12-23 | Stop reason: HOSPADM

## 2017-12-18 RX ORDER — GABAPENTIN 100 MG/1
200 CAPSULE ORAL EVERY 8 HOURS SCHEDULED
Status: DISCONTINUED | OUTPATIENT
Start: 2017-12-18 | End: 2017-12-19 | Stop reason: HOSPADM

## 2017-12-18 RX ORDER — OXYCODONE AND ACETAMINOPHEN 10; 325 MG/1; MG/1
1 TABLET ORAL EVERY 4 HOURS PRN
Qty: 60 TABLET | Refills: 0 | Status: ON HOLD | OUTPATIENT
Start: 2017-12-18 | End: 2017-12-22

## 2017-12-18 RX ADMIN — OXYCODONE HYDROCHLORIDE AND ACETAMINOPHEN 1 TABLET: 10; 325 TABLET ORAL at 19:59

## 2017-12-18 RX ADMIN — RIVAROXABAN 10 MG: 10 TABLET, FILM COATED ORAL at 08:08

## 2017-12-18 RX ADMIN — GABAPENTIN 200 MG: 100 CAPSULE ORAL at 08:08

## 2017-12-18 RX ADMIN — DULOXETINE HYDROCHLORIDE 60 MG: 60 CAPSULE, DELAYED RELEASE ORAL at 08:08

## 2017-12-18 RX ADMIN — OXYCODONE HYDROCHLORIDE AND ACETAMINOPHEN 1 TABLET: 10; 325 TABLET ORAL at 12:23

## 2017-12-18 RX ADMIN — GABAPENTIN 200 MG: 100 CAPSULE ORAL at 21:54

## 2017-12-18 RX ADMIN — GABAPENTIN 200 MG: 100 CAPSULE ORAL at 13:27

## 2017-12-18 RX ADMIN — OXYCODONE HYDROCHLORIDE AND ACETAMINOPHEN 1 TABLET: 10; 325 TABLET ORAL at 08:12

## 2017-12-18 NOTE — THERAPY TREATMENT NOTE
Acute Care - Physical Therapy Treatment Note   Deya Causey     Patient Name: Raven Young  : 1955  MRN: [id number]  Today's Date: 2017  Onset of Illness/Injury or Date of Surgery Date: 12/15/17  Date of Referral to PT: 17  Referring Physician: DANIEL    Admit Date: 12/15/2017    Visit Dx:    ICD-10-CM ICD-9-CM   1. 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   • Closed left subtrochanteric femur fracture   • Subtrochanteric fracture, closed, right, with nonunion, subsequent encounter               Adult Rehabilitation Note       17 1305 17 0900 17 0845    Rehab Assessment/Intervention    Discipline physical therapist  -BP  physical therapy assistant  -KM    Document Type therapy note (daily note)  -BP  therapy note (daily note)  -KM    Subjective Information agree to therapy;complains of;pain  -BP  agree to therapy   states her leg hurts if hanging down  -KM    Patient Effort, Rehab Treatment adequate  -BP  good  -KM    Symptoms Noted During/After Treatment increased pain  -BP  increased pain  -KM    Symptoms Noted Comment   reported increased pain with movement but pain decreased after getting up to chair  -KM    Precautions/Limitations fall precautions;other (see comments)   TTWB  -BP  fall precautions   TDWB  -KM    Specific Treatment Considerations   pt has limited weightbearing and requires assist with moving RLE.  Has difficulty with leg down  -KM    Recorded by [BP] Nery Tolliver, PT  [KM] Griselda Cole, PTA    Pain Assessment    Pain Assessment 0-10  -BP  0-10  -KM    Pain Score 3  -BP --   not bad but then 8 with ambulation then little to 0 with sit  -KM     Pain Type Acute pain;Surgical pain  -BP Acute pain;Surgical pain  -KM     Pain Location  Hip  -BP Hip  -KM     Pain Orientation Right  -BP Right  -KM     Pain Intervention(s) Medication (See MAR);Repositioned   Pre medicated for treatment  -BP Medication (See MAR)  -KM     Response to Interventions tolerated  -BP      Recorded by [BP] Nery Tolliver, PT [KM] Griselda Cole PTA [KM] Griselda Cole PTA    Cognitive Assessment/Intervention    Personal Safety Interventions gait belt;nonskid shoes/slippers when out of bed  -BP gait belt;nonskid shoes/slippers when out of bed  -KM     Recorded by [BP] Nery Tolliver, PT [KM] Griselda Cole PTA     Bed Mobility, Assessment/Treatment    Bed Mobility, Assistive Device bed rails  -BP bed rails;head of bed elevated  -KM     Bed Mob, Supine to Sit, [place]  contact guard assist   pt moving RLE - just available to assist if needed  -KM     Bed Mob, Sit to Supine, [place] minimum assist (75% patient effort);verbal cues required  -BP      Bed Mobility, Comment Min A for R LE into bed. Patient requires extended time and cues for sequencing   -BP pt requires significant time to scoot to edge of bed  -KM     Recorded by [BP] Nery Tolliver, PT [KM] Griselda Cole PTA     Transfer Assessment/Treatment    Transfers, Sit-Stand [place] contact guard assist;verbal cues required  -BP contact guard assist;minimum assist (75% patient effort);1 person + 1 person to manage equipment  -KM     Transfers, Stand-Sit [place] contact guard assist;verbal cues required  -BP contact guard assist;minimum assist (75% patient effort)  -KM     Transfers, Sit-Stand-Sit, Assist Device standard walker  -BP standard walker  -KM     Transfer, Maintain Weight Bearing Status able to maintain weight bearing status  -BP      Transfer, Comment Verbal cues for hand placement   -BP Pt requires cues for hand placement.  Pt sat on the side of the bed for 3-4 minutes with RLE supported.  Cues to scoot to edge of bed and to allow RLE to rest on can..  Shoe on LLE for ambulation.  -KM      Recorded by [BP] Nery Tolliver, PT [KM] Griselda Cole PTA     Gait Assessment/Treatment    Gait, [place] Level contact guard assist;verbal cues required  - contact guard assist;minimum assist (75% patient effort);1 person + 1 person to manage equipment  -KM     Gait, Assistive Device standard walker  -BP standard walker  -KM     Gait, Distance (Feet) 15  -BP 10  -KM     Gait, Gait Deviations nish decreased;forward flexed posture  -BP      Gait, Maintain Weight Bearing Status able to maintain weight bearing status  -      Gait, Safety Issues  --   pt is TDWB  -KM     Gait, Comment Patient able to maintain TTWB during gait training. Patient tends to cross R LE over L. Verbal cues to keep legs uncrossed.   -BP pt required encouragement to perform.  Pt appears anxious and requires time to complete mobility.    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)  -KM     Recorded by  [KM] Griselda Cole PTA     Therapy Exercises    Right Lower Extremity 10 reps;AROM:  -BP 5 reps;AAROM:  -KM     Left Lower Extremity 10 reps;AAROM:;SLR;quad sets;hip abduction/adduction;heel slides  -BP 5 reps;hip abduction/adduction;heel slides  -KM     Bilateral Lower Extremities  10 reps;ankle pumps/circles;quad sets  -KM     Recorded by [BP] Nery Tolliver, PT [KM] Griselda Cole PTA     Positioning and Restraints    Pre-Treatment Position sitting in chair/recliner  -BP in bed  -KM     Post Treatment Position bed  -BP chair  -KM     In Bed supine;call light within reach;encouraged to call for assist;with nsg  -BP      In Chair  notified nsg;reclined;call light within reach;encouraged to call for assist;legs elevated;pillow between legs  -KM     Recorded by [BP] Nery Tolliver, PT [KM] Griselda Cole PTA       User Key  (r) = Recorded By, (t) = Taken By, (c) = Cosigned By     Initials Name Effective Dates    CLAY Cole, PTA 08/11/15 -     BP JerryChrist Tolliver, PT 12/01/15 -                 IP PT Goals       12/17/17 1014          Bed Mobility PT LTG    Bed Mobility PT LTG, Date Established 12/17/17  -JAMIL      Bed Mobility PT LTG, Time to Achieve by discharge  -JAMIL      Bed Mobility PT LTG, Activity Type all bed mobility  -JAMIL      Bed Mobility PT LTG, [place] Level supervision required  -JAMIL      Transfer Training PT LTG    Transfer Training PT LTG, Date Established 12/17/17  -JAMIL      Transfer Training PT LTG, Time to Achieve by discharge  -JAMIL      Transfer Training PT LTG, Activity Type all transfers  -JAMIL      Transfer Training PT LTG, [place] Level supervision required  -JAMIL      Transfer Training PT LTG, Assist Device walker, standard  -JAMIL      Gait Training PT LTG    Gait Training Goal PT LTG, Date Established 12/17/17  -JAMIL      Gait Training Goal PT LTG, Time to Achieve by discharge  -JAMIL      Gait Training Goal PT LTG, [place] Level supervision required  -JAMIL      Gait Training Goal PT LTG, Assist Device walker, standard  -JAMIL      Gait Training Goal PT LTG, Distance to Achieve 100  -JAMIL      Patient Education PT LTG    Patient Education PT LTG, Date Established 12/17/17  -JAMIL      Patient Education PT LTG, Education Type precaution per surgeon   weightbearing status  -JAMIL      Patient Education PT LTG, Education Understanding verbalize understanding;demonstrate adequately  -JAMIL        User Key  (r) = Recorded By, (t) = Taken By, (c) = Cosigned By    Initials Name Provider Type    JAMIL Patterson, PT Physical Therapist          Physical Therapy Education     Title: PT OT SLP Therapies (Done)     Topic: Physical Therapy (Done)     Point: Mobility training (Done)    Learning Progress Summary    Learner Readiness Method Response Comment Documented by Status   Patient Acceptance E VU  BP 12/18/17 1342 Done    Acceptance E,D ROSALINA discussed positioning with pt- pt placing toes  of one foot over other- used roll in between legs to help with positioning.  Encouraged pt to move LEs- not keep in the same place.  Spoke to nursing about mobility with pt KM 12/18/17 1008 Done    Acceptance E VU Educated pt and sister on PT POC, DC recommendations, and WB status. JAMIL 12/17/17 1011 Done   Family Acceptance E VU Educated pt and sister on PT POC, DC recommendations, and WB status. JAMIL 12/17/17 1011 Done               Point: Home exercise program (Done)    Learning Progress Summary    Learner Readiness Method Response Comment Documented by Status   Patient Acceptance E VU  BP 12/18/17 1342 Done    Acceptance E,D VU discussed positioning with pt- pt placing toes of one foot over other- used roll in between legs to help with positioning.  Encouraged pt to move LEs- not keep in the same place.  Spoke to nursing about mobility with pt KM 12/18/17 1008 Done    Acceptance E VU Educated pt and sister on PT POC, DC recommendations, and WB status. JAMIL 12/17/17 1011 Done   Family Acceptance E VU Educated pt and sister on PT POC, DC recommendations, and WB status. JAMIL 12/17/17 1011 Done               Point: Body mechanics (Done)    Learning Progress Summary    Learner Readiness Method Response Comment Documented by Status   Patient Acceptance E,D VU discussed positioning with pt- pt placing toes of one foot over other- used roll in between legs to help with positioning.  Encouraged pt to move LEs- not keep in the same place.  Spoke to nursing about mobility with pt  12/18/17 1008 Done    Acceptance E VU Educated pt and sister on PT POC, DC recommendations, and WB status. JAMIL 12/17/17 1011 Done   Family Acceptance E VU Educated pt and sister on PT POC, DC recommendations, and WB status. JAMIL 12/17/17 1011 Done               Point: Precautions (Done)    Learning Progress Summary    Learner Readiness Method Response Comment Documented by Status   Patient Acceptance E VU  BP 12/18/17 1342 Done    Acceptance E,D VU  discussed positioning with pt- pt placing toes of one foot over other- used roll in between legs to help with positioning.  Encouraged pt to move LEs- not keep in the same place.  Spoke to nursing about mobility with pt  12/18/17 1008 Done    Acceptance E VU Educated pt and sister on PT POC, DC recommendations, and WB status. JAMIL 12/17/17 1011 Done   Family Acceptance E VU Educated pt and sister on PT POC, DC recommendations, and WB status. JAMIL 12/17/17 1011 Done                      User Key     Initials Effective Dates Name Provider Type Discipline    KM 08/11/15 -  Griselda Cole, PTA Physical Therapy Assistant PT    BP 12/01/15 -  Nery Tolliver, PT Physical Therapist PT    JAMIL 05/12/16 -  Coni Patterson, PT Physical Therapist PT                    PT Recommendation and Plan  Anticipated Discharge Disposition: skilled nursing facility (vs. Home with HH and family assist)  Planned Therapy Interventions: balance training, bed mobility training, gait training, home exercise program, strengthening, stretching, transfer training  PT Frequency: 2 times/day  Plan of Care Review  Plan Of Care Reviewed With: patient  Progress: improving  Outcome Summary/Follow up Plan: PT: Patient performs sit to/from stand transfers with CGA, sit to supine transfer with min A and gait x 15 feet TTWB with standard walker with CGA. Patient requires verbal cues for sequencing and safety with functional transfers. She is able to maintain TTWB during functional mobility. Will continue to assess need for short term rehab prior to return home.           Outcome Measures       12/18/17 1305 12/18/17 0845 12/17/17 1016    How much help from another person do you currently need...    Turning from your back to your side while in flat bed without using bedrails? 3  -BP 3  -KM 3  -JAMIL    Moving from lying on back to sitting on the side of a flat bed without bedrails? 3  -BP 3  -KM 3  -JAMIL    Moving to and from a bed to a chair (including a  wheelchair)? 3  -BP 3  -KM 3  -JAMIL    Standing up from a chair using your arms (e.g., wheelchair, bedside chair)? 3  -BP 3  -KM 3  -JAMIL    Climbing 3-5 steps with a railing? 1  -BP 1  -KM 1  -JAMIL    To walk in hospital room? 3  -BP 3  -KM 2  -JAMIL    AM-PAC 6 Clicks Score 16  -BP 16  -KM 15  -JAMIL    Functional Assessment    Outcome Measure Options AM-PAC 6 Clicks Basic Mobility (PT)  -BP AM-PAC 6 Clicks Basic Mobility (PT)  -KM AM-PAC 6 Clicks Basic Mobility (PT)  -JAMIL      User Key  (r) = Recorded By, (t) = Taken By, (c) = Cosigned By    Initials Name Provider Type    CLAY Cole PTA Physical Therapy Assistant    BP Nery Tolliver, PT Physical Therapist    JAMIL Patterson, PT Physical Therapist           Time Calculation:         PT Charges       12/18/17 1344 12/18/17 1014       Time Calculation    Start Time 1305  -BP 0845  -KM     Stop Time 1330  -BP 0920  -KM     Time Calculation (min) 25 min  -BP 35 min  -KM     PT Received On 12/18/17  -BP      PT - Next Appointment 12/19/17  -BP 12/18/17  -KM       User Key  (r) = Recorded By, (t) = Taken By, (c) = Cosigned By    Initials Name Provider Type    CLAY Cole PTA Physical Therapy Assistant    BP Nery Tolliver, PT Physical Therapist          Therapy Charges for Today     Code Description Service Date Service Provider Modifiers Qty    02705925860 HC GAIT TRAINING EA 15 MIN 12/18/2017 Nery Tolliver, PT GP 1    [id number] HC PT THER PROC EA 15 MIN 12/18/2017 Nery Tolliver, PT GP 1    [id number] HC PT THER SUPP EA 15 MIN 12/18/2017 Nery Tolliver, PT GP 1          PT G-Codes  Outcome Measure Options: AM-PAC 6 Clicks Basic Mobility (PT)    Nery Tolliver, PT  12/18/2017

## 2017-12-18 NOTE — THERAPY TREATMENT NOTE
Acute Care - Physical Therapy Treatment Note   [place]     Patient Name: Raven Young  : 1955  MRN: [id number]  Today's Date: 2017  Onset of Illness/Injury or Date of Surgery Date: 12/15/17  Date of Referral to PT: 17  Referring Physician: DANIEL    Admit Date: 12/15/2017    Visit Dx:    ICD-10-CM ICD-9-CM   1. 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   • Closed left subtrochanteric femur fracture   • Subtrochanteric fracture, closed, right, with nonunion, subsequent encounter               Adult Rehabilitation Note       17 0900 17 0845       Rehab Assessment/Intervention    Discipline  physical therapy assistant  -KM     Document Type  therapy note (daily note)  -KM     Subjective Information  agree to therapy   states her leg hurts if hanging down  -KM     Patient Effort, Rehab Treatment  good  -KM     Symptoms Noted During/After Treatment  increased pain  -KM     Symptoms Noted Comment  reported increased pain with movement but pain decreased after getting up to chair  -KM     Precautions/Limitations  fall precautions   TDWB  -KM     Specific Treatment Considerations  pt has limited weightbearing and requires assist with moving RLE.  Has difficulty with leg down  -KM     Recorded by  [KM] Griselda Cole PTA     Pain Assessment    Pain Assessment  0-10  -KM     Pain Score --   not bad but then 8 with ambulation then little to 0 with sit  -KM      Pain Type Acute pain;Surgical pain  -KM      Pain Location Hip  -KM      Pain Orientation Right  -KM      Pain Intervention(s) Medication (See MAR)  -KM      Recorded by [KM] Griselda Cole PTA [KM] Griselda Cole PTA     Cognitive Assessment/Intervention    Personal Safety Interventions gait belt;nonskid  shoes/slippers when out of bed  -KM      Recorded by [KM] Griselda Cole PTA      Bed Mobility, Assessment/Treatment    Bed Mobility, Assistive Device bed rails;head of bed elevated  -KM      Bed Mob, Supine to Sit, [place] contact guard assist   pt moving RLE - just available to assist if needed  -KM      Bed Mobility, Comment pt requires significant time to scoot to edge of bed  -KM      Recorded by [KM] Griselda Cole PTA      Transfer Assessment/Treatment    Transfers, Sit-Stand [place] contact guard assist;minimum assist (75% patient effort);1 person + 1 person to manage equipment  -KM      Transfers, Stand-Sit [place] contact guard assist;minimum assist (75% patient effort)  -KM      Transfers, Sit-Stand-Sit, Assist Device standard walker  -KM      Transfer, Comment Pt requires cues for hand placement.  Pt sat on the side of the bed for 3-4 minutes with RLE supported.  Cues to scoot to edge of bed and to allow RLE to rest on can..  Shoe on LLE for ambulation.  -KM      Recorded by [KM] Griselda Cole PTA      Gait Assessment/Treatment    Gait, [place] Level contact guard assist;minimum assist (75% patient effort);1 person + 1 person to manage equipment  -KM      Gait, Assistive Device standard walker  -KM      Gait, Distance (Feet) 10  -KM      Gait, Safety Issues --   pt is TDWB  -KM      Gait, Comment pt required encouragement to perform.  Pt appears anxious and requires time to complete mobility.    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)  -KM      Recorded by [KM] Griselda Cole PTA      Therapy Exercises    Right Lower Extremity 5 reps;AAROM:  -KM      Left Lower Extremity 5 reps;hip abduction/adduction;heel slides  -KM      Bilateral Lower Extremities 10 reps;ankle pumps/circles;quad sets  -KM      Recorded by [KM] Griselda Cole PTA      Positioning and  Restraints    Pre-Treatment Position in bed  -KM      Post Treatment Position chair  -KM      In Chair notified nsg;reclined;call light within reach;encouraged to call for assist;legs elevated;pillow between legs  -KM      Recorded by [KM] Griselda Cole PTA        User Key  (r) = Recorded By, (t) = Taken By, (c) = Cosigned By    Initials Name Effective Dates    KM Griselda Cole PTA 08/11/15 -                 IP PT Goals       12/17/17 1014          Bed Mobility PT LTG    Bed Mobility PT LTG, Date Established 12/17/17  -JAMIL      Bed Mobility PT LTG, Time to Achieve by discharge  -JAMIL      Bed Mobility PT LTG, Activity Type all bed mobility  -JAMIL      Bed Mobility PT LTG, [place] Level supervision required  -JAMIL      Transfer Training PT LTG    Transfer Training PT LTG, Date Established 12/17/17  -JAMIL      Transfer Training PT LTG, Time to Achieve by discharge  -JAMIL      Transfer Training PT LTG, Activity Type all transfers  -JAMIL      Transfer Training PT LTG, [place] Level supervision required  -JAMIL      Transfer Training PT LTG, Assist Device walker, standard  -JAMIL      Gait Training PT LTG    Gait Training Goal PT LTG, Date Established 12/17/17  -JAMIL      Gait Training Goal PT LTG, Time to Achieve by discharge  -JAMIL      Gait Training Goal PT LTG, [place] Level supervision required  -JAMIL      Gait Training Goal PT LTG, Assist Device walker, standard  -JAMIL      Gait Training Goal PT LTG, Distance to Achieve 100  -JAMIL      Patient Education PT LTG    Patient Education PT LTG, Date Established 12/17/17  -JAMIL      Patient Education PT LTG, Education Type precaution per surgeon   weightbearing status  -JAMIL      Patient Education PT LTG, Education Understanding verbalize understanding;demonstrate adequately  -JAMIL        User Key  (r) = Recorded By, (t) = Taken By, (c) = Cosigned By    Initials Name Provider Type    JAMIL Patterson PT Physical Therapist          Physical Therapy Education     Title: PT OT SLP  Therapies (Done)     Topic: Physical Therapy (Done)     Point: Mobility training (Done)    Learning Progress Summary    Learner Readiness Method Response Comment Documented by Status   Patient Acceptance E,D VU discussed positioning with pt- pt placing toes of one foot over other- used roll in between legs to help with positioning.  Encouraged pt to move LEs- not keep in the same place.  Spoke to nursing about mobility with pt  12/18/17 1008 Done    Acceptance E VU Educated pt and sister on PT POC, DC recommendations, and WB status. JAMIL 12/17/17 1011 Done   Family Acceptance E VU Educated pt and sister on PT POC, DC recommendations, and WB status. JAMIL 12/17/17 1011 Done               Point: Home exercise program (Done)    Learning Progress Summary    Learner Readiness Method Response Comment Documented by Status   Patient Acceptance E,D VU discussed positioning with pt- pt placing toes of one foot over other- used roll in between legs to help with positioning.  Encouraged pt to move LEs- not keep in the same place.  Spoke to nursing about mobility with pt  12/18/17 1008 Done    Acceptance E VU Educated pt and sister on PT POC, DC recommendations, and WB status. JAMIL 12/17/17 1011 Done   Family Acceptance E VU Educated pt and sister on PT POC, DC recommendations, and WB status. JAMIL 12/17/17 1011 Done               Point: Body mechanics (Done)    Learning Progress Summary    Learner Readiness Method Response Comment Documented by Status   Patient Acceptance E,D VU discussed positioning with pt- pt placing toes of one foot over other- used roll in between legs to help with positioning.  Encouraged pt to move LEs- not keep in the same place.  Spoke to nursing about mobility with pt  12/18/17 1008 Done    Acceptance E VU Educated pt and sister on PT POC, DC recommendations, and WB status. JAMIL 12/17/17 1011 Done   Family Acceptance E VU Educated pt and sister on PT POC, DC recommendations, and WB status. JAMIL 12/17/17 1011  Done               Point: Precautions (Done)    Learning Progress Summary    Learner Readiness Method Response Comment Documented by Status   Patient Acceptance E,D VU discussed positioning with pt- pt placing toes of one foot over other- used roll in between legs to help with positioning.  Encouraged pt to move LEs- not keep in the same place.  Spoke to nursing about mobility with pt  12/18/17 1008 Done    Acceptance E VU Educated pt and sister on PT POC, DC recommendations, and WB status. JAMIL 12/17/17 1011 Done   Family Acceptance E VU Educated pt and sister on PT POC, DC recommendations, and WB status. JAMIL 12/17/17 1011 Done                      User Key     Initials Effective Dates Name Provider Type Discipline     08/11/15 -  Griselda Cole, PTA Physical Therapy Assistant PT    JAMIL 05/12/16 -  Coni Patterson, PT Physical Therapist PT                Pt states she is thinking about going to her daughter's for [place].  Discussed entry to daughter's house- states there are stairs- states she is unsure how she did with stairs after last surgery.  Discussed that she is now TDWB and ambulating up and down stairs this time is different than after last surgery.  Pt reports her  had a stroke in 2006 and she is his caregiver but he is able to stay alone when she is at work.  States there is a ramp to enter home and she could propel wheelchair  Around the house.  States she could not use her bathroom but could use her 's bathroom.  Discussed that pt is limited and would not be able to assist spouse.  Discussed options of using wheelchair for most activities and use walker to transfer and ambulate short distances.  Would recommend assist at home because pt is not able to assist spouse.  Pt may benefit from STR to increase independence with mobility prior to returning home.  Encouraged pt to move LEs and not keep LEs in same place- pt appears fearful to move RLE.  Reviewed pt's weightbearing status and  risks of not following MD's weightbearing status.  Discussed am session with pt's nurse, PCA and discharge planner.  If pt does not go to STR, recommend home health PT, wheelchair with elevating LEs and assist at home.     PT Recommendation and Plan  Anticipated Discharge Disposition: skilled nursing facility (vs. Home with HH and family assist)  Planned Therapy Interventions: balance training, bed mobility training, gait training, home exercise program, strengthening, stretching, transfer training  PT Frequency: 2 times/day  Plan of Care Review  Plan Of Care Reviewed With: patient  Progress: improving  Outcome Summary/Follow up Plan: PT:  Pt requires significant time to complete moblity.  Pt was able to ambulate with walker TDWB with assist of one plus one for chair x 10 feet.  If pt returns home to her home, she has ramp to enter home and could  perform most activities from wheelchair level.  Pt states her  has a wheelchair at home but does use it at times.  Pt would benefit from wheelchair with elevated legs at discharge.  Pt would need to be able to ambulate short distances and complete transfers independently prior to returning home.          Outcome Measures       12/18/17 0845 12/17/17 1016       How much help from another person do you currently need...    Turning from your back to your side while in flat bed without using bedrails? 3  -KM 3  -JAMIL     Moving from lying on back to sitting on the side of a flat bed without bedrails? 3  -KM 3  -JAMIL     Moving to and from a bed to a chair (including a wheelchair)? 3  -KM 3  -JAMIL     Standing up from a chair using your arms (e.g., wheelchair, bedside chair)? 3  -KM 3  -JAMIL     Climbing 3-5 steps with a railing? 1  -KM 1  -JAMIL     To walk in hospital room? 3  -KM 2  -JAMIL     AM-PAC 6 Clicks Score 16  -KM 15  -JAMIL     Functional Assessment    Outcome Measure Options AM-PAC 6 Clicks Basic Mobility (PT)  -KM AM-PAC 6 Clicks Basic Mobility (PT)  -JAMIL       User Key   (r) = Recorded By, (t) = Taken By, (c) = Cosigned By    Initials Name Provider Type    CLAY Cole PTA Physical Therapy Assistant    JAMIL Patterson, PT Physical Therapist           Time Calculation:         PT Charges       12/18/17 1014          Time Calculation    Start Time 0845  -KM      Stop Time 0920  -KM      Time Calculation (min) 35 min  -KM      PT - Next Appointment 12/18/17  -KM        User Key  (r) = Recorded By, (t) = Taken By, (c) = Cosigned By    Initials Name Provider Type    CLAY Cole PTA Physical Therapy Assistant          Therapy Charges for Today     Code Description Service Date Service Provider Modifiers Qty    28122944881 HC PT THER PROC EA 15 MIN 12/18/2017 Griselda Cole PTA GP 1    27191842933 HC PT THER SUPP EA 15 MIN 12/18/2017 Griselda Cole PTA GP 1    21027728258 HC PT CARE PLAN EACH 15 MIN 12/18/2017 Griselda Cole PTA GP 1          PT G-Codes  Outcome Measure Options: AM-PAC 6 Clicks Basic Mobility (PT)    Griselda Cole PTA  12/18/2017

## 2017-12-18 NOTE — PLAN OF CARE
Problem: Pain, Acute (Adult)  Goal: Identify Related Risk Factors and Signs and Symptoms  Outcome: Ongoing (interventions implemented as appropriate)    12/18/17 0019   Pain, Acute   Related Risk Factors (Acute Pain) surgery   Signs and Symptoms (Acute Pain) BADLs/IADLs reluctance/inability to perform;facial mask of pain/grimace;fatigue/weakness;fear of reinjury;sleep pattern alteration;verbalization of pain descriptors       Goal: Acceptable Pain Control/Comfort Level  Outcome: Ongoing (interventions implemented as appropriate)    12/18/17 0019   Pain, Acute (Adult)   Acceptable Pain Control/Comfort Level making progress toward outcome

## 2017-12-18 NOTE — PLAN OF CARE
Problem: Patient Care Overview (Adult)  Goal: Plan of Care Review  Outcome: Ongoing (interventions implemented as appropriate)    12/18/17 5355   Coping/Psychosocial Response Interventions   Plan Of Care Reviewed With patient   Patient Care Overview   Progress improving   Outcome Evaluation   Outcome Summary/Follow up Plan pain control went well today ,meds adjusted, up with PT tolerated well       Goal: Adult Individualization and Mutuality  Outcome: Ongoing (interventions implemented as appropriate)  Goal: Discharge Needs Assessment  Outcome: Ongoing (interventions implemented as appropriate)    Problem: Perioperative Period (Adult)  Goal: Signs and Symptoms of Listed Potential Problems Will be Absent or Manageable (Perioperative Period)  Outcome: Ongoing (interventions implemented as appropriate)    Problem: Fall Risk (Adult)  Goal: Identify Related Risk Factors and Signs and Symptoms  Outcome: Outcome(s) achieved Date Met:  12/18/17  Goal: Absence of Falls  Outcome: Ongoing (interventions implemented as appropriate)    Problem: Pain, Acute (Adult)  Goal: Identify Related Risk Factors and Signs and Symptoms  Outcome: Outcome(s) achieved Date Met:  12/18/17  Goal: Acceptable Pain Control/Comfort Level  Outcome: Ongoing (interventions implemented as appropriate)

## 2017-12-18 NOTE — PLAN OF CARE
Problem: Patient Care Overview (Adult)  Goal: Plan of Care Review    12/18/17 1342   Coping/Psychosocial Response Interventions   Plan Of Care Reviewed With patient   Patient Care Overview   Progress improving   Outcome Evaluation   Outcome Summary/Follow up Plan PT: Patient performs sit to/from stand transfers with CGA, sit to supine transfer with min A and gait x 15 feet TTWB with standard walker with CGA. Patient requires verbal cues for sequencing and safety with functional transfers. She is able to maintain TTWB during functional mobility. Will continue to assess need for short term rehab prior to return home.

## 2017-12-18 NOTE — NURSING NOTE
Continued Stay Note  [facility]     Patient Name: Raven Young  MRN: [id number]  Today's Date: 12/18/2017    Admit Date: 12/15/2017          Discharge Plan       12/18/17 1114    Case Management/Social Work Plan    Additional Comments Per Stacey Webb will accept patient when medically ready and decision from insurance.  Spoke with patient at bedside and in agreement with plans;  understands that we are waiting on insurance approval.    will continue to follow.                Discharge Codes     None            Mica Leon RN

## 2017-12-18 NOTE — PROGRESS NOTES
Orthopedic Progress Note   Chief Complaint:  Status post ORIF right subtrochanteric nonunion with allograft    Subjective     Interval History: Patient postop day 3.  She is afebrile hemodynamically stable hemoglobin 11.2 but her pain remains moderately sign and is having ificant difficulty in ambulation          Objective     Vital Signs  Temp:  [97.5 °F (36.4 °C)-99.7 °F (37.6 °C)] 97.5 °F (36.4 °C)  Heart Rate:  [102-112] 102  Resp:  [18-20] 20  BP: ()/(53-66) 116/66  Body mass index is 31.18 kg/(m^2).    Intake/Output Summary (Last 24 hours) at 12/18/17 0659  Last data filed at 12/17/17 2300   Gross per 24 hour   Intake              120 ml   Output              700 ml   Net             -580 ml     I/O this shift:  In: -   Out: 600 [Urine:600]       Physical Exam:   General: patient awake, alert and cooperative   Cardiovascular: regular rhythm and rate   Pulm: clear to auscultation bilaterally   Abdomen: Benign.  Soft bowel sounds   Extremities: Right leg is good distal pulses no motor deficit no sensory loss and calf is nontender.   Neurologic: Normal mood and behavior     Results Review:     I reviewed the patient's new clinical results.      WBC No results found for: WBCS   HGB Hemoglobin   Date Value Ref Range Status   12/18/2017 11.2 (L) 12.0 - 16.0 g/dL Final   12/17/2017 11.4 (L) 12.0 - 16.0 g/dL Final   12/16/2017 12.3 12.0 - 16.0 g/dL Final   12/15/2017 12.1 12.0 - 16.0 g/dL Final      HCT Hematocrit   Date Value Ref Range Status   12/18/2017 34.7 (L) 37.0 - 47.0 % Final   12/17/2017 37.0 37.0 - 47.0 % Final   12/16/2017 39.4 37.0 - 47.0 % Final   12/15/2017 38.3 37.0 - 47.0 % Final      Platlets No results found for: LABPLAT     PT/INR:  No results found for: PROTIME/No results found for: INR    Sodium No results found for: NA   Potassium No results found for: K   Chloride No results found for: CL   Bicarbonate No results found for: PLASMABICARB   BUN No results found for: BUN   Creatinine No  results found for: CREATININE   Calcium No results found for: CALCIUM   Magnesium  AST  ALT  Bilirubin, Total  AlkPhos  Albumin    Amylase  Lipase    Radiology: No results found for: MG  No components found for: AST.*  No components found for: ALT.*  No components found for: BILIRUBIN, TOTAL.*    No components found for: ALKPHOS.*  No components found for: ALBUMIN.*      No components found for: AMYLASE.*  No components found for: LIPASE.*            Imaging Results (most recent)     Procedure Component Value Units Date/Time    FL C Arm During Surgery [[id number]] Updated:  12/15/17 1423    XR Hip With or Without Pelvis 1 View Right [[id number]] Collected:  12/15/17 1431     Updated:  12/15/17 1511    Narrative:       INDICATION: Right hip pain. Nonunion of a right proximal femur fracture.  .     COMPARISON: 11/09/2017.     FINDINGS:  Single fluoroscopic view(s) of the right hip.  There is been interval  revision of the ORIF of the proximal right femur fracture. Alignment is  anatomic. Fluoroscopic time 1.01 minutes.         Impression:       Anatomic alignment status post ORIF..     This report was finalized on 12/15/2017 3:08 PM by Dr. Duarte Ramirez MD.                  lactated ringers 20 mL/hr Last Rate: 20 mL/hr (12/16/17 2314)         Assessment/Plan     Patient Active Problem List   Diagnosis Code   • Closed right hip fracture S72.001A   • Subtrochanteric fracture of right femur S72.21XA   • Closed subtrochanteric fracture of femur with nonunion S72.23XK   • Closed left subtrochanteric femur fracture S72.22XA   • Subtrochanteric fracture, closed, right, with nonunion, subsequent encounter S72.21XK       Will add Neurontin in attempt to alleviate her pain.  Awaiting TCU evaluation.      Keyshawn Madden MD  12/18/17  6:59 AM

## 2017-12-19 ENCOUNTER — HOSPITAL ENCOUNTER (INPATIENT)
Facility: HOSPITAL | Age: 62
LOS: 4 days | Discharge: HOME HEALTH | End: 2017-12-23
Attending: ORTHOPAEDIC SURGERY | Admitting: ORTHOPAEDIC SURGERY

## 2017-12-19 VITALS
DIASTOLIC BLOOD PRESSURE: 63 MMHG | OXYGEN SATURATION: 96 % | SYSTOLIC BLOOD PRESSURE: 107 MMHG | BODY MASS INDEX: 31.15 KG/M2 | RESPIRATION RATE: 18 BRPM | WEIGHT: 165 LBS | HEIGHT: 61 IN | HEART RATE: 97 BPM | TEMPERATURE: 98.7 F

## 2017-12-19 LAB
HCT VFR BLD AUTO: 33.3 % (ref 37–47)
HGB BLD-MCNC: 10.4 G/DL (ref 12–16)

## 2017-12-19 PROCEDURE — 85018 HEMOGLOBIN: CPT | Performed by: ORTHOPAEDIC SURGERY

## 2017-12-19 PROCEDURE — G0008 ADMIN INFLUENZA VIRUS VAC: HCPCS | Performed by: ORTHOPAEDIC SURGERY

## 2017-12-19 PROCEDURE — 97116 GAIT TRAINING THERAPY: CPT

## 2017-12-19 PROCEDURE — 90686 IIV4 VACC NO PRSV 0.5 ML IM: CPT | Performed by: ORTHOPAEDIC SURGERY

## 2017-12-19 PROCEDURE — 97110 THERAPEUTIC EXERCISES: CPT

## 2017-12-19 PROCEDURE — 25010000002 INFLUENZA VAC SPLIT QUAD 0.5 ML SUSPENSION PREFILLED SYRINGE: Performed by: ORTHOPAEDIC SURGERY

## 2017-12-19 PROCEDURE — 85014 HEMATOCRIT: CPT | Performed by: ORTHOPAEDIC SURGERY

## 2017-12-19 RX ORDER — DULOXETIN HYDROCHLORIDE 60 MG/1
60 CAPSULE, DELAYED RELEASE ORAL DAILY
Status: DISCONTINUED | OUTPATIENT
Start: 2017-12-20 | End: 2017-12-23 | Stop reason: HOSPADM

## 2017-12-19 RX ORDER — OXYCODONE AND ACETAMINOPHEN 10; 325 MG/1; MG/1
1 TABLET ORAL EVERY 4 HOURS PRN
Status: DISCONTINUED | OUTPATIENT
Start: 2017-12-19 | End: 2017-12-23 | Stop reason: HOSPADM

## 2017-12-19 RX ORDER — GABAPENTIN 100 MG/1
200 CAPSULE ORAL EVERY 8 HOURS SCHEDULED
Status: CANCELLED | OUTPATIENT
Start: 2017-12-19

## 2017-12-19 RX ORDER — OXYCODONE AND ACETAMINOPHEN 10; 325 MG/1; MG/1
1 TABLET ORAL EVERY 4 HOURS PRN
Status: CANCELLED | OUTPATIENT
Start: 2017-12-19 | End: 2017-12-25

## 2017-12-19 RX ORDER — DULOXETIN HYDROCHLORIDE 60 MG/1
60 CAPSULE, DELAYED RELEASE ORAL DAILY
Status: CANCELLED | OUTPATIENT
Start: 2017-12-20

## 2017-12-19 RX ORDER — OXYCODONE AND ACETAMINOPHEN 10; 325 MG/1; MG/1
1 TABLET ORAL EVERY 4 HOURS PRN
Status: CANCELLED | OUTPATIENT
Start: 2017-12-19

## 2017-12-19 RX ORDER — OXYCODONE AND ACETAMINOPHEN 10; 325 MG/1; MG/1
1 TABLET ORAL EVERY 4 HOURS PRN
Status: DISCONTINUED | OUTPATIENT
Start: 2017-12-19 | End: 2017-12-19 | Stop reason: SDUPTHER

## 2017-12-19 RX ORDER — GABAPENTIN 100 MG/1
200 CAPSULE ORAL EVERY 8 HOURS SCHEDULED
Status: DISCONTINUED | OUTPATIENT
Start: 2017-12-19 | End: 2017-12-23 | Stop reason: HOSPADM

## 2017-12-19 RX ADMIN — RIVAROXABAN 10 MG: 10 TABLET, FILM COATED ORAL at 09:46

## 2017-12-19 RX ADMIN — TUBERCULIN PURIFIED PROTEIN DERIVATIVE 5 UNITS: 5 INJECTION INTRADERMAL at 17:11

## 2017-12-19 RX ADMIN — OXYCODONE HYDROCHLORIDE AND ACETAMINOPHEN 1 TABLET: 10; 325 TABLET ORAL at 09:46

## 2017-12-19 RX ADMIN — DULOXETINE HYDROCHLORIDE 60 MG: 60 CAPSULE, DELAYED RELEASE ORAL at 09:46

## 2017-12-19 RX ADMIN — INFLUENZA A VIRUS A/MICHIGAN/45/2015 X-275 (H1N1) ANTIGEN (FORMALDEHYDE INACTIVATED), INFLUENZA A VIRUS A/HONG KONG/4801/2014 X-263B (H3N2) ANTIGEN (FORMALDEHYDE INACTIVATED), INFLUENZA B VIRUS B/PHUKET/3073/2013 ANTIGEN (FORMALDEHYDE INACTIVATED), AND INFLUENZA B VIRUS B/BRISBANE/60/2008 ANTIGEN (FORMALDEHYDE INACTIVATED) 0.5 ML: 15; 15; 15; 15 INJECTION, SUSPENSION INTRAMUSCULAR at 12:39

## 2017-12-19 RX ADMIN — GABAPENTIN 200 MG: 100 CAPSULE ORAL at 21:57

## 2017-12-19 RX ADMIN — OXYCODONE HYDROCHLORIDE AND ACETAMINOPHEN 1 TABLET: 10; 325 TABLET ORAL at 02:24

## 2017-12-19 RX ADMIN — GABAPENTIN 200 MG: 100 CAPSULE ORAL at 05:46

## 2017-12-19 NOTE — PLAN OF CARE
Problem: Patient Care Overview (Adult)  Goal: Plan of Care Review  Outcome: Ongoing (interventions implemented as appropriate)    12/19/17 1211   Coping/Psychosocial Response Interventions   Plan Of Care Reviewed With patient   Patient Care Overview   Progress improving   Outcome Evaluation   Outcome Summary/Follow up Plan Patient doing well. Pain controlled with PRN medacines. Patient to be transferred to TCU after lunch. Gave report to nurse Alvarado from TCU.          Problem: Fall Risk (Adult)  Goal: Absence of Falls  Outcome: Ongoing (interventions implemented as appropriate)    Problem: Pain, Acute (Adult)  Goal: Acceptable Pain Control/Comfort Level  Outcome: Ongoing (interventions implemented as appropriate)

## 2017-12-19 NOTE — THERAPY TREATMENT NOTE
Acute Care - Physical Therapy Treatment Note   [place]     Patient Name: Raven Young  : 1955  MRN: [id number]  Today's Date: 2017  Onset of Illness/Injury or Date of Surgery Date: 12/15/17  Date of Referral to PT: 17  Referring Physician: DANIEL    Admit Date: 12/15/2017    Visit Dx:    ICD-10-CM ICD-9-CM   1. 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   • Closed left subtrochanteric femur fracture   • Subtrochanteric fracture, closed, right, with nonunion, subsequent encounter               Adult Rehabilitation Note       17 1325 17 0858 17 1305    Rehab Assessment/Intervention    Discipline physical therapy assistant  -KM physical therapist  -JW physical therapist  -BP    Document Type therapy note (daily note);discharge summary  -KM therapy note (daily note)  -JW therapy note (daily note)  -BP    Subjective Information agree to therapy  -KM agree to therapy;complains of;pain  -JW agree to therapy;complains of;pain  -BP    Patient Effort, Rehab Treatment good  -KM good  -JW adequate  -BP    Symptoms Noted During/After Treatment --   reports soreness if leg hangs down-   -KM increased pain   pain increased with mobility  -JW increased pain  -BP    Precautions/Limitations  fall precautions   TTWB right LE  -JW fall precautions;other (see comments)   TTWB  -BP    Specific Treatment Considerations pt requires encouragement to not use UEs and LLE to assist RLE.  PT is TDWB  -KM      Recorded by [KM] Griselda Cole, PTA [JW] Jessa Barragan, PT [BP] Nery Tolliver, PT    Pain Assessment    Pain Assessment 0-10  -KM --   pt c/o increased pain with mobility, does not rate  -JW 0-10  -BP    Pain Score 1   during ex  -KM  3  -BP    Post Pain Score --   did  not rate during ambulation  -KM      Pain Type Acute pain;Surgical pain  -KM Acute pain;Surgical pain  -JW Acute pain;Surgical pain  -BP    Pain Location Hip  -KM Hip  -JW Hip  -BP    Pain Orientation Right  -KM Right  -JW Right  -BP    Pain Intervention(s) Medication (See MAR);Repositioned  -KM  Medication (See MAR);Repositioned   Pre medicated for treatment  -BP    Response to Interventions tolerated  -KM  tolerated  -BP    Recorded by [KM] Griselda Cole PTA [JW] Jessa Barragan, PT [BP] Nery Tolliver, PT    Cognitive Assessment/Intervention    Personal Safety Interventions gait belt;nonskid shoes/slippers when out of bed  -KM gait belt;nonskid shoes/slippers when out of bed  -JW gait belt;nonskid shoes/slippers when out of bed  -BP    Recorded by [KM] Griselda Cole PTA [JW] Jessa Barragan, PT [BP] Nery Tolliver, PT    Bed Mobility, Assessment/Treatment    Bed Mobility, Assistive Device   bed rails  -BP    Bed Mob, Supine to Sit, [place] contact guard assist;verbal cues required;supervision required  -KM      Bed Mob, Sit to Supine, [place] contact guard assist;verbal cues required;supervision required  -KM  minimum assist (75% patient effort);verbal cues required  -BP    Bed Mobility, Comment pt able to scoot to edge of bed but requires encouragement to use RLE and not use UEs and LLE to move RLE.    -KM deferred-up in chair  -JW Min A for R LE into bed. Patient requires extended time and cues for sequencing   -BP    Recorded by [KM] Griselda Cole PTA [JW] Jessa Barragan, PT [BP] Nery Tolliver, PT    Transfer Assessment/Treatment    Transfers, Sit-Stand [place] contact guard assist;verbal cues required;stand by assist  -KM contact guard assist;verbal cues required  - contact guard assist;verbal cues required  -BP    Transfers, Stand-Sit [place] stand by assist;verbal cues required  -KM stand by assist;verbal cues required  - contact guard assist;verbal cues required  -BP     Transfers, Sit-Stand-Sit, Assist Device standard walker  -KM standard walker  -JW standard walker  -BP    Transfer, Maintain Weight Bearing Status  able to maintain weight bearing status  -JW able to maintain weight bearing status  -BP    Transfer, Comment pt requires cues not to pull up on walker and to reach back when she reaches her destination  -KM pt requires verbal cues for hand placement  - Verbal cues for hand placement   -BP    Recorded by [KM] Griselda Cole PTA [JW] Jessa Barragan, PT [BP] Nery Tolliver, PT    Gait Assessment/Treatment    Gait, [place] Level stand by assist;contact guard assist;verbal cues required  -KM stand by assist;contact guard assist  - contact guard assist;verbal cues required  -BP    Gait, Assistive Device standard walker  -KM standard walker  -JW standard walker  -BP    Gait, Distance (Feet) 22  -KM 33  -JW 15  -BP    Gait, Gait Deviations   nish decreased;forward flexed posture  -BP    Gait, Maintain Weight Bearing Status  able to maintain weight bearing status  -JW able to maintain weight bearing status  -BP    Gait, Comment pt able to maintain TDWB with ambulation.  Limited gait due to pt had just returned to bed with nursing  -KM pt requires CGA for safety during direction change/turn with walker.  pt requires cues for proper distance from front of walker during gait.  pt able to maintain TTWB during mobility.  -JW Patient able to maintain TTWB during gait training. Patient tends to cross R LE over L. Verbal cues to keep legs uncrossed.   -BP    Recorded by [KM] Griselda Cole PTA [JW] Jessa Barragan, PT [BP] Nery Tolliver, PT    Stairs Assessment/Treatment    Stairs, Comment pt reports she uses a stool to get into her bed.  States she thinks she can hop on stool and then hop into bed.  Discussed taking mattress and springs off bed frame or buying a lower bed frame so transfer into and out of bed would be safer.  Pt reports her spouse sleeps in a hospital  bed and there are no other beds in the house.  -KM      Recorded by [KM] Griselda Cole PTA      Therapy Exercises    Right Lower Extremity  AAROM:;10 reps;ankle pumps/circles;glut sets;heel slides;hip abduction/adduction;quad sets;SLR   pt given written handout of exercises  - 10 reps;AROM:  -BP    Left Lower Extremity   10 reps;AAROM:;SLR;quad sets;hip abduction/adduction;heel slides  -BP    Bilateral Lower Extremities 10 reps;ankle pumps/circles;glut sets;heel slides;hip abduction/adduction;quad sets  -KM      Recorded by [KM] Griselda Cole PTA [JW] Jessa Barragan, PT [BP] Nery Tolliver, PT    Positioning and Restraints    Pre-Treatment Position in bed  -KM sitting in chair/recliner  -JW sitting in chair/recliner  -BP    Post Treatment Position bed  -KM chair  -JW bed  -BP    In Bed supine;call light within reach;side rails up x2;R heel elevated  -KM  supine;call light within reach;encouraged to call for assist;with nsg  -BP    In Chair  reclined;call light within reach;encouraged to call for assist  -     Recorded by [KM] Griselda Cole PTA [JW] Jessa Barragan, PT [BP] Nery Tolliver, PT      12/18/17 0900 12/18/17 0845       Rehab Assessment/Intervention    Discipline  physical therapy assistant  -     Document Type  therapy note (daily note)  -KM     Subjective Information  agree to therapy   states her leg hurts if hanging down  -KM     Patient Effort, Rehab Treatment  good  -KM     Symptoms Noted During/After Treatment  increased pain  -KM     Symptoms Noted Comment  reported increased pain with movement but pain decreased after getting up to chair  -KM     Precautions/Limitations  fall precautions   TDWB  -KM     Specific Treatment Considerations  pt has limited weightbearing and requires assist with moving RLE.  Has difficulty with leg down  -KM     Recorded by  [KM] Griselda Cole PTA     Pain Assessment    Pain Assessment  0-10  -KM     Pain Score --   not bad but then 8 with ambulation then  little to 0 with sit  -KM      Pain Type Acute pain;Surgical pain  -KM      Pain Location Hip  -KM      Pain Orientation Right  -KM      Pain Intervention(s) Medication (See MAR)  -KM      Recorded by [KM] Griselda Cole PTA [KM] Griselda Cole PTA     Cognitive Assessment/Intervention    Personal Safety Interventions gait belt;nonskid shoes/slippers when out of bed  -KM      Recorded by [KM] Griselda Cole PTA      Bed Mobility, Assessment/Treatment    Bed Mobility, Assistive Device bed rails;head of bed elevated  -KM      Bed Mob, Supine to Sit, [place] contact guard assist   pt moving RLE - just available to assist if needed  -KM      Bed Mobility, Comment pt requires significant time to scoot to edge of bed  -KM      Recorded by [KM] Griselda Cole PTA      Transfer Assessment/Treatment    Transfers, Sit-Stand [place] contact guard assist;minimum assist (75% patient effort);1 person + 1 person to manage equipment  -KM      Transfers, Stand-Sit [place] contact guard assist;minimum assist (75% patient effort)  -KM      Transfers, Sit-Stand-Sit, Assist Device standard walker  -KM      Transfer, Comment Pt requires cues for hand placement.  Pt sat on the side of the bed for 3-4 minutes with RLE supported.  Cues to scoot to edge of bed and to allow RLE to rest on can..  Shoe on LLE for ambulation.  -KM      Recorded by [KM] Griselda Cole PTA      Gait Assessment/Treatment    Gait, [place] Level contact guard assist;minimum assist (75% patient effort);1 person + 1 person to manage equipment  -KM      Gait, Assistive Device standard walker  -KM      Gait, Distance (Feet) 10  -KM      Gait, Safety Issues --   pt is TDWB  -KM      Gait, Comment pt required encouragement to perform.  Pt appears anxious and requires time to complete mobility.    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)  -KM      Recorded by [KM] Griselda Cole PTA      Therapy Exercises    Right Lower Extremity 5 reps;AAROM:  -KM      Left Lower Extremity 5 reps;hip abduction/adduction;heel slides  -KM      Bilateral Lower Extremities 10 reps;ankle pumps/circles;quad sets  -KM      Recorded by [KM] Griselda Cole PTA      Positioning and Restraints    Pre-Treatment Position in bed  -KM      Post Treatment Position chair  -KM      In Chair notified nsg;reclined;call light within reach;encouraged to call for assist;legs elevated;pillow between legs  -KM      Recorded by [KM] Griselda Cole PTA        User Key  (r) = Recorded By, (t) = Taken By, (c) = Cosigned By    Initials Name Effective Dates    KM Griselda Cole, PTA 08/11/15 -     BP Nery Tolliver, PT 12/01/15 -     JW Jessa Barragan, PT 12/01/15 -                 IP PT Goals       12/19/17 1427 12/17/17 1014       Bed Mobility PT LTG    Bed Mobility PT LTG, Date Established  12/17/17  -JAMIL     Bed Mobility PT LTG, Time to Achieve  by discharge  -JAMIL     Bed Mobility PT LTG, Activity Type  all bed mobility  -JAMIL     Bed Mobility PT LTG, [place] Level  supervision required  -JAMIL     Bed Mobility PT LTG, Date Goal Reviewed 12/19/17  -KM      Bed Mobility PT LTG, Outcome goal not met  -KM      Bed Mobility PT LTG, Reason Goal Not Met discharged from facility  -KM      Transfer Training PT LTG    Transfer Training PT LTG, Date Established  12/17/17  -JAMIL     Transfer Training PT LTG, Time to Achieve  by discharge  -JAMIL     Transfer Training PT LTG, Activity Type  all transfers  -JAMIL     Transfer Training PT LTG, [place] Level  supervision required  -JAMIL     Transfer Training PT LTG, Assist Device  walker, standard  -JAMIL     Transfer Training PT  LTG, Date Goal Reviewed 12/19/17  -KM      Transfer Training PT LTG, Outcome goal not met  -KM      Transfer Training PT LTG, Reason Goal Not Met discharged from facility  -KM      Gait Training PT LTG    Gait Training Goal PT  LTG, Date Established  12/17/17  -JAMIL     Gait Training Goal PT LTG, Time to Achieve  by discharge  -JAMIL     Gait Training Goal PT LTG, [place] Level  supervision required  -JAMIL     Gait Training Goal PT LTG, Assist Device  walker, standard  -JAMIL     Gait Training Goal PT LTG, Distance to Achieve  100  -JAMIL     Gait Training Goal PT LTG, Date Goal Reviewed 12/19/17  -KM      Gait Training Goal PT LTG, Outcome goal not met   ambulates up to 33 ft  -KM      Gait Training Goal PT LTG, Reason Goal Not Met discharged from facility  -KM      Patient Education PT LTG    Patient Education PT LTG, Date Established  12/17/17  -JAMIL     Patient Education PT LTG, Education Type  precaution per surgeon   weightbearing status  -JAMIL     Patient Education PT LTG, Education Understanding  verbalize understanding;demonstrate adequately  -JAMIL     Patient Education PT LTG, Date Goal Reviewed 12/19/17  -KM      Patient Education PT LTG Outcome goal met   pt able to maintain TDWB  -KM        User Key  (r) = Recorded By, (t) = Taken By, (c) = Cosigned By    Initials Name Provider Type     Griselda Cole, PTA Physical Therapy Assistant    JAMIL Patterson, PT Physical Therapist          Physical Therapy Education     Title: PT OT SLP Therapies (Resolved)     Topic: Physical Therapy (Resolved)     Point: Mobility training (Resolved)    Learning Progress Summary    Learner Readiness Method Response Comment Documented by Status   Patient Acceptance E,D ROSALINA encouraged pt to not keep putting one foot on top of the other.  this is pt's resting position.  Used pillow to try to keep pt from this.  Educated pt on importance of using RLE and not having UEs and LLE move RLE for her.  Encouraged mobility with staff KM 12/19/17 1425 Done    Acceptance E VU  JW 12/19/17 0935 Done    Acceptance E VU  BP 12/18/17 1342 Done    Acceptance E,D ROSALINA discussed positioning with pt- pt placing toes of one foot over other- used roll in between legs to help with  positioning.  Encouraged pt to move LEs- not keep in the same place.  Spoke to nursing about mobility with pt KM 12/18/17 1008 Done    Acceptance E VU Educated pt and sister on PT POC, DC recommendations, and WB status. JAMIL 12/17/17 1011 Done   Family Acceptance E VU Educated pt and sister on PT POC, DC recommendations, and WB status. JAMIL 12/17/17 1011 Done               Point: Home exercise program (Resolved)    Learning Progress Summary    Learner Readiness Method Response Comment Documented by Status   Patient Acceptance E,D VU encouraged pt to perform HEP again this pm KM 12/19/17 1426 Done    Acceptance E,D VU encouraged pt to not keep putting one foot on top of the other.  this is pt's resting position.  Used pillow to try to keep pt from this.  Educated pt on importance of using RLE and not having UEs and LLE move RLE for her.  Encouraged mobility with staff KM 12/19/17 1425 Done    Acceptance E VU  JW 12/19/17 0935 Done    Acceptance E VU  BP 12/18/17 1342 Done    Acceptance E,D VU discussed positioning with pt- pt placing toes of one foot over other- used roll in between legs to help with positioning.  Encouraged pt to move LEs- not keep in the same place.  Spoke to nursing about mobility with pt KM 12/18/17 1008 Done    Acceptance E VU Educated pt and sister on PT POC, DC recommendations, and WB status. JAMIL 12/17/17 1011 Done   Family Acceptance E VU Educated pt and sister on PT POC, DC recommendations, and WB status. JAMIL 12/17/17 1011 Done               Point: Body mechanics (Resolved)    Learning Progress Summary    Learner Readiness Method Response Comment Documented by Status   Patient Acceptance E,D VU encouraged pt to not keep putting one foot on top of the other.  this is pt's resting position.  Used pillow to try to keep pt from this.  Educated pt on importance of using RLE and not having UEs and LLE move RLE for her.  Encouraged mobility with staff KM 12/19/17 1425 Done    Acceptance E,D ROSALINA discussed  positioning with pt- pt placing toes of one foot over other- used roll in between legs to help with positioning.  Encouraged pt to move LEs- not keep in the same place.  Spoke to nursing about mobility with pt  12/18/17 1008 Done    Acceptance E VU Educated pt and sister on PT POC, DC recommendations, and WB status. JAMIL 12/17/17 1011 Done   Family Acceptance E VU Educated pt and sister on PT POC, DC recommendations, and WB status. JAMIL 12/17/17 1011 Done               Point: Precautions (Resolved)    Learning Progress Summary    Learner Readiness Method Response Comment Documented by Status   Patient Acceptance E,D VU encouraged pt to not keep putting one foot on top of the other.  this is pt's resting position.  Used pillow to try to keep pt from this.  Educated pt on importance of using RLE and not having UEs and LLE move RLE for her.  Encouraged mobility with staff  12/19/17 1425 Done    Acceptance E VU  JW 12/19/17 0935 Done    Acceptance E VU  BP 12/18/17 1342 Done    Acceptance E,D VU discussed positioning with pt- pt placing toes of one foot over other- used roll in between legs to help with positioning.  Encouraged pt to move LEs- not keep in the same place.  Spoke to nursing about mobility with pt  12/18/17 1008 Done    Acceptance E VU Educated pt and sister on PT POC, DC recommendations, and WB status. JAMIL 12/17/17 1011 Done   Family Acceptance E VU Educated pt and sister on PT POC, DC recommendations, and WB status. JAMIL 12/17/17 1011 Done                      User Key     Initials Effective Dates Name Provider Type Discipline     08/11/15 -  Griselda Cole, PTA Physical Therapy Assistant PT    BP 12/01/15 -  Nery Tolliver, PT Physical Therapist PT    JW 12/01/15 -  Jessa Barragan, PT Physical Therapist PT    JAMIL 05/12/16 -  Coni Patterson, PT Physical Therapist PT              Pt states she is supposed to be moved to skilled today.  Pt reports overall her pain is better and is a lot better if she has  someone move RLE for her.  Discussed benefits of pt attempting to move RLE and not using UEs and LLE.  Also pt's resting position is keeping feet on top of each other.  Used pillow to separate feet to try to decrease this.  Pt was discharged to rehab and skilled care after therapy session.  Will have PT see for eval to continue PT.      PT Recommendation and Plan  Anticipated Discharge Disposition: skilled nursing facility (vs. Home with HH and family assist)  Planned Therapy Interventions: balance training, bed mobility training, gait training, home exercise program, strengthening, stretching, transfer training  PT Frequency: 2 times/day  Plan of Care Review  Plan Of Care Reviewed With: patient  Progress: improving  Outcome Summary/Follow up Plan: PT:  Pt improving with mobility.  Continues to require assist and cues for safety with mobility.  Pt is TDWB [facility] standard walker and ambulates short distances with assist of one.  Requires cues and encouragement to try to move RLE rather than using UEs and LLE to move RLE.  Pt encouraged to continue to perform HEP and ambulate with staff          Outcome Measures       12/19/17 1325 12/19/17 0858 12/18/17 1305    How much help from another person do you currently need...    Turning from your back to your side while in flat bed without using bedrails? 3  -KM 3  -JW 3  -BP    Moving from lying on back to sitting on the side of a flat bed without bedrails? 3  -KM 3  -JW 3  -BP    Moving to and from a bed to a chair (including a wheelchair)? 3  -KM 3  -JW 3  -BP    Standing up from a chair using your arms (e.g., wheelchair, bedside chair)? 3  -KM 3  -JW 3  -BP    Climbing 3-5 steps with a railing? 1  -KM 1  -JW 1  -BP    To walk in hospital room? 3  -KM 3  -JW 3  -BP    AM-PAC 6 Clicks Score 16  -KM 16  -JW 16  -BP    Functional Assessment    Outcome Measure Options AM-PAC 6 Clicks Basic Mobility (PT)  -KM AM-PAC 6 Clicks Basic Mobility (PT)  -JW AM-PAC 6 Clicks Basic  Mobility (PT)  -BP      12/18/17 0845 12/17/17 1016       How much help from another person do you currently need...    Turning from your back to your side while in flat bed without using bedrails? 3  -KM 3  -JAMIL     Moving from lying on back to sitting on the side of a flat bed without bedrails? 3  -KM 3  -JAMIL     Moving to and from a bed to a chair (including a wheelchair)? 3  -KM 3  -JAMIL     Standing up from a chair using your arms (e.g., wheelchair, bedside chair)? 3  -KM 3  -JAMIL     Climbing 3-5 steps with a railing? 1  -KM 1  -JAMIL     To walk in hospital room? 3  -KM 2  -JAMIL     AM-PAC 6 Clicks Score 16  -KM 15  -JAMIL     Functional Assessment    Outcome Measure Options AM-PAC 6 Clicks Basic Mobility (PT)  -KM AM-PAC 6 Clicks Basic Mobility (PT)  -JAMIL       User Key  (r) = Recorded By, (t) = Taken By, (c) = Cosigned By    Initials Name Provider Type    CLAY Cole PTA Physical Therapy Assistant    BP Nery Tolliver, PT Physical Therapist    YVONNE Barragan, PT Physical Therapist    JAMIL Patterson, PT Physical Therapist           Time Calculation:         PT Charges       12/19/17 1437 12/19/17 0941       Time Calculation    Start Time 1325  -KM 0858  -     Stop Time 1351  -KM 0917  -     Time Calculation (min) 26 min  -KM 19 min  -     PT Received On  12/19/17  -YVONNE     PT - Next Appointment  12/19/17  -YVONNE       User Key  (r) = Recorded By, (t) = Taken By, (c) = Cosigned By    Initials Name Provider Type    CLAY Cole PTA Physical Therapy Assistant    YVONNE Barragan, PT Physical Therapist          Therapy Charges for Today     Code Description Service Date Service Provider Modifiers Qty    98507730819 HC PT THER PROC EA 15 MIN 12/18/2017 Griselda Cole PTA GP 1    26442263511 HC PT THER SUPP EA 15 MIN 12/18/2017 Griselda Cole PTA GP 1    03077109103 HC PT CARE PLAN EACH 15 MIN 12/18/2017 Griselda Cole PTA GP 1    83070316668 HC GAIT TRAINING EA 15 MIN 12/19/2017 Griselda Cole PTA GP 1     [id number]  PT THER PROC EA 15 MIN 12/19/2017 Griselda Cole, PTA GP 1          PT G-Codes  Outcome Measure Options: AM-PAC 6 Clicks Basic Mobility (PT)    Griselda Cole, DAXA  12/19/2017

## 2017-12-19 NOTE — THERAPY TREATMENT NOTE
Acute Care - Physical Therapy Treatment Note   [place]     Patient Name: Raven Young  : 1955  MRN: [id number]  Today's Date: 2017  Onset of Illness/Injury or Date of Surgery Date: 12/15/17  Date of Referral to PT: 17  Referring Physician: DANIEL    Admit Date: 12/15/2017    Visit Dx:    ICD-10-CM ICD-9-CM   1. 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   • Closed left subtrochanteric femur fracture   • Subtrochanteric fracture, closed, right, with nonunion, subsequent encounter               Adult Rehabilitation Note       17 0858 17 1305 17 0900    Rehab Assessment/Intervention    Discipline physical therapist  -JW physical therapist  -BP     Document Type therapy note (daily note)  -JW therapy note (daily note)  -BP     Subjective Information agree to therapy;complains of;pain  -JW agree to therapy;complains of;pain  -BP     Patient Effort, Rehab Treatment good  -JW adequate  -BP     Symptoms Noted During/After Treatment increased pain   pain increased with mobility  -JW increased pain  -BP     Precautions/Limitations fall precautions   TTWB right LE  -JW fall precautions;other (see comments)   TTWB  -BP     Recorded by [JW] Jessa Barragan, PT [BP] Nery Tolliver, PT     Pain Assessment    Pain Assessment --   pt c/o increased pain with mobility, does not rate  -JW 0-10  -BP     Pain Score  3  -BP --   not bad but then 8 with ambulation then little to 0 with sit  -KM    Pain Type Acute pain;Surgical pain  -JW Acute pain;Surgical pain  -BP Acute pain;Surgical pain  -KM    Pain Location Hip  -JW Hip  -BP Hip  -KM    Pain Orientation Right  -JW Right  -BP Right  -KM    Pain Intervention(s)  Medication (See MAR);Repositioned   Pre medicated for  treatment  -BP Medication (See MAR)  -KM    Response to Interventions  tolerated  -BP     Recorded by [JW] Jessa Barragan, PT [BP] Nery Tolliver, PT [KM] Griselda Cole PTA    Cognitive Assessment/Intervention    Personal Safety Interventions gait belt;nonskid shoes/slippers when out of bed  -JW gait belt;nonskid shoes/slippers when out of bed  -BP gait belt;nonskid shoes/slippers when out of bed  -KM    Recorded by [JW] Jessa Barragan, PT [BP] Nery Tolliver, PT [KM] Griselda Cole PTA    Bed Mobility, Assessment/Treatment    Bed Mobility, Assistive Device  bed rails  -BP bed rails;head of bed elevated  -KM    Bed Mob, Supine to Sit, [place]   contact guard assist   pt moving RLE - just available to assist if needed  -KM    Bed Mob, Sit to Supine, [place]  minimum assist (75% patient effort);verbal cues required  -BP     Bed Mobility, Comment deferred-up in chair  -JW Min A for R LE into bed. Patient requires extended time and cues for sequencing   -BP pt requires significant time to scoot to edge of bed  -KM    Recorded by [JW] Jessa Barragan, PT [BP] Nery Tolliver, PT [KM] Griselda Cole PTA    Transfer Assessment/Treatment    Transfers, Sit-Stand [place] contact guard assist;verbal cues required  -JW contact guard assist;verbal cues required  -BP contact guard assist;minimum assist (75% patient effort);1 person + 1 person to manage equipment  -KM    Transfers, Stand-Sit [place] stand by assist;verbal cues required  -JW contact guard assist;verbal cues required  -BP contact guard assist;minimum assist (75% patient effort)  -KM    Transfers, Sit-Stand-Sit, Assist Device standard walker  -JW standard walker  -BP standard walker  -KM    Transfer, Maintain Weight Bearing Status able to maintain weight bearing status  -JW able to maintain weight bearing status  -BP     Transfer, Comment pt requires verbal cues for hand placement  -JW Verbal cues for hand placement   -BP Pt requires cues for  hand placement.  Pt sat on the side of the bed for 3-4 minutes with RLE supported.  Cues to scoot to edge of bed and to allow RLE to rest on can..  Shoe on LLE for ambulation.  -KM    Recorded by [JW] Jessa Barragan, PT [BP] Nery Tolliver, PT [KM] Griselda Cole PTA    Gait Assessment/Treatment    Gait, [place] stand by assist;contact guard assist  - contact guard assist;verbal cues required  - contact guard assist;minimum assist (75% patient effort);1 person + 1 person to manage equipment  -KM    Gait, Assistive Device standard walker  - standard walker  - standard walker  -KM    Gait, Distance (Feet) 33  -JW 15  -BP 10  -KM    Gait, Gait Deviations  nish decreased;forward flexed posture  -     Gait, Maintain Weight Bearing Status able to maintain weight bearing status  - able to maintain weight bearing status  -     Gait, Safety Issues   --   pt is TDWB  -KM    Gait, Comment pt requires CGA for safety during direction change/turn with walker.  pt requires cues for proper distance from front of walker during gait.  pt able to maintain TTWB during mobility.  - Patient able to maintain TTWB during gait training. Patient tends to cross R LE over L. Verbal cues to keep legs uncrossed.   -BP pt required encouragement to perform.  Pt appears anxious and requires time to complete mobility.    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)  -KM    Recorded by   [KM] Griselda Cole PTA    Therapy Exercises    Right Lower Extremity AAROM:;10 reps;ankle pumps/circles;glut sets;heel slides;hip abduction/adduction;quad sets;SLR   pt given written handout of exercises  - 10 reps;AROM:  -BP 5 reps;AAROM:  -KM    Left Lower Extremity  10 reps;AAROM:;SLR;quad sets;hip abduction/adduction;heel slides  -BP 5 reps;hip  abduction/adduction;heel slides  -KM    Bilateral Lower Extremities   10 reps;ankle pumps/circles;quad sets  -KM    Recorded by [JW] Jessa Barragan, PT [BP] Nery Tolliver, PT [KM] Griselda Cole PTA    Positioning and Restraints    Pre-Treatment Position sitting in chair/recliner  -JW sitting in chair/recliner  -BP in bed  -KM    Post Treatment Position chair  -JW bed  -BP chair  -KM    In Bed  supine;call light within reach;encouraged to call for assist;with nsg  -BP     In Chair reclined;call light within reach;encouraged to call for assist  -JW  notified nsg;reclined;call light within reach;encouraged to call for assist;legs elevated;pillow between legs  -KM    Recorded by [JW] Jessa Barragan, PT [BP] Nery Tolliver, PT [KM] Griselda Cole PTA      12/18/17 0845          Rehab Assessment/Intervention    Discipline physical therapy assistant  -KM      Document Type therapy note (daily note)  -KM      Subjective Information agree to therapy   states her leg hurts if hanging down  -KM      Patient Effort, Rehab Treatment good  -KM      Symptoms Noted During/After Treatment increased pain  -KM      Symptoms Noted Comment reported increased pain with movement but pain decreased after getting up to chair  -KM      Precautions/Limitations fall precautions   TDWB  -KM      Specific Treatment Considerations pt has limited weightbearing and requires assist with moving RLE.  Has difficulty with leg down  -KM      Recorded by [KM] Griselda Cole PTA      Pain Assessment    Pain Assessment 0-10  -KM      Recorded by [KM] Griselda Cole PTA        User Key  (r) = Recorded By, (t) = Taken By, (c) = Cosigned By    Initials Name Effective Dates     Griselda Cole PTA 08/11/15 -     BP Nery Tolliver, PT 12/01/15 -     JW Jessa Barragan, PT 12/01/15 -                 IP PT Goals       12/17/17 1014          Bed Mobility PT LTG    Bed Mobility PT LTG, Date Established 12/17/17  -JAMIL      Bed Mobility PT LTG, Time to Achieve  by discharge  -JAMIL      Bed Mobility PT LTG, Activity Type all bed mobility  -JAMIL      Bed Mobility PT LTG, [place] Level supervision required  -JAMIL      Transfer Training PT LTG    Transfer Training PT LTG, Date Established 12/17/17  -JAMIL      Transfer Training PT LTG, Time to Achieve by discharge  -JAMIL      Transfer Training PT LTG, Activity Type all transfers  -JAMIL      Transfer Training PT LTG, [place] Level supervision required  -JAMIL      Transfer Training PT LTG, Assist Device walker, standard  -JAMIL      Gait Training PT LTG    Gait Training Goal PT LTG, Date Established 12/17/17  -JAMIL      Gait Training Goal PT LTG, Time to Achieve by discharge  -JAMIL      Gait Training Goal PT LTG, [place] Level supervision required  -JAMIL      Gait Training Goal PT LTG, Assist Device walker, standard  -JAMIL      Gait Training Goal PT LTG, Distance to Achieve 100  -JAMIL      Patient Education PT LTG    Patient Education PT LTG, Date Established 12/17/17  -JAMIL      Patient Education PT LTG, Education Type precaution per surgeon   weightbearing status  -JAMIL      Patient Education PT LTG, Education Understanding verbalize understanding;demonstrate adequately  -JAMIL        User Key  (r) = Recorded By, (t) = Taken By, (c) = Cosigned By    Initials Name Provider Type    JAMIL Patterson PT Physical Therapist          Physical Therapy Education     Title: PT OT SLP Therapies (Done)     Topic: Physical Therapy (Done)     Point: Mobility training (Done)    Learning Progress Summary    Learner Readiness Method Response Comment Documented by Status   Patient Acceptance E VU  JW 12/19/17 0935 Done    Acceptance E VU  BP 12/18/17 1342 Done    Acceptance E,D VU discussed positioning with pt- pt placing toes of one foot over other- used roll in between legs to help with positioning.  Encouraged pt to move LEs- not keep in the same place.  Spoke to nursing about mobility with pt KM 12/18/17 1008 Done    Acceptance E ROSALINA Educated pt and sister  on PT POC, DC recommendations, and WB status. JAMIL 12/17/17 1011 Done   Family Acceptance E VU Educated pt and sister on PT POC, DC recommendations, and WB status. JAMIL 12/17/17 1011 Done               Point: Home exercise program (Done)    Learning Progress Summary    Learner Readiness Method Response Comment Documented by Status   Patient Acceptance E VU  JW 12/19/17 0935 Done    Acceptance E VU  BP 12/18/17 1342 Done    Acceptance E,D VU discussed positioning with pt- pt placing toes of one foot over other- used roll in between legs to help with positioning.  Encouraged pt to move LEs- not keep in the same place.  Spoke to nursing about mobility with pt KM 12/18/17 1008 Done    Acceptance E VU Educated pt and sister on PT POC, DC recommendations, and WB status. JAMIL 12/17/17 1011 Done   Family Acceptance E VU Educated pt and sister on PT POC, DC recommendations, and WB status. JAMIL 12/17/17 1011 Done               Point: Body mechanics (Done)    Learning Progress Summary    Learner Readiness Method Response Comment Documented by Status   Patient Acceptance E,D VU discussed positioning with pt- pt placing toes of one foot over other- used roll in between legs to help with positioning.  Encouraged pt to move LEs- not keep in the same place.  Spoke to nursing about mobility with pt KM 12/18/17 1008 Done    Acceptance E VU Educated pt and sister on PT POC, DC recommendations, and WB status. JAMIL 12/17/17 1011 Done   Family Acceptance E VU Educated pt and sister on PT POC, DC recommendations, and WB status. JAMIL 12/17/17 1011 Done               Point: Precautions (Done)    Learning Progress Summary    Learner Readiness Method Response Comment Documented by Status   Patient Acceptance E VU  JW 12/19/17 0935 Done    Acceptance E VU  BP 12/18/17 1342 Done    Acceptance E,D VU discussed positioning with pt- pt placing toes of one foot over other- used roll in between legs to help with positioning.  Encouraged pt to move LEs- not keep  in the same place.  Spoke to nursing about mobility with pt  12/18/17 1008 Done    Acceptance E VU Educated pt and sister on PT POC, DC recommendations, and WB status. JAMIL 12/17/17 1011 Done   Family Acceptance E VU Educated pt and sister on PT POC, DC recommendations, and WB status. JAMIL 12/17/17 1011 Done                      User Key     Initials Effective Dates Name Provider Type Discipline    KM 08/11/15 -  Griselda Cole, PTA Physical Therapy Assistant PT    BP 12/01/15 -  Nery Tolliver, PT Physical Therapist PT     12/01/15 -  Jessa Barragan, PT Physical Therapist PT    JAMIL 05/12/16 -  Coni Patterson, PT Physical Therapist PT                    PT Recommendation and Plan  Anticipated Discharge Disposition: skilled nursing facility (vs. Home with HH and family assist)  Planned Therapy Interventions: balance training, bed mobility training, gait training, home exercise program, strengthening, stretching, transfer training  PT Frequency: 2 times/day  Plan of Care Review  Plan Of Care Reviewed With: patient  Outcome Summary/Follow up Plan: PT: Patient requires CGA for sit to stand from recliner surface.  pt performs gait x33 feet with standard walker with SBA/CGA.  Pt requires increased assistance to manage device during turns.  Patient  able to maintain TTWB status during mobility without cues from therapist.  patient has continued to improve with overall functional mobility however continues to have c/o increased pain during transfers.          Outcome Measures       12/19/17 0858 12/18/17 1305 12/18/17 0845    How much help from another person do you currently need...    Turning from your back to your side while in flat bed without using bedrails? 3  -JW 3  -BP 3  -KM    Moving from lying on back to sitting on the side of a flat bed without bedrails? 3  -JW 3  -BP 3  -KM    Moving to and from a bed to a chair (including a wheelchair)? 3  -JW 3  -BP 3  -KM    Standing up from a chair using your arms (e.g.,  wheelchair, bedside chair)? 3  -JW 3  -BP 3  -KM    Climbing 3-5 steps with a railing? 1  -JW 1  -BP 1  -KM    To walk in hospital room? 3  -JW 3  -BP 3  -KM    AM-PAC 6 Clicks Score 16  -JW 16  -BP 16  -KM    Functional Assessment    Outcome Measure Options AM-PAC 6 Clicks Basic Mobility (PT)  -JW AM-PAC 6 Clicks Basic Mobility (PT)  -BP AM-PAC 6 Clicks Basic Mobility (PT)  -KM      12/17/17 1016          How much help from another person do you currently need...    Turning from your back to your side while in flat bed without using bedrails? 3  -JAMIL      Moving from lying on back to sitting on the side of a flat bed without bedrails? 3  -JAMIL      Moving to and from a bed to a chair (including a wheelchair)? 3  -JAMIL      Standing up from a chair using your arms (e.g., wheelchair, bedside chair)? 3  -JAMIL      Climbing 3-5 steps with a railing? 1  -JAMIL      To walk in hospital room? 2  -JAMIL      AM-PAC 6 Clicks Score 15  -JAMIL      Functional Assessment    Outcome Measure Options AM-PAC 6 Clicks Basic Mobility (PT)  -JAMIL        User Key  (r) = Recorded By, (t) = Taken By, (c) = Cosigned By    Initials Name Provider Type     Griselda Cole, PTA Physical Therapy Assistant    BP Nery Tolliver, PT Physical Therapist    YVONNE Barragan, PT Physical Therapist    JAMIL Patterson, PT Physical Therapist           Time Calculation:         PT Charges       12/19/17 0941          Time Calculation    Start Time 0858  -      Stop Time 0917  -      Time Calculation (min) 19 min  -      PT Received On 12/19/17  -      PT - Next Appointment 12/19/17  -        User Key  (r) = Recorded By, (t) = Taken By, (c) = Cosigned By    Initials Name Provider Type    YVONNE Barragan, PT Physical Therapist          Therapy Charges for Today     Code Description Service Date Service Provider Modifiers Qty    [id number] HC PT THER PROC EA 15 MIN 12/19/2017 Jessa Barragan, PT GP 1          PT G-Codes  Outcome Measure Options: AM-PAC 6  Clicks Basic Mobility (PT)    Jessa Barragan, PT  12/19/2017

## 2017-12-19 NOTE — PLAN OF CARE
Problem: Patient Care Overview (Adult)  Goal: Plan of Care Review  Outcome: Ongoing (interventions implemented as appropriate)    12/19/17 1658   Coping/Psychosocial Response Interventions   Plan Of Care Reviewed With patient   Patient Care Overview   Progress no change       Goal: Adult Individualization and Mutuality  Outcome: Ongoing (interventions implemented as appropriate)  Goal: Discharge Needs Assessment  Outcome: Ongoing (interventions implemented as appropriate)    12/19/17 1658   Discharge Needs Assessment   Concerns To Be Addressed no discharge needs identified   Readmission Within The Last 30 Days no previous admission in last 30 days   Equipment Needed After Discharge walker, rolling   Self-Care   Equipment Currently Used at Home walker, rolling   Living Environment   Transportation Available car;family or friend will provide         Problem: Fall Risk (Adult)  Goal: Identify Related Risk Factors and Signs and Symptoms  Outcome: Ongoing (interventions implemented as appropriate)    12/19/17 1658   Fall Risk   Fall Risk: Related Risk Factors gait/mobility problems   Fall Risk: Signs and Symptoms presence of risk factors       Goal: Absence of Falls  Outcome: Ongoing (interventions implemented as appropriate)    12/19/17 1658   Fall Risk (Adult)   Absence of Falls making progress toward outcome         Problem: Skin Integrity Impairment, Risk/Actual (Adult)  Goal: Identify Related Risk Factors and Signs and Symptoms  Outcome: Ongoing (interventions implemented as appropriate)    12/19/17 1658   Skin Integrity Impairment, Risk/Actual   Skin Integrity Impairment, Risk/Actual: Related Risk Factors infection/disease process       Goal: Skin Integrity/Wound Healing  Outcome: Ongoing (interventions implemented as appropriate)    12/19/17 1658   Skin Integrity Impairment, Risk/Actual (Adult)   Skin Integrity/Wound Healing making progress toward outcome

## 2017-12-19 NOTE — NURSING NOTE
Case Management Discharge Note    Final Note[facility].     Discharge Placement     Facility/Agency Request Status Selected? Address Phone Number Fax Number    [facility] Accepted    Yes [address] [id number] [phone]             Discharge Codes: 03  Discharged/transferred to skilled nursing facility (SNF) with Medicare certification in anticipation of skilled care

## 2017-12-19 NOTE — PLAN OF CARE
Problem: Patient Care Overview (Adult)  Goal: Plan of Care Review  Outcome: Ongoing (interventions implemented as appropriate)    12/19/17 1430   Coping/Psychosocial Response Interventions   Plan Of Care Reviewed With patient   Patient Care Overview   Progress improving   Outcome Evaluation   Outcome Summary/Follow up Plan PT: Pt improving with mobility. Continues to require assist and cues for safety with mobility. Pt is TDWB [facility] standard walker and ambulates short distances with assist of one. Requires cues and encouragement to try to move RLE rather than using UEs and LLE to move RLE. Pt encouraged to continue to perform HEP and ambulate with staff

## 2017-12-19 NOTE — NURSING NOTE
Continued Stay Note  [facility]     Patient Name: Raven Young  MRN: [id number]  Today's Date: 12/19/2017    Admit Date: 12/15/2017          Discharge Plan       12/19/17 1036    Case Management/Social Work Plan    Additional Comments Per [facility] unit they will accept and insurance has approved for transfer today.  Patient in agreement.  Notified patient's nurse, Alena and charge nurse, Neli.       12/19/17 0952    Case Management/Social Work Plan    Plan Plan for rehab placement.  Await insurance approval.               Discharge Codes     None        Expected Discharge Date and Time     Expected Discharge Date Expected Discharge Time    Dec 19, 2017             Mica Leon RN

## 2017-12-19 NOTE — PROGRESS NOTES
POD# 4 s/p right hip nonunion ORIF    Subjective: Ms. Young states she has noted some improvement of pain with standing and moving however continues to experience pain when right lower extremity in dependent position. Denies presence of numbness and tingling at right lower extremity. Negative for fevers, sweats an chills overnight.     Objective:  Vitals:    12/18/17 1604 12/18/17 2029 12/19/17 0015 12/19/17 0557   BP:  104/71 108/68 103/66   BP Location:  Left arm Left arm Left arm   Patient Position:  Lying Lying Lying   Pulse:  109 104 90   Resp:  16 16 16   Temp:  100.3 °F (37.9 °C) 97.9 °F (36.6 °C) 97.7 °F (36.5 °C)   TempSrc:  Oral Oral Oral   SpO2: 94% 92% 96% 91%   Weight:       Height:             Results from last 7 days  Lab Units 12/19/17  0339 12/18/17  0351 12/17/17  0746  12/12/17  1534   WBC 10*3/mm3  --   --   --   --  7.47   HEMOGLOBIN g/dL 10.4* 11.2* 11.4*  < > 13.8   HEMATOCRIT % 33.3* 34.7* 37.0  < > 42.4   PLATELETS 10*3/mm3  --   --   --   --  303   < > = values in this interval not displayed.          Exam:    Right lower extremity: Incision clean, dry, intact      Flex and extend toes and ankle     Negative mariana's sign bilaterally     Positive sensation over all aspects right lower extremity     All compartments are soft and easily compressible     Mild edema noted right ankle     Brisk cap refill all digits, 2+ pulses present     Impression: s/p right hip nonunion ORIF    Plan:  1. PT/OT- ambulate  2. Pain control- gabapentin, percocet  3. DVT prophylaxis- Xarelto   4. Wound care- leave open to air  5. Disposition- rehab today

## 2017-12-19 NOTE — PLAN OF CARE
Problem: Patient Care Overview (Adult)  Goal: Plan of Care Review  Outcome: Ongoing (interventions implemented as appropriate)    12/19/17 0924   Coping/Psychosocial Response Interventions   Plan Of Care Reviewed With patient   Outcome Evaluation   Outcome Summary/Follow up Plan PT: Patient requires CGA for sit to stand from recliner surface. pt performs gait x33 feet with standard walker with SBA/CGA. Pt requires increased assistance to manage device during turns. Patient able to maintain TTWB status during mobility without cues from therapist. patient has continued to improve with overall functional mobility however continues to have c/o increased pain during transfers.

## 2017-12-19 NOTE — DISCHARGE SUMMARY
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             gabapentin (NEURONTIN) 100 MG capsule  Take 2 capsules by mouth Every 8 (Eight) Hours.             oxyCODONE-acetaminophen (PERCOCET)  MG per tablet  Take 1 tablet by mouth Every 4 (Four) Hours As Needed for Moderate Pain  or Severe Pain  for up to 7 days.             rivaroxaban (XARELTO) 10 MG tablet  Take 1 tablet by mouth Daily.                     Past Medical History:   Diagnosis Date   • Cancer     basal cell   • Depression with anxiety    • PONV (postoperative nausea and vomiting)      Past Surgical History:   Procedure Laterality Date   • FIBULA BONE GRAFT TO HIP Right 12/15/2017    Procedure: FIBULA BONE GRAFT TO HIP with cancellus autograft the Dall-Miles cable ;  Surgeon: Keyshawn Madden MD;  Location:  [facility];  Service:    • HARDWARE REMOVAL Right 8/15/2017    Procedure: HARDWARE REMOVAL long gamma nail  with long gamma nail right subtrochanteric femur fracture meir vanco powder ;  Surgeon: Keyshawn Madden MD;  Location:  [facility] OR;  Service:    • IM NAILING FEMORAL SHAFT RETROGRADE Right 08/15/2017     replacement leah  fx   • NE OPEN FIX INTER/SUBTROCH FX,IMPLNT Right 4/14/2017    Procedure: FEMUR INTRAMEDULLARY NAILING/RODDING long meir gamma nail 7fr hemovac placement;  Surgeon: Keyshawn Madden MD;  Location[facility];  Service: Orthopedics   • TUBAL ABDOMINAL LIGATION       Social History     Occupational History   • Not on file.     Social History Main Topics   • Smoking status: Never Smoker   • Smokeless tobacco: Never Used   • Alcohol use Yes      Comment: rare   • Drug use: No   • Sexual activity: Defer      Social History     Social History Narrative     Family History   Problem Relation Age of Onset   • Asthma Mother    • No Known Problems Father          Physical Exam: 62 y.o. female  General Appearance:    Alert, cooperative, in no acute distress                      Vitals:    12/18/17 1604 12/18/17 2029 12/19/17 0015 12/19/17 0557   BP:  104/71 108/68 103/66   BP Location:  Left arm Left arm Left arm   Patient Position:  Lying Lying Lying   Pulse:  109 104 90   Resp:  16 16 16   Temp:  100.3 °F (37.9 °C) 97.9 °F (36.6 °C) 97.7 °F (36.5 °C)   TempSrc:  Oral Oral Oral   SpO2: 94% 92% 96% 91%   Weight:       Height:            Head:    Normocephalic, without obvious abnormality, atraumatic   Eyes:            Lids and lashes normal, conjunctivae and sclerae normal, no   icterus, no pallor, corneas clear, PERRLA   Ears:    Ears appear intact with no abnormalities noted   Throat:   No oral lesions, no thrush, oral mucosa moist   Neck:   No adenopathy, supple, trachea midline, no thyromegaly, no    carotid bruit, no JVD   Back:     No kyphosis present, no scoliosis present, no skin lesions,       erythema or scars, no tenderness to percussion or                   palpation,   range of motion normal   Lungs:     Clear to auscultation,respirations regular, even and                   unlabored    Heart:    Regular rhythm and normal rate, normal S1 and S2, no            murmur, no gallop, no rub, no click    Chest Wall:    No abnormalities observed   Abdomen:     Normal bowel sounds, no masses, no organomegaly, soft        non-tender, non-distended, no guarding, no rebound                 tenderness   Rectal:     Deferred   Extremities:   Incision intact without signs or symptoms of infection.               Neurovascular status remains intact to operative extremity.      Moves all extremities well, no edema, no cyanosis, no              redness   Pulses:   Pulses palpable and equal bilaterally   Skin:   No bleeding, bruising or rash   Lymph nodes:   No palpable adenopathy   Neurologic:   Cranial nerves 2 - 12 grossly intact, sensation intact, DTR        present and equal bilaterally           Hospital Course:  62 y.o. female admitted to [facility] services of Keyshawn Madden MD with Subtrochanteric fracture, closed, right, with nonunion, subsequent encounter [S72.21XK]  Subtrochanteric fracture, closed, right, with nonunion, subsequent encounter [S72.21XK] on 12/15/2017 and underwent FIBULA BONE GRAFT TO HIP with cancellus autograft the Dall-Miles cable    Per Keyshawn Madden MD. Antibiotic and VTE prophylaxis were per SCIP protocols. Post-operatively the patient transferred to the post-operative floor where the patient underwent mobilization therapy that included active as well as passive ROM exercises. Opioids were titrated to achieve appropriate pain management to allow for participation in mobilization exercises. Vital signs are now stable. The incision is intact without signs or symptoms of infection. Operative extremity neurovascular status remains intact.   Appropriate education re: incision care, activity levels, medications, and follow up visits was completed and all questions were answered. The patient is now deemed stable for discharge to Home.      DIAGNOSTIC TESTS:     Admission on 12/15/2017   Component Date Value Ref Range Status   • Hemoglobin 12/15/2017 12.1  12.0 - 16.0 g/dL Final   • Hematocrit  12/15/2017 38.3  37.0 - 47.0 % Final   • Hemoglobin 12/16/2017 12.3  12.0 - 16.0 g/dL Final   • Hematocrit 12/16/2017 39.4  37.0 - 47.0 % Final   • Hemoglobin 12/17/2017 11.4* 12.0 - 16.0 g/dL Final   • Hematocrit 12/17/2017 37.0  37.0 - 47.0 % Final   • Hemoglobin 12/18/2017 11.2* 12.0 - 16.0 g/dL Final   • Hematocrit 12/18/2017 34.7* 37.0 - 47.0 % Final   • Hemoglobin 12/19/2017 10.4* 12.0 - 16.0 g/dL Final   • Hematocrit 12/19/2017 33.3* 37.0 - 47.0 % Final       Ct Femur Right Wo Contrast    Result Date: 12/5/2017  Narrative: RIGHT FEMUR CT WITHOUT CONTRAST  INDICATION: Right femoral fracture status post revision fixation. Follow-up. Observation for healing.  PROCEDURE: Unenhanced CT of the right femur  COMPARISON: 08/11/2017  FINDINGS:  Right femoral fixation hardware is in place. No malalignment. No lucency around the hardware. Fracture lines of the subtrochanteric fracture are still visible. No dislocation. No drainable fluid collection.      Impression: Right femoral hardware appropriately positioned. No malalignment.  Fracture lines of the subtrochanteric fracture are still visible and very similar to 08/11/2017  This report was finalized on 12/5/2017 2:49 PM by Dr. Henry Matos MD.      Xr Hip With Or Without Pelvis 1 View Right    Result Date: 12/15/2017  Narrative: INDICATION: Right hip pain. Nonunion of a right proximal femur fracture. .  COMPARISON: 11/09/2017.  FINDINGS: Single fluoroscopic view(s) of the right hip.  There is been interval revision of the ORIF of the proximal right femur fracture. Alignment is anatomic. Fluoroscopic time 1.01 minutes.       Impression: Anatomic alignment status post ORIF..  This report was finalized on 12/15/2017 3:08 PM by Dr. Duarte Ramirez MD.        Discharge and Follow up Instructions:         Date: 12/19/2017    MARTY Haas      Time: Discharge less than 30  min

## 2017-12-19 NOTE — PLAN OF CARE
Problem: Inpatient Physical Therapy  Goal: Bed Mobility Goal LTG- PT  Outcome: Unable to achieve outcome(s) by discharge Date Met:  12/19/17 12/17/17 1014 12/19/17 1427   Bed Mobility PT LTG   Bed Mobility PT LTG, Date Established 12/17/17 --    Bed Mobility PT LTG, Time to Achieve by discharge --    Bed Mobility PT LTG, Activity Type all bed mobility --    Bed Mobility PT LTG, [place] Level supervision required --    Bed Mobility PT LTG, Date Goal Reviewed --  12/19/17   Bed Mobility PT LTG, Outcome --  goal not met   Bed Mobility PT LTG, Reason Goal Not Met --  discharged from facility       Goal: Transfer Training Goal 1 LTG- PT  Outcome: Unable to achieve outcome(s) by discharge Date Met:  12/19/17 12/17/17 1014 12/19/17 1427   Transfer Training PT LTG   Transfer Training PT LTG, Date Established 12/17/17 --    Transfer Training PT LTG, Time to Achieve by discharge --    Transfer Training PT LTG, Activity Type all transfers --    Transfer Training PT LTG, [place] Level supervision required --    Transfer Training PT LTG, Assist Device walker, standard --    Transfer Training PT LTG, Date Goal Reviewed --  12/19/17   Transfer Training PT LTG, Outcome --  goal not met   Transfer Training PT LTG, Reason Goal Not Met --  discharged from facility       Goal: Gait Training Goal LTG- PT  Outcome: Unable to achieve outcome(s) by discharge Date Met:  12/19/17 12/17/17 1014 12/19/17 1427   Gait Training PT LTG   Gait Training Goal PT LTG, Date Established 12/17/17 --    Gait Training Goal PT LTG, Time to Achieve by discharge --    Gait Training Goal PT LTG, [place] Level supervision required --    Gait Training Goal PT LTG, Assist Device walker, standard --    Gait Training Goal PT LTG, Distance to Achieve 100 --    Gait Training Goal PT LTG, Date Goal Reviewed --  12/19/17   Gait Training Goal PT LTG, Outcome --  goal not met  (ambulates up to 33 ft)   Gait Training Goal PT LTG, Reason Goal Not  Met --  discharged from facility       Goal: Patient Education Goal LTG- PT  Outcome: Outcome(s) achieved Date Met:  12/19/17 12/17/17 1014 12/19/17 1427   Patient Education PT LTG   Patient Education PT LTG, Date Established 12/17/17 --    Patient Education PT LTG, Education Type precaution per surgeon  (weightbearing status) --    Patient Education PT LTG, Education Understanding verbalize understanding;demonstrate adequately --    Patient Education PT LTG, Date Goal Reviewed --  12/19/17   Patient Education PT LTG Outcome --  goal met  (pt able to maintain TDWB)

## 2017-12-20 PROCEDURE — 97165 OT EVAL LOW COMPLEX 30 MIN: CPT

## 2017-12-20 PROCEDURE — 97530 THERAPEUTIC ACTIVITIES: CPT

## 2017-12-20 PROCEDURE — 97161 PT EVAL LOW COMPLEX 20 MIN: CPT

## 2017-12-20 PROCEDURE — 97110 THERAPEUTIC EXERCISES: CPT

## 2017-12-20 RX ADMIN — DULOXETINE HYDROCHLORIDE 60 MG: 60 CAPSULE, DELAYED RELEASE ORAL at 09:41

## 2017-12-20 RX ADMIN — GABAPENTIN 200 MG: 100 CAPSULE ORAL at 06:24

## 2017-12-20 RX ADMIN — GABAPENTIN 200 MG: 100 CAPSULE ORAL at 14:23

## 2017-12-20 RX ADMIN — RIVAROXABAN 10 MG: 10 TABLET, FILM COATED ORAL at 09:41

## 2017-12-20 RX ADMIN — GABAPENTIN 200 MG: 100 CAPSULE ORAL at 21:41

## 2017-12-20 NOTE — PLAN OF CARE
Problem: Patient Care Overview (Adult)  Goal: Plan of Care Review   12/20/17 1044   Coping/Psychosocial Response Interventions   Plan Of Care Reviewed With patient   Outcome Evaluation   Outcome Summary/Follow up Plan PT Eval Complete: patient performs supine to sit transfer with supervision, sit to/from stand transfer with CGA, and gait x 50 feet with CGA and use of RW. Patient requires verbal cues for safety with AD however maintains TTWB status during mobility. Provided written handout, reviewed and performed ther ex. Patient would benefit from inpatient skilled PT services to address deficits in functional mobility and LE strength. Plan to see patient daily x 4 days with anticipated discharged on 12/23/17. Recommend home health at discharge. Will continue to assess for appropriate assistive device at discharge.

## 2017-12-20 NOTE — THERAPY EVALUATION
SNF - Physical Therapy Initial Evaluation   Deya Causey     Patient Name: Raven Young  : 1955  MRN: [id number]  Today's Date: 2017   Onset of Illness/Injury or Date of Surgery Date: 17 (Transferred to SNF on 17)  Date of Referral to PT: 17  Referring Physician: MARTY Murphy      Admit Date: 2017     Visit Dx:  No diagnosis found.  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   • Closed left subtrochanteric femur fracture   • Subtrochanteric fracture, closed, right, with nonunion, subsequent encounter     Past Medical History:   Diagnosis Date   • Cancer     basal cell   • Depression with anxiety    • PONV (postoperative nausea and vomiting)      Past Surgical History:   Procedure Laterality Date   • FIBULA BONE GRAFT TO HIP Right 12/15/2017    Procedure: FIBULA BONE GRAFT TO HIP with cancellus autograft the [place]-Miles cable ;  Surgeon: Keyshawn Madden MD;  Location:  [facility];  Service:    • HARDWARE REMOVAL Right 8/15/2017    Procedure: HARDWARE REMOVAL long gamma nail  with long gamma nail right subtrochanteric femur fracture meir vanco powder ;  Surgeon: Keyshawn Madden MD;  Location: [facility] OR;  Service:    • IM NAILING FEMORAL SHAFT RETROGRADE Right 08/15/2017    replacement leah  fx   • [place] OPEN FIX INTER/SUBTROCH FX,IMPLNT Right 2017    Procedure: FEMUR INTRAMEDULLARY NAILING/RODDING long meir gamma nail 7fr hemovac placement;  Surgeon: Keyshawn Madden MD;  Location: [facility] OR;  Service: Orthopedics   • TUBAL ABDOMINAL LIGATION            PT ASSESSMENT (last 72 hours)      PT Evaluation       17 0844 17 8495    Rehab Evaluation    Document Type evaluation  -BP     Subjective Information agree to therapy;no complaints  -BP     Patient Effort, Rehab Treatment adequate  -BP     Symptoms Noted During/After Treatment none  -BP      General Information    Patient Profile Review yes  -BP     Onset of Illness/Injury or Date of Surgery Date 12/14/17   Transferred to SNF on 12/19/17  -BP     Referring Physician MARTY Murphy  -BP     General Observations Patient supine in bed with HOB elevated. In no acute distress. Agrees to evaluation.   -BP     Pertinent History Of Current Problem Patient s/p fibula bone graft to hip with callelus autograft secondary to non union subtrochanteric R femur fracture sustaining in april of 2017. Patient now transferred to SNF for rehab prior to return home. Per ortho she is TTWB. Prior to surgery she was independent with all mobility and ADL's. Occassionally ambulates with a straight cane. Patient cares for her .   -BP     Precautions/Limitations fall precautions;other (see comments)   TTWB  -BP     Prior Level of Function --   See pertinent history of current problem   -BP     Equipment Currently Used at Home walker, standard;cane, straight;lift device;wheelchair;other (see comments)   BSC, transport chair. All husbands equipment   -BP     Plans/Goals Discussed With patient;agreed upon  -BP     Risks Reviewed patient:;LOB;increased discomfort  -BP     Benefits Reviewed patient:;improve function;increase independence;increase strength  -BP     Barriers to Rehab none identified  -BP     Living Environment    Lives With spouse  -BP     Living Arrangements house  -BP     Home Accessibility bed and bath on same level;ramps present at home  -BP     Transportation Available      Living Environment Comment Pt is caregiver for her . Patient has option to return home to her sisters house which has a ramp to enter.   -BP     Clinical Impression    Date of Referral to PT 12/19/17  -BP     PT Diagnosis Decreased functional mobility  -BP     Criteria for Skilled Therapeutic Interventions Met yes;treatment indicated  -BP     Rehab Potential good, to achieve stated therapy goals  -BP     Predicted Duration of  Therapy Intervention (days/wks) 1 week  -BP     Pain Assessment    Pain Assessment 0-10  -BP     Pain Score 0  -BP     Post Pain Score 0  -BP     Cognitive Assessment/Intervention    Current Cognitive/Communication Assessment functional  -BP     Orientation Status oriented x 4  -BP     Follows Commands/Answers Questions 100% of the time;able to follow single-step instructions  -BP     Personal Safety WNL/WFL  -BP     Personal Safety Interventions gait belt;nonskid shoes/slippers when out of bed  -BP     ROM (Range of Motion)    General ROM Detail L LE AROM WFL. R ankle AROM WFL. R knee and hip ROM testing deferred   -BP     MMT (Manual Muscle Testing)    General MMT Assessment Detail L LE gross MMT 5/5. R LE MMT deferred due to recent surgery  -BP     Bed Mobility, Assessment/Treatment    Bed Mobility, Assistive Device head of bed elevated;bed rails  -BP     Bed Mob, Supine to Sit, [place] supervision required  -BP     Transfer Assessment/Treatment    Transfers, Sit-Stand [place] contact guard assist;verbal cues required  -BP     Transfers, Stand-Sit [place] contact guard assist;verbal cues required  -BP     Transfers, Sit-Stand-Sit, Assist Device rolling walker  -BP     Transfer, Maintain Weight Bearing Status able to maintain weight bearing status  -BP     Transfer, Comment Verbal cues for hand placement   -BP     Gait Assessment/Treatment    Gait, [place] Level contact guard assist;verbal cues required  -BP     Gait, Assistive Device rolling walker  -BP     Gait, Distance (Feet) 50  -BP     Gait, Gait Deviations nish decreased;forward flexed posture;step length decreased  -BP     Gait, Maintain Weight Bearing Status able to maintain weight bearing status  -     Gait, Safety Issues step length decreased  -     Gait, Comment Patient able to maintain TTWB. Demonstrates decreased foot clearance during swing of L LE. Able to temporarily improve with verbal cues.   -BP     Therapy Exercises     Right Lower Extremity AROM:;10 reps;SLR;SAQ;quad sets;heel slides;hip abduction/adduction;ankle pumps/circles;AAROM:   Written HEP provided and reviewed  -     Sensory Assessment/Intervention    Sensory Impairment --   Denies numbness and tingling in B LE's   -BP     Positioning and Restraints    Pre-Treatment Position in bed  -BP     Post Treatment Position chair  -BP     In Chair reclined;call light within reach;encouraged to call for assist;with OT  -BP             User Key  (r) = Recorded By, (t) = Taken By, (c) = Cosigned By    Initials Name Provider Type    KM Griselda Cole, PTA Physical Therapy Assistant     Nery Tolliver, PT Physical Therapist    YVONNE Barragan, PT Physical Therapist    LEOLA Prieto, RALPH Registered Nurse    ZANE Yanes LPN Licensed Nurse    NISHI Briones RN Registered Nurse          Physical Therapy Education     Title: PT OT SLP Therapies (Done)     Topic: Physical Therapy (Done)     Point: Mobility training (Done)    Learning Progress Summary    Learner Readiness Method Response Comment Documented by Status   Patient Acceptance E [facility] 12/20/17 1043 Done               Point: Home exercise program (Done)    Learning Progress Summary    Learner Readiness Method Response Comment Documented by Status   Patient Acceptance E [facility] 12/20/17 1043 Done               Point: Precautions (Done)    Learning Progress Summary    Learner Readiness Method Response Comment Documented by Status   Patient Acceptance E [facility] 12/20/17 1043 Done                      User Key     Initials Effective Dates Name Provider Type [facility] 12/01/15 -  Nery Tolliver, PT Physical Therapist PT                PT Recommendation and Plan  Anticipated Equipment Needs At Discharge: front wheeled walker  Anticipated Discharge Disposition: home with home health  Planned Therapy Interventions: bed mobility training, gait training, home exercise program, transfer training, ROM (Range of  Motion), strengthening, patient/family education, wheelchair management/propulsion training  PT Frequency: daily, 5 times/wk  Plan of Care Review  Plan Of Care Reviewed With: patient  Outcome Summary/Follow up Plan: PT Eval Complete: patient performs supine to sit transfer with supervision, sit to/from stand transfer with CGA, and gait x 50 feet with CGA and use of RW. Patient requires verbal cues for safety with AD however maintains TTWB status during mobility. Provided written handout, reviewed and performed ther ex. Patient would benefit from inpatient skilled PT services to address deficits in functional mobility and LE strength. Plan to see patient daily x 4 days with anticipated discharged on 12/23/17. Recommend home health at discharge. Will continue to assess for appropriate assistive device at discharge.           IP PT Goals       12/20/17 1050      Bed Mobility PT STG    Bed Mobility PT STG, Date Established 12/20/17  -BP    Bed Mobility PT STG, Time to Achieve 5 - 7 days  -BP    Bed Mobility PT STG, Activity Type supine to sit/sit to supine  -BP    Bed Mobility PT STG, [place] Level conditional independence  -BP    Transfer Training PT STG    Transfer Training PT STG, Date Established 12/20/17  -BP    Transfer Training PT STG, Time to Achieve 5 - 7 days  -BP    Transfer Training PT STG, Activity Type sit to stand/stand to sit  -BP    Transfer Training PT STG, [place] Level conditional independence  -BP    Transfer Training PT STG, Assist Device walker, rolling  -BP    Gait Training PT STG    Gait Training Goal PT STG, Date Established 12/20/17  -BP    Gait Training Goal PT STG, Time to Achieve 5 - 7 days  -BP    Gait Training Goal PT STG, [place] Level supervision required  -BP    Gait Training Goal PT STG, Assist Device walker, rolling  -BP    Gait Training Goal PT STG, Distance to Achieve 75  -BP    Patient Education PT STG    Patient Education PT STG, Date Established 12/20/17  -BP     Patient Education PT STG, Time to Achieve 5 - 7 days  -BP    Patient Education PT STG, Education Type written program;HEP  -BP    Patient Education PT STG, Education Understanding demonstrate adequately;verbalize understanding  -BP      User Key  (r) = Recorded By, (t) = Taken By, (c) = Cosigned By    Initials Name Provider Type    CLAY Cole, PTA Physical Therapy Assistant    BP Nery Tolilver, PT Physical Therapist    JAMIL Patterson, PT Physical Therapist               Time Calculation:         PT Charges       12/20/17 1047          Time Calculation    Start Time 0844  -BP      Stop Time 0918  -BP      Time Calculation (min) 34 min  -BP      PT Non-Billable Time (min) 15 min  -BP      PT Received On 12/20/17  -BP      PT - Next Appointment 12/21/17  -BP      Time Calculation- PT    TCU Minutes- PT 19 min   11 co treat with OT  -BP        User Key  (r) = Recorded By, (t) = Taken By, (c) = Cosigned By    Initials Name Provider Type    BP Nery Tolliver, PT Physical Therapist          Therapy Charges for Today     Code Description Service Date Service Provider Modifiers Qty    85667014780 HC PT EVAL LOW COMPLEXITY 1 12/20/2017 Nery Tolliver, PT GP 1    66318614419 HC PT THER PROC EA 15 MIN 12/20/2017 Nery Tolliver, PT GP 1                 Nery Tolliver, PT  12/20/2017

## 2017-12-20 NOTE — PLAN OF CARE
Problem: Inpatient Occupational Therapy  Goal: Transfer Training Goal 1 STG- OT   12/20/17 1400   Transfer Training OT STG   Transfer Training OT STG, Date Established 12/20/17   Transfer Training OT STG, Time to Achieve 2 - 3 days   Transfer Training OT STG, Activity Type sit to stand/stand to sit;toilet   Transfer Training OT STG, [place] Level supervision required   Transfer Training OT STG, Assist Device walker, rolling     Goal: Patient Education Goal STG- OT   12/20/17 1400   Patient Education OT STG   Patient Education OT STG, Date Established 12/20/17   Patient Education OT STG, Time to Achieve 2 - 3 days   Patient Education OT STG, Education Type HEP   Patient Education OT STG, Education Understanding demonstrates adequately     Goal: ADL Goal LTG- OT   12/20/17 1400   ADL OT LTG   ADL OT LTG, Date Established 12/20/17   ADL OT LTG, Time to Achieve 1 wk   ADL OT LTG, Activity Type ADL skills   ADL OT LTG, Additional Goal Patient to perform sink side bathing/dressing with supervision and adaptive equipment as needed.

## 2017-12-20 NOTE — THERAPY EVALUATION
SNF - Occupational Therapy Initial Evaluation   Deya Causey     Patient Name: Raven Young  : 1955  MRN: [id number]  Today's Date: 2017  Onset of Illness/Injury or Date of Surgery Date: 17 (Transfer to SNF)  Date of Referral to OT: 17  Referring Physician: Echo FERGUSON    Admit Date: 2017     No diagnosis found.  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   • Closed left subtrochanteric femur fracture   • Subtrochanteric fracture, closed, right, with nonunion, subsequent encounter     Past Medical History:   Diagnosis Date   • Cancer     basal cell   • Depression with anxiety    • PONV (postoperative nausea and vomiting)      Past Surgical History:   Procedure Laterality Date   • FIBULA BONE GRAFT TO HIP Right 12/15/2017    Procedure: FIBULA BONE GRAFT TO HIP with cancellus autograft the Jennifer-Miles cable ;  Surgeon: Keyshawn Madden MD;  Location:  [facility] OR;  Service:    • HARDWARE REMOVAL Right 8/15/2017    Procedure: HARDWARE REMOVAL long gamma nail  with long gamma nail right subtrochanteric femur fracture meir vanco powder ;  Surgeon: Keyshawn Madden MD;  Location: [facility] OR;  Service:    • IM NAILING FEMORAL SHAFT RETROGRADE Right 08/15/2017    replacement leah  fx   • NV OPEN FIX INTER/SUBTROCH FX,IMPLNT Right 2017    Procedure: FEMUR INTRAMEDULLARY NAILING/RODDING long meir gamma nail 7fr hemovac placement;  Surgeon: Keyshawn Madden MD;  Location: [facility] OR;  Service: Orthopedics   • TUBAL ABDOMINAL LIGATION            OT ASSESSMENT FLOWSHEET (last 72 hours)      OT Evaluation       17 0845 17 0844 17 1658 17 1430 17 1427       Document Type evaluation  -EN    Subjective Information agree to therapy;no complaints  -EN    Patient Effort, Rehab Treatment good  -EN    Symptoms Noted During/After Treatment         Patient Profile Review yes  -EN    Onset of Illness/Injury or Date of Surgery Date 12/14/17   Transfer to SNF12.19-17  -EN    Referring Physician Echo FERGUSON  -EN    General Observations Patient resting in bed, agreeable to OT evaluation.  -EN    Pertinent History Of Current Problem Patient s/p fibula bone graft to hip with callelus autograft secondary to non union subtrochanteric R femur fracture sustaining in april of 2017. Patient now transferred to SNF for rehab prior to return home. Per ortho she is TTWB. Prior to surgery she was independent with all mobility and ADL's. Occassionally ambulates with a straight cane. Patient cares for her .   -EN    Precautions/Limitations     Prior Level of Function     Equipment Currently Used at Home walker, rolling;cane, straight;lift device;commode;dressing device   owns DME but doesn't use (transport chair, w/c  -EN    Plans/Goals Discussed With patient;agreed upon  -EN    Risks Reviewed patient:;LOB  -EN    Benefits Reviewed patient:;improve function  -EN    Barriers to Rehab        Lives With spouse  -EN    Living Arrangements house  -EN    Home Accessibility bed and bath on same level  -EN    Stair Railings at Home     Type of Financial/Environmental Concern     Transportation Available     Living Environment Comment 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  Has a ramp and DME (which is used by spouse).  -EN       Date of Referral to OT 12/20/17  -EN    OT Diagnosis Decrased ADL independence  -EN    Functional Level At Time Of Evaluation Patient performed bed mobility with supervison, stood with CGA.  Required Min A for LB ADLs and CGA for functional mobility with rolling walker.  Patient has reacher and sock aid from initial surgery and reported she knows how to use.  Requested patient have family bring in long handled equipment and clothing.  -EN    Patient/Family Goals Statement Patient would like to return home by the weekend.  -EN    Criteria for  Skilled Therapeutic Interventions Met yes;treatment indicated  -EN    Rehab Potential good, to achieve stated therapy goals  -EN    Therapy Frequency 3 times/wk  -EN    Predicted Duration of Therapy Intervention (days/wks) Will continue to assess, patient reported she would like to return home by Friday or Saturday if possible.  -EN    Anticipated Equipment Needs At Discharge wheelchair  -EN    Anticipated Discharge Disposition home with home health;home with assist  -EN       Ambulation 0-->independent  -EN    Transferring 0-->independent  -EN    Toileting 0-->independent  -EN    Bathing 0-->independent  -EN    Dressing 0-->independent  -EN    Eating 0-->independent  -EN    Communication 0-->understands/communicates without difficulty  -EN    Swallowing 0-->swallows foods/liquids without difficulty  -EN    Prior Functional Level Comment        Pain Assessment 0-10  -EN    Pain Score 0  -EN    Post Pain Score 0  -EN       Current Cognitive/Communication Assessment functional  -EN    Orientation Status oriented x 4  -EN    Follows Commands/Answers Questions 100% of the time  -EN    Personal Safety WNL/WFL  -EN    Personal Safety Interventions gait belt;nonskid shoes/slippers when out of bed  -EN       General ROM Detail B UE AROM WFL  -EN       General MMT Assessment Detail B UE strength 4+/5  -EN       Bed Mobility, Assistive Device head of bed elevated;bed rails  -EN    Bed Mob, Supine to Sit, Story supervision required  -EN       Transfers, Sit-Stand Story contact guard assist;verbal cues required  -EN    Transfers, Stand-Sit Story contact guard assist;verbal cues required  -EN    Transfers, Sit-Stand-Sit, Assist Device rolling walker  -EN    Transfer, Maintain Weight Bearing Status able to maintain weight bearing status  -EN    Transfer, Comment verbal cues for hand placement  -EN       Functional Mobility- Ind. Level contact guard assist  -EN    Functional Mobility- Device rolling walker   -EN    Functional Mobility-Distance (Feet) 50  -EN       LB Bathing Assess/Train, [place] Level minimum assist (75% patient effort)  -EN       LB Dressing Assess/Train, [place] minimum assist (75% patient effort)  -EN       Right Lower Extremity     Bilateral Upper Extremity 15 reps;elbow flexion/extension;shoulder extension/flexion;sitting  -EN    BUE Resistance theraband   red  -EN       Sensory Impairment        Planned Therapy Interventions adaptive equipment training;ADL retraining;strengthening;transfer training  -EN       Pre-Treatment Position in bed  -EN    Post Treatment Position chair  -EN    In Chair reclined;call light within reach;encouraged to call for assist  -EN      12/19/17 1325 12/19/17 0946 12/19/17 0858 12/18/17 1305 12/18/17 0900      12/18/17 0845 12/18/17 0810               User Key  (r) = Recorded By, (t) = Taken By, (c) = Cosigned By    Initials Name Effective Dates    KM Griselda Cole, PTA 08/11/15 -     EN Kaitlynn Myles, OTR 06/22/16 -     BP Nery Tolliver, PT 12/01/15 -     JW Jessa Barragan, PT 12/01/15 -     LEOLA Prieto, RN 06/16/16 -     ZANE Yanes, LPN 10/07/16 -     SW Alena Briones, RN 03/31/17 -            Occupational Therapy Education     Title: PT OT SLP Therapies (Done)     Topic: Occupational Therapy (Done)     Point: ADL training (Done)    Description: Instruct learner(s) on proper safety adaptation and remediation techniques during self care or transfers.   Instruct in proper use of assistive devices.    Learning Progress Summary    Learner Readiness Method Response Comment Documented by Status   Patient Acceptance JOÃO,MARAH ROMANO,LUZ Patient educated on safety with functional mobility/transfers and UE strengthening program. EN 12/20/17 0845 Done               Point: Home exercise program (Done)    Description: Instruct learner(s) on appropriate technique for monitoring, assisting and/or progressing therapeutic exercises/activities.    Learning  Progress Summary    Learner Readiness Method Response Comment Documented by Status   Patient Acceptance E,D ROSALINA,LUZ Patient educated on safety with functional mobility/transfers and UE strengthening program. EN 12/20/17 0845 Done                      User Key     Initials Effective Dates Name Provider Type Discipline    EN 06/22/16 -  ANDRE Tee Occupational Therapist OT                  OT Recommendation and Plan  Anticipated Equipment Needs At Discharge: wheelchair  Anticipated Discharge Disposition: home with home health, home with assist  Planned Therapy Interventions: adaptive equipment training, ADL retraining, strengthening, transfer training  Therapy Frequency: 3 times/wk  Plan of Care Review  Outcome Summary/Follow up Plan: OT evaluation completed.  Patient performed supine-sit with supervision.  Performend sit to stand and funcitonal mobility with rolling walker and CGA.  Patient required min A for LB ADLs.  Patient stated she has a reacher, sock aid and LH shoe horn from previous hip surgery and knows how to use.  Requested patient have family bring in long handled adaptive equipment and clothing.  Patient issued red theraband and participated in UE HEP with OT.  Patient would benefit from OT 3 X per week to review adaptive equipment, HEP and for safety with functional transfers/mobility.          OT Goals       12/20/17 1400          Transfer Training OT STG    Transfer Training OT STG, Date Established 12/20/17  -EN      Transfer Training OT STG, Time to Achieve 2 - 3 days  -EN      Transfer Training OT STG, Activity Type sit to stand/stand to sit;toilet  -EN      Transfer Training OT STG, [place] Level supervision required  -EN      Transfer Training OT STG, Assist Device walker, rolling  -EN      Patient Education OT STG    Patient Education OT STG, Date Established 12/20/17  -EN      Patient Education OT STG, Time to Achieve 2 - 3 days  -EN      Patient Education OT STG, Education  Type HEP  -EN      Patient Education OT STG, Education Understanding demonstrates adequately  -EN      ADL OT LTG    ADL OT LTG, Date Established 12/20/17  -EN      ADL OT LTG, Time to Achieve 1 wk  -EN      ADL OT LTG, Activity Type ADL skills  -EN      ADL OT LTG, Additional Goal Patient perform sink side bathing/dressing with supervision and adaptive equipment as needed.  -EN        User Key  (r) = Recorded By, (t) = Taken By, (c) = Cosigned By    Initials Name Provider Type    ANDRE Buckley Occupational Therapist                Outcome Measures       12/19/17 1325 12/19/17 0858 12/18/17 1305    How much difficulty does the patient currently have...    Turning from your back to your side while in flat bed without using bedrails? 3  -KM 3  -JW 3  -BP    Standing up from a chair using your arms (e.g., wheelchair, bedside chair)? 3  -KM 3  -JW 3  -BP    Moving from lying on back to sitting on the side of a flat bed without bedrails? 3  -KM 3  - 3  -BP    How much help from another person do you currently need...    Moving to and from a bed to a chair (including a wheelchair)? 3  -KM 3  -JW 3  -BP    Climbing 3-5 steps with a railing? 1  -KM 1  - 1  -BP    To walk in hospital room? 3  -KM 3  -JW 3  -BP    AM-PAC 6 Clicks Score 16  -KM 16  - 16  -BP    Functional Assessment    Outcome Measure Options AM-PAC 6 Clicks Basic Mobility (PT)  -KM AM-PAC 6 Clicks Basic Mobility (PT)  - AM-PAC 6 Clicks Basic Mobility (PT)  -BP      12/18/17 0845          How much difficulty does the patient currently have...    Turning from your back to your side while in flat bed without using bedrails? 3  -KM      Standing up from a chair using your arms (e.g., wheelchair, bedside chair)? 3  -KM      Moving from lying on back to sitting on the side of a flat bed without bedrails? 3  -KM      How much help from another person do you currently need...    Moving to and from a bed to a chair (including a wheelchair)? 3   -KM      Climbing 3-5 steps with a railing? 1  -KM      To walk in hospital room? 3  -KM      AM-PAC 6 Clicks Score 16  -KM      Functional Assessment    Outcome Measure Options AM-PAC 6 Clicks Basic Mobility (PT)  -KM        User Key  (r) = Recorded By, (t) = Taken By, (c) = Cosigned By    Initials Name Provider Type    KM Griselda Cole, PTA Physical Therapy Assistant    BP Nery Tolliver, PT Physical Therapist    YVONNE Barragan, PT Physical Therapist          Time Calculation:   OT Start Time: 0844  OT Stop Time: 0925  OT Time Calculation (min): 41 min --21 minutes treatment (11 minutes co-tx)  OT Non-Billable Time (min): 15 min    Therapy Charges for Today     Code Description Service Date Service Provider Modifiers Qty    02138851671  OT THERAPEUTIC ACT EA 15 MIN 12/20/2017 ANDRE Tee GO 1    [id number]  OT EVAL LOW COMPLEXITY 1 12/20/2017 ANDRE Tee GO 1               ANDRE Blakely  12/20/2017

## 2017-12-20 NOTE — NURSING NOTE
12/20/17 1415   Incision 12/15/17 1410 Right leg   Placement Date/Time: 12/15/17 1410   Side: Right  Location: leg  Additional Comments: Hip   Incision WDL WDL   Dressing Appearance dried drainage;dressing loose   Appearance well approximated;swelling;tenderness;other (see comments);drainage  (dermabond intact)   Drainage Characteristics/Odor serosanguineous   Drainage Amount scant   Dressing Dressing changed;other (see comments)  ([place] dsg applied)   Incision 12/15/17 1403 Right lower leg   Placement Date/Time: 12/15/17 1403   Side: Right  Orientation: lower  Location: (c) leg   Incision WDL WDL   Dressing Appearance intact;dry   Appearance well approximated;other (see comments)  (dermabond intact)   Drainage Characteristics/Odor serosanguineous   Drainage Amount scant   Dressing Dressing changed;other (see comments)  (bordered gauze dressing applied)     Seen for initial WOCN skin assessment on Rehab unit.  All bony areas clear and intact.  Right hip and right proximal shin incisions with dermabond, intact.  Incisions are well approximated, moderate swelling to right marin incisional skin and scant drainage.  Dressings changed and recommend changing as needed for drainage.  Once no drainage, may leave bev.  Waffle cushion in place for pressure reduction.  Will continue to see weekly.

## 2017-12-20 NOTE — PLAN OF CARE
Problem: Patient Care Overview (Adult)  Goal: Plan of Care Review   12/20/17 3014   Outcome Evaluation   Outcome Summary/Follow up Plan OT evaluation completed. Patient performed supine-sit with supervision. Performent sit to stand and funcitonal mobility with rolling walker and CGA. Patient required min A for LB ADLs. Patient stated she has a reacher, sock aid and LH shoe horn from previous hip surgery and knows how to use. Requested patient have family bring in long handled adaptive equipment and clothing. Patient issued red theraband and participated in UE HEP with OT. Patient would benefit from OT 3 X per week to review adaptive equipment, HEP and for safety with functional transfers/mobility.

## 2017-12-20 NOTE — PLAN OF CARE
Problem: Inpatient Physical Therapy  Goal: Bed Mobility Goal STG- PT   12/20/17 1050   Bed Mobility PT STG   Bed Mobility PT STG, Date Established 12/20/17   Bed Mobility PT STG, Time to Achieve 5 - 7 days   Bed Mobility PT STG, Activity Type supine to sit/sit to supine   Bed Mobility PT STG, [place] Level conditional independence     Goal: Transfer Training Goal 1 STG- PT   12/20/17 1050   Transfer Training PT STG   Transfer Training PT STG, Date Established 12/20/17   Transfer Training PT STG, Time to Achieve 5 - 7 days   Transfer Training PT STG, Activity Type sit to stand/stand to sit   Transfer Training PT STG, [place] Level conditional independence   Transfer Training PT STG, Assist Device walker, rolling     Goal: Gait Training Goal STG- PT   12/20/17 1050   Gait Training PT STG   Gait Training Goal PT STG, Date Established 12/20/17   Gait Training Goal PT STG, Time to Achieve 5 - 7 days   Gait Training Goal PT STG, [place] Level supervision required   Gait Training Goal PT STG, Assist Device walker, rolling   Gait Training Goal PT STG, Distance to Achieve 75     Goal: Patient Education Goal STG- PT   12/20/17 1050   Patient Education PT STG   Patient Education PT STG, Date Established 12/20/17   Patient Education PT STG, Time to Achieve 5 - 7 days   Patient Education PT STG, Education Type written program;HEP   Patient Education PT STG, Education Understanding demonstrate adequately;verbalize understanding

## 2017-12-21 PROCEDURE — 97110 THERAPEUTIC EXERCISES: CPT

## 2017-12-21 PROCEDURE — 97116 GAIT TRAINING THERAPY: CPT

## 2017-12-21 PROCEDURE — 97535 SELF CARE MNGMENT TRAINING: CPT

## 2017-12-21 PROCEDURE — S0260 H&P FOR SURGERY: HCPCS | Performed by: ORTHOPAEDIC SURGERY

## 2017-12-21 RX ADMIN — OXYCODONE HYDROCHLORIDE AND ACETAMINOPHEN 1 TABLET: 10; 325 TABLET ORAL at 08:58

## 2017-12-21 RX ADMIN — GABAPENTIN 200 MG: 100 CAPSULE ORAL at 06:19

## 2017-12-21 RX ADMIN — OXYCODONE HYDROCHLORIDE AND ACETAMINOPHEN 1 TABLET: 10; 325 TABLET ORAL at 00:06

## 2017-12-21 RX ADMIN — GABAPENTIN 200 MG: 100 CAPSULE ORAL at 14:19

## 2017-12-21 RX ADMIN — DULOXETINE HYDROCHLORIDE 60 MG: 60 CAPSULE, DELAYED RELEASE ORAL at 14:18

## 2017-12-21 RX ADMIN — RIVAROXABAN 10 MG: 10 TABLET, FILM COATED ORAL at 08:58

## 2017-12-21 RX ADMIN — GABAPENTIN 200 MG: 100 CAPSULE ORAL at 21:10

## 2017-12-21 NOTE — PROGRESS NOTES
"Adult Nutrition  Assessment/PES    Patient Name:  Raven Young  YOB: 1955  MRN: [id number]  Admit Date:  12/19/2017    Assessment Date:  12/21/2017            Reason for Assessment       12/21/17 1513    Reason for Assessment    Reason For Assessment/Visit admission assessment   Pertinent Diagnosis                   Femur fracture             Anthropometrics       12/21/17 1516    Anthropometrics    Height Method Actual     Height 155.3 cm (61.14\")    RD Documented Weight on Admission 74.8 kg (165 lb)   12-19-17    Anthropometrics (Special Considerations)    RD Calculated BMI (kg/m2) 31.18    Ideal Body Weight (IBW)    Ideal Body Weight (IBW), Female 48.87    Usual Body Weight (UBW)    Usual Body Weight --   no apparent significant change in weight recently, wt 11-30-17 was 165# and was 168# on 4-13-17 per review of old records    Body Mass Index (BMI)    BMI Grade 30 - 34.9- obesity - grade I            Labs/Tests/Procedures/Meds       12/21/17 1518    Labs/Tests/Procedures/Meds    Labs/Tests Review Reviewed    Medication Review Reviewed, pertinent              Estimated/Assessed Needs       12/21/17 1519    Calculation Measurements    Weight Used For Calculations 74.8 kg (165 lb)    Height Used for Calculations 1.553 m (5' 1.14\")    Estimated/Assessed Energy Needs    Energy Need Method Chaffee-St Jeor    Age 62    RMR (Chaffee-St. Jeor Equation) 1243.07    Activity Factors (Chaf[place] St Jeor)  Confined to bed  1.2    Total estimated needs ([place] St. Jeor) 1,492    Estimated/Assessed Protein Needs    Weight Used for Protein Calculation 74.8 kg (165 lb)    Protein (gm/kg) 1.0    1.0 Gm Protein (gm) 74.84    Estimated/Assessed Fluid Needs    Fluid Need Method RDA method    RDA Method (mL)  1492            Nutrition Prescription Ordered       12/21/17 1520    Nutrition Prescription PO    Current PO Diet Regular            Evaluation of Received Nutrient/Fluid Intake       12/21/17 1521    Evaluation " of Received Nutrient/Fluid Intake    Nutrition Delivered Fluid Evaluation    Fluid Intake Evaluation    Oral Fluid (mL) 1680   orevious 24 hours    Total Fluid Intake (mL) 1680    % Fluid Needs 100%    PO Evaluation    Number of Meals 5    % PO Intake 80%             Problem/Interventions:        Problem 1       12/21/17 1522    Nutrition Diagnoses Problem 1    Problem 1 Overweight/Obesity    Etiology (related to) Factors Affecting Nutrition    Signs/Symptoms (evidenced by) BMI    BMI 30 - 34.9                    Intervention Goal       12/21/17 1638    Intervention Goal    General Maintain nutrition    PO PO intake (%)    PO Intake % 75 %            Nutrition Intervention       12/21/17 1638    Nutrition Intervention    RD/Tech Action Interview for preference;Follow Tx progress              Education/Evaluation       12/21/17 1637    Education    Education Education offered and refused    Monitor/Evaluation    Monitor I&O;PO intake;Pertinent labs;Weight;Skin status        Electronically signed by:  Belén Bowman RD  12/21/17 4:38 PM

## 2017-12-21 NOTE — PLAN OF CARE
Problem: Patient Care Overview (Adult)  Goal: Plan of Care Review   12/21/17 6460   Coping/Psychosocial Response Interventions   Plan Of Care Reviewed With patient   Outcome Evaluation   Outcome Summary/Follow up Plan OT: pt supervsion for functional transfers with rolling walker. pt supervison for tb adls from chair level. pt cga performing lb adls in standing with no LOB and adhering to TWB on R LE with rolling walker for stability.

## 2017-12-21 NOTE — THERAPY TREATMENT NOTE
SNF - Physical Therapy Treatment Note  SHIRA Deya Causey     Patient Name: Raven Young  : 1955  MRN: [id number]  Today's Date: 2017  Onset of Illness/Injury or Date of Surgery Date: 17 (Transfer to SNF)  Date of Referral to PT: 17  Referring Physician: Echo FERGUSON    Admit Date: 2017    Visit Dx:  No diagnosis found.  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   • Closed left subtrochanteric femur fracture   • Subtrochanteric fracture, closed, right, with nonunion, subsequent encounter               Adult Rehabilitation Note       17 0945 17 1325 17 0858    Rehab Assessment/Intervention    Discipline physical therapist  -BP physical therapy assistant  -KM physical therapist  -JW    Document Type therapy note (daily note)  -BP therapy note (daily note);discharge summary  -KM therapy note (daily note)  -JW    Subjective Information agree to therapy;no complaints  -BP agree to therapy  -KM agree to therapy;complains of;pain  -JW    Patient Effort, Rehab Treatment good  -BP good  -KM good  -JW    Symptoms Noted During/After Treatment increased pain  -BP --   reports soreness if leg hangs down-   -KM increased pain   pain increased with mobility  -JW    Symptoms Noted Comment Increased pain with mobility  -BP      Precautions/Limitations fall precautions;other (see comments)   TTWB R LE  -BP  fall precautions   TTWB right LE  -JW    Specific Treatment Considerations Patient states she is feeling better today. She states she feels ready to go home, she denies concerns at this time. Plan to discharge from PT on 17  -BP pt requires encouragement to not use UEs and LLE to assist RLE.  PT is TDWB  -KM     Recorded by [BP] Nery Tolliver, PT [KM] Griselda Cole PTA [JW] Jessa Barragan, CRISTIAN    Pain Assessment    Pain Assessment 0-10   -BP 0-10  -KM --   pt c/o increased pain with mobility, does not rate  -JW    Pain Score --   c/o pain with mobilty. Does not rate  -BP 1   during ex  -KM     Post Pain Score  --   did not rate during ambulation  -KM     Pain Type Acute pain;Surgical pain  -BP Acute pain;Surgical pain  -KM Acute pain;Surgical pain  -JW    Pain Location Hip  -BP Hip  -KM Hip  -JW    Pain Orientation Right  -BP Right  -KM Right  -JW    Pain Intervention(s) Repositioned;Medication (See MAR)   pre medicated for treatment  -BP Medication (See MAR);Repositioned  -KM     Response to Interventions tolerated  -BP tolerated  -KM     Recorded by [BP] Nery Tolliver, PT [KM] Griselda Cole, PTA [JW] Jessa Barragan, PT    Cognitive Assessment/Intervention    Personal Safety Interventions fall prevention program maintained  -BP gait belt;nonskid shoes/slippers when out of bed  -KM gait belt;nonskid shoes/slippers when out of bed  -JW    Recorded by [BP] Nery Tolliver, PT [KM] Griselda Cole, PTA [JW] Jessa Barragan, PT    Mobility Assessment/Training    Additional Documentation Wheelchair Training/Management (Group)  -BP      Recorded by [BP] Nery Tolliver, PT      Bed Mobility, Assessment/Treatment    Bed Mobility, Assistive Device head of bed elevated  -BP      Bed Mob, Supine to Sit, [place] conditional independence  -BP contact guard assist;verbal cues required;supervision required  -KM     Bed Mob, Sit to Supine, [place]  contact guard assist;verbal cues required;supervision required  -KM     Bed Mobility, Comment  pt able to scoot to edge of bed but requires encouragement to use RLE and not use UEs and LLE to move RLE.    -KM deferred-up in chair  -JW    Recorded by [BP] Nery Tolliver, PT [KM] Griselda Cole, PTA [JW] Jessa Barragan, PT    Transfer Assessment/Treatment    Transfers, Sit-Stand [place] supervision required  -BP contact guard assist;verbal cues required;stand by assist  -KM contact guard assist;verbal  cues required  -JW    Transfers, Stand-Sit [place] supervision required  -BP stand by assist;verbal cues required  -KM stand by assist;verbal cues required  -JW    Transfers, Sit-Stand-Sit, Assist Device rolling walker  -BP standard walker  -KM standard walker  -JW    Toilet Transfer, [place] supervision required  -BP      Toilet Transfer, Assistive Device rolling walker;elevated toilet seat  -BP      Transfer, Maintain Weight Bearing Status able to maintain weight bearing status  -BP  able to maintain weight bearing status  -JW    Transfer, Comment Patient demonstrates proper hand placement with each transfer  -BP pt requires cues not to pull up on walker and to reach back when she reaches her destination  -KM pt requires verbal cues for hand placement  -JW    Recorded by [BP] Nery Tolliver, PT [KM] Griselda Cole, PTA [JW] Jessa Barragan, PT    Gait Assessment/Treatment    Gait, [place] Level stand by assist  - stand by assist;contact guard assist;verbal cues required  -KM stand by assist;contact guard assist  -JW    Gait, Assistive Device rolling walker  -BP standard walker  -KM standard walker  -JW    Gait, Distance (Feet) 45  -BP 22  -KM 33  -JW    Gait, Gait Deviations nish decreased;forward flexed posture  -BP      Gait, Maintain Weight Bearing Status able to maintain weight bearing status  -BP  able to maintain weight bearing status  -    Gait, Comment Patient demonstrates improved safety with use of RW. Navigates directional changes and external obstacles without LOB or verbal cues for safety.   -BP pt able to maintain TDWB with ambulation.  Limited gait due to pt had just returned to bed with nursing  -KM pt requires CGA for safety during direction change/turn with walker.  pt requires cues for proper distance from front of walker during gait.  pt able to maintain TTWB during mobility.  -JW    Recorded by [BP] Nery Tolliver, PT [KM] Griselda Cole, PTA [JW] Jessa Barragan, PT     Stairs Assessment/Treatment    Stairs, Comment  pt reports she uses a stool to get into her bed.  States she thinks she can hop on stool and then hop into bed.  Discussed taking mattress and springs off bed frame or buying a lower bed frame so transfer into and out of bed would be safer.  Pt reports her spouse sleeps in a hospital bed and there are no other beds in the house.  -KM     Recorded by  [KM] Griselda Cole PTA     Wheelchair Training/Management    Wheelchair Transfer Type stand pivot transfer  -BP      Wheelchair Task Training armrest management;footrest management;leg rest management;wheel lock management  -BP      Wheelchair Propulsion Training moving through doorways;turning wheelchair;forward propulsion;steering wheelchair;other (see comments)   around obstacles   -BP      Wheelchair Propulsion Training Comment performs with supervision  -BP      Wheelchair, Distance Propelled 140  -BP      Wheelchair Safety Comment Verbal cues initially for navigation directional changes. Able to demonstrate carry over and perform with supervision  -BP      Recorded by [BP] Nery Tolliver PT      Motor Skills/Interventions    Additional Documentation Balance Skills Training (Group)  -BP      Recorded by [BP] Nery Tolliver, CRISTIAN      Balance Skills Training    Standing-Level of Assistance Close supervision  -BP      Static Standing Balance Support assistive device   dynamic standing, One UE unsupported  -BP      Recorded by [BP] Nery Tolliver, CRISTIAN      Therapy Exercises    Right Lower Extremity   AAROM:;10 reps;ankle pumps/circles;glut sets;heel slides;hip abduction/adduction;quad sets;SLR   pt given written handout of exercises  -JW    Bilateral Lower Extremities  10 reps;ankle pumps/circles;glut sets;heel slides;hip abduction/adduction;quad sets  -KM     Recorded by  [KM] Griselda Cole PTA [JW] Jessa Barragan, PT    Positioning and Restraints    Pre-Treatment Position in bed  -BP in bed  -KM sitting in  chair/recliner  -JW    Post Treatment Position chair  -BP bed  -KM chair  -JW    In Bed  supine;call light within reach;side rails up x2;R heel elevated  -KM     In Chair notified nsg;reclined;call light within reach;encouraged to call for assist  -BP  reclined;call light within reach;encouraged to call for assist  -JW    Recorded by [BP] Nery Tolliver, PT [KM] Griselda Cole, PTA [JW] Jessa Barragan, PT      12/18/17 1305          Rehab Assessment/Intervention    Discipline physical therapist  -BP      Document Type therapy note (daily note)  -BP      Subjective Information agree to therapy;complains of;pain  -BP      Patient Effort, Rehab Treatment adequate  -BP      Symptoms Noted During/After Treatment increased pain  -BP      Precautions/Limitations fall precautions;other (see comments)   TTWB  -BP      Recorded by [BP] Nery Tolliver, PT      Pain Assessment    Pain Assessment 0-10  -BP      Pain Score 3  -BP      Pain Type Acute pain;Surgical pain  -BP      Pain Location Hip  -BP      Pain Orientation Right  -BP      Pain Intervention(s) Medication (See MAR);Repositioned   Pre medicated for treatment  -BP      Response to Interventions tolerated  -BP      Recorded by [BP] Nery Tolliver, PT      Cognitive Assessment/Intervention    Personal Safety Interventions gait belt;nonskid shoes/slippers when out of bed  -BP      Recorded by [BP] Nery Tolliver, PT      Bed Mobility, Assessment/Treatment    Bed Mobility, Assistive Device bed rails  -BP      Bed Mob, Sit to Supine, [place] minimum assist (75% patient effort);verbal cues required  -BP      Bed Mobility, Comment Min A for R LE into bed. Patient requires extended time and cues for sequencing   -BP      Recorded by [BP] Nery Tolliver, PT      Transfer Assessment/Treatment    Transfers, Sit-Stand [place] contact guard assist;verbal cues required  -BP      Transfers, Stand-Sit [place] contact guard assist;verbal cues required  -BP       Transfers, Sit-Stand-Sit, Assist Device standard walker  -BP      Transfer, Maintain Weight Bearing Status able to maintain weight bearing status  -BP      Transfer, Comment Verbal cues for hand placement   -BP      Recorded by [BP] Nery Tolliver, PT      Gait Assessment/Treatment    Gait, [place] Level contact guard assist;verbal cues required  -BP      Gait, Assistive Device standard walker  -BP      Gait, Distance (Feet) 15  -BP      Gait, Gait Deviations nish decreased;forward flexed posture  -BP      Gait, Maintain Weight Bearing Status able to maintain weight bearing status  -BP      Gait, Comment Patient able to maintain TTWB during gait training. Patient tends to cross R LE over L. Verbal cues to keep legs uncrossed.   -BP      Recorded by [BP] Nery Tolliver PT      Therapy Exercises    Right Lower Extremity 10 reps;AROM:  -BP      Left Lower Extremity 10 reps;AAROM:;SLR;quad sets;hip abduction/adduction;heel slides  -BP      Recorded by [BP] Nery Tolliver PT      Positioning and Restraints    Pre-Treatment Position sitting in chair/recliner  -BP      Post Treatment Position bed  -BP      In Bed supine;call light within reach;encouraged to call for assist;with nsg  -BP      Recorded by [BP] Nery Tolliver, PT        User Key  (r) = Recorded By, (t) = Taken By, (c) = Cosigned By    Initials Name Effective Dates    CLAY Cole, PTA 08/11/15 -     BP Nery Tolliver, PT 12/01/15 -     JW Jessa Barragan, PT 12/01/15 -                 IP PT Goals       12/20/17 1050 12/19/17 1427 12/17/17 1014    Bed Mobility PT STG    Bed Mobility PT STG, Date Established 12/20/17  -BP      Bed Mobility PT STG, Time to Achieve 5 - 7 days  -BP      Bed Mobility PT STG, Activity Type supine to sit/sit to supine  -BP      Bed Mobility PT STG, [place] Level conditional independence  -BP      Bed Mobility PT LTG    Bed Mobility PT LTG, Date Established   12/17/17  -JAMIL    Bed Mobility PT LTG, Time to  Achieve   by discharge  -JAMIL    Bed Mobility PT LTG, Activity Type   all bed mobility  -JAMIL    Bed Mobility PT LTG, [place] Level   supervision required  -JAMIL    Bed Mobility PT LTG, Date Goal Reviewed  12/19/17  -KM     Bed Mobility PT LTG, Outcome  goal not met  -KM     Bed Mobility PT LTG, Reason Goal Not Met  discharged from facility  -KM     Transfer Training PT STG    Transfer Training PT STG, Date Established 12/20/17  -BP      Transfer Training PT STG, Time to Achieve 5 - 7 days  -BP      Transfer Training PT STG, Activity Type sit to stand/stand to sit  -BP      Transfer Training PT STG, [place] Level conditional independence  -BP      Transfer Training PT STG, Assist Device walker, rolling  -BP      Transfer Training PT LTG    Transfer Training PT LTG, Date Established   12/17/17  -JAMIL    Transfer Training PT LTG, Time to Achieve   by discharge  -JAMIL    Transfer Training PT LTG, Activity Type   all transfers  -JAMIL    Transfer Training PT LTG, [place] Level   supervision required  -JAMIL    Transfer Training PT LTG, Assist Device   walker, standard  -JAMIL    Transfer Training PT  LTG, Date Goal Reviewed  12/19/17  -KM     Transfer Training PT LTG, Outcome  goal not met  -KM     Transfer Training PT LTG, Reason Goal Not Met  discharged from facility  -KM     Gait Training PT STG    Gait Training Goal PT STG, Date Established 12/20/17  -BP      Gait Training Goal PT STG, Time to Achieve 5 - 7 days  -BP      Gait Training Goal PT STG, [place] Level supervision required  -BP      Gait Training Goal PT STG, Assist Device walker, rolling  -BP      Gait Training Goal PT STG, Distance to Achieve 75  -BP      Gait Training PT LTG    Gait Training Goal PT LTG, Date Established   12/17/17  -JAMIL    Gait Training Goal PT LTG, Time to Achieve   by discharge  -JAMIL    Gait Training Goal PT LTG, [place] Level   supervision required  -JAMIL    Gait Training Goal PT LTG, Assist Device   walker, standard  -JAMIL    Gait  Training Goal PT LTG, Distance to Achieve   100  -JAMIL    Gait Training Goal PT LTG, Date Goal Reviewed  12/19/17  -KM     Gait Training Goal PT LTG, Outcome  goal not met   ambulates up to 33 ft  -KM     Gait Training Goal PT LTG, Reason Goal Not Met  discharged from facility  -KM     Patient Education PT STG    Patient Education PT STG, Date Established 12/20/17  -BP      Patient Education PT STG, Time to Achieve 5 - 7 days  -BP      Patient Education PT STG, Education Type written program;HEP  -BP      Patient Education PT STG, Education Understanding demonstrate adequately;verbalize understanding  -BP      Patient Education PT LTG    Patient Education PT LTG, Date Established   12/17/17  -JAMIL    Patient Education PT LTG, Education Type   precaution per surgeon   weightbearing status  -JAMIL    Patient Education PT LTG, Education Understanding   verbalize understanding;demonstrate adequately  -JAMIL    Patient Education PT LTG, Date Goal Reviewed  12/19/17  -KM     Patient Education PT LTG Outcome  goal met   pt able to maintain TDWB  -KM       User Key  (r) = Recorded By, (t) = Taken By, (c) = Cosigned By    Initials Name Provider Type    CLAY Cole, PTA Physical Therapy Assistant    BP Nery Tolliver, PT Physical Therapist    JAMIL Patterson, PT Physical Therapist          Physical Therapy Education     Title: PT OT SLP Therapies (Done)     Topic: Physical Therapy (Done)     Point: Mobility training (Done)    Learning Progress Summary    Learner Readiness Method Response Comment Documented by Status   Patient Acceptance E [facility] 12/21/17 1119 Done    Acceptance E   BP 12/20/17 1043 Done               Point: Home exercise program (Done)    Learning Progress Summary    Learner Readiness Method Response Comment Documented by Status   Patient Acceptance E [facility] 12/20/17 1043 Done               Point: Precautions (Done)    Learning Progress Summary    Learner Readiness Method Response Comment Documented by  Status   Patient Acceptance E   BP 12/21/17 1119 Done    Acceptance E   BP 12/20/17 1043 Done                      User Key     Initials Effective Dates Name Provider Type Discipline     12/01/15 -  Nery Tolliver, PT Physical Therapist PT                    PT Recommendation and Plan  Anticipated Equipment Needs At Discharge: front wheeled walker  Anticipated Discharge Disposition: home with home health  Planned Therapy Interventions: bed mobility training, gait training, home exercise program, transfer training, ROM (Range of Motion), strengthening, patient/family education, wheelchair management/propulsion training  PT Frequency: daily, 5 times/wk  Plan of Care Review  Plan Of Care Reviewed With: patient  Progress: improving  Outcome Summary/Follow up Plan: PT: Patient performs supine to sit transfer with conditional independence, sit to/from stand transfers with supervision with RW and gait x 45 feet with RW with SBA. Patient able to maintain TTWB status without verbal cues with all transfers and gait. Patient performed w/c mobility x 140 feet with supervision. Able to manage external obstacles, doorways and directional changes without assist. Patient able to perform LE HEP independently with use of written HEP. Plan to see patient one additional visit and discharge from PT on 12/12/17. Patient denies concerns for return home. Discharge planner notified.           Outcome Measures       12/19/17 1325 12/19/17 0858 12/18/17 1305    How much difficulty does the patient currently have...    Turning from your back to your side while in flat bed without using bedrails? 3  -KM 3  -JW 3  -BP    Standing up from a chair using your arms (e.g., wheelchair, bedside chair)? 3  -KM 3  -JW 3  -BP    Moving from lying on back to sitting on the side of a flat bed without bedrails? 3  -KM 3  -JW 3  -BP    How much help from another person do you currently need...    Moving to and from a bed to a chair (including a  wheelchair)? 3  -KM 3  -JW 3  -BP    Climbing 3-5 steps with a railing? 1  -KM 1  -JW 1  -BP    To walk in hospital room? 3  -KM 3  -JW 3  -BP    AM-PAC 6 Clicks Score 16  -KM 16  -JW 16  -BP    Functional Assessment    Outcome Measure Options AM-PAC 6 Clicks Basic Mobility (PT)  -KM AM-PAC 6 Clicks Basic Mobility (PT)  -JW AM-PAC 6 Clicks Basic Mobility (PT)  -BP      User Key  (r) = Recorded By, (t) = Taken By, (c) = Cosigned By    Initials Name Provider Type    KM Griselda Cole, PTA Physical Therapy Assistant    BP Nery Tolliver, PT Physical Therapist    JW Jessa Barragan, PT Physical Therapist           Time Calculation:         PT Charges       12/21/17 1123          Time Calculation    Start Time 0945  -BP      Stop Time 1012  -BP      Time Calculation (min) 27 min  -BP      PT Received On 12/21/17  -BP      PT - Next Appointment 12/22/17  -BP        User Key  (r) = Recorded By, (t) = Taken By, (c) = Cosigned By    Initials Name Provider Type    BP Nery Tolliver, PT Physical Therapist          Therapy Charges for Today     Code Description Service Date Service Provider Modifiers Qty    59797937337 HC PT EVAL LOW COMPLEXITY 1 12/20/2017 Nery Tolliver, PT GP 1    20826863286 HC PT THER PROC EA 15 MIN 12/20/2017 Nery Tolliver, PT GP 1    97576715731 HC PT THER PROC EA 15 MIN 12/21/2017 Nery Tolliver, PT GP 1    16293946295 HC GAIT TRAINING EA 15 MIN 12/21/2017 Nery Tolliver, PT GP 1               Nery Tolliver, PT  12/21/2017

## 2017-12-21 NOTE — THERAPY TREATMENT NOTE
SNF - Occupational Therapy Treatment Note  SHIRA FalkSamson     Patient Name: Raven Young  : 1955  MRN: [id number]  Today's Date: 2017  Onset of Illness/Injury or Date of Surgery Date: 17 (Transfer to SNF)  Date of Referral to OT: 17  Referring Physician: Echo FERGUSON      Admit Date: 2017    Visit Dx:   No diagnosis found.  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   • Closed left subtrochanteric femur fracture   • Subtrochanteric fracture, closed, right, with nonunion, subsequent encounter             Adult Rehabilitation Note       17 1019 17 0945 17 1325    Rehab Assessment/Intervention    Discipline occupational therapist  -JJ physical therapist  -BP physical therapy assistant  -KM    Document Type therapy note (daily note)  -JJ therapy note (daily note)  -BP therapy note (daily note);discharge summary  -KM    Subjective Information agree to therapy;no complaints  -JJ agree to therapy;no complaints  -BP agree to therapy  -KM    Patient Effort, Rehab Treatment good  -JJ good  -BP good  -KM    Symptoms Noted During/After Treatment increased pain   with movement  -JJ increased pain  -BP --   reports soreness if leg hangs down-   -KM    Symptoms Noted Comment  Increased pain with mobility  -BP     Precautions/Limitations  fall precautions;other (see comments)   TTWB R LE  -BP     Specific Treatment Considerations  Patient states she is feeling better today. She states she feels ready to go home, she denies concerns at this time. Plan to discharge from PT on 17  -BP pt requires encouragement to not use UEs and LLE to assist RLE.  PT is TDWB  -KM    Recorded by [JJ] Antonette Fletcher, OTR [BP] Nery Tolliver, PT [KM] Griselda Cole, PTA    Pain Assessment    Pain Assessment 0-10  -JJ 0-10  -BP 0-10  -KM    Pain Score  "--   states \"ok\" at rest, increased with movment, not rated  - --   c/o pain with mobilty. Does not rate  -BP 1   during ex  -KM    Post Pain Score   --   did not rate during ambulation  -KM    Pain Type Acute pain;Surgical pain  - Acute pain;Surgical pain  -BP Acute pain;Surgical pain  -KM    Pain Location Hip  - Hip  -BP Hip  -KM    Pain Orientation Right  -JJ Right  -BP Right  -KM    Pain Intervention(s) Repositioned  -JJ Repositioned;Medication (See MAR)   pre medicated for treatment  -BP Medication (See MAR);Repositioned  -KM    Response to Interventions tolerated  -J tolerated  -BP tolerated  -KM    Recorded by [JJ] Antonette Fletcher, OTR [BP] Nery Tolliver, PT [KM] Griselda Cole, DAXA    Cognitive Assessment/Intervention    Personal Safety Interventions gait belt;nonskid shoes/slippers when out of bed  - fall prevention program maintained  -BP gait belt;nonskid shoes/slippers when out of bed  -KM    Recorded by [JJ] Antonette Fletcher, OTR [BP] Nery Tolliver, PT [KM] Griselda Cole PTA    Mobility Assessment/Training    Additional Documentation  Wheelchair Training/Management (Group)  -BP     Recorded by  [BP] Nery Tolliver, PT     Bed Mobility, Assessment/Treatment    Bed Mobility, Assistive Device  head of bed elevated  -BP     Bed Mob, Supine to Sit, [place]  conditional independence  -BP contact guard assist;verbal cues required;supervision required  -KM    Bed Mob, Sit to Supine, [place]   contact guard assist;verbal cues required;supervision required  -KM    Bed Mobility, Comment deferred up in chair  -  pt able to scoot to edge of bed but requires encouragement to use RLE and not use UEs and LLE to move RLE.    -KM    Recorded by [JJ] Antonette Fletcher, OTR [BP] Nery Tolliver, PT [KM] Griselda Cole, PTA    Transfer Assessment/Treatment    Transfers, Sit-Stand [place] supervision required  - supervision required  - contact guard assist;verbal cues " required;stand by assist  -KM    Transfers, Stand-Sit [place] supervision required  - supervision required  - stand by assist;verbal cues required  -KM    Transfers, Sit-Stand-Sit, Assist Device rolling walker  - rolling walker  - standard walker  -KM    Toilet Transfer, [place]  supervision required  -BP     Toilet Transfer, Assistive Device  rolling walker;elevated toilet seat  -     Transfer, Maintain Weight Bearing Status  able to maintain weight bearing status  -     Transfer, Comment pt supervison x several trails from recliner chair during adl, with no LOB and adhering to TTWB  - Patient demonstrates proper hand placement with each transfer  - pt requires cues not to pull up on walker and to reach back when she reaches her destination  -KM    Recorded by [JJ] Antonette Fletcher, OTR [BP] Nery Tolliver, PT [KM] Griselda Cole, PTA    Gait Assessment/Treatment    Gait, [place] Level  stand by assist  -BP stand by assist;contact guard assist;verbal cues required  -KM    Gait, Assistive Device  rolling walker  - standard walker  -KM    Gait, Distance (Feet)  45  -BP 22  -KM    Gait, Gait Deviations  nish decreased;forward flexed posture  -     Gait, Maintain Weight Bearing Status  able to maintain weight bearing status  -     Gait, Comment  Patient demonstrates improved safety with use of RW. Navigates directional changes and external obstacles without LOB or verbal cues for safety.   -BP pt able to maintain TDWB with ambulation.  Limited gait due to pt had just returned to bed with nursing  -KM    Recorded by  [BP] Nery Tolliver, PT [KM] Griselda Cole PTA    Stairs Assessment/Treatment    Stairs, Comment   pt reports she uses a stool to get into her bed.  States she thinks she can hop on stool and then hop into bed.  Discussed taking mattress and springs off bed frame or buying a lower bed frame so transfer into and out of bed would be safer.  Pt reports her  spouse sleeps in a hospital bed and there are no other beds in the house.  -KM    Recorded by   [KM] Griselda Cole, PTA    Wheelchair Training/Management    Wheelchair Transfer Type  stand pivot transfer  -BP     Wheelchair Task Training  armrest management;footrest management;leg rest management;wheel lock management  -BP     Wheelchair Propulsion Training  moving through doorways;turning wheelchair;forward propulsion;steering wheelchair;other (see comments)   around obstacles   -BP     Wheelchair Propulsion Training Comment  performs with supervision  -BP     Wheelchair, Distance Propelled  140  -BP     Wheelchair Safety Comment  Verbal cues initially for navigation directional changes. Able to demonstrate carry over and perform with supervision  -BP     Recorded by  [BP] Nery Tolliver, PT     Upper Body Bathing Assessment/Training    UB Bathing Assess/Train, [place] Level supervision required   with exception required assist with back  -JJ      Recorded by [JJ] Antonette Fletcher, OTR      Lower Body Bathing Assessment/Training    LB Bathing Assess/Train, [place] Level supervision required  -JJ      LB Bathing Assess/Train, Comment pt cga for bathing [place] in  standing, no LOB maintained TTWB  -JJ      Recorded by [JJ] Antonette Fletcher, OTR      Upper Body Dressing Assessment/Training    UB Dressing Assess/Train, [place] supervision required  -JJ      Recorded by [JJ] Antonette Fletcher, OTR      Lower Body Dressing Assessment/Training    LB Dressing Assess/Train, [place] supervision required;contact guard assist  -JJ      LB Dressing Assess/Train, Comment pt required cga for pulling pants up over hips in standing with rolling walker  -JJ      Recorded by [JJ] Antonette Fletcher, OTR      Motor Skills/Interventions    Additional Documentation  Balance Skills Training (Group)  -BP     Recorded by  [BP] Nery Tolliver, PT     Balance Skills Training    Standing-Level of  Assistance  Close supervision  -BP     Static Standing Balance Support  assistive device   dynamic standing, One UE unsupported  -BP     Recorded by  [BP] Nery Tolliver, PT     Therapy Exercises    Bilateral Lower Extremities   10 reps;ankle pumps/circles;glut sets;heel slides;hip abduction/adduction;quad sets  -KM    Recorded by   [KM] Griselda Cole, PTA    Positioning and Restraints    Pre-Treatment Position sitting in chair/recliner  -JJ in bed  -BP in bed  -KM    Post Treatment Position chair  -JJ chair  -BP bed  -KM    In Bed   supine;call light within reach;side rails up x2;R heel elevated  -KM    In Chair reclined;call light within reach;encouraged to call for assist  -JJ notified nsg;reclined;call light within reach;encouraged to call for assist  -BP     Recorded by [JJ] Antonette Fletcher, OTR [BP] Nery Tolliver, PT [KM] Griselda Cole, PTA      12/19/17 0858          Rehab Assessment/Intervention    Discipline physical therapist  -      Document Type therapy note (daily note)  -JW      Subjective Information agree to therapy;complains of;pain  -JW      Patient Effort, Rehab Treatment good  -JW      Symptoms Noted During/After Treatment increased pain   pain increased with mobility  -JW      Precautions/Limitations fall precautions   TTWB right LE  -JW      Recorded by [JW] Jessa Barragan, PT      Pain Assessment    Pain Assessment --   pt c/o increased pain with mobility, does not rate  -JW      Pain Type Acute pain;Surgical pain  -JW      Pain Location Hip  -JW      Pain Orientation Right  -JW      Recorded by [JW] Jessa Barragan, PT      Cognitive Assessment/Intervention    Personal Safety Interventions gait belt;nonskid shoes/slippers when out of bed  -JW      Recorded by [JW] Jessa Barragan, PT      Bed Mobility, Assessment/Treatment    Bed Mobility, Comment deferred-up in chair  -JW      Recorded by [YVONNE] Jessa Barragan PT      Transfer Assessment/Treatment    Transfers, Sit-Stand  [place] contact guard assist;verbal cues required  -      Transfers, Stand-Sit [place] stand by assist;verbal cues required  -      Transfers, Sit-Stand-Sit, Assist Device standard walker  -JW      Transfer, Maintain Weight Bearing Status able to maintain weight bearing status  -JW      Transfer, Comment pt requires verbal cues for hand placement  -JW      Recorded by [JW] Jessa Barragan PT      Gait Assessment/Treatment    Gait, [place] Level stand by assist;contact guard assist  -JW      Gait, Assistive Device standard walker  -      Gait, Distance (Feet) 33  -JW      Gait, Maintain Weight Bearing Status able to maintain weight bearing status  -      Gait, Comment pt requires CGA for safety during direction change/turn with walker.  pt requires cues for proper distance from front of walker during gait.  pt able to maintain TTWB during mobility.  -JW      Recorded by [YVONNE] Jessa Barragan PT      Therapy Exercises    Right Lower Extremity AAROM:;10 reps;ankle pumps/circles;glut sets;heel slides;hip abduction/adduction;quad sets;SLR   pt given written handout of exercises  -JW      Recorded by [YVONNE] Jessa Barragan PT      Positioning and Restraints    Pre-Treatment Position sitting in chair/recliner  -JW      Post Treatment Position chair  -JW      In Chair reclined;call light within reach;encouraged to call for assist  -JW      Recorded by [JW] Jessa Barragan PT        User Key  (r) = Recorded By, (t) = Taken By, (c) = Cosigned By    Initials Name Effective Dates    CLAY Griselda GABRIELA Cole, PTA 08/11/15 -     POLLY Fletcher, OTR 06/22/16 -     BP Nery Tolliver, PT 12/01/15 -     YVONNE Barragan, PT 12/01/15 -                 OT Goals       12/20/17 1400          Transfer Training OT STG    Transfer Training OT STG, Date Established 12/20/17  -EN      Transfer Training OT STG, Time to Achieve 2 - 3 days  -EN      Transfer Training OT STG, Activity Type sit to stand/stand to sit;toilet   -EN      Transfer Training OT STG, [place] Level supervision required  -EN      Transfer Training OT STG, Assist Device walker, rolling  -EN      Patient Education OT STG    Patient Education OT STG, Date Established 12/20/17  -EN      Patient Education OT STG, Time to Achieve 2 - 3 days  -EN      Patient Education OT STG, Education Type HEP  -EN      Patient Education OT STG, Education Understanding demonstrates adequately  -EN      ADL OT LTG    ADL OT LTG, Date Established 12/20/17  -EN      ADL OT LTG, Time to Achieve 1 wk  -EN      ADL OT LTG, Activity Type ADL skills  -EN      ADL OT LTG, Additional Goal Patient perform sink side bathing/dressing with supervision and adaptive equipment as needed.  -EN        User Key  (r) = Recorded By, (t) = Taken By, (c) = Cosigned By    Initials Name Provider Type    SAVITA Myles OTR Occupational Therapist          Occupational Therapy Education     Title: PT OT SLP Therapies (Done)     Topic: Occupational Therapy (Done)     Point: ADL training (Done)    Description: Instruct learner(s) on proper safety adaptation and remediation techniques during self care or transfers.   Instruct in proper use of assistive devices.    Learning Progress Summary    Learner Readiness Method Response Comment Documented by Status   Patient Acceptance JOÃO ROMANO pt educated on adls, long hnadled ae, and safety with functional transfers JJ 12/21/17 1349 Done    Acceptance MARAH MOYER DU Patient educated on safety with functional mobility/transfers and UE strengthening program. EN 12/20/17 0845 Done               Point: Home exercise program (Done)    Description: Instruct learner(s) on appropriate technique for monitoring, assisting and/or progressing therapeutic exercises/activities.    Learning Progress Summary    Learner Readiness Method Response Comment Documented by Status   Patient Acceptance MARAH MOYER DU Patient educated on safety with functional mobility/transfers and UE strengthening  program. EN 12/20/17 0845 Done                      User Key     Initials Effective Dates Name Provider Type Discipline    EN 06/22/16 -  Kaitlynn Myles, OTR Occupational Therapist OT    POLLY 06/22/16 -  Antonette Fletcher, OTR Occupational Therapist OT                  OT Recommendation and Plan  Anticipated Equipment Needs At Discharge: wheelchair  Anticipated Discharge Disposition: home with home health, home with assist  Planned Therapy Interventions: adaptive equipment training, ADL retraining, strengthening, transfer training  Therapy Frequency: 3 times/wk  Plan of Care Review  Plan Of Care Reviewed With: patient  Outcome Summary/Follow up Plan: OT: pt supervsion for functional transfers with rolling walker. pt supervison for tb adls from chair level. pt cga performing lb adls in standing with no LOB and adhering to TWB on R LE with rolling walker for stability.         Outcome Measures       12/19/17 1325 12/19/17 0858       How much difficulty does the patient currently have...    Turning from your back to your side while in flat bed without using bedrails? 3  -KM 3  -JW     Standing up from a chair using your arms (e.g., wheelchair, bedside chair)? 3  -KM 3  -JW     Moving from lying on back to sitting on the side of a flat bed without bedrails? 3  -KM 3  -JW     How much help from another person do you currently need...    Moving to and from a bed to a chair (including a wheelchair)? 3  -KM 3  -JW     Climbing 3-5 steps with a railing? 1  -KM 1  -JW     To walk in hospital room? 3  -KM 3  -JW     AM-PAC 6 Clicks Score 16  -KM 16  -     Functional Assessment    Outcome Measure Options AM-PAC 6 Clicks Basic Mobility (PT)  -KM AM-PAC 6 Clicks Basic Mobility (PT)  -JW       User Key  (r) = Recorded By, (t) = Taken By, (c) = Cosigned By    Initials Name Provider Type    CLAY Cole, DAXA Physical Therapy Assistant    YVONNE Barragan, PT Physical Therapist           Time Calculation:         Time  Calculation- OT       12/21/17 1351          Time Calculation- OT    OT Start Time 1019  -POLLY      OT Stop Time 1056  -POLLY      OT Time Calculation (min) 37 min  -POLLY      TCU Minutes- OT 37 min  -POLLY        User Key  (r) = Recorded By, (t) = Taken By, (c) = Cosigned By    Initials Name Provider Type    POLLY Fletcher, OTR Occupational Therapist           Therapy Charges for Today     Code Description Service Date Service Provider Modifiers Qty    95972748885 HC OT SELF CARE/MGMT/TRAIN EA 15 MIN 12/21/2017 Antonette Fletcher, OTR GO 2               Antonette Fletcher, OTR  12/21/2017

## 2017-12-21 NOTE — H&P
Orthopedic Surgery    Patient Care Team:  Andres Kathleen MD as PCP - General (Family Medicine)    CHIEF COMPLAINT: Right hip pain    HISTORY OF PRESENT ILLNESS: 62-year-old female is being admitted to TCU for rehabilitation following surgery for nonunion of right subtrochanteric femur fracture on 12 December patient underwent fibular allografting with cancellus autograft into the nonunion site the right subtrochanteric fracture.  Patient previously had a fractured leah with the leah exchange she's gone onto a nonunion and the procedure performed on 12.  Postoperatively she remained afebrile hemodynamically stable but needed maximum assistance as far as ambulation and therefore being transferred to TCU for continued postop PT OT and weightbearing training.          Past Medical History:   Diagnosis Date   • Cancer     basal cell   • Depression with anxiety    • PONV (postoperative nausea and vomiting)      Past Surgical History:   Procedure Laterality Date   • FIBULA BONE GRAFT TO HIP Right 12/15/2017    Procedure: FIBULA BONE GRAFT TO HIP with cancellus autograft the [place] cable ;  Surgeon: Keyshawn Madden MD;  Location:  [facility] OR;  Service:    • HARDWARE REMOVAL Right 8/15/2017    Procedure: HARDWARE REMOVAL long gamma nail  with long gamma nail right subtrochanteric femur fracture meir vanco powder ;  Surgeon: Keyshawn Madden MD;  Location:  [facility] OR;  Service:    • IM NAILING FEMORAL SHAFT RETROGRADE Right 08/15/2017    replacement leah  fx   • NH OPEN FIX INTER/SUBTROCH FX,IMPLNT Right 4/14/2017    Procedure: FEMUR INTRAMEDULLARY NAILING/RODDING long meir gamma nail 7fr hemovac placement;  Surgeon: Keyshawn Madden MD;  Location:  [facility] OR;  Service: Orthopedics   • TUBAL ABDOMINAL LIGATION       Family History   Problem Relation Age of Onset   • Asthma Mother    • No Known Problems Father      Social History   Substance Use Topics   • Smoking status: Never Smoker   • Smokeless tobacco: Never Used   • Alcohol  "use Yes      Comment: rare     Prescriptions Prior to Admission   Medication Sig Dispense Refill Last Dose   • DULoxetine (CYMBALTA) 60 MG capsule Take 60 mg by mouth Daily.   Unknown at Unknown time   • gabapentin (NEURONTIN) 100 MG capsule Take 2 capsules by mouth Every 8 (Eight) Hours. 900 capsule 0 Unknown at Unknown time   • oxyCODONE-acetaminophen (PERCOCET)  MG per tablet Take 1 tablet by mouth Every 4 (Four) Hours As Needed for Moderate Pain  or Severe Pain  for up to 7 days. 60 tablet 0 Unknown at Unknown time   • rivaroxaban (XARELTO) 10 MG tablet Take 1 tablet by mouth Daily. 30 tablet 0 Unknown at Unknown time     Allergies:  Review of patient's allergies indicates no known allergies.    REVIEW OF SYSTEMS:  Please see the above history of present illness for pertinent positives and negatives.  The remainder of the patient's systems have been reviewed and are negative.    Vital Signs  Temp:  [97.8 °F (36.6 °C)-98.7 °F (37.1 °C)] 98.7 °F (37.1 °C)  Heart Rate:  [100-105] 100  Resp:  [18] 18  BP: (101-105)/(60-67) 105/67    Flowsheet Rows         First Filed Value    Admission Height  154.9 cm (61\") Documented at 12/19/2017 1426    Admission Weight  74.8 kg (165 lb) Documented at 12/19/2017 1426           Physical Exam:  Physical Exam   Constitutional: Patient appears well-developed and well-nourished and in no acute distress   HEENT:   Head: Normocephalic and atraumatic.   Eyes:  Pupils are equal, round, and reactive to light.  Mouth and Throat: Patient has moist mucous membranes. Oropharynx is clear of any erythema or exudate.     Neck: Neck supple. No JVD present. No thyromegaly present. No lymphadenopathy present.  Cardiovascular: Regular rate, regular rhythm.  Pulmonary/Chest: Lungs are clear to auscultation bilaterally.  Abdominal:benign,soft with bowel sounds  Musculoskeletal: Normal posture.  Extremities: Right leg and distal pulses no motor deficit no sensory loss.  Wound is benign her calf " is nontender negative Hom[place].    Neurological: Patient is alert and oriented.  Psychological:   Mood and behavior appropriate.  Skin: Skin is warm and dry.     Results Review:    I reviewed the patient's new clinical results.  Lab Results (most recent)     None          Imaging Results (most recent)     None        ECG/EMG Results (most recent)     None            Assessment/Plan  status post fibular allografting cancellus autograft to right subtrochanteric nonunion        I discussed the patients findings and my recommendations with patient and plan to continue to postoperative rehabilitation and TCU to the patient is independent with gait before going home    Keyshawn Madden MD  12/21/17  7:46 AM

## 2017-12-21 NOTE — PLAN OF CARE
Problem: Patient Care Overview (Adult)  Goal: Plan of Care Review   12/21/17 1119   Coping/Psychosocial Response Interventions   Plan Of Care Reviewed With patient   Patient Care Overview   Progress improving   Outcome Evaluation   Outcome Summary/Follow up Plan PT: Patient performs supine to sit transfer with conditional independence, sit to/from stand transfers with supervision with RW and gait x 45 feet with RW with SBA. Patient able to maintain TTWB status without verbal cues with all transfers and gait. Patient performed w/c mobility x 140 feet with supervision. Able to manage external obstacles, doorways and directional changes without assist. Patient able to perform LE HEP independently with use of written HEP. Plan to see patient one additional visit and discharge from PT on 12/12/17. Patient denies concerns for return home. Discharge planner notified.

## 2017-12-22 PROBLEM — S72.21XG: Status: RESOLVED | Noted: 2017-12-22 | Resolved: 2017-12-22

## 2017-12-22 PROBLEM — S72.21XG: Status: ACTIVE | Noted: 2017-12-22

## 2017-12-22 PROCEDURE — 97110 THERAPEUTIC EXERCISES: CPT

## 2017-12-22 PROCEDURE — 99024 POSTOP FOLLOW-UP VISIT: CPT | Performed by: ORTHOPAEDIC SURGERY

## 2017-12-22 PROCEDURE — 97535 SELF CARE MNGMENT TRAINING: CPT

## 2017-12-22 RX ORDER — GABAPENTIN 100 MG/1
200 CAPSULE ORAL EVERY 8 HOURS SCHEDULED
Qty: 90 CAPSULE | Refills: 0 | Status: SHIPPED | OUTPATIENT
Start: 2017-12-22 | End: 2018-01-24

## 2017-12-22 RX ORDER — OXYCODONE AND ACETAMINOPHEN 10; 325 MG/1; MG/1
1 TABLET ORAL EVERY 4 HOURS PRN
Qty: 60 TABLET | Refills: 0 | Status: SHIPPED | OUTPATIENT
Start: 2017-12-22 | End: 2017-12-29

## 2017-12-22 RX ADMIN — RIVAROXABAN 10 MG: 10 TABLET, FILM COATED ORAL at 08:35

## 2017-12-22 RX ADMIN — GABAPENTIN 200 MG: 100 CAPSULE ORAL at 15:39

## 2017-12-22 RX ADMIN — DULOXETINE HYDROCHLORIDE 60 MG: 60 CAPSULE, DELAYED RELEASE ORAL at 08:35

## 2017-12-22 RX ADMIN — GABAPENTIN 200 MG: 100 CAPSULE ORAL at 21:24

## 2017-12-22 RX ADMIN — GABAPENTIN 200 MG: 100 CAPSULE ORAL at 06:35

## 2017-12-22 NOTE — THERAPY TREATMENT NOTE
SNF - Occupational Therapy Treatment Note  [place]     Patient Name: Raven Young  : 1955  MRN: [id number]  Today's Date: 2017  Onset of Illness/Injury or Date of Surgery Date: 17 (Transfer to SNF)  Date of Referral to OT: 17  Referring Physician: Echo FERGUSON      Admit Date: 2017    Visit Dx:   No diagnosis found.  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   • Closed left subtrochanteric femur fracture   • Subtrochanteric fracture, closed, right, with nonunion, subsequent encounter             Adult Rehabilitation Note       17 0845 17 1019 17 0945    Rehab Assessment/Intervention    Discipline occupational therapist  -JJ occupational therapist  -JJ physical therapist  -BP    Document Type therapy note (daily note)  -JJ therapy note (daily note)  -JJ therapy note (daily note)  -BP    Subjective Information agree to therapy;no complaints  -JJ agree to therapy;no complaints  -JJ agree to therapy;no complaints  -BP    Patient Effort, Rehab Treatment good  -JJ good  -JJ good  -BP    Symptoms Noted During/After Treatment increased pain   pt states increased pain w movement, not rated  -JJ increased pain   with movement  -JJ increased pain  -BP    Symptoms Noted Comment   Increased pain with mobility  -BP    Precautions/Limitations   fall precautions;other (see comments)   TTWB R LE  -BP    Specific Treatment Considerations   Patient states she is feeling better today. She states she feels ready to go home, she denies concerns at this time. Plan to discharge from PT on 17  -BP    Recorded by [JJ] Antonette Fletcher, OTR [JJ] Antonette Fletcher, OTR [BP] Nery Tolliver, PT    Pain Assessment    Pain Assessment 0-10  -JJ 0-10  -JJ 0-10  -BP    Pain Score --   no pain at rest, incres pain w movement, not rated   "-JJ --   states \"ok\" at rest, increased with movment, not rated  - --   c/o pain with mobilty. Does not rate  -BP    Pain Type Acute pain;Surgical pain  -JJ Acute pain;Surgical pain  -J Acute pain;Surgical pain  -BP    Pain Location Hip  -JJ Hip  -JJ Hip  -BP    Pain Orientation Right  -JJ Right  -JJ Right  -BP    Pain Intervention(s) Repositioned  -JJ Repositioned  -JJ Repositioned;Medication (See MAR)   pre medicated for treatment  -BP    Response to Interventions tolerated  -JJ tolerated  -JJ tolerated  -BP    Recorded by [JJ] Antonette Fletcher, OTR [JJ] Antonette Fletcher, OTR [BP] Nery Tolliver, PT    Cognitive Assessment/Intervention    Personal Safety Interventions gait belt;nonskid shoes/slippers when out of bed  - gait belt;nonskid shoes/slippers when out of bed  - fall prevention program maintained  -BP    Recorded by [JJ] Antonette Fletcher, OTR [JJ] Antonette Fletcher, OTR [BP] Nery Tolliver, PT    Mobility Assessment/Training    Additional Documentation   Wheelchair Training/Management (Group)  -BP    Recorded by   [BP] Nery Tolliver, PT    Bed Mobility, Assessment/Treatment    Bed Mobility, Assistive Device head of bed elevated;bed rails  -  head of bed elevated  -BP    Bed Mob, Supine to Sit, [place] conditional independence  -  conditional independence  -BP    Bed Mob, Sit to Supine, [place] contact guard assist  -      Bed Mobility, Comment  deferred up in chair  -     Recorded by [JJ] Antonette Fletcher, OTR [JJ] Antonette Fletcher, OTR [BP] Nery Tolliver, PT    Transfer Assessment/Treatment    Transfers, Sit-Stand [place] supervision required  -JJ supervision required  - supervision required  -BP    Transfers, Stand-Sit [place] supervision required  -JJ supervision required  - supervision required  -BP    Transfers, Sit-Stand-Sit, Assist Device rolling walker  -JJ rolling walker  -JJ rolling walker  -BP    Toilet Transfer, " [place]   supervision required  -BP    Toilet Transfer, Assistive Device   rolling walker;elevated toilet seat  -BP    Transfer, Maintain Weight Bearing Status   able to maintain weight bearing status  -BP    Transfer, Comment  pt supervison x several trails from recliner chair during adl, with no LOB and adhering to TTWB  -JJ Patient demonstrates proper hand placement with each transfer  -BP    Recorded by [JJ] Antonette Fletcher, OTR [JJ] Antonette Fletcher, OTR [BP] Nery Tolliver, PT    Gait Assessment/Treatment    Gait, [place] Level   stand by assist  -BP    Gait, Assistive Device   rolling walker  -BP    Gait, Distance (Feet)   45  -BP    Gait, Gait Deviations   nish decreased;forward flexed posture  -BP    Gait, Maintain Weight Bearing Status   able to maintain weight bearing status  -BP    Gait, Comment   Patient demonstrates improved safety with use of RW. Navigates directional changes and external obstacles without LOB or verbal cues for safety.   -BP    Recorded by   [BP] Nery Tolliver, CRISTIAN    Wheelchair Training/Management    Wheelchair Transfer Type   stand pivot transfer  -BP    Wheelchair Task Training   armrest management;footrest management;leg rest management;wheel lock management  -BP    Wheelchair Propulsion Training   moving through doorways;turning wheelchair;forward propulsion;steering wheelchair;other (see comments)   around obstacles   -BP    Wheelchair Propulsion Training Comment   performs with supervision  -BP    Wheelchair, Distance Propelled   140  -BP    Wheelchair Safety Comment   Verbal cues initially for navigation directional changes. Able to demonstrate carry over and perform with supervision  -BP    Recorded by   [BP] Nery Tolliver, PT    Upper Body Bathing Assessment/Training    UB Bathing Assess/Train, [place] Level  supervision required   with exception required assist with back  -JJ     Recorded by  [JJ] Antonette Fletcher, OTR     Lower  Body Bathing Assessment/Training    LB Bathing Assess/Train, [place] Level  supervision required  -JJ     LB Bathing Assess/Train, Comment  pt cga for bathing [place] in  standing, no LOB maintained TTWB  -JJ     Recorded by  [JJ] Antonette Fletcher, OTR     Upper Body Dressing Assessment/Training    UB Dressing Assess/Train, [place]  supervision required  -JJ     Recorded by  [JJ] Antonette Fletcher, OTR     Lower Body Dressing Assessment/Training    LB Dressing Assess/Train, [place]  supervision required;contact guard assist  -JJ     LB Dressing Assess/Train, Comment  pt required cga for pulling pants up over hips in standing with rolling walker  -JJ     Recorded by  [JJ] Antonette Fletcher, OTR     Motor Skills/Interventions    Additional Documentation   Balance Skills Training (Group)  -BP    Recorded by   [BP] Nery Tolliver, PT    Balance Skills Training    Standing-Level of Assistance   Close supervision  -BP    Static Standing Balance Support   assistive device   dynamic standing, One UE unsupported  -BP    Recorded by   [BP] Nery Tolliver, PT    Positioning and Restraints    Pre-Treatment Position in bed  -JJ sitting in chair/recliner  -JJ in bed  -BP    Post Treatment Position bed  -JJ chair  -JJ chair  -BP    In Bed supine;call light within reach  -JJ      In Chair  reclined;call light within reach;encouraged to call for assist  -JJ notified nsg;reclined;call light within reach;encouraged to call for assist  -BP    Recorded by [JJ] Antonette Fletcher, OTR [JJ] Antonette Fletcher, OTR [BP] Nery Tolliver, PT      12/19/17 1325          Rehab Assessment/Intervention    Discipline physical therapy assistant  -KM      Document Type therapy note (daily note);discharge summary  -KM      Subjective Information agree to therapy  -KM      Patient Effort, Rehab Treatment good  -KM      Symptoms Noted During/After Treatment --   reports soreness if leg hangs down-   -KM       Specific Treatment Considerations pt requires encouragement to not use UEs and LLE to assist RLE.  PT is TDWB  -KM      Recorded by [KM] Griselda Cole PTA      Pain Assessment    Pain Assessment 0-10  -KM      Pain Score 1   during ex  -KM      Post Pain Score --   did not rate during ambulation  -KM      Pain Type Acute pain;Surgical pain  -KM      Pain Location Hip  -KM      Pain Orientation Right  -KM      Pain Intervention(s) Medication (See MAR);Repositioned  -KM      Response to Interventions tolerated  -KM      Recorded by [KM] Griselda Cole PTA      Cognitive Assessment/Intervention    Personal Safety Interventions gait belt;nonskid shoes/slippers when out of bed  -KM      Recorded by [KM] Griselda Cole PTA      Bed Mobility, Assessment/Treatment    Bed Mob, Supine to Sit, [place] contact guard assist;verbal cues required;supervision required  -KM      Bed Mob, Sit to Supine, [place] contact guard assist;verbal cues required;supervision required  -KM      Bed Mobility, Comment pt able to scoot to edge of bed but requires encouragement to use RLE and not use UEs and LLE to move RLE.    -KM      Recorded by [KM] Griselda Cole PTA      Transfer Assessment/Treatment    Transfers, Sit-Stand [place] contact guard assist;verbal cues required;stand by assist  -KM      Transfers, Stand-Sit [place] stand by assist;verbal cues required  -KM      Transfers, Sit-Stand-Sit, Assist Device standard walker  -KM      Transfer, Comment pt requires cues not to pull up on walker and to reach back when she reaches her destination  -KM      Recorded by [KM] Griselda Cole PTA      Gait Assessment/Treatment    Gait, [place] Level stand by assist;contact guard assist;verbal cues required  -KM      Gait, Assistive Device standard walker  -KM      Gait, Distance (Feet) 22  -KM      Gait, Comment pt able to maintain TDWB with ambulation.  Limited gait due to pt had just returned to bed with nursing  -KM       Recorded by [KM] Griselda Cole PTA      Stairs Assessment/Treatment    Stairs, Comment pt reports she uses a stool to get into her bed.  States she thinks she can hop on stool and then hop into bed.  Discussed taking mattress and springs off bed frame or buying a lower bed frame so transfer into and out of bed would be safer.  Pt reports her spouse sleeps in a hospital bed and there are no other beds in the house.  -KM      Recorded by [KM] Griselda Cole PTA      Therapy Exercises    Bilateral Lower Extremities 10 reps;ankle pumps/circles;glut sets;heel slides;hip abduction/adduction;quad sets  -KM      Recorded by [KM] Griselda Cole PTA      Positioning and Restraints    Pre-Treatment Position in bed  -KM      Post Treatment Position bed  -KM      In Bed supine;call light within reach;side rails up x2;R heel elevated  -KM      Recorded by [KM] Griselda Cole PTA        User Key  (r) = Recorded By, (t) = Taken By, (c) = Cosigned By    Initials Name Effective Dates    KM Griselda Cole PTA 08/11/15 -     J Antonette Fletcher, OTR 06/22/16 -     BP Nery Tolliver, PT 12/01/15 -                 OT Goals       12/22/17 0914 12/20/17 1400       Transfer Training OT STG    Transfer Training OT STG, Date Established 12/20/17  - 12/20/17  -EN     Transfer Training OT STG, Time to Achieve  2 - 3 days  -EN     Transfer Training OT STG, Activity Type  sit to stand/stand to sit;toilet  -EN     Transfer Training OT STG, [place] Level  supervision required  -EN     Transfer Training OT STG, Assist Device  walker, rolling  -EN     Transfer Training OT STG, Date Goal Reviewed 12/22/17  -      Transfer Training OT STG, Outcome goal met  -      Patient Education OT STG    Patient Education OT STG, Date Established  12/20/17  -EN     Patient Education OT STG, Time to Achieve  2 - 3 days  -EN     Patient Education OT STG, Education Type  HEP  -EN     Patient Education OT STG, Education Understanding  demonstrates  adequately  -EN     Patient Education OT STG, Date Goal Reviewed 12/22/17  -      Patient Education OT STG Outcome goal met  -      ADL OT LTG    ADL OT LTG, Date Established  12/20/17  -EN     ADL OT LTG, Time to Achieve  1 wk  -EN     ADL OT LTG, Activity Type  ADL skills  -EN     ADL OT LTG, Additional Goal  Patient perform sink side bathing/dressing with supervision and adaptive equipment as needed.  -EN     ADL OT LTG, Date Goal Reviewed 12/22/17  -      ADL OT LTG, Outcome goal met  -        User Key  (r) = Recorded By, (t) = Taken By, (c) = Cosigned By    Initials Name Provider Type    EN Kaitlynn Myles, OTR Occupational Therapist    POLLY Fletcher, OTR Occupational Therapist          Occupational Therapy Education     Title: PT OT SLP Therapies (Done)     Topic: Occupational Therapy (Resolved)     Point: ADL training (Resolved)    Description: Instruct learner(s) on proper safety adaptation and remediation techniques during self care or transfers.   Instruct in proper use of assistive devices.    Learning Progress Summary    Learner Readiness Method Response Comment Documented by Status   Patient Acceptance E ROSALINA,LUZ pt educated on home safety safety with functional trasnfers, mobility and adls adhering to TTWB  12/22/17 0911 Done    Acceptance E VU pt educated on adls, long hnadled ae, and safety with functional transfers  12/21/17 1349 Done    Acceptance E,D LUZ ROMANO Patient educated on safety with functional mobility/transfers and UE strengthening program. EN 12/20/17 0845 Done               Point: Home exercise program (Resolved)    Description: Instruct learner(s) on appropriate technique for monitoring, assisting and/or progressing therapeutic exercises/activities.    Learning Progress Summary    Learner Readiness Method Response Comment Documented by Status   Patient Acceptance E ROSALINA,LUZ pt educated on home safety safety with functional trasnfers, mobility and adls adhering to TTWB  JJ 12/22/17 0911 Done    Acceptance E,LUZ FORDE Patient educated on safety with functional mobility/transfers and UE strengthening program. EN 12/20/17 0845 Done                      User Key     Initials Effective Dates Name Provider Type Discipline    EN 06/22/16 -  Kaitlynn Myles, OTR Occupational Therapist OT    JJ 06/22/16 -  Antonette Fletcher, OTR Occupational Therapist OT                  OT Recommendation and Plan  Anticipated Equipment Needs At Discharge: wheelchair  Anticipated Discharge Disposition: home with assist  Planned Therapy Interventions: adaptive equipment training, ADL retraining, strengthening, transfer training  Therapy Frequency: 3 times/wk  Plan of Care Review  Plan Of Care Reviewed With: patient  Outcome Summary/Follow up Plan: OT: pt sba for [id number] transfers and mobility with rolling walker, adhering to TTWB on R LE. pt is sba for adls with long handled ae when needed. pt has no concerns with returning home. anticipated discharge home with family tomorrow        Outcome Measures       12/19/17 1325          How much difficulty does the patient currently have...    Turning from your back to your side while in flat bed without using bedrails? 3  -KM      Standing up from a chair using your arms (e.g., wheelchair, bedside chair)? 3  -KM      Moving from lying on back to sitting on the side of a flat bed without bedrails? 3  -KM      How much help from another person do you currently need...    Moving to and from a bed to a chair (including a wheelchair)? 3  -KM      Climbing 3-5 steps with a railing? 1  -KM      To walk in hospital room? 3  -KM      AM-PAC 6 Clicks Score 16  -KM      Functional Assessment    Outcome Measure Options AM-PAC 6 Clicks Basic Mobility (PT)  -KM        User Key  (r) = Recorded By, (t) = Taken By, (c) = Cosigned By    Initials Name Provider Type    CLAY Cole, DAXA Physical Therapy Assistant           Time Calculation:         Time Calculation- OT        12/22/17 0920          Time Calculation- OT    OT Start Time 0845  -POLLY      OT Stop Time 0903  -POLLY      OT Time Calculation (min) 18 min  -POLLY      TCU Minutes- OT 18 min  -POLLY        User Key  (r) = Recorded By, (t) = Taken By, (c) = Cosigned By    Initials Name Provider Type    POLLY Fletcher, OTR Occupational Therapist           Therapy Charges for Today     Code Description Service Date Service Provider Modifiers Qty    27877978650 HC OT SELF CARE/MGMT/TRAIN EA 15 MIN 12/21/2017 ANDRE Blue GO 2    90362787610 HC OT SELF CARE/MGMT/TRAIN EA 15 MIN 12/22/2017 ANDRE Blue GO 1               ANDRE Blue  12/22/2017

## 2017-12-22 NOTE — PLAN OF CARE
Problem: Inpatient Physical Therapy  Goal: Bed Mobility Goal STG- PT  Outcome: Outcome(s) achieved Date Met: 12/22/17 12/20/17 1050 12/22/17 1039   Bed Mobility PT STG   Bed Mobility PT STG, Date Established 12/20/17 --    Bed Mobility PT STG, Time to Achieve 5 - 7 days --    Bed Mobility PT STG, Activity Type supine to sit/sit to supine --    Bed Mobility PT STG, [place] Level conditional independence --    Bed Mobility PT STG, Date Goal Reviewed --  12/22/17   Bed Mobility PT STG, Outcome --  goal met     Goal: Transfer Training Goal 1 STG- PT  Outcome: Outcome(s) achieved Date Met: 12/22/17 12/20/17 1050 12/22/17 1039   Transfer Training PT STG   Transfer Training PT STG, Date Established 12/20/17 --    Transfer Training PT STG, Time to Achieve 5 - 7 days --    Transfer Training PT STG, Activity Type sit to stand/stand to sit --    Transfer Training PT STG, [place] Level conditional independence --    Transfer Training PT STG, Assist Device walker, rolling --    Transfer Training PT STG, Date Goal Reviewed --  12/22/17   Transfer Training PT STG, Outcome --  goal met     Goal: Gait Training Goal STG- PT  Outcome: Outcome(s) achieved Date Met: 12/22/17 12/20/17 1050 12/22/17 1039   Gait Training PT STG   Gait Training Goal PT STG, Date Established 12/20/17 --    Gait Training Goal PT STG, Time to Achieve 5 - 7 days --    Gait Training Goal PT STG, [place] Level supervision required --    Gait Training Goal PT STG, Assist Device walker, rolling --    Gait Training Goal PT STG, Distance to Achieve 75 --    Gait Training Goal PT STG, Date Goal Reviewed --  12/22/17   Gait Training Goal PT STG, Outcome --  goal met     Goal: Patient Education Goal STG- PT  Outcome: Outcome(s) achieved Date Met: 12/22/17 12/20/17 1050 12/22/17 1039   Patient Education PT STG   Patient Education PT STG, Date Established 12/20/17 --    Patient Education PT STG, Time to Achieve 5 - 7 days --    Patient Education PT  STG, Education Type written program;HEP --    Patient Education PT STG, Education Understanding demonstrate adequately;verbalize understanding --    Patient Education PT STG, Date Goal Reviewed --  12/22/17   Patient Education PT STG Outcome --  goal met

## 2017-12-22 NOTE — PLAN OF CARE
Problem: Patient Care Overview (Adult)  Goal: Plan of Care Review   12/22/17 0914   Coping/Psychosocial Response Interventions   Plan Of Care Reviewed With patient   Outcome Evaluation   Outcome Summary/Follow up Plan OT: pt sba for [id number] transfers and mobility with rolling walker, adhering to TTWB on R LE. pt is sba for adls with long handled ae when needed. pt has no concerns with returning home. anticipated discharge home with family tomorrow       Problem: Inpatient Occupational Therapy  Goal: Transfer Training Goal 1 STG- OT  Outcome: Outcome(s) achieved Date Met: 12/22/17 12/20/17 1400 12/22/17 0914   Transfer Training OT STG   Transfer Training OT STG, Date Established --  12/20/17   Transfer Training OT STG, Time to Achieve 2 - 3 days --    Transfer Training OT STG, Activity Type sit to stand/stand to sit;toilet --    Transfer Training OT STG, [place] Level supervision required --    Transfer Training OT STG, Assist Device walker, rolling --    Transfer Training OT STG, Date Goal Reviewed --  12/22/17   Transfer Training OT STG, Outcome --  goal met     Goal: Patient Education Goal STG- OT  Outcome: Outcome(s) achieved Date Met: 12/22/17 12/20/17 1400 12/22/17 0914   Patient Education OT STG   Patient Education OT STG, Date Established 12/20/17 --    Patient Education OT STG, Time to Achieve 2 - 3 days --    Patient Education OT STG, Education Type HEP --    Patient Education OT STG, Education Understanding demonstrates adequately --    Patient Education OT STG, Date Goal Reviewed --  12/22/17   Patient Education OT STG Outcome --  goal met     Goal: ADL Goal LTG- OT  Outcome: Outcome(s) achieved Date Met: 12/22/17 12/20/17 1400 12/22/17 0914   ADL OT LTG   ADL OT LTG, Date Established 12/20/17 --    ADL OT LTG, Time to Achieve 1 wk --    ADL OT LTG, Activity Type ADL skills --    ADL OT LTG, Additional Goal Patient perform sink side bathing/dressing with supervision and adaptive equipment  as needed. --    ADL OT LTG, Date Goal Reviewed --  12/22/17   ADL OT LTG, Outcome --  goal met

## 2017-12-22 NOTE — THERAPY DISCHARGE NOTE
"SNF - Physical Therapy Treatment Note/Discharge  SHIRA Deya Causey     Patient Name: Raven Young  : 1955  MRN: [id number]  Today's Date: 2017  Onset of Illness/Injury or Date of Surgery Date: 17 (Transfer to SNF)  Date of Referral to PT: 17  Referring Physician: Echo FERGUSON    Admit Date: 2017    Visit Dx:  No diagnosis found.  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   • Closed left subtrochanteric femur fracture   • Subtrochanteric fracture, closed, right, with nonunion, subsequent encounter                 Adult Rehabilitation Note       17 0950 17 0845 17 1019    Rehab Assessment/Intervention    Discipline physical therapist  -JW occupational therapist  -JJ occupational therapist  -JJ    Document Type therapy note (daily note);discharge summary  -JW therapy note (daily note)  -JJ therapy note (daily note)  -JJ    Subjective Information agree to therapy;complains of;pain  -JW agree to therapy;no complaints  -JJ agree to therapy;no complaints  -JJ    Patient Effort, Rehab Treatment good  -JW good  -JJ good  -JJ    Symptoms Noted During/After Treatment increased pain   increased pain with mobility  -JW increased pain   pt states increased pain w movement, not rated  -JJ increased pain   with movement  -JJ    Precautions/Limitations fall precautions;other (see comments)   TTWB right LE  -JW      Specific Treatment Considerations pt states she has no concerns regarding return home  -JW      Recorded by [YVONNE] Jessa Barragan, PT [JJ] Antonette Fletcher, OTR [JJ] Antonette Fletcher, OTR    Pain Assessment    Pain Assessment --   reports pain in right hip, does not rate  -JW 0-10  -JJ 0-10  -JJ    Pain Score  --   no pain at rest, incres pain w movement, not rated  -JJ --   states \"ok\" at rest, increased with movment, not " rated  -JJ    Pain Type Acute pain;Surgical pain  - Acute pain;Surgical pain  -J Acute pain;Surgical pain  -    Pain Location Hip  - Hip  -JJ Hip  -JJ    Pain Orientation Right  - Right  -JJ Right  -JJ    Pain Descriptors Aching  -      Pain Frequency Intermittent  -      Pain Intervention(s) Repositioned  -JW Repositioned  -JJ Repositioned  -JJ    Response to Interventions tolerated  -JW tolerated  -JJ tolerated  -JJ    Recorded by [JW] Jessa Barragan, PT [JJ] Antonette Fletcher, OTR [JJ] Antonette Fletcher, OTR    Cognitive Assessment/Intervention    Personal Safety Interventions gait belt;nonskid shoes/slippers when out of bed  - gait belt;nonskid shoes/slippers when out of bed  -JJ gait belt;nonskid shoes/slippers when out of bed  -JJ    Recorded by [JW] Jessa Barragan, PT [JJ] Antonette Fletcher, OTR [JJ] Antonette Fletcher, OTR    Bed Mobility, Assessment/Treatment    Bed Mobility, Assistive Device head of bed elevated  - head of bed elevated;bed rails  -     Bed Mob, Supine to Sit, [place] conditional independence  - conditional independence  -     Bed Mob, Sit to Supine, [place]  contact guard assist  -     Bed Mobility, Comment   deferred up in chair  -JJ    Recorded by [JW] Jessa Barragan, PT [JJ] Antonette Fletcher, OTR [JJ] Antonette Fletcher, OTR    Transfer Assessment/Treatment    Transfers, Sit-Stand [place] conditional independence  - supervision required  - supervision required  -J    Transfers, Stand-Sit [place] conditional independence  - supervision required  - supervision required  -    Transfers, Sit-Stand-Sit, Assist Device rolling walker  - rolling walker  - rolling walker  -    Transfer, Maintain Weight Bearing Status able to maintain weight bearing status  -      Transfer, Comment pt requires no verbal cues for sequencing/hand placement  -  pt supervison x several trails from recliner chair during adl, with  no LOB and adhering to TTWB  -JJ    Recorded by [JW] Jessa Barragan, PT [JJ] Antonette Fletcher, OTR [JJ] Antonette Fletcher, OTR    Gait Assessment/Treatment    Gait, [place] Level conditional independence  -      Gait, Assistive Device rolling walker  -      Gait, Distance (Feet) 80  -JW      Gait, Gait Deviations nish decreased  -      Gait, Maintain Weight Bearing Status able to maintain weight bearing status  -      Gait, Comment pt able to navigate external obstacles, no episodes of balance loss noted  -JW      Recorded by [JW] Jessa Barragan, PT      Upper Body Bathing Assessment/Training    UB Bathing Assess/Train, [place] Level   supervision required   with exception required assist with back  -JJ    Recorded by   [JJ] Antonette Fletcher OTR    Lower Body Bathing Assessment/Training    LB Bathing Assess/Train, [place] Level   supervision required  -JJ    LB Bathing Assess/Train, Comment   pt cga for bathing periarea in  standing, no LOB maintained TTWB  -JJ    Recorded by   [JJ] Antonette Fletcher OTR    Upper Body Dressing Assessment/Training    UB Dressing Assess/Train, [place]   supervision required  -JJ    Recorded by   [JJ] Antonette Fletcher, OTR    Lower Body Dressing Assessment/Training    LB Dressing Assess/Train, [place]   supervision required;contact guard assist  -JJ    LB Dressing Assess/Train, Comment   pt required cga for pulling pants up over hips in standing with rolling walker  -JJ    Recorded by   [JJ] Antonette Fletcher OTR    Positioning and Restraints    Pre-Treatment Position in bed  - in bed  -JJ sitting in chair/recliner  -JJ    Post Treatment Position chair  - bed  -JJ chair  -JJ    In Bed  supine;call light within reach  -JJ     In Chair reclined;call light within reach;encouraged to call for assist  -JW  reclined;call light within reach;encouraged to call for assist  -JJ    Recorded by [JW] Jessa Barragan, PT [JJ]  Antonette [place], OTR [JJ] Antonette Fletcher, OTR      12/21/17 0945 12/19/17 1325       Rehab Assessment/Intervention    Discipline physical therapist  -BP physical therapy assistant  -KM     Document Type therapy note (daily note)  -BP therapy note (daily note);discharge summary  -KM     Subjective Information agree to therapy;no complaints  -BP agree to therapy  -KM     Patient Effort, Rehab Treatment good  -BP good  -KM     Symptoms Noted During/After Treatment increased pain  -BP --   reports soreness if leg hangs down-   -KM     Symptoms Noted Comment Increased pain with mobility  -BP      Precautions/Limitations fall precautions;other (see comments)   TTWB R LE  -BP      Specific Treatment Considerations Patient states she is feeling better today. She states she feels ready to go home, she denies concerns at this time. Plan to discharge from PT on 12/22/17  -BP pt requires encouragement to not use UEs and LLE to assist RLE.  PT is TDWB  -KM     Recorded by [BP] Nery Tolliver, PT [KM] Griselda Cole PTA     Pain Assessment    Pain Assessment 0-10  -BP 0-10  -KM     Pain Score --   c/o pain with mobilty. Does not rate  -BP 1   during ex  -KM     Post Pain Score  --   did not rate during ambulation  -KM     Pain Type Acute pain;Surgical pain  -BP Acute pain;Surgical pain  -KM     Pain Location Hip  -BP Hip  -KM     Pain Orientation Right  -BP Right  -KM     Pain Intervention(s) Repositioned;Medication (See MAR)   pre medicated for treatment  -BP Medication (See MAR);Repositioned  -KM     Response to Interventions tolerated  -BP tolerated  -KM     Recorded by [BP] Nery Tolliver, PT [KM] Griselda Cole PTA     Cognitive Assessment/Intervention    Personal Safety Interventions fall prevention program maintained  -BP gait belt;nonskid shoes/slippers when out of bed  -KM     Recorded by [BP] Nery Tolliver PT [KM] Griselda Cole PTA     Mobility Assessment/Training    Additional Documentation  Wheelchair Training/Management (Group)  -BP      Recorded by [BP] Nery Tolliver, PT      Bed Mobility, Assessment/Treatment    Bed Mobility, Assistive Device head of bed elevated  -BP      Bed Mob, Supine to Sit, [place] conditional independence  -BP contact guard assist;verbal cues required;supervision required  -KM     Bed Mob, Sit to Supine, [place]  contact guard assist;verbal cues required;supervision required  -KM     Bed Mobility, Comment  pt able to scoot to edge of bed but requires encouragement to use RLE and not use UEs and LLE to move RLE.    -KM     Recorded by [BP] Neyr Tolliver, PT [KM] Griselda Cole PTA     Transfer Assessment/Treatment    Transfers, Sit-Stand [place] supervision required  -BP contact guard assist;verbal cues required;stand by assist  -KM     Transfers, Stand-Sit [place] supervision required  -BP stand by assist;verbal cues required  -KM     Transfers, Sit-Stand-Sit, Assist Device rolling walker  -BP standard walker  -KM     Toilet Transfer, [place] supervision required  -BP      Toilet Transfer, Assistive Device rolling walker;elevated toilet seat  -BP      Transfer, Maintain Weight Bearing Status able to maintain weight bearing status  -BP      Transfer, Comment Patient demonstrates proper hand placement with each transfer  -BP pt requires cues not to pull up on walker and to reach back when she reaches her destination  -KM     Recorded by [BP] Nery Tolliver, PT [KM] Griselda Cole PTA     Gait Assessment/Treatment    Gait, [place] Level stand by assist  -BP stand by assist;contact guard assist;verbal cues required  -KM     Gait, Assistive Device rolling walker  -BP standard walker  -KM     Gait, Distance (Feet) 45  -BP 22  -KM     Gait, Gait Deviations nish decreased;forward flexed posture  -BP      Gait, Maintain Weight Bearing Status able to maintain weight bearing status  -BP      Gait, Comment Patient demonstrates improved safety with  use of RW. Navigates directional changes and external obstacles without LOB or verbal cues for safety.   -BP pt able to maintain TDWB with ambulation.  Limited gait due to pt had just returned to bed with nursing  -KM     Recorded by [BP] Nery Tolliver, PT [KM] Griselda Cole PTA     Stairs Assessment/Treatment    Stairs, Comment  pt reports she uses a stool to get into her bed.  States she thinks she can hop on stool and then hop into bed.  Discussed taking mattress and springs off bed frame or buying a lower bed frame so transfer into and out of bed would be safer.  Pt reports her spouse sleeps in a hospital bed and there are no other beds in the house.  -KM     Recorded by  [KM] Griselda Cole PTA     Wheelchair Training/Management    Wheelchair Transfer Type stand pivot transfer  -BP      Wheelchair Task Training armrest management;footrest management;leg rest management;wheel lock management  -BP      Wheelchair Propulsion Training moving through doorways;turning wheelchair;forward propulsion;steering wheelchair;other (see comments)   around obstacles   -BP      Wheelchair Propulsion Training Comment performs with supervision  -BP      Wheelchair, Distance Propelled 140  -BP      Wheelchair Safety Comment Verbal cues initially for navigation directional changes. Able to demonstrate carry over and perform with supervision  -BP      Recorded by [BP] Nery Tolliver PT      Motor Skills/Interventions    Additional Documentation Balance Skills Training (Group)  -BP      Recorded by [BP] Nery Tolliver, CRISTIAN      Balance Skills Training    Standing-Level of Assistance Close supervision  -BP      Static Standing Balance Support assistive device   dynamic standing, One UE unsupported  -BP      Recorded by [BP] Nery Tolliver PT      Therapy Exercises    Bilateral Lower Extremities  10 reps;ankle pumps/circles;glut sets;heel slides;hip abduction/adduction;quad sets  -KM     Recorded by  [KM] Griselda Cole PTA      Positioning and Restraints    Pre-Treatment Position in bed  -BP in bed  -KM     Post Treatment Position chair  -BP bed  -KM     In Bed  supine;call light within reach;side rails up x2;R heel elevated  -KM     In Chair notified nsg;reclined;call light within reach;encouraged to call for assist  -BP      Recorded by [BP] Nery Tolliver, PT [KM] Griseldajesica Cole, PTA       User Key  (r) = Recorded By, (t) = Taken By, (c) = Cosigned By    Initials Name Effective Dates     Griselda GABRIELA Cole, PTA 08/11/15 -     JJ Antonette Fletcher, OTR 06/22/16 -     BP Nery Tolliver, PT 12/01/15 -     YVONNE Barragan, PT 12/01/15 -           PT Recommendation and Plan  Anticipated Equipment Needs At Discharge: front wheeled walker  Anticipated Discharge Disposition: home with home health  Planned Therapy Interventions: bed mobility training, gait training, home exercise program, transfer training, ROM (Range of Motion), strengthening, patient/family education, wheelchair management/propulsion training  PT Frequency: daily, 5 times/wk  Plan of Care Review  Plan Of Care Reviewed With: patient  Outcome Summary/Follow up Plan: PT: Patient able to perform transfers  with conditional independence.  Patient able to perform gait, TTWB with rolling walker with conditional independence x60 feet with good safety awareness and no episodes of balance loss.  Patient reports no concerns regarding return home at this time.  Patient will be discharged from physical therapy at this time.         Time Calculation:         PT Charges       12/22/17 1041          Time Calculation    Start Time 0950  -      Stop Time 1005  -      Time Calculation (min) 15 min  -      PT Received On 12/22/17  -      Time Calculation- PT    TCU Minutes- PT 15 min  -        User Key  (r) = Recorded By, (t) = Taken By, (c) = Cosigned By    Initials Name Provider Type    YVONNE Barragan PT Physical Therapist          Therapy Charges for Today     Code  Description Service Date Service Provider Modifiers Qty    [id number]  PT THER PROC EA 15 MIN 12/22/2017 Jessa Barragan, PT GP 1               PT Discharge Summary  Anticipated Discharge Disposition: home with home health  Reason for Discharge: All goals achieved, Maximum functional potential achieved  Outcomes Achieved: Able to achieve all goals within established timeline    Patient has been seen once daily x3 days to address deficits in functional mobility.  Patient currently able to perform bed mobility and transfers with conditional independence and gait with rolling walker x80 feet with conditional independence.  Patient able to maintain TTWB status during functional mobility.  Patient has currently maximized functional potential with therapy and reports no concerns regarding return home.  Recommend use of rolling walker and wheelchair for distance mobility upon return home.  Plan to discharge from physical therapy today, patient in agreement with plan.  Jessa Barragan, CRISTIAN  12/22/2017

## 2017-12-22 NOTE — PROGRESS NOTES
Orthopedic Progress Note   Chief Complaint:  Status post fibular allograft and cancellus autograft the subtrochanteric nonunion    Subjective     Interval History: Patient is afebrile hemodynamically stable.  HShes doing better with ambulation her pain is well-controlled oral medication.  Plan discharge home tomorrow          Objective     Vital Signs  Temp:  [97 °F (36.1 °C)-98.4 °F (36.9 °C)] 98.4 °F (36.9 °C)  Heart Rate:  [] 94  Resp:  [18] 18  BP: (101-108)/(61-64) 108/61  Body mass index is 31.18 kg/(m^2).    Intake/Output Summary (Last 24 hours) at 12/22/17 0637  Last data filed at 12/21/17 2014   Gross per 24 hour   Intake             1320 ml   Output                0 ml   Net             1320 ml     I/O this shift:  In: 240 [P.O.:240]  Out: -        Physical Exam:   General: patient awake, alert and cooperative   Cardiovascular: regular rhythm and rate   Pulm: clear to auscultation bilaterally   Abdomen: Benign.  Soft bowel sounds   Extremities: Right extremity is good distal pulses no motor deficit wound is benign.  Calf is nontender   Neurologic: Normal mood and behavior     Results Review:     I reviewed the patient's new clinical results.      WBC No results found for: WBCS   HGB No results found for: HGB   HCT No results found for: HCT   Platlets No results found for: LABPLAT     PT/INR:  No results found for: PROTIME/No results found for: INR    Sodium No results found for: NA   Potassium No results found for: K   Chloride No results found for: CL   Bicarbonate No results found for: PLASMABICARB   BUN No results found for: BUN   Creatinine No results found for: CREATININE   Calcium No results found for: CALCIUM   Magnesium  AST  ALT  Bilirubin, Total  AlkPhos  Albumin    Amylase  Lipase    Radiology: No results found for: MG  No components found for: AST.*  No components found for: ALT.*  No components found for: BILIRUBIN, TOTAL.*    No components found for: ALKPHOS.*  No components found for:  ALBUMIN.*      No components found for: AMYLASE.*  No components found for: LIPASE.*            Imaging Results (most recent)     None                    Assessment/Plan     Patient Active Problem List   Diagnosis Code   • Closed right hip fracture S72.001A   • Subtrochanteric fracture of right femur S72.21XA   • Closed subtrochanteric fracture of femur with nonunion S72.23XK   • Closed left subtrochanteric femur fracture S72.22XA   • Subtrochanteric fracture, closed, right, with nonunion, subsequent encounter S72.21XK   • Subtrochanteric fracture of right femur, closed, with delayed healing, subsequent encounter S72.21XG         Home tomorrow.  Patient is weightbearing as tolerated.  We'll send home with pain medication and anticoagulation medication.  Has scheduled appointment at office on January 3    Keyshawn Madden MD  12/22/17  6:37 AM

## 2017-12-22 NOTE — PLAN OF CARE
Problem: Patient Care Overview (Adult)  Goal: Plan of Care Review  Outcome: Ongoing (interventions implemented as appropriate)   12/22/17 1038   Coping/Psychosocial Response Interventions   Plan Of Care Reviewed With patient   Outcome Evaluation   Outcome Summary/Follow up Plan PT: Patient able to perform transfers with conditional independence. Patient able to perform gait, TTWB with rolling walker with conditional independence x80 feet with good safety awareness and no episodes of balance loss. Patient reports no concerns regarding return home at this time. Patient will be discharged from physical therapy at this time.

## 2017-12-22 NOTE — PROGRESS NOTES
To be discharged home tomorrow(12/23/2017) with [facility] for PT, OT, and nursing. Follow up appointment with PCP,  on 12/26/2017 at 2PM. Follow up with orthopedic surgeon,  on January 3, 2018 at 11:30AM. [facility] will be providing her with a wheelchair with swing away, elevating leg rest. Her home is already wheelchair accessible and she has a ramp. She remains toe touch weight bearing to right leg. No other DME needed.

## 2017-12-22 NOTE — PLAN OF CARE
Problem: Patient Care Overview (Adult)  Goal: Plan of Care Review  Outcome: Ongoing (interventions implemented as appropriate)   12/22/17 0012   Coping/Psychosocial Response Interventions   Plan Of Care Reviewed With patient   Outcome Evaluation   Outcome Summary/Follow up Plan Patient resting in bed. no c/o SOA. Ice pack applied to Right Hip for comfort measures -no pain medication needed at this time. Safety measures in check.

## 2017-12-23 VITALS
TEMPERATURE: 97 F | DIASTOLIC BLOOD PRESSURE: 68 MMHG | OXYGEN SATURATION: 97 % | BODY MASS INDEX: 31.15 KG/M2 | RESPIRATION RATE: 18 BRPM | HEART RATE: 80 BPM | WEIGHT: 165 LBS | SYSTOLIC BLOOD PRESSURE: 106 MMHG | HEIGHT: 61 IN

## 2017-12-23 RX ADMIN — OXYCODONE HYDROCHLORIDE AND ACETAMINOPHEN 1 TABLET: 10; 325 TABLET ORAL at 00:17

## 2017-12-23 RX ADMIN — GABAPENTIN 200 MG: 100 CAPSULE ORAL at 15:07

## 2017-12-23 RX ADMIN — DULOXETINE HYDROCHLORIDE 60 MG: 60 CAPSULE, DELAYED RELEASE ORAL at 08:21

## 2017-12-23 RX ADMIN — GABAPENTIN 200 MG: 100 CAPSULE ORAL at 05:52

## 2017-12-23 RX ADMIN — RIVAROXABAN 10 MG: 10 TABLET, FILM COATED ORAL at 08:21

## 2017-12-23 NOTE — PROGRESS NOTES
s/p right hip nonunion ORIF    Subjective: Patient states pain doing well, mobilizing with PT. Denies numbness or tingling. No fevers or chills overnight.     Objective:  Vitals:    12/21/17 2014 12/22/17 0755 12/22/17 2029 12/23/17 0800   BP: 108/61 116/79 110/67 106/68   BP Location: Left arm Left arm Left arm Left arm   Patient Position: Lying Lying Lying Lying   Pulse: 94 90 93 80   Resp: 18 18 19 18   Temp: 98.4 °F (36.9 °C) 97.6 °F (36.4 °C) 97.1 °F (36.2 °C) 97 °F (36.1 °C)   TempSrc: Oral Oral Oral Oral   SpO2: 94% 95% 93% 97%   Weight:       Height:             Results from last 7 days  Lab Units 12/19/17  0339 12/18/17  0351 12/17/17  0746   HEMOGLOBIN g/dL 10.4* 11.2* 11.4*   HEMATOCRIT % 33.3* 34.7* 37.0             Exam:    Right lower extremity-incision clean dry and intact    Flex and extend toes and ankle    Brisk cap refill all digits    Positive sensation right lower extremity   Compartments soft and easily compressible    Impression: s/p right hip nonunion ORIF     Plan:  1. PT/OT- ambulate, WBAT  2. Pain control- Percocet  3. DVT prophylaxis- Xarelto  4. Wound care- glue intact, leave open to air  5. Disposition- home today

## 2017-12-23 NOTE — DISCHARGE SUMMARY
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      No Known Allergies    Current Medications:   Raven Young   Home Medication Instructions PATRCIE:[id number]    Printed on:12/23/17 5072   Medication Information                      DULoxetine (CYMBALTA) 60 MG capsule  Take 60 mg by mouth Daily.             gabapentin (NEURONTIN) 100 MG capsule  Take 2 capsules by mouth Every 8 (Eight) Hours.             oxyCODONE-acetaminophen (PERCOCET)  MG per tablet  Take 1 tablet by mouth Every 4 (Four) Hours As Needed for Moderate Pain  or Severe Pain  for up to 7 days.             rivaroxaban (XARELTO) 10 MG tablet  Take 1 tablet by mouth Daily.                     Past Medical History:   Diagnosis Date   • Cancer     basal cell   • Depression with anxiety    • PONV (postoperative nausea and vomiting)      Past Surgical History:   Procedure Laterality Date   • FIBULA BONE GRAFT TO HIP Right 12/15/2017    Procedure: FIBULA BONE GRAFT TO HIP with cancellus autograft the Dall-Miles cable ;  Surgeon: Keyshawn Madden MD;  Location:  [facility];  Service:    • HARDWARE REMOVAL Right 8/15/2017    Procedure: HARDWARE REMOVAL long gamma nail  with long gamma nail right subtrochanteric femur fracture meir vanco powder ;  Surgeon: Keyshawn Madden MD;  Location:  [facility] OR;  Service:    • IM NAILING FEMORAL SHAFT RETROGRADE Right 08/15/2017    replacement  leah  fx   • SD OPEN FIX INTER/SUBTROCH FX,IMPLNT Right 4/14/2017    Procedure: FEMUR INTRAMEDULLARY NAILING/RODDING long meir gamma nail 7fr hemovac placement;  Surgeon: Keyshawn Madden MD;  Location: [facility];  Service: Orthopedics   • TUBAL ABDOMINAL LIGATION       Social History     Occupational History   • Not on file.     Social History Main Topics   • Smoking status: Never Smoker   • Smokeless tobacco: Never Used   • Alcohol use Yes      Comment: rare   • Drug use: No   • Sexual activity: Defer      Social History     Social History Narrative     Family History   Problem Relation Age of Onset   • Asthma Mother    • No Known Problems Father          Physical Exam: 62 y.o. female  General Appearance:    Alert, cooperative, in no acute distress                      Vitals:    12/21/17 2014 12/22/17 0755 12/22/17 2029 12/23/17 0800   BP: 108/61 116/79 110/67 106/68   BP Location: Left arm Left arm Left arm Left arm   Patient Position: Lying Lying Lying Lying   Pulse: 94 90 93 80   Resp: 18 18 19 18   Temp: 98.4 °F (36.9 °C) 97.6 °F (36.4 °C) 97.1 °F (36.2 °C) 97 °F (36.1 °C)   TempSrc: Oral Oral Oral Oral   SpO2: 94% 95% 93% 97%   Weight:       Height:            Head:    Normocephalic, without obvious abnormality, atraumatic   Eyes:            Lids and lashes normal, conjunctivae and sclerae normal, no   icterus, no pallor, corneas clear, PERRLA   Ears:    Ears appear intact with no abnormalities noted   Throat:   No oral lesions, no thrush, oral mucosa moist   Neck:   No adenopathy, supple, trachea midline, no thyromegaly, no    carotid bruit, no JVD   Back:     No kyphosis present, no scoliosis present, no skin lesions,       erythema or scars, no tenderness to percussion or                   palpation,   range of motion normal   Lungs:     Clear to auscultation,respirations regular, even and                   unlabored    Heart:    Regular rhythm and normal rate, normal S1 and S2, no            murmur, no  gallop, no rub, no click   Chest Wall:    No abnormalities observed   Abdomen:     Normal bowel sounds, no masses, no organomegaly, soft        non-tender, non-distended, no guarding, no rebound                 tenderness   Rectal:     Deferred   Extremities:   Incision intact without signs or symptoms of infection.               Neurovascular status remains intact to operative extremity.      Moves all extremities well, no edema, no cyanosis, no              redness   Pulses:   Pulses palpable and equal bilaterally   Skin:   No bleeding, bruising or rash   Lymph nodes:   No palpable adenopathy   Neurologic:   Cranial nerves 2 - 12 grossly intact, sensation intact, DTR        present and equal bilaterally           Hospital Course:  62 y.o. female admitted to [facility] to services of Keyshawn Madden MD with Subtrochanteric fracture of right femur, closed, with delayed healing, subsequent encounter [S72.21XG] on 12/19/2017 and underwent ORIF nonunion right hip fracture.   Per Keyshawn Madden MD. Antibiotic and VTE prophylaxis were per [facility]IP protocols. Post-operatively the patient transferred to the post-operative floor where the patient underwent mobilization therapy that included active as well as passive ROM exercises. Opioids were titrated to achieve appropriate pain management to allow for participation in mobilization exercises. Vital signs are now stable. The incision is intact without signs or symptoms of infection. Operative extremity neurovascular status remains intact.   Appropriate education re: incision care, activity levels, medications, and follow up visits was completed and all questions were answered. The patient is now deemed stable for discharge to Home.      DIAGNOSTIC TESTS:     No visits with results within 2 Day(s) from this visit.  Latest known visit with results is:    Admission on 12/15/2017, Discharged on 12/19/2017   Component Date Value Ref Range Status   • Hemoglobin 12/15/2017 12.1   12.0 - 16.0 g/dL Final   • Hematocrit 12/15/2017 38.3  37.0 - 47.0 % Final   • Hemoglobin 12/16/2017 12.3  12.0 - 16.0 g/dL Final   • Hematocrit 12/16/2017 39.4  37.0 - 47.0 % Final   • Hemoglobin 12/17/2017 11.4* 12.0 - 16.0 g/dL Final   • Hematocrit 12/17/2017 37.0  37.0 - 47.0 % Final   • Hemoglobin 12/18/2017 11.2* 12.0 - 16.0 g/dL Final   • Hematocrit 12/18/2017 34.7* 37.0 - 47.0 % Final   • Hemoglobin 12/19/2017 10.4* 12.0 - 16.0 g/dL Final   • Hematocrit 12/19/2017 33.3* 37.0 - 47.0 % Final       Ct Femur Right Wo Contrast    Result Date: 12/5/2017  Narrative: RIGHT FEMUR CT WITHOUT CONTRAST  INDICATION: Right femoral fracture status post revision fixation. Follow-up. Observation for healing.  PROCEDURE: Unenhanced CT of the right femur  COMPARISON: 08/11/2017  FINDINGS:  Right femoral fixation hardware is in place. No malalignment. No lucency around the hardware. Fracture lines of the subtrochanteric fracture are still visible. No dislocation. No drainable fluid collection.      Impression: Right femoral hardware appropriately positioned. No malalignment.  Fracture lines of the subtrochanteric fracture are still visible and very similar to 08/11/2017  This report was finalized on 12/5/2017 2:49 PM by Dr. Henry Matos MD.      Xr Hip With Or Without Pelvis 1 View Right    Result Date: 12/15/2017  Narrative: INDICATION: Right hip pain. Nonunion of a right proximal femur fracture. .  COMPARISON: 11/09/2017.  FINDINGS: Single fluoroscopic view(s) of the right hip.  There is been interval revision of the ORIF of the proximal right femur fracture. Alignment is anatomic. Fluoroscopic time 1.01 minutes.       Impression: Anatomic alignment status post ORIF..  This report was finalized on 12/15/2017 3:08 PM by Dr. Duarte Ramirez MD.        Discharge and Follow up Instructions:     WBAT right lower extremity  DVT prophylaxis- Xarelto    Date: 12/23/2017    Leonardo Bautista MD

## 2018-01-03 ENCOUNTER — OFFICE VISIT (OUTPATIENT)
Dept: ORTHOPEDIC SURGERY | Facility: CLINIC | Age: 63
End: 2018-01-03

## 2018-01-03 VITALS — BODY MASS INDEX: 31.15 KG/M2 | HEIGHT: 61 IN | WEIGHT: 165 LBS

## 2018-01-03 DIAGNOSIS — R52 PAIN: Primary | ICD-10-CM

## 2018-01-03 DIAGNOSIS — Z09 STATUS POST ORTHOPEDIC SURGERY, FOLLOW-UP EXAM: ICD-10-CM

## 2018-01-03 DIAGNOSIS — Z87.81 H/O NONUNION OF FRACTURE: ICD-10-CM

## 2018-01-03 PROCEDURE — 99024 POSTOP FOLLOW-UP VISIT: CPT | Performed by: ORTHOPAEDIC SURGERY

## 2018-01-03 RX ORDER — ATORVASTATIN CALCIUM 20 MG/1
TABLET, FILM COATED ORAL
Refills: 1 | COMMUNITY
Start: 2017-12-27 | End: 2018-05-16

## 2018-01-03 NOTE — PROGRESS NOTES
Subjective: Status post autologous and allograft seen nonunion right subtrochanteric femur fracture     Patient ID: Raven Young is a 62 y.o. female.    Chief Complaint:    History of Present Illness patient is 3 weeks status post above stated procedure and is doing well.  She's been nonweightbearing having minimal discomfort at this time.       Social History     Occupational History   • Not on file.     Social History Main Topics   • Smoking status: Never Smoker   • Smokeless tobacco: Never Used   • Alcohol use Yes      Comment: rare   • Drug use: No   • Sexual activity: Defer      Review of Systems   Constitutional: Negative for chills, diaphoresis, fever and unexpected weight change.   HENT: Negative for hearing loss, nosebleeds, sore throat and tinnitus.    Eyes: Negative for pain and visual disturbance.   Respiratory: Negative for cough, shortness of breath and wheezing.    Cardiovascular: Negative for chest pain and palpitations.   Gastrointestinal: Negative for abdominal pain, diarrhea, nausea and vomiting.   Endocrine: Negative for cold intolerance, heat intolerance and polydipsia.   Genitourinary: Negative for difficulty urinating, dysuria and hematuria.   Musculoskeletal: Positive for arthralgias and myalgias. Negative for joint swelling.   Skin: Negative for rash and wound.   Allergic/Immunologic: Negative for environmental allergies.   Neurological: Negative for dizziness, syncope and numbness.   Hematological: Does not bruise/bleed easily.   Psychiatric/Behavioral: Negative for dysphoric mood and sleep disturbance. The patient is not nervous/anxious.          Past Medical History:   Diagnosis Date   • Cancer     basal cell   • Depression with anxiety    • PONV (postoperative nausea and vomiting)      Past Surgical History:   Procedure Laterality Date   • FIBULA BONE GRAFT TO HIP Right 12/15/2017    Procedure: FIBULA BONE GRAFT TO HIP with cancellus autograft the Jennifer-Marquis cable ;  Surgeon: Keyshawn  MD Chano;  Location: [facility];  Service:    • HARDWARE REMOVAL Right 8/15/2017    Procedure: HARDWARE REMOVAL long gamma nail  with long gamma nail right subtrochanteric femur fracture meir vanco powder ;  Surgeon: Keyshawn Madden MD;  Location:  [facility] OR;  Service:    • IM NAILING FEMORAL SHAFT RETROGRADE Right 08/15/2017    replacement leah  fx   • [place] OPEN FIX INTER/SUBTROCH FX,IMPLNT Right 4/14/2017    Procedure: FEMUR INTRAMEDULLARY NAILING/RODDING long meir gamma nail 7fr hemovac placement;  Surgeon: Keyshawn Madden MD;  Location:  [facility] OR;  Service: Orthopedics   • TUBAL ABDOMINAL LIGATION       Family History   Problem Relation Age of Onset   • Asthma Mother    • No Known Problems Father          Objective:  There were no vitals filed for this visit.  Last 3 weights    01/03/18  1133   Weight: 74.8 kg (165 lb)     Body mass index is 31.18 kg/(m^2).       Ortho Exam  she is alert and oriented ×3.  Both the hip wound and the donor site on the proximal tibia were examined the wound is completely benign.  Her calf is nontender negative Homans.  She is not having any real pain at this time although she has been nonweightbearing.    Assessment:       1. Pain    2. Status post orthopedic surgery, follow-up exam    3. H/O nonunion of fracture          Plan:      patient was instructed at this time she can stop the Xarelto and begin 1 adult aspirin a day for DVT prophylaxis.  She can begin weightbearing as tolerated and that was emphasized as pain permits.  Return to see me in 3 weeks with x-ray of her right femur from hip to knee.  Off work until see      Work Status:    DANIA query complete.    Orders:  No orders of the defined types were placed in this encounter.      Medications:  New Medications Ordered This Visit   Medications   • atorvastatin (LIPITOR) 20 MG tablet     Sig: TAKE ONE TABLET DAILY     Refill:  1       Followup:  Return in about 3 weeks (around 1/24/2018).          Dragon transcription  disclaimer     Much of this encounter note is an electronic transcription/translation of spoken language to printed text. The electronic translation of spoken language may permit erroneous, or at times, nonsensical words or phrases to be inadvertently transcribed. Although I have reviewed the note for such errors, some may still exist.

## 2018-01-22 RX ORDER — RIVAROXABAN 10 MG/1
TABLET, FILM COATED ORAL
Qty: 14 TABLET | Refills: 0 | Status: SHIPPED | OUTPATIENT
Start: 2018-01-22 | End: 2018-01-24

## 2018-01-24 ENCOUNTER — OFFICE VISIT (OUTPATIENT)
Dept: ORTHOPEDIC SURGERY | Facility: CLINIC | Age: 63
End: 2018-01-24

## 2018-01-24 VITALS — BODY MASS INDEX: 31.15 KG/M2 | HEIGHT: 61 IN | WEIGHT: 165 LBS

## 2018-01-24 DIAGNOSIS — R52 PAIN: ICD-10-CM

## 2018-01-24 DIAGNOSIS — Z87.81 H/O NONUNION OF FRACTURE: ICD-10-CM

## 2018-01-24 DIAGNOSIS — S72.001A CLOSED FRACTURE OF RIGHT HIP, INITIAL ENCOUNTER (HCC): Primary | ICD-10-CM

## 2018-01-24 DIAGNOSIS — Z09 STATUS POST ORTHOPEDIC SURGERY, FOLLOW-UP EXAM: ICD-10-CM

## 2018-01-24 PROCEDURE — 99024 POSTOP FOLLOW-UP VISIT: CPT | Performed by: ORTHOPAEDIC SURGERY

## 2018-01-24 PROCEDURE — 73552 X-RAY EXAM OF FEMUR 2/>: CPT | Performed by: ORTHOPAEDIC SURGERY

## 2018-01-24 NOTE — PROGRESS NOTES
Subjective: Status post allograft and autograft right subtrochanteric nonunion     Patient ID: Raven Young is a 62 y.o. female.    Chief Complaint:    History of Present Illness patient is over 4 weeks out is doing better.  She states she is having some slight decrease in the discomfort is able ambulate less pain.  Still some soreness in the knee but for the most part she is noted some improvement.       Social History     Occupational History   • Not on file.     Social History Main Topics   • Smoking status: Never Smoker   • Smokeless tobacco: Never Used   • Alcohol use Yes      Comment: rare   • Drug use: No   • Sexual activity: Defer      Review of Systems      Past Medical History:   Diagnosis Date   • Cancer     basal cell   • Depression with anxiety    • PONV (postoperative nausea and vomiting)      Past Surgical History:   Procedure Laterality Date   • FIBULA BONE GRAFT TO HIP Right 12/15/2017    Procedure: FIBULA BONE GRAFT TO HIP with cancellus autograft the Dall-Miles cable ;  Surgeon: Keyshawn Madden MD;  Location:  [facility] OR;  Service:    • HARDWARE REMOVAL Right 8/15/2017    Procedure: HARDWARE REMOVAL long gamma nail  with long gamma nail right subtrochanteric femur fracture meir vanco powder ;  Surgeon: Keyshawn Madden MD;  Location:  [facility] OR;  Service:    • IM NAILING FEMORAL SHAFT RETROGRADE Right 08/15/2017    replacement leah  fx   • GA OPEN FIX INTER/SUBTROCH FX,IMPLNT Right 4/14/2017    Procedure: FEMUR INTRAMEDULLARY NAILING/RODDING long meir gamma nail 7fr hemovac placement;  Surgeon: Keyshawn Madden MD;  Location:  [facility] OR;  Service: Orthopedics   • TUBAL ABDOMINAL LIGATION       Family History   Problem Relation Age of Onset   • Asthma Mother    • No Known Problems Father          Objective:  There were no vitals filed for this visit.  Last 3 weights    01/24/18  1124   Weight: 74.8 kg (165 lb)     Body mass index is 31.18 kg/(m^2).       Ortho Exam  AP and lateral view of the right hip  to evaluate her surgery does show what appears to be increasing callus at that nonunion site compared to previous x-rays.  The fibular allograft is in good position.  She is alert and oriented ×3.  His no motor deficit no sensory loss in the leg.  Quad function is +4 out of 5.  Hip abductors are +3 out of 5.  Skin is cool to touch.    Assessment:       1. Closed fracture of right hip, initial encounter    2. Pain    3. Status post orthopedic surgery, follow-up exam    4. H/O nonunion of fracture          Plan:     instructed to resume his strengthening program as previously prescribed by physical therapy.  At her request release return to work on Monday.  Return to see me in 6 weeks with repeat x-ray of her right femur       Work Status:    DANIA query complete.    Orders:  Orders Placed This Encounter   Procedures   • XR Femur 2 View Right       Medications:  No orders of the defined types were placed in this encounter.      Followup:  Return in about 6 weeks (around 3/7/2018).          Dragon transcription disclaimer     Much of this encounter note is an electronic transcription/translation of spoken language to printed text. The electronic translation of spoken language may permit erroneous, or at times, nonsensical words or phrases to be inadvertently transcribed. Although I have reviewed the note for such errors, some may still exist.

## 2018-03-07 ENCOUNTER — OFFICE VISIT (OUTPATIENT)
Dept: ORTHOPEDIC SURGERY | Facility: CLINIC | Age: 63
End: 2018-03-07

## 2018-03-07 DIAGNOSIS — R52 PAIN: Primary | ICD-10-CM

## 2018-03-07 DIAGNOSIS — Z87.81 H/O NONUNION OF FRACTURE: ICD-10-CM

## 2018-03-07 DIAGNOSIS — Z09 STATUS POST ORTHOPEDIC SURGERY, FOLLOW-UP EXAM: ICD-10-CM

## 2018-03-07 DIAGNOSIS — M94.261 CHONDROMALACIA OF RIGHT KNEE: ICD-10-CM

## 2018-03-07 DIAGNOSIS — S72.001A CLOSED FRACTURE OF RIGHT HIP, INITIAL ENCOUNTER (HCC): ICD-10-CM

## 2018-03-07 PROCEDURE — 73501 X-RAY EXAM HIP UNI 1 VIEW: CPT | Performed by: ORTHOPAEDIC SURGERY

## 2018-03-07 PROCEDURE — 99214 OFFICE O/P EST MOD 30 MIN: CPT | Performed by: ORTHOPAEDIC SURGERY

## 2018-03-07 PROCEDURE — 20610 DRAIN/INJ JOINT/BURSA W/O US: CPT | Performed by: ORTHOPAEDIC SURGERY

## 2018-03-07 PROCEDURE — 73562 X-RAY EXAM OF KNEE 3: CPT | Performed by: ORTHOPAEDIC SURGERY

## 2018-03-07 PROCEDURE — 73552 X-RAY EXAM OF FEMUR 2/>: CPT | Performed by: ORTHOPAEDIC SURGERY

## 2018-03-07 RX ORDER — LIDOCAINE HYDROCHLORIDE 10 MG/ML
8 INJECTION, SOLUTION EPIDURAL; INFILTRATION; INTRACAUDAL; PERINEURAL
Status: COMPLETED | OUTPATIENT
Start: 2018-03-07 | End: 2018-03-07

## 2018-03-07 RX ORDER — MELOXICAM 7.5 MG/1
TABLET ORAL
Refills: 5 | COMMUNITY
Start: 2018-02-12 | End: 2018-06-13 | Stop reason: SDUPTHER

## 2018-03-07 RX ORDER — TRIAMCINOLONE ACETONIDE 40 MG/ML
40 INJECTION, SUSPENSION INTRA-ARTICULAR; INTRAMUSCULAR
Status: COMPLETED | OUTPATIENT
Start: 2018-03-07 | End: 2018-03-07

## 2018-03-07 RX ADMIN — LIDOCAINE HYDROCHLORIDE 8 ML: 10 INJECTION, SOLUTION EPIDURAL; INFILTRATION; INTRACAUDAL; PERINEURAL at 16:08

## 2018-03-07 RX ADMIN — TRIAMCINOLONE ACETONIDE 40 MG: 40 INJECTION, SUSPENSION INTRA-ARTICULAR; INTRAMUSCULAR at 16:08

## 2018-03-07 NOTE — PROGRESS NOTES
Subjective: Status post right subtrochanteric femur fracture, chondromalacia right knee     Patient ID: Raven Young is a 62 y.o. female.    Chief Complaint:    History of Present Illness patient is 3 months off the leg and proceeded to the hip and is making slow progress but slight decrease in the hip pain.  Most of the pain now is more in the thigh and in the knee.  Has been using the bone stimulator is ambulating today independently without cane or crutch on that she still does have a slight outpouching gait       Social History     Occupational History   • Not on file.     Social History Main Topics   • Smoking status: Never Smoker   • Smokeless tobacco: Never Used   • Alcohol use Yes      Comment: rare   • Drug use: No   • Sexual activity: Defer      Review of Systems   Constitutional: Negative for chills, diaphoresis, fever and unexpected weight change.   HENT: Negative for hearing loss, nosebleeds, sore throat and tinnitus.    Eyes: Negative for pain and visual disturbance.   Respiratory: Negative for cough, shortness of breath and wheezing.    Cardiovascular: Negative for chest pain and palpitations.   Gastrointestinal: Negative for abdominal pain, diarrhea, nausea and vomiting.   Endocrine: Negative for cold intolerance, heat intolerance and polydipsia.   Genitourinary: Negative for difficulty urinating, dysuria and hematuria.   Musculoskeletal: Positive for arthralgias. Negative for joint swelling and myalgias.   Skin: Negative for rash and wound.   Allergic/Immunologic: Negative for environmental allergies.   Neurological: Negative for dizziness, syncope and numbness.   Hematological: Does not bruise/bleed easily.   Psychiatric/Behavioral: Negative for dysphoric mood and sleep disturbance. The patient is not nervous/anxious.    All other systems reviewed and are negative.        Past Medical History:   Diagnosis Date   • Cancer     basal cell   • Depression with anxiety    • PONV (postoperative nausea  and vomiting)      Past Surgical History:   Procedure Laterality Date   • FIBULA BONE GRAFT TO HIP Right 12/15/2017    Procedure: FIBULA BONE GRAFT TO HIP with cancellus autograft the Dall-Miles cable ;  Surgeon: Keyshawn Madden MD;  Location:  [facility] OR;  Service:    • HARDWARE REMOVAL Right 8/15/2017    Procedure: HARDWARE REMOVAL long gamma nail  with long gamma nail right subtrochanteric femur fracture meir vanco powder ;  Surgeon: Keyshawn Madden MD;  Location:  [facility] OR;  Service:    • IM NAILING FEMORAL SHAFT RETROGRADE Right 08/15/2017    replacement leah  fx   • [place] OPEN FIX INTER/SUBTROCH FX,IMPLNT Right 4/14/2017    Procedure: FEMUR INTRAMEDULLARY NAILING/RODDING long meir gamma nail 7fr hemovac placement;  Surgeon: Keyshawn Madden MD;  Location:  [facility] OR;  Service: Orthopedics   • TUBAL ABDOMINAL LIGATION       Family History   Problem Relation Age of Onset   • Asthma Mother    • No Known Problems Father          Objective:  There were no vitals filed for this visit.  There were no vitals filed for this visit.  There is no height or weight on file to calculate BMI.       Ortho Exam  AP lateral of the right femur to 9 weeks healing does show increasing consolidation at the fracture site compared to previous x-rays.  AP lateral sunrise view of the knee to evaluate the complaint of knee pain does show evidence of chondromalacia but no acute changes noted.  She is alert and oriented ×3.  She has no motor deficit good distal pulses right lower extremity.  She has 0-125° of motion of the knee with no effusion and just mild crepitus but no instability.  Quad function 5 over 5.  Calf is nontender negative Homans.  No sensory loss in the skin is cool to touch.  Hip abductors are still +3 out of 5 secondary to pain and muscle weakness due to the multiple procedures that she's had.  Patient continues take meloxicam without any GI side effects that she's continued to use the bone stimulator.    Assessment:       1.  Pain    2. Closed fracture of right hip, initial encounter    3. Status post orthopedic surgery, follow-up exam    4. H/O nonunion of fracture    5. Chondromalacia of right knee          Plan: Spent almost 20 minutes with the patient reviewing x-rays of her hip and her knee and her physical findings.  She is making progress as far as the hip is concerned again has minimal hip pain but more distal thigh pain and knee pain.  Reviewed treatment options regarding the knee.  She is currently taking meloxicam so after discussing options as far as physical therapy bracing or injections or proceed with a cortisone injection.  Those given to the superior lateral portal about sterile prep without complications with 8 cc lidocaine 1 cc Kenalog.  Postinjection instructions given to the patient.  Continue meloxicam.  Continue the bone stimulator.  Return in 6 weeks with a repeat x-ray of her right femur.      Large Joint Arthrocentesis  Date/Time: 3/7/2018 4:08 PM  Consent given by: patient  Site marked: site marked  Supporting Documentation  Indications: pain   Procedure Details  Location: knee - R knee (right)  Preparation: Patient was prepped and draped in the usual sterile fashion  Needle size: 22 G  Approach: anterolateral  Medications administered: 8 mL lidocaine PF 1% 1 %; 40 mg triamcinolone acetonide 40 MG/ML  Patient tolerance: patient tolerated the procedure well with no immediate complications            Work Status:    DANIA query complete.    Orders:  Orders Placed This Encounter   Procedures   • Large Joint Arthrocentesis   • XR Hip With or Without Pelvis 1 View Right   • XR Femur 2 View Right   • XR Knee 3 View Right       Medications:  New Medications Ordered This Visit   Medications   • meloxicam (MOBIC) 7.5 MG tablet     Sig: TAKE 1 TABLET BY MOUTH DAILY.     Refill:  5       Followup:  Return in about 6 weeks (around 4/18/2018).          Dragon transcription disclaimer     Much of this encounter note is an  electronic transcription/translation of spoken language to printed text. The electronic translation of spoken language may permit erroneous, or at times, nonsensical words or phrases to be inadvertently transcribed. Although I have reviewed the note for such errors, some may still exist.

## 2018-03-12 ENCOUNTER — TRANSCRIBE ORDERS (OUTPATIENT)
Dept: ADMINISTRATIVE | Facility: HOSPITAL | Age: 63
End: 2018-03-12

## 2018-03-12 DIAGNOSIS — Z78.0 POSTMENOPAUSAL: ICD-10-CM

## 2018-03-12 DIAGNOSIS — Z12.31 SCREENING MAMMOGRAM, ENCOUNTER FOR: Primary | ICD-10-CM

## 2018-04-03 ENCOUNTER — APPOINTMENT (OUTPATIENT)
Dept: BONE DENSITY | Facility: HOSPITAL | Age: 63
End: 2018-04-03

## 2018-04-03 ENCOUNTER — HOSPITAL ENCOUNTER (OUTPATIENT)
Dept: MAMMOGRAPHY | Facility: HOSPITAL | Age: 63
Discharge: HOME | End: 2018-04-03
Admitting: FAMILY MEDICINE

## 2018-04-03 DIAGNOSIS — Z12.31 SCREENING MAMMOGRAM, ENCOUNTER FOR: ICD-10-CM

## 2018-04-03 DIAGNOSIS — Z78.0 POSTMENOPAUSAL: ICD-10-CM

## 2018-04-03 PROCEDURE — 77067 SCR MAMMO BI INCL CAD: CPT

## 2018-04-03 PROCEDURE — 77080 DXA BONE DENSITY AXIAL: CPT

## 2018-04-03 PROCEDURE — 77063 BREAST TOMOSYNTHESIS BI: CPT

## 2018-04-10 ENCOUNTER — TELEPHONE (OUTPATIENT)
Dept: ORTHOPEDIC SURGERY | Facility: CLINIC | Age: 63
End: 2018-04-10

## 2018-04-10 NOTE — TELEPHONE ENCOUNTER
Patient called and she said her surgery was in December by Dr. Madden.  Her incision started to turn red 1 week ago.  She is not having any pain or any other symptoms, per Echo FERGUSON, patient was directed to ice incision and stay off of leg as much as possible.  She is scheduled to come in for a visit and Echo will access at the time.

## 2018-04-11 ENCOUNTER — OFFICE VISIT (OUTPATIENT)
Dept: ORTHOPEDIC SURGERY | Facility: CLINIC | Age: 63
End: 2018-04-11

## 2018-04-11 DIAGNOSIS — Z09 STATUS POST ORTHOPEDIC SURGERY, FOLLOW-UP EXAM: ICD-10-CM

## 2018-04-11 DIAGNOSIS — R52 PAIN: Primary | ICD-10-CM

## 2018-04-11 PROCEDURE — 73590 X-RAY EXAM OF LOWER LEG: CPT | Performed by: NURSE PRACTITIONER

## 2018-04-11 PROCEDURE — 99024 POSTOP FOLLOW-UP VISIT: CPT | Performed by: NURSE PRACTITIONER

## 2018-04-11 NOTE — PROGRESS NOTES
Subjective:     Patient ID: Raven Young is a 62 y.o. female.    Chief Complaint: status post Autologous bone grafting with fibular allograft strut graft to right subtrochanteric nonunion    History of Present Illness    Ms. Young presents today with concerns of erythema and puffiness over anterior tibia. Status post autologous bone graft with fibular allograft. Denies experiencing pain over tibia but noticed the erythema and puffiness within last 24 hours. Denies presence of drainage from site. Denies presence of numbness and tingling right lower extremity. Denies all other concerns present at this time.      Social History     Occupational History   • Not on file.     Social History Main Topics   • Smoking status: Never Smoker   • Smokeless tobacco: Never Used   • Alcohol use Yes      Comment: rare   • Drug use: No   • Sexual activity: Defer      Past Medical History:   Diagnosis Date   • Cancer     basal cell   • Depression with anxiety    • PONV (postoperative nausea and vomiting)      Past Surgical History:   Procedure Laterality Date   • FIBULA BONE GRAFT TO HIP Right 12/15/2017    Procedure: FIBULA BONE GRAFT TO HIP with cancellus autograft the Jennifer-Miles cable ;  Surgeon: Keyshawn Madden MD;  Location: [facility] OR;  Service:    • HARDWARE REMOVAL Right 8/15/2017    Procedure: HARDWARE REMOVAL long gamma nail  with long gamma nail right subtrochanteric femur fracture meir vanco powder ;  Surgeon: Keyshawn Madden MD;  Location: [facility] OR;  Service:    • IM NAILING FEMORAL SHAFT RETROGRADE Right 08/15/2017    replacement leah  fx   • [place] OPEN FIX INTER/SUBTROCH FX,IMPLNT Right 4/14/2017    Procedure: FEMUR INTRAMEDULLARY NAILING/RODDING long meir gamma nail 7fr hemovac placement;  Surgeon: Keyshawn Madden MD;  Location: [facility] OR;  Service: Orthopedics   • TUBAL ABDOMINAL LIGATION         Family History   Problem Relation Age of Onset   • Asthma Mother    • No Known Problems Father          Review of Systems    Constitutional: Negative for chills, diaphoresis, fever and unexpected weight change.   HENT: Negative for hearing loss, nosebleeds, sore throat and tinnitus.    Eyes: Negative for pain and visual disturbance.   Respiratory: Negative for cough, shortness of breath and wheezing.    Cardiovascular: Negative for chest pain and palpitations.   Gastrointestinal: Negative for abdominal pain, diarrhea, nausea and vomiting.   Endocrine: Negative for cold intolerance, heat intolerance and polydipsia.   Genitourinary: Negative for difficulty urinating, dysuria and hematuria.   Musculoskeletal: Positive for arthralgias. Negative for joint swelling and myalgias.   Skin: Negative for rash and wound.   Allergic/Immunologic: Negative for environmental allergies.   Neurological: Negative for dizziness, syncope and numbness.   Hematological: Does not bruise/bleed easily.   Psychiatric/Behavioral: Negative for dysphoric mood and sleep disturbance. The patient is not nervous/anxious.    All other systems reviewed and are negative.          Objective:  Physical Exam    General: No acute distress.  Eyes: conjunctiva clear; pupils equally round and reactive  ENT: external ears and nose atraumatic; oropharynx clear  CV: no peripheral edema  Resp: normal respiratory effort  Skin: no rashes or wounds; normal turgor  Psych: mood and affect appropriate; recent and remote memory intact    There were no vitals filed for this visit.  There were no vitals filed for this visit.  There is no height or weight on file to calculate BMI.     Ortho Exam    Right lower extremity- incision clean, dry, intact, well healed. Minimal swelling noted at incision, negative for erythema, warmth and drainage. Positive sensation all aspects right lower extremity. Negative mariana's sign, negative calf pain.     Imaging:  Right Tib-Fib X-Ray  Indication: Pain  AP and Lateral views    Findings:  No fracture  No bony lesion  Normal soft tissues  Normal joint  spaces  No prior studies were available for comparison.    Assessment:       1. Pain    2. Status post orthopedic surgery, follow-up exam          Plan:    Orders:  Orders Placed This Encounter   Procedures   • XR Tibia Fibula 2 View Right       I ordered and reviewed the DANIA today.     Dictated utilizing Dragon dictation

## 2018-04-18 ENCOUNTER — OFFICE VISIT (OUTPATIENT)
Dept: ORTHOPEDIC SURGERY | Facility: CLINIC | Age: 63
End: 2018-04-18

## 2018-04-18 DIAGNOSIS — R52 PAIN: Primary | ICD-10-CM

## 2018-04-18 DIAGNOSIS — Z87.81 H/O NONUNION OF FRACTURE: ICD-10-CM

## 2018-04-18 DIAGNOSIS — Z09 STATUS POST ORTHOPEDIC SURGERY, FOLLOW-UP EXAM: ICD-10-CM

## 2018-04-18 PROCEDURE — 99212 OFFICE O/P EST SF 10 MIN: CPT | Performed by: ORTHOPAEDIC SURGERY

## 2018-04-18 PROCEDURE — 73552 X-RAY EXAM OF FEMUR 2/>: CPT | Performed by: ORTHOPAEDIC SURGERY

## 2018-04-18 RX ORDER — ASPIRIN 325 MG
325 TABLET ORAL DAILY
COMMUNITY
End: 2018-06-21 | Stop reason: HOSPADM

## 2018-04-18 NOTE — PROGRESS NOTES
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 female.    Chief Complaint:    History of Present Illness patient is 4 months out from her last procedure although doing better still having lateral hip pain and some mild groin pain with ambulation.  Is ambulating with a cane.       Social History     Occupational History   • Not on file.     Social History Main Topics   • Smoking status: Never Smoker   • Smokeless tobacco: Never Used   • Alcohol use Yes      Comment: rare   • Drug use: No   • Sexual activity: Defer      Review of Systems   Constitutional: Negative for chills, diaphoresis, fever and unexpected weight change.   HENT: Negative for hearing loss, nosebleeds, sore throat and tinnitus.    Eyes: Negative for pain and visual disturbance.   Respiratory: Negative for cough, shortness of breath and wheezing.    Cardiovascular: Negative for chest pain and palpitations.   Gastrointestinal: Negative for abdominal pain, diarrhea, nausea and vomiting.   Endocrine: Negative for cold intolerance, heat intolerance and polydipsia.   Genitourinary: Negative for difficulty urinating, dysuria and hematuria.   Musculoskeletal: Positive for arthralgias. Negative for joint swelling and myalgias.   Skin: Negative for rash and wound.   Allergic/Immunologic: Negative for environmental allergies.   Neurological: Negative for dizziness, syncope and numbness.   Hematological: Does not bruise/bleed easily.   Psychiatric/Behavioral: Negative for dysphoric mood and sleep disturbance. The patient is not nervous/anxious.    All other systems reviewed and are negative.          Objective:     Ortho Exam  AP and lateral of the hip does show some increasing callus at the fracture site compared to previous x-rays.  Again is no motor deficit no sensory loss that she does have pain lateral thigh and some groin pain.  Calf is nontender negative Homans.  Hip  abductors are +3 out of 5 secondary to weakness and some discomfort in the thigh itself.  The skin is cool to touch.  Good distal pulses no motor deficit.    Assessment:       1. Pain    2. Status post orthopedic surgery, follow-up exam    3. H/O nonunion of fracture          Plan:        We'll proceed with a CT scan to see if we have complete healing at that fracture site.  If there is complete healing will evaluate the hip pain with an injection.  If the CT confirms there is a nonunion she may be candidate for proximal femoral replacement CT scan first to determine what is the next appropriate treatment

## 2018-04-24 ENCOUNTER — HOSPITAL ENCOUNTER (OUTPATIENT)
Dept: CT IMAGING | Facility: HOSPITAL | Age: 63
Discharge: HOME | End: 2018-04-24
Attending: ORTHOPAEDIC SURGERY | Admitting: ORTHOPAEDIC SURGERY

## 2018-04-24 DIAGNOSIS — Z09 STATUS POST ORTHOPEDIC SURGERY, FOLLOW-UP EXAM: ICD-10-CM

## 2018-04-24 DIAGNOSIS — Z87.81 H/O NONUNION OF FRACTURE: ICD-10-CM

## 2018-04-24 DIAGNOSIS — R52 PAIN: ICD-10-CM

## 2018-04-24 PROCEDURE — 73700 CT LOWER EXTREMITY W/O DYE: CPT

## 2018-04-26 ENCOUNTER — APPOINTMENT (OUTPATIENT)
Dept: CT IMAGING | Facility: HOSPITAL | Age: 63
End: 2018-04-26
Attending: ORTHOPAEDIC SURGERY

## 2018-04-26 ENCOUNTER — OFFICE VISIT (OUTPATIENT)
Dept: ORTHOPEDIC SURGERY | Facility: CLINIC | Age: 63
End: 2018-04-26

## 2018-04-26 VITALS — BODY MASS INDEX: 31.15 KG/M2 | HEIGHT: 61 IN | WEIGHT: 165 LBS

## 2018-04-26 DIAGNOSIS — R52 PAIN: Primary | ICD-10-CM

## 2018-04-26 DIAGNOSIS — M88.9 PAGET'S DISEASE OF BONE: ICD-10-CM

## 2018-04-26 DIAGNOSIS — Z09 STATUS POST ORTHOPEDIC SURGERY, FOLLOW-UP EXAM: ICD-10-CM

## 2018-04-26 DIAGNOSIS — Z87.81 H/O NONUNION OF FRACTURE: ICD-10-CM

## 2018-04-26 PROCEDURE — 99212 OFFICE O/P EST SF 10 MIN: CPT | Performed by: ORTHOPAEDIC SURGERY

## 2018-04-26 NOTE — PROGRESS NOTES
Subjective: Right hip pain     Patient ID: Raven Young is a 62 y.o. female.    Chief Complaint:    History of Present Illness patient returns with results of the CT scan.  The reading by radiology which I reviewed the report and films and discussed them personally with the radiologist the patient had packing disease which would account for her to nonunion and lack of healing of the subtrochanteric fracture.  Reviewed once again with the patient a history or injury she states there was no fall but had spontaneous pain in the hip that caused her to fall resulting in subtrochanteric fracture.       Social History     Occupational History   • Not on file.     Social History Main Topics   • Smoking status: Never Smoker   • Smokeless tobacco: Never Used   • Alcohol use Yes      Comment: rare   • Drug use: No   • Sexual activity: Defer      Review of Systems   Constitutional: Negative for chills, diaphoresis, fever and unexpected weight change.   HENT: Negative for hearing loss, nosebleeds, sore throat and tinnitus.    Eyes: Negative for pain and visual disturbance.   Respiratory: Negative for cough, shortness of breath and wheezing.    Cardiovascular: Negative for chest pain and palpitations.   Gastrointestinal: Negative for abdominal pain, diarrhea, nausea and vomiting.   Endocrine: Negative for cold intolerance, heat intolerance and polydipsia.   Genitourinary: Negative for difficulty urinating, dysuria and hematuria.   Musculoskeletal: Positive for arthralgias and myalgias. Negative for joint swelling.   Skin: Negative for rash and wound.   Allergic/Immunologic: Negative for environmental allergies.   Neurological: Negative for dizziness, syncope and numbness.   Hematological: Does not bruise/bleed easily.   Psychiatric/Behavioral: Negative for dysphoric mood and sleep disturbance. The patient is not nervous/anxious.          Past Medical History:   Diagnosis Date   • Cancer     basal cell   • Depression with  anxiety    • PONV (postoperative nausea and vomiting)      Past Surgical History:   Procedure Laterality Date   • FIBULA BONE GRAFT TO HIP Right 12/15/2017    Procedure: FIBULA BONE GRAFT TO HIP with cancellus autograft the Dall-Miles cable ;  Surgeon: Keyshawn Madden MD;  Location:  [facility] OR;  Service:    • HARDWARE REMOVAL Right 8/15/2017    Procedure: HARDWARE REMOVAL long gamma nail  with long gamma nail right subtrochanteric femur fracture meir vanco powder ;  Surgeon: Keyshawn Madden MD;  Location:  [facility] OR;  Service:    • IM NAILING FEMORAL SHAFT RETROGRADE Right 08/15/2017    replacement leah  fx   • [place] OPEN FIX INTER/SUBTROCH FX,IMPLNT Right 4/14/2017    Procedure: FEMUR INTRAMEDULLARY NAILING/RODDING long meir gamma nail 7fr hemovac placement;  Surgeon: Keyshawn Madden MD;  Location:  [facility] OR;  Service: Orthopedics   • TUBAL ABDOMINAL LIGATION       Family History   Problem Relation Age of Onset   • Asthma Mother    • No Known Problems Father          Objective:  There were no vitals filed for this visit.  1    04/26/18  1547   Weight: 74.8 kg (165 lb)     Body mass index is 31.18 kg/m².       Ortho Exam  reviewed the x-rays and the CT scan with the patient reviewing her x-rays also in the office.  She has persistent pain and walks with an outpouching gait.  Also complains of some left hip pain although not as severe.    Assessment:       1. Pain    2. Status post orthopedic surgery, follow-up exam    3. H/O nonunion of fracture    4. Paget's disease of bone          Plan:      review the results of the CT scan of the patient and previous x-rays.  At this point proceed with an operative file to take to fully evaluate for Paget's disease and do a bone scan and the recommendation radiology to see if there is any other involvement.  Sports treatment of the hip the patient will require proximal femoral replacement and total hip.  But return first with these results before scheduling any further  treatment      Work Status:    DANIA query complete.    Orders:  Orders Placed This Encounter   Procedures   • NM Bone Scan Whole Body   • Alkaline Phosphatase, Bone Specific       Medications:  No orders of the defined types were placed in this encounter.      Followup:  Return in about 1 week (around 5/3/2018).          Dragon transcription disclaimer     Much of this encounter note is an electronic transcription/translation of spoken language to printed text. The electronic translation of spoken language may permit erroneous, or at times, nonsensical words or phrases to be inadvertently transcribed. Although I have reviewed the note for such errors, some may still exist.

## 2018-05-07 ENCOUNTER — HOSPITAL ENCOUNTER (OUTPATIENT)
Dept: NUCLEAR MEDICINE | Facility: HOSPITAL | Age: 63
Discharge: HOME | End: 2018-05-07
Attending: ORTHOPAEDIC SURGERY

## 2018-05-07 DIAGNOSIS — R52 PAIN: ICD-10-CM

## 2018-05-07 DIAGNOSIS — Z87.81 H/O NONUNION OF FRACTURE: ICD-10-CM

## 2018-05-07 DIAGNOSIS — Z09 STATUS POST ORTHOPEDIC SURGERY, FOLLOW-UP EXAM: ICD-10-CM

## 2018-05-07 DIAGNOSIS — M88.9 PAGET'S DISEASE OF BONE: ICD-10-CM

## 2018-05-07 PROCEDURE — 78306 BONE IMAGING WHOLE BODY: CPT

## 2018-05-07 PROCEDURE — 0 TECHNETIUM MEDRONATE KIT: Performed by: ORTHOPAEDIC SURGERY

## 2018-05-07 PROCEDURE — A9503 TC99M MEDRONATE: HCPCS | Performed by: ORTHOPAEDIC SURGERY

## 2018-05-07 RX ORDER — TC 99M MEDRONATE 20 MG/10ML
22.5 INJECTION, POWDER, LYOPHILIZED, FOR SOLUTION INTRAVENOUS
Status: COMPLETED | OUTPATIENT
Start: 2018-05-07 | End: 2018-05-07

## 2018-05-07 RX ADMIN — Medication 22.5 MILLICURIE: at 11:45

## 2018-05-16 ENCOUNTER — LAB (OUTPATIENT)
Dept: LAB | Facility: HOSPITAL | Age: 63
End: 2018-05-16
Attending: ORTHOPAEDIC SURGERY

## 2018-05-16 ENCOUNTER — OFFICE VISIT (OUTPATIENT)
Dept: ORTHOPEDIC SURGERY | Facility: CLINIC | Age: 63
End: 2018-05-16

## 2018-05-16 DIAGNOSIS — Z09 STATUS POST ORTHOPEDIC SURGERY, FOLLOW-UP EXAM: ICD-10-CM

## 2018-05-16 DIAGNOSIS — R52 PAIN: Primary | ICD-10-CM

## 2018-05-16 DIAGNOSIS — M88.9 PAGET'S DISEASE OF BONE: ICD-10-CM

## 2018-05-16 DIAGNOSIS — Z87.81 H/O NONUNION OF FRACTURE: ICD-10-CM

## 2018-05-16 DIAGNOSIS — R52 PAIN: ICD-10-CM

## 2018-05-16 PROCEDURE — 36415 COLL VENOUS BLD VENIPUNCTURE: CPT

## 2018-05-16 PROCEDURE — 99214 OFFICE O/P EST MOD 30 MIN: CPT | Performed by: ORTHOPAEDIC SURGERY

## 2018-05-16 PROCEDURE — 84080 ASSAY ALKALINE PHOSPHATASES: CPT

## 2018-05-16 RX ORDER — GUAIFENESIN 600 MG/1
1200 TABLET, EXTENDED RELEASE ORAL 2 TIMES DAILY
COMMUNITY
End: 2018-06-13

## 2018-05-16 NOTE — PROGRESS NOTES
Subjective: Right hip pain     Patient ID: Raven Young is a 62 y.o. female.    Chief Complaint:    History of Present Illness patient returns with results of the bone scan which I personally reviewed the images and the report which confirm Paget's disease involving the right hip.  She has not gotten the blood test drawn as of yet.  Still having significant pain discomfort in the hip with ambulation.       Social History     Occupational History   • Not on file.     Social History Main Topics   • Smoking status: Never Smoker   • Smokeless tobacco: Never Used   • Alcohol use Yes      Comment: rare   • Drug use: No   • Sexual activity: Defer      Review of Systems   Constitutional: Negative for chills, diaphoresis, fever and unexpected weight change.   HENT: Negative for hearing loss, nosebleeds, sore throat and tinnitus.    Eyes: Negative for pain and visual disturbance.   Respiratory: Negative for cough, shortness of breath and wheezing.    Cardiovascular: Negative for chest pain and palpitations.   Gastrointestinal: Negative for abdominal pain, diarrhea, nausea and vomiting.   Endocrine: Negative for cold intolerance, heat intolerance and polydipsia.   Genitourinary: Negative for difficulty urinating, dysuria and hematuria.   Musculoskeletal: Negative for arthralgias, joint swelling and myalgias.   Skin: Negative for rash and wound.   Allergic/Immunologic: Negative for environmental allergies.   Neurological: Negative for dizziness, syncope and numbness.   Hematological: Does not bruise/bleed easily.   Psychiatric/Behavioral: Negative for dysphoric mood and sleep disturbance. The patient is not nervous/anxious.          Past Medical History:   Diagnosis Date   • Cancer     basal cell   • Depression with anxiety    • PONV (postoperative nausea and vomiting)      Past Surgical History:   Procedure Laterality Date   • FIBULA BONE GRAFT TO HIP Right 12/15/2017    Procedure: FIBULA BONE GRAFT TO HIP with cancellus  autograft the Dall-Miles cable ;  Surgeon: Keyshawn Madden MD;  Location:  [facility] OR;  Service:    • HARDWARE REMOVAL Right 8/15/2017    Procedure: HARDWARE REMOVAL long gamma nail  with long gamma nail right subtrochanteric femur fracture meir vanco powder ;  Surgeon: Keyshawn Madden MD;  Location:  [facility] OR;  Service:    • IM NAILING FEMORAL SHAFT RETROGRADE Right 08/15/2017    replacement leah  fx   • [place] OPEN FIX INTER/SUBTROCH FX,IMPLNT Right 4/14/2017    Procedure: FEMUR INTRAMEDULLARY NAILING/RODDING long meir gamma nail 7fr hemovac placement;  Surgeon: Keyshawn Madden MD;  Location:  [facility] OR;  Service: Orthopedics   • TUBAL ABDOMINAL LIGATION       Family History   Problem Relation Age of Onset   • Asthma Mother    • No Known Problems Father          Objective:  There were no vitals filed for this visit.  There were no vitals filed for this visit.  There is no height or weight on file to calculate BMI.       Ortho Exam  she is alert and oriented ×3.  His no motor deficit no sensory deficit in the leg and wounds are completely benign.  There is marked pain with ambulation in the right hip and thigh area.  Mild swelling noted but that is secondary to scar tissue she's had 3 surgeries on that hip at this time.  Skin is cool to touch and good capillary refill.  His no limb length discrepancy at this time.  She has been tolerating meloxicam without any GI side effects no improvement of her symptoms.  Still walking with an antalgic gait.  The pain is quite disabling and does interfere with all activities of daily living.  Hip abductors are probably +3 out of 5 secondary to pain.  Her calf is nontender with a negative Homans.    Assessment:       1. Pain    2. Status post orthopedic surgery, follow-up exam    3. H/O nonunion of fracture    4. Paget's disease of bone          Plan:      spent almost 20 minutes with the patient and family discussing the pathology involving the hip i.e. the Paget disease.  He has a  nonunion at the fracture site secondary to Paget's disease.  She will get the blood work drawn today.  Discussed with her detail the risk of surgery which include but not limited to flexion and the need for multiple procedures including implant removal to eradicate faction and a slight increase in the risk of infection is always had 3 surgeries to that had already.  Also discussed dislocation limb length discrepancy, nerve injury, vascular injury, periprosthetic fracture, the need for revision surgery and persistent pain and discomfort despite a well implanted revision total hip arthroplasty patient understands all the above risks and all questions including rehabilitation which is 3 months to a year.  Call back to schedule surgery in the next day or 2.      Work Status:    DANIA query complete.    Orders:  No orders of the defined types were placed in this encounter.      Medications:  No orders of the defined types were placed in this encounter.      Followup:  Return in about 4 weeks (around 6/13/2018).          Dragon transcription disclaimer     Much of this encounter note is an electronic transcription/translation of spoken language to printed text. The electronic translation of spoken language may permit erroneous, or at times, nonsensical words or phrases to be inadvertently transcribed. Although I have reviewed the note for such errors, some may still exist.

## 2018-05-21 LAB — ALP BONE SERPL-MCNC: 31.8 UG/L

## 2018-05-30 ENCOUNTER — PREP FOR SURGERY (OUTPATIENT)
Dept: OTHER | Facility: HOSPITAL | Age: 63
End: 2018-05-30

## 2018-05-30 DIAGNOSIS — S72.21XK: Primary | ICD-10-CM

## 2018-05-30 RX ORDER — MELOXICAM 7.5 MG/1
7.5 TABLET ORAL ONCE
Status: CANCELLED | OUTPATIENT
Start: 2018-06-19 | End: 2018-06-19

## 2018-05-30 RX ORDER — OXYCODONE HCL 10 MG/1
10 TABLET, FILM COATED, EXTENDED RELEASE ORAL ONCE
Status: CANCELLED | OUTPATIENT
Start: 2018-06-19 | End: 2018-06-19

## 2018-05-30 RX ORDER — PREGABALIN 75 MG/1
150 CAPSULE ORAL ONCE
Status: CANCELLED | OUTPATIENT
Start: 2018-06-19 | End: 2018-06-19

## 2018-06-05 ENCOUNTER — PREP FOR SURGERY (OUTPATIENT)
Dept: OTHER | Facility: HOSPITAL | Age: 63
End: 2018-06-05

## 2018-06-05 ENCOUNTER — HOSPITAL ENCOUNTER (OUTPATIENT)
Dept: GENERAL RADIOLOGY | Facility: HOSPITAL | Age: 63
Discharge: HOME | End: 2018-06-05
Admitting: ORTHOPAEDIC SURGERY

## 2018-06-05 ENCOUNTER — APPOINTMENT (OUTPATIENT)
Dept: PREADMISSION TESTING | Facility: HOSPITAL | Age: 63
End: 2018-06-05

## 2018-06-05 VITALS
DIASTOLIC BLOOD PRESSURE: 69 MMHG | BODY MASS INDEX: 31.75 KG/M2 | HEIGHT: 61 IN | HEART RATE: 87 BPM | SYSTOLIC BLOOD PRESSURE: 113 MMHG | OXYGEN SATURATION: 95 % | RESPIRATION RATE: 16 BRPM | WEIGHT: 168.2 LBS

## 2018-06-05 DIAGNOSIS — S72.21XK: ICD-10-CM

## 2018-06-05 DIAGNOSIS — M25.551 PAIN OF RIGHT HIP JOINT: Primary | ICD-10-CM

## 2018-06-05 LAB
ABO GROUP BLD: NORMAL
ANION GAP SERPL CALCULATED.3IONS-SCNC: 10.7 MMOL/L
APTT PPP: 25.8 SECONDS (ref 24.3–38.1)
BASOPHILS # BLD AUTO: 0.02 10*3/MM3 (ref 0–0.2)
BASOPHILS NFR BLD AUTO: 0.4 % (ref 0–2)
BILIRUB UR QL STRIP: NEGATIVE
BLD GP AB SCN SERPL QL: NEGATIVE
BUN BLD-MCNC: 13 MG/DL (ref 8–23)
BUN/CREAT SERPL: 16.3 (ref 7–25)
CALCIUM SPEC-SCNC: 9.2 MG/DL (ref 8.8–10.5)
CHLORIDE SERPL-SCNC: 101 MMOL/L (ref 98–107)
CLARITY UR: CLEAR
CO2 SERPL-SCNC: 26.3 MMOL/L (ref 22–29)
COLOR UR: YELLOW
CREAT BLD-MCNC: 0.8 MG/DL (ref 0.57–1)
DEPRECATED RDW RBC AUTO: 46.4 FL (ref 37–54)
EOSINOPHIL # BLD AUTO: 0.18 10*3/MM3 (ref 0.1–0.3)
EOSINOPHIL NFR BLD AUTO: 3.5 % (ref 0–4)
ERYTHROCYTE [DISTWIDTH] IN BLOOD BY AUTOMATED COUNT: 14.6 % (ref 11.5–14.5)
GFR SERPL CREATININE-BSD FRML MDRD: 72 ML/MIN/1.73
GLUCOSE BLD-MCNC: 106 MG/DL (ref 65–99)
GLUCOSE UR STRIP-MCNC: NEGATIVE MG/DL
HCT VFR BLD AUTO: 44.6 % (ref 37–47)
HGB BLD-MCNC: 14 G/DL (ref 12–16)
HGB UR QL STRIP.AUTO: NEGATIVE
IMM GRANULOCYTES # BLD: 0.01 10*3/MM3 (ref 0–0.03)
IMM GRANULOCYTES NFR BLD: 0.2 % (ref 0–0.5)
INR PPP: 0.99 (ref 0.9–1.1)
KETONES UR QL STRIP: NEGATIVE
LEUKOCYTE ESTERASE UR QL STRIP.AUTO: NEGATIVE
LYMPHOCYTES # BLD AUTO: 1.78 10*3/MM3 (ref 0.6–4.8)
LYMPHOCYTES NFR BLD AUTO: 34.7 % (ref 20–45)
MCH RBC QN AUTO: 27.7 PG (ref 27–31)
MCHC RBC AUTO-ENTMCNC: 31.4 G/DL (ref 31–37)
MCV RBC AUTO: 88.3 FL (ref 81–99)
MONOCYTES # BLD AUTO: 0.41 10*3/MM3 (ref 0–1)
MONOCYTES NFR BLD AUTO: 8 % (ref 3–8)
NEUTROPHILS # BLD AUTO: 2.73 10*3/MM3 (ref 1.5–8.3)
NEUTROPHILS NFR BLD AUTO: 53.2 % (ref 45–70)
NITRITE UR QL STRIP: NEGATIVE
NRBC BLD MANUAL-RTO: 0 /100 WBC (ref 0–0)
PH UR STRIP.AUTO: <=5 [PH] (ref 4.5–8)
PLATELET # BLD AUTO: 358 10*3/MM3 (ref 140–500)
PMV BLD AUTO: 8.9 FL (ref 7.4–10.4)
POTASSIUM BLD-SCNC: 4.3 MMOL/L (ref 3.5–5.2)
PROT UR QL STRIP: NEGATIVE
PROTHROMBIN TIME: 13.1 SECONDS (ref 12.1–15)
RBC # BLD AUTO: 5.05 10*6/MM3 (ref 4.2–5.4)
RH BLD: POSITIVE
SODIUM BLD-SCNC: 138 MMOL/L (ref 136–145)
SP GR UR STRIP: 1.02 (ref 1–1.03)
T&S EXPIRATION DATE: NORMAL
UROBILINOGEN UR QL STRIP: NORMAL
WBC NRBC COR # BLD: 5.13 10*3/MM3 (ref 4.8–10.8)

## 2018-06-05 PROCEDURE — 86850 RBC ANTIBODY SCREEN: CPT | Performed by: ORTHOPAEDIC SURGERY

## 2018-06-05 PROCEDURE — 86900 BLOOD TYPING SEROLOGIC ABO: CPT | Performed by: ORTHOPAEDIC SURGERY

## 2018-06-05 PROCEDURE — 86901 BLOOD TYPING SEROLOGIC RH(D): CPT

## 2018-06-05 PROCEDURE — 93010 ELECTROCARDIOGRAM REPORT: CPT | Performed by: INTERNAL MEDICINE

## 2018-06-05 PROCEDURE — 81003 URINALYSIS AUTO W/O SCOPE: CPT | Performed by: ORTHOPAEDIC SURGERY

## 2018-06-05 PROCEDURE — 86901 BLOOD TYPING SEROLOGIC RH(D): CPT | Performed by: ORTHOPAEDIC SURGERY

## 2018-06-05 PROCEDURE — 80048 BASIC METABOLIC PNL TOTAL CA: CPT | Performed by: ORTHOPAEDIC SURGERY

## 2018-06-05 PROCEDURE — 86923 COMPATIBILITY TEST ELECTRIC: CPT

## 2018-06-05 PROCEDURE — 93005 ELECTROCARDIOGRAM TRACING: CPT

## 2018-06-05 PROCEDURE — 85025 COMPLETE CBC W/AUTO DIFF WBC: CPT | Performed by: ORTHOPAEDIC SURGERY

## 2018-06-05 PROCEDURE — 85730 THROMBOPLASTIN TIME PARTIAL: CPT | Performed by: ORTHOPAEDIC SURGERY

## 2018-06-05 PROCEDURE — 36415 COLL VENOUS BLD VENIPUNCTURE: CPT

## 2018-06-05 PROCEDURE — 85610 PROTHROMBIN TIME: CPT | Performed by: ORTHOPAEDIC SURGERY

## 2018-06-05 PROCEDURE — 86900 BLOOD TYPING SEROLOGIC ABO: CPT

## 2018-06-05 PROCEDURE — 71046 X-RAY EXAM CHEST 2 VIEWS: CPT

## 2018-06-05 RX ORDER — MELOXICAM 7.5 MG/1
7.5 TABLET ORAL EVERY MORNING
COMMUNITY
End: 2018-06-13 | Stop reason: SDUPTHER

## 2018-06-05 NOTE — PAT
PT. HERE FOR PAT VISIT. LABS, EKG, CXR COMPLETE.PRE-OP INSTRUCTIONS REVIEWED. WILL ATTEND JOINT CAMP 06/07.

## 2018-06-08 RX ORDER — MELOXICAM 7.5 MG/1
7.5 TABLET ORAL DAILY
Qty: 30 TABLET | Refills: 2 | Status: SHIPPED | OUTPATIENT
Start: 2018-06-08 | End: 2018-06-21 | Stop reason: HOSPADM

## 2018-06-13 ENCOUNTER — OFFICE VISIT (OUTPATIENT)
Dept: ORTHOPEDIC SURGERY | Facility: CLINIC | Age: 63
End: 2018-06-13

## 2018-06-13 VITALS — WEIGHT: 168 LBS | BODY MASS INDEX: 30.91 KG/M2 | HEIGHT: 62 IN

## 2018-06-13 DIAGNOSIS — Z87.81 H/O NONUNION OF FRACTURE: ICD-10-CM

## 2018-06-13 DIAGNOSIS — M88.9 PAGET'S DISEASE OF BONE: ICD-10-CM

## 2018-06-13 DIAGNOSIS — Z09 STATUS POST ORTHOPEDIC SURGERY, FOLLOW-UP EXAM: ICD-10-CM

## 2018-06-13 DIAGNOSIS — R52 PAIN: Primary | ICD-10-CM

## 2018-06-13 PROCEDURE — S0260 H&P FOR SURGERY: HCPCS | Performed by: ORTHOPAEDIC SURGERY

## 2018-06-13 ASSESSMENT — HOOS JR
HOOS JR SCORE: 61.815
HOOS JR SCORE: 9

## 2018-06-13 NOTE — PROGRESS NOTES
Subjective: Right hip pain, Paget's disease     Patient ID: Raven Young is a 62 y.o. female.    Chief Complaint:    History of Present Illness patient seen for H&P to undergo right proximal total hip replacement on Tuesday       Social History     Occupational History   • Not on file.     Social History Main Topics   • Smoking status: Never Smoker   • Smokeless tobacco: Never Used   • Alcohol use Yes      Comment: rare   • Drug use: No   • Sexual activity: Defer      Review of Systems   Constitutional: Negative for chills, diaphoresis, fever and unexpected weight change.   HENT: Negative for hearing loss, nosebleeds, sore throat and tinnitus.    Eyes: Negative for pain and visual disturbance.   Respiratory: Negative for cough, shortness of breath and wheezing.    Cardiovascular: Negative for chest pain and palpitations.   Gastrointestinal: Negative for abdominal pain, diarrhea, nausea and vomiting.   Endocrine: Negative for cold intolerance, heat intolerance and polydipsia.   Genitourinary: Negative for difficulty urinating, dysuria and hematuria.   Musculoskeletal: Positive for arthralgias and myalgias. Negative for joint swelling.   Skin: Negative for rash and wound.   Allergic/Immunologic: Negative for environmental allergies.   Neurological: Negative for dizziness, syncope and numbness.   Hematological: Does not bruise/bleed easily.   Psychiatric/Behavioral: Negative for dysphoric mood and sleep disturbance. The patient is not nervous/anxious.          Past Medical History:   Diagnosis Date   • Cancer     basal cell   • Depression with anxiety    • PONV (postoperative nausea and vomiting)    • Right hip pain     SCHEDULED FOR SX     Past Surgical History:   Procedure Laterality Date   • FIBULA BONE GRAFT TO HIP Right 12/15/2017    Procedure: FIBULA BONE GRAFT TO HIP with cancellus autograft the Luis Alfredo cable ;  Surgeon: Keyshawn Madden MD;  Location: [facility];  Service:    • HARDWARE REMOVAL Right 8/15/2017     Procedure: HARDWARE REMOVAL long gamma nail  with long gamma nail right subtrochanteric femur fracture meir vanco powder ;  Surgeon: Keyshawn Madden MD;  Location:  [facility] OR;  Service:    • IM NAILING FEMORAL SHAFT RETROGRADE Right 08/15/2017    replacement leah  fx   • [place] OPEN FIX INTER/SUBTROCH FX,IMPLNT Right 4/14/2017    Procedure: FEMUR INTRAMEDULLARY NAILING/RODDING long meir gamma nail 7fr hemovac placement;  Surgeon: Keyshawn Madden MD;  Location:  [facility] OR;  Service: Orthopedics   • TUBAL ABDOMINAL LIGATION       Family History   Problem Relation Age of Onset   • Asthma Mother    • No Known Problems Father          Objective:  There were no vitals filed for this visit.  1    06/13/18  1533   Weight: 76.2 kg (168 lb)     Body mass index is 30.73 kg/m².       Ortho Exam  H&P completed    Assessment:       1. Pain    2. H/O nonunion of fracture    3. Paget's disease of bone    4. Status post orthopedic surgery, follow-up exam          Plan: All questions answered            Work Status:    DANIA query complete.    Orders:  No orders of the defined types were placed in this encounter.      Medications:  No orders of the defined types were placed in this encounter.      Followup:  Return in about 3 weeks (around 7/4/2018).          Dragon transcription disclaimer     Much of this encounter note is an electronic transcription/translation of spoken language to printed text. The electronic translation of spoken language may permit erroneous, or at times, nonsensical words or phrases to be inadvertently transcribed. Although I have reviewed the note for such errors, some may still exist.

## 2018-06-13 NOTE — H&P
Orthopedic Surgery    Patient Care Team:  Andres Kathleen MD as PCP - General (Family Medicine)    CHIEF COMPLAINT:  Right  hip pain    HISTORY OF PRESENT ILLNESS: 62-year-old female sustained a subtrochanteric femur fracture  April 2017 underwent a long gamma nailing.  Subsequent to that surgery the nail broke requiring a second procedure with re-rodding and grafting at the fracture site and a third procedure for allografting.  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  Have discussed with the patient the risk of surgery which include but not limited to infection and the need for multiple procedures including implant removal to eradicate infection, DVT, nerve injury, vascular injury, periprosthetic fracture, dislocation, the need for revision surgery persistent pain and discomfort and she understands.  Return since the rehabilitation at 3-6 months.          Past Medical History:   Diagnosis Date   • Cancer     basal cell   • Depression with anxiety    • PONV (postoperative nausea and vomiting)    • Right hip pain     SCHEDULED FOR SX     Past Surgical History:   Procedure Laterality Date   • FIBULA BONE GRAFT TO HIP Right 12/15/2017    Procedure: FIBULA BONE GRAFT TO HIP with cancellus autograft the [place]iles cable ;  Surgeon: Keyshawn Madden MD;  Location:  [facility];  Service:    • HARDWARE REMOVAL Right 8/15/2017    Procedure: HARDWARE REMOVAL long gamma nail  with long gamma nail right subtrochanteric femur fracture meir vanco powder ;  Surgeon: Keyshawn Madden MD;  Location:  [facility];  Service:    • IM NAILING FEMORAL SHAFT RETROGRADE Right 08/15/2017    replacement laeh  fx   • MD OPEN FIX INTER/SUBTROCH FX,IMPLNT Right 4/14/2017    Procedure: FEMUR INTRAMEDULLARY NAILING/RODDING long meir gamma nail 7fr  hemovac placement;  Surgeon: Keyshawn Madden MD;  Location: [facility];  Service: Orthopedics   • TUBAL ABDOMINAL LIGATION       Family History   Problem Relation Age of Onset   • Asthma Mother    • No Known Problems Father      Social History   Substance Use Topics   • Smoking status: Never Smoker   • Smokeless tobacco: Never Used   • Alcohol use Yes      Comment: rare     No prescriptions prior to admission.     Allergies:  Sulfa antibiotics    REVIEW OF SYSTEMS:  Please see the above history of present illness for pertinent positives and negatives.  The remainder of the patient's systems have been reviewed and are negative.    Vital Signs            Physical Exam:  Physical Exam   Constitutional: Patient appears well-developed and well-nourished and in no acute distress   HEENT:   Head: Normocephalic and atraumatic.   Eyes:  Pupils are equal, round, and reactive to light.  Mouth and Throat: Patient has moist mucous membranes. Oropharynx is clear of any erythema or exudate.     Neck: Neck supple. No JVD present. No thyromegaly present. No lymphadenopathy present.  Cardiovascular: Regular rate, regular rhythm.  Pulmonary/Chest: Lungs are clear to auscultation bilaterally.  Abdominal:benign,soft with bowel sounds  Musculoskeletal: Normal posture.  Extremities: Right leg is good distal pulses no motor or sensory deficit.  Good capillary refill.  Calf is nontender negative Homans.  Hip abductors and extensors and flexors are all +3 out of 5 secondary to chronic pain..  Neurological: Patient is alert and oriented.  Psychological:   Mood and behavior appropriate.  Skin: Skin is warm and dry.     Results Review:    I reviewed the patient's new clinical results.  Lab Results (most recent)     None          Imaging Results (most recent)     None            ECG/EMG Results (most recent)     None          Assessment/Plan patches disease with nonunion subtrochanteric femur fracture        I discussed the patients findings and my  recommendations with patient and plan to proceed with right proximal femoral total hip replacement    Keyshawn Madden MD  06/13/18  3:44 PM

## 2018-06-18 ENCOUNTER — ANESTHESIA EVENT (OUTPATIENT)
Dept: PERIOP | Facility: HOSPITAL | Age: 63
End: 2018-06-18

## 2018-06-19 ENCOUNTER — APPOINTMENT (OUTPATIENT)
Dept: GENERAL RADIOLOGY | Facility: HOSPITAL | Age: 63
End: 2018-06-19

## 2018-06-19 ENCOUNTER — ANESTHESIA (OUTPATIENT)
Dept: PERIOP | Facility: HOSPITAL | Age: 63
End: 2018-06-19

## 2018-06-19 ENCOUNTER — HOSPITAL ENCOUNTER (INPATIENT)
Facility: HOSPITAL | Age: 63
LOS: 2 days | Discharge: HOME HEALTH | End: 2018-06-21
Attending: ORTHOPAEDIC SURGERY | Admitting: ORTHOPAEDIC SURGERY

## 2018-06-19 DIAGNOSIS — S72.21XK: ICD-10-CM

## 2018-06-19 PROBLEM — M84.750K: Status: ACTIVE | Noted: 2018-06-19

## 2018-06-19 PROCEDURE — 36430 TRANSFUSION BLD/BLD COMPNT: CPT

## 2018-06-19 PROCEDURE — C1776 JOINT DEVICE (IMPLANTABLE): HCPCS | Performed by: ORTHOPAEDIC SURGERY

## 2018-06-19 PROCEDURE — 30233N1 TRANSFUSION OF NONAUTOLOGOUS RED BLOOD CELLS INTO PERIPHERAL VEIN, PERCUTANEOUS APPROACH: ICD-10-PCS | Performed by: ORTHOPAEDIC SURGERY

## 2018-06-19 PROCEDURE — 25010000002 DEXAMETHASONE PER 1 MG: Performed by: ANESTHESIOLOGY

## 2018-06-19 PROCEDURE — 25010000003 CEFAZOLIN PER 500 MG: Performed by: ORTHOPAEDIC SURGERY

## 2018-06-19 PROCEDURE — C1713 ANCHOR/SCREW BN/BN,TIS/BN: HCPCS | Performed by: ORTHOPAEDIC SURGERY

## 2018-06-19 PROCEDURE — 86923 COMPATIBILITY TEST ELECTRIC: CPT

## 2018-06-19 PROCEDURE — 0QP604Z REMOVAL OF INTERNAL FIXATION DEVICE FROM RIGHT UPPER FEMUR, OPEN APPROACH: ICD-10-PCS | Performed by: ORTHOPAEDIC SURGERY

## 2018-06-19 PROCEDURE — 25010000002 PROPOFOL 10 MG/ML EMULSION: Performed by: NURSE ANESTHETIST, CERTIFIED REGISTERED

## 2018-06-19 PROCEDURE — 27130 TOTAL HIP ARTHROPLASTY: CPT | Performed by: ORTHOPAEDIC SURGERY

## 2018-06-19 PROCEDURE — 25010000002 VANCOMYCIN 750 MG RECONSTITUTED SOLUTION 1 EACH VIAL: Performed by: ORTHOPAEDIC SURGERY

## 2018-06-19 PROCEDURE — 87075 CULTR BACTERIA EXCEPT BLOOD: CPT | Performed by: ORTHOPAEDIC SURGERY

## 2018-06-19 PROCEDURE — 94799 UNLISTED PULMONARY SVC/PX: CPT

## 2018-06-19 PROCEDURE — P9041 ALBUMIN (HUMAN),5%, 50ML: HCPCS | Performed by: NURSE ANESTHETIST, CERTIFIED REGISTERED

## 2018-06-19 PROCEDURE — 0SRB049 REPLACEMENT OF LEFT HIP JOINT WITH CERAMIC ON POLYETHYLENE SYNTHETIC SUBSTITUTE, CEMENTED, OPEN APPROACH: ICD-10-PCS | Performed by: ORTHOPAEDIC SURGERY

## 2018-06-19 PROCEDURE — 25010000002 PHENYLEPHRINE PER 1 ML: Performed by: NURSE ANESTHETIST, CERTIFIED REGISTERED

## 2018-06-19 PROCEDURE — 25010000002 VANCOMYCIN PER 500 MG: Performed by: ORTHOPAEDIC SURGERY

## 2018-06-19 PROCEDURE — 25010000002 ALBUMIN HUMAN 5% PER 50 ML: Performed by: NURSE ANESTHETIST, CERTIFIED REGISTERED

## 2018-06-19 PROCEDURE — 25010000002 ONDANSETRON PER 1 MG: Performed by: ANESTHESIOLOGY

## 2018-06-19 PROCEDURE — 87070 CULTURE OTHR SPECIMN AEROBIC: CPT | Performed by: ORTHOPAEDIC SURGERY

## 2018-06-19 PROCEDURE — 73501 X-RAY EXAM HIP UNI 1 VIEW: CPT

## 2018-06-19 PROCEDURE — 86900 BLOOD TYPING SEROLOGIC ABO: CPT

## 2018-06-19 PROCEDURE — P9016 RBC LEUKOCYTES REDUCED: HCPCS

## 2018-06-19 PROCEDURE — 87205 SMEAR GRAM STAIN: CPT | Performed by: ORTHOPAEDIC SURGERY

## 2018-06-19 DEVICE — PINNACLE CANCELLOUS BONE SCREW 6.5MM X 25MM
Type: IMPLANTABLE DEVICE | Site: HIP | Status: FUNCTIONAL
Brand: PINNACLE

## 2018-06-19 DEVICE — PINNACLE GRIPTION ACETABULAR SHELL MULTI-HOLE 50MM OD
Type: IMPLANTABLE DEVICE | Site: HIP | Status: FUNCTIONAL
Brand: PINNACLE GRIPTION

## 2018-06-19 DEVICE — BIOLOX DELTA CERAMIC FEMORAL HEAD 32MM DIA +5.0 12/14 TAPER
Type: IMPLANTABLE DEVICE | Site: HIP | Status: FUNCTIONAL
Brand: BIOLOX DELTA

## 2018-06-19 DEVICE — PINNACLE HIP SOLUTIONS ALTRX POLYETHYLENE ACETABULAR LINER +4 10 DEGREES 32MM ID 50MM OD
Type: IMPLANTABLE DEVICE | Site: HIP | Status: FUNCTIONAL
Brand: PINNACLE ALTRX

## 2018-06-19 DEVICE — LPS PROXIMAL FEMORAL STANDARD BODY NEUTRAL
Type: IMPLANTABLE DEVICE | Site: HIP | Status: FUNCTIONAL
Brand: LPS

## 2018-06-19 DEVICE — LPS FEMORAL STEM BOWED 12MM X 150MM CEMENTED
Type: IMPLANTABLE DEVICE | Site: HIP | Status: FUNCTIONAL
Brand: LPS

## 2018-06-19 DEVICE — PINNACLE CANCELLOUS BONE SCREW 6.5MM X 20MM
Type: IMPLANTABLE DEVICE | Site: HIP | Status: FUNCTIONAL
Brand: PINNACLE

## 2018-06-19 DEVICE — CABL W/SLV DALLMILES BEAD 2MM: Type: IMPLANTABLE DEVICE | Site: HIP | Status: FUNCTIONAL

## 2018-06-19 DEVICE — SMARTSET GMV HIGH PERFORMANCE GENTAMICIN MEDIUM VISCOSITY BONE CEMENT 40G
Type: IMPLANTABLE DEVICE | Site: HIP | Status: FUNCTIONAL
Brand: SMARTSET

## 2018-06-19 RX ORDER — ALBUMIN, HUMAN INJ 5% 5 %
SOLUTION INTRAVENOUS CONTINUOUS PRN
Status: DISCONTINUED | OUTPATIENT
Start: 2018-06-19 | End: 2018-06-19 | Stop reason: SURG

## 2018-06-19 RX ORDER — PREGABALIN 75 MG/1
150 CAPSULE ORAL ONCE
Status: COMPLETED | OUTPATIENT
Start: 2018-06-19 | End: 2018-06-19

## 2018-06-19 RX ORDER — SODIUM CHLORIDE 9 MG/ML
40 INJECTION, SOLUTION INTRAVENOUS AS NEEDED
Status: DISCONTINUED | OUTPATIENT
Start: 2018-06-19 | End: 2018-06-19

## 2018-06-19 RX ORDER — ONDANSETRON 4 MG/1
4 TABLET, FILM COATED ORAL EVERY 6 HOURS PRN
Status: DISCONTINUED | OUTPATIENT
Start: 2018-06-19 | End: 2018-06-21 | Stop reason: HOSPADM

## 2018-06-19 RX ORDER — BISACODYL 5 MG/1
10 TABLET, DELAYED RELEASE ORAL DAILY PRN
Status: DISCONTINUED | OUTPATIENT
Start: 2018-06-19 | End: 2018-06-21 | Stop reason: HOSPADM

## 2018-06-19 RX ORDER — BUPIVACAINE HYDROCHLORIDE 5 MG/ML
INJECTION, SOLUTION EPIDURAL; INTRACAUDAL AS NEEDED
Status: DISCONTINUED | OUTPATIENT
Start: 2018-06-19 | End: 2018-06-19 | Stop reason: SURG

## 2018-06-19 RX ORDER — ONDANSETRON 2 MG/ML
4 INJECTION INTRAMUSCULAR; INTRAVENOUS ONCE AS NEEDED
Status: COMPLETED | OUTPATIENT
Start: 2018-06-19 | End: 2018-06-19

## 2018-06-19 RX ORDER — OXYCODONE HYDROCHLORIDE 5 MG/1
10 TABLET ORAL EVERY 4 HOURS PRN
Status: DISCONTINUED | OUTPATIENT
Start: 2018-06-19 | End: 2018-06-21 | Stop reason: HOSPADM

## 2018-06-19 RX ORDER — DIPHENHYDRAMINE HYDROCHLORIDE 50 MG/ML
12.5 INJECTION INTRAMUSCULAR; INTRAVENOUS
Status: DISCONTINUED | OUTPATIENT
Start: 2018-06-19 | End: 2018-06-19

## 2018-06-19 RX ORDER — MEPERIDINE HYDROCHLORIDE 25 MG/ML
12.5 INJECTION INTRAMUSCULAR; INTRAVENOUS; SUBCUTANEOUS
Status: DISCONTINUED | OUTPATIENT
Start: 2018-06-19 | End: 2018-06-19

## 2018-06-19 RX ORDER — SODIUM CHLORIDE, SODIUM LACTATE, POTASSIUM CHLORIDE, CALCIUM CHLORIDE 600; 310; 30; 20 MG/100ML; MG/100ML; MG/100ML; MG/100ML
9 INJECTION, SOLUTION INTRAVENOUS CONTINUOUS
Status: DISCONTINUED | OUTPATIENT
Start: 2018-06-19 | End: 2018-06-19

## 2018-06-19 RX ORDER — PREGABALIN 75 MG/1
75 CAPSULE ORAL EVERY 12 HOURS SCHEDULED
Status: DISCONTINUED | OUTPATIENT
Start: 2018-06-19 | End: 2018-06-21 | Stop reason: HOSPADM

## 2018-06-19 RX ORDER — SODIUM CHLORIDE, SODIUM LACTATE, POTASSIUM CHLORIDE, CALCIUM CHLORIDE 600; 310; 30; 20 MG/100ML; MG/100ML; MG/100ML; MG/100ML
30 INJECTION, SOLUTION INTRAVENOUS CONTINUOUS
Status: DISCONTINUED | OUTPATIENT
Start: 2018-06-19 | End: 2018-06-20

## 2018-06-19 RX ORDER — ONDANSETRON 2 MG/ML
4 INJECTION INTRAMUSCULAR; INTRAVENOUS ONCE AS NEEDED
Status: DISCONTINUED | OUTPATIENT
Start: 2018-06-19 | End: 2018-06-19

## 2018-06-19 RX ORDER — MIDAZOLAM HYDROCHLORIDE 1 MG/ML
2 INJECTION INTRAMUSCULAR; INTRAVENOUS
Status: DISCONTINUED | OUTPATIENT
Start: 2018-06-19 | End: 2018-06-19

## 2018-06-19 RX ORDER — DEXAMETHASONE SODIUM PHOSPHATE 4 MG/ML
8 INJECTION, SOLUTION INTRA-ARTICULAR; INTRALESIONAL; INTRAMUSCULAR; INTRAVENOUS; SOFT TISSUE ONCE AS NEEDED
Status: COMPLETED | OUTPATIENT
Start: 2018-06-19 | End: 2018-06-19

## 2018-06-19 RX ORDER — MELOXICAM 7.5 MG/1
7.5 TABLET ORAL ONCE
Status: COMPLETED | OUTPATIENT
Start: 2018-06-19 | End: 2018-06-19

## 2018-06-19 RX ORDER — VANCOMYCIN HYDROCHLORIDE
15 ONCE
Status: DISCONTINUED | OUTPATIENT
Start: 2018-06-19 | End: 2018-06-19 | Stop reason: CLARIF

## 2018-06-19 RX ORDER — MAGNESIUM HYDROXIDE 1200 MG/15ML
LIQUID ORAL AS NEEDED
Status: DISCONTINUED | OUTPATIENT
Start: 2018-06-19 | End: 2018-06-19 | Stop reason: HOSPADM

## 2018-06-19 RX ORDER — BUPIVACAINE HYDROCHLORIDE 2.5 MG/ML
INJECTION, SOLUTION EPIDURAL; INFILTRATION; INTRACAUDAL AS NEEDED
Status: DISCONTINUED | OUTPATIENT
Start: 2018-06-19 | End: 2018-06-19 | Stop reason: HOSPADM

## 2018-06-19 RX ORDER — MIDAZOLAM HYDROCHLORIDE 1 MG/ML
1 INJECTION INTRAMUSCULAR; INTRAVENOUS
Status: DISCONTINUED | OUTPATIENT
Start: 2018-06-19 | End: 2018-06-19

## 2018-06-19 RX ORDER — ACETAMINOPHEN 325 MG/1
650 TABLET ORAL ONCE AS NEEDED
Status: DISCONTINUED | OUTPATIENT
Start: 2018-06-19 | End: 2018-06-19

## 2018-06-19 RX ORDER — ONDANSETRON 4 MG/1
4 TABLET, ORALLY DISINTEGRATING ORAL EVERY 6 HOURS PRN
Status: DISCONTINUED | OUTPATIENT
Start: 2018-06-19 | End: 2018-06-21 | Stop reason: HOSPADM

## 2018-06-19 RX ORDER — LIDOCAINE HYDROCHLORIDE 10 MG/ML
0.5 INJECTION, SOLUTION EPIDURAL; INFILTRATION; INTRACAUDAL; PERINEURAL ONCE AS NEEDED
Status: COMPLETED | OUTPATIENT
Start: 2018-06-19 | End: 2018-06-19

## 2018-06-19 RX ORDER — OXYCODONE HCL 10 MG/1
10 TABLET, FILM COATED, EXTENDED RELEASE ORAL ONCE
Status: COMPLETED | OUTPATIENT
Start: 2018-06-19 | End: 2018-06-19

## 2018-06-19 RX ORDER — OXYCODONE AND ACETAMINOPHEN 7.5; 325 MG/1; MG/1
1 TABLET ORAL ONCE AS NEEDED
Status: DISCONTINUED | OUTPATIENT
Start: 2018-06-19 | End: 2018-06-19

## 2018-06-19 RX ORDER — SODIUM CHLORIDE 9 MG/ML
INJECTION, SOLUTION INTRAVENOUS AS NEEDED
Status: DISCONTINUED | OUTPATIENT
Start: 2018-06-19 | End: 2018-06-19 | Stop reason: HOSPADM

## 2018-06-19 RX ORDER — SODIUM CHLORIDE 0.9 % (FLUSH) 0.9 %
1-10 SYRINGE (ML) INJECTION AS NEEDED
Status: DISCONTINUED | OUTPATIENT
Start: 2018-06-19 | End: 2018-06-19

## 2018-06-19 RX ORDER — DULOXETIN HYDROCHLORIDE 60 MG/1
60 CAPSULE, DELAYED RELEASE ORAL EVERY MORNING
Status: DISCONTINUED | OUTPATIENT
Start: 2018-06-20 | End: 2018-06-21 | Stop reason: HOSPADM

## 2018-06-19 RX ORDER — ACETAMINOPHEN 500 MG
1000 TABLET ORAL EVERY 6 HOURS
Status: DISCONTINUED | OUTPATIENT
Start: 2018-06-19 | End: 2018-06-21 | Stop reason: HOSPADM

## 2018-06-19 RX ORDER — PROPOFOL 10 MG/ML
VIAL (ML) INTRAVENOUS CONTINUOUS PRN
Status: DISCONTINUED | OUTPATIENT
Start: 2018-06-19 | End: 2018-06-19 | Stop reason: SURG

## 2018-06-19 RX ORDER — NALOXONE HCL 0.4 MG/ML
0.1 VIAL (ML) INJECTION
Status: DISCONTINUED | OUTPATIENT
Start: 2018-06-19 | End: 2018-06-21 | Stop reason: HOSPADM

## 2018-06-19 RX ORDER — ONDANSETRON 2 MG/ML
4 INJECTION INTRAMUSCULAR; INTRAVENOUS EVERY 6 HOURS PRN
Status: DISCONTINUED | OUTPATIENT
Start: 2018-06-19 | End: 2018-06-21 | Stop reason: HOSPADM

## 2018-06-19 RX ORDER — BISACODYL 10 MG
10 SUPPOSITORY, RECTAL RECTAL DAILY PRN
Status: DISCONTINUED | OUTPATIENT
Start: 2018-06-19 | End: 2018-06-21 | Stop reason: HOSPADM

## 2018-06-19 RX ORDER — ACETAMINOPHEN 650 MG/1
650 SUPPOSITORY RECTAL ONCE AS NEEDED
Status: DISCONTINUED | OUTPATIENT
Start: 2018-06-19 | End: 2018-06-19

## 2018-06-19 RX ADMIN — PHENYLEPHRINE HYDROCHLORIDE 100 MCG: 10 INJECTION INTRAVENOUS at 13:11

## 2018-06-19 RX ADMIN — SODIUM CHLORIDE 1000 MG: 9 INJECTION, SOLUTION INTRAVENOUS at 10:57

## 2018-06-19 RX ADMIN — ALBUMIN HUMAN: 0.05 INJECTION, SOLUTION INTRAVENOUS at 12:27

## 2018-06-19 RX ADMIN — SODIUM CHLORIDE 1000 MG: 9 INJECTION, SOLUTION INTRAVENOUS at 13:36

## 2018-06-19 RX ADMIN — SODIUM CHLORIDE, POTASSIUM CHLORIDE, SODIUM LACTATE AND CALCIUM CHLORIDE: 600; 310; 30; 20 INJECTION, SOLUTION INTRAVENOUS at 10:58

## 2018-06-19 RX ADMIN — CEFAZOLIN SODIUM 2 G: 2 SOLUTION INTRAVENOUS at 10:55

## 2018-06-19 RX ADMIN — FAMOTIDINE 20 MG: 10 INJECTION, SOLUTION INTRAVENOUS at 09:57

## 2018-06-19 RX ADMIN — ONDANSETRON 4 MG: 2 INJECTION, SOLUTION INTRAMUSCULAR; INTRAVENOUS at 09:57

## 2018-06-19 RX ADMIN — MELOXICAM 7.5 MG: 7.5 TABLET ORAL at 09:08

## 2018-06-19 RX ADMIN — ACETAMINOPHEN 1000 MG: 500 TABLET, FILM COATED ORAL at 21:30

## 2018-06-19 RX ADMIN — PHENYLEPHRINE HYDROCHLORIDE 200 MCG: 10 INJECTION INTRAVENOUS at 12:36

## 2018-06-19 RX ADMIN — VANCOMYCIN HYDROCHLORIDE 1250 MG: 1 INJECTION, POWDER, LYOPHILIZED, FOR SOLUTION INTRAVENOUS at 21:30

## 2018-06-19 RX ADMIN — SODIUM CHLORIDE 1000 MG: 900 INJECTION, SOLUTION INTRAVENOUS at 09:55

## 2018-06-19 RX ADMIN — PHENYLEPHRINE HYDROCHLORIDE 100 MCG: 10 INJECTION INTRAVENOUS at 12:09

## 2018-06-19 RX ADMIN — ALBUMIN HUMAN: 0.05 INJECTION, SOLUTION INTRAVENOUS at 12:12

## 2018-06-19 RX ADMIN — OXYCODONE HYDROCHLORIDE 10 MG: 10 TABLET, FILM COATED, EXTENDED RELEASE ORAL at 09:08

## 2018-06-19 RX ADMIN — EPHEDRINE SULFATE 10 MG: 50 INJECTION INTRAMUSCULAR; INTRAVENOUS; SUBCUTANEOUS at 11:56

## 2018-06-19 RX ADMIN — EPHEDRINE SULFATE 5 MG: 50 INJECTION INTRAMUSCULAR; INTRAVENOUS; SUBCUTANEOUS at 12:02

## 2018-06-19 RX ADMIN — PROPOFOL 50 MCG/KG/MIN: 10 INJECTION, EMULSION INTRAVENOUS at 10:58

## 2018-06-19 RX ADMIN — BUPIVACAINE HYDROCHLORIDE 3 ML: 5 INJECTION, SOLUTION EPIDURAL; INTRACAUDAL; PERINEURAL at 10:44

## 2018-06-19 RX ADMIN — LIDOCAINE HYDROCHLORIDE 0.1 ML: 10 INJECTION, SOLUTION EPIDURAL; INFILTRATION; INTRACAUDAL; PERINEURAL at 09:10

## 2018-06-19 RX ADMIN — PHENYLEPHRINE HYDROCHLORIDE 100 MCG: 10 INJECTION INTRAVENOUS at 12:48

## 2018-06-19 RX ADMIN — SODIUM CHLORIDE, POTASSIUM CHLORIDE, SODIUM LACTATE AND CALCIUM CHLORIDE 100 ML/HR: 600; 310; 30; 20 INJECTION, SOLUTION INTRAVENOUS at 15:43

## 2018-06-19 RX ADMIN — DEXAMETHASONE SODIUM PHOSPHATE 8 MG: 4 INJECTION, SOLUTION INTRAMUSCULAR; INTRAVENOUS at 09:57

## 2018-06-19 RX ADMIN — PREGABALIN 75 MG: 75 CAPSULE ORAL at 20:18

## 2018-06-19 RX ADMIN — CEFAZOLIN SODIUM 2 G: 2 SOLUTION INTRAVENOUS at 18:29

## 2018-06-19 RX ADMIN — ACETAMINOPHEN 1000 MG: 500 TABLET, FILM COATED ORAL at 17:22

## 2018-06-19 RX ADMIN — PHENYLEPHRINE HYDROCHLORIDE 100 MCG: 10 INJECTION INTRAVENOUS at 12:28

## 2018-06-19 RX ADMIN — SODIUM CHLORIDE, POTASSIUM CHLORIDE, SODIUM LACTATE AND CALCIUM CHLORIDE 9 ML/HR: 600; 310; 30; 20 INJECTION, SOLUTION INTRAVENOUS at 09:10

## 2018-06-19 RX ADMIN — PREGABALIN 150 MG: 75 CAPSULE ORAL at 09:08

## 2018-06-19 RX ADMIN — SODIUM CHLORIDE, POTASSIUM CHLORIDE, SODIUM LACTATE AND CALCIUM CHLORIDE 100 ML/HR: 600; 310; 30; 20 INJECTION, SOLUTION INTRAVENOUS at 15:42

## 2018-06-19 NOTE — ANESTHESIA PREPROCEDURE EVALUATION
Anesthesia Evaluation     Patient summary reviewed and Nursing notes reviewed   history of anesthetic complications: PONV  NPO Solid Status: > 8 hours  NPO Liquid Status: > 8 hours           Airway   Mallampati: II  TM distance: >3 FB  Neck ROM: full  No difficulty expected  Dental - normal exam     Pulmonary - normal exam    breath sounds clear to auscultation  (+) recent URI (couple of weeks ago) resolved,   (-) not a smokerSleep apnea: snores.  Cardiovascular - normal exam  Exercise tolerance: good (4-7 METS)    ECG reviewed  Rhythm: regular  Rate: normal      ROS comment: Narrative     RR Interval= 845 ms  MD Interval= 148 ms  QRSD Interval= 76 ms  QT Interval= 408 ms  QTc Interval= 444 ms  Heart Rate= 71 ms  P Axis= 13 deg  QRS Axis= 33 deg  T Wave Axis= 38 deg  I: 40 Axis= 44 deg  T: 40 Axis= 23 deg  ST Axis= 62 deg  SINUS RHYTHM  NO SIGNIFICANT CHANGE FROM PREVIOUS ECG  Electronically Signed by:  Geraldo Orozco ([facility]) 05-Jun-2018 15:24:21  Date and Time of Study: 2018-06-05 12:05:18        Neuro/Psych  (+) numbness (fingers hands bilaterally),     GI/Hepatic/Renal/Endo - negative ROS     Musculoskeletal     Abdominal  - normal exam   Substance History   (-) drug useAlcohol use: occ.     OB/GYN negative ob/gyn ROS         Other   (+) arthritis (knee and hip)                   Anesthesia Plan    ASA 2     spinal     intravenous induction   Anesthetic plan and risks discussed with patient and spouse/significant other.  Use of blood products discussed with patient and spouse/significant other  Consented to blood products.

## 2018-06-19 NOTE — OP NOTE
Preoperative Diagnosis: !.  Paget's disease right femur subtrochanteric fracture 2.  Failed open reduction internal fixation of right subtrochanteric femur fracture    Postoperative Diagnosis: Right total hip arthroplasty was right proximal femoral replacement and removal of previously placed hardware.  50 mm acetabular cup with a 10° liner, proximal femoral standard body with a 12 x 150 mm bowed stem with a 36 mm ceramic +5 neck, cemented stem      Procedure: Right total hip arthroplasty with proximal femoral replacement and removal of previously placed hardware.  50 mm acetabular cup with a 10° liner, proximal femoral standard body with a 12 x 150 mm bowed stem with a 36 mm ceramic head +5 neck, cemented.[employer]       Surgeon: Chano Kenny     Anesthesia: Spinal.      Anesthetist: Paco Mcelroy    Estimated blood loss: 800 cc    Intraoperative blood: 2 units    Drains:  none    Complications: None         Indications for procedure:62-year-old female Paget's disease in his subtrochanteric femur fracture that failed to heal despite previous attempt at reduction internal fixation          Operative Note:Patient brought to the operating room and after satisfactory anesthesia patient placed in right lateral decubitus position.  Timeouts completed identifying the patient procedure correctly.  The right hip was then prepped and after the prep drive for 3 minutes was draped in a sterile from the usual manner timeout again completed.  Going to the old lateral incision from the greater trochanter distally for about 8-10 cm and proximally slightly posterior for about 3 cm via blunt and sharp dissection the gluteal and tensor fascia marlen was identified and incised the entire length of the incision exposing the greater trochanter and the vastus lateralis and the gluteal tendons.  Using the cautery soft tissue was elevated although the previously placed in leg cortical graft and the wire was removed.  Some of  the trochanteric fracture line was identified and a art was made 90 mm and through the greater trochanter which is down on the femur itself and that lined 90 mm was just distal to subtrochanteric femur fracture.  Via blunt and sharp dissection after the hip abductors were released off the greater trochanter marked with stay sutures via blunt and sharp dissection circumferentially the proximal fragment was released.  Previously placed gamma nail was removed first of lack screw and then the Gamma nail itself.  Once it was removed the femoral head and proximal femur was removed.  Acetabulum was then prepared in standard fashion.  First reaming begun 43 mm reamer progressing up to 49.  A 50 mm cup was press-fit into place and secured with 3-6.5 screws.  10° trial liner was inserted.  The canals of the standard fashion.  First 2 mm Marek cables placed around the proximal end of the resected femur.  Reaming was then begun up to 14 mm reamer and then a trial reduction was carried out using the proximal femoral components.  After anteversion was determined and the length was determined all trial components were then removed and the 12 mm stem with the proximal standard femoral body was cemented into place after cement on the back table with anteversion marked and recorded and then when the implant was cemented with antibiotic cement was maintained in the proper position.  After cement hardened trial reduction first with a standard neck and a +5 neck showed excellent stability and length.  Wound is irrigated Betadine and normal saline.  160 cc of the expare solution was injected deep and superficial tissues.  The hip abductors were then sutured to the proximal femoral replacement through the holes in the implant with #2 FiberWire.  5 sutures were placed to pull the gluteus alysia medius and minimus to the implant.  1 g of vancomycin powder massage and deep and superficial tissues prior to the gluteal repair.  More  vancomycin powder was applied over the gluteal repair and then the gluteus alysia and tensor fascia marlen was closed in interrupted #2 Vicryl subcuticular closure was carried out using 0 strata fix and 3 -0 strata fix . A prineo Dermabond dressing was applied to the wound.  Sterile dressing was then applied and the patient transferred recovery having tolerated procedure well.  All counts were correct.

## 2018-06-19 NOTE — ANESTHESIA POSTPROCEDURE EVALUATION
Patient: Raven Young    Procedure Summary     Date:  06/19/18 Room / Location:   [facility] /  [facility]    Anesthesia Start:  1052 Anesthesia Stop:  1434    Procedure:  TOTAL HIP ARTHROPLASTY (Right Hip) Diagnosis:       Closed subtrochanteric fracture of femur with nonunion, right      (Closed subtrochanteric fracture of femur with nonunion, right [S72.21XK])    Surgeon:  Keyshawn Madden MD Provider:  Elpidio Huerta CRNA    Anesthesia Type:  spinal ASA Status:  2          Anesthesia Type: spinal  Last vitals  BP   125/64 (06/19/18 1046)   Temp   98.1 °F (36.7 °C) (06/19/18 0847)   Pulse   85 (06/19/18 1046)   Resp   14 (06/19/18 1046)     SpO2   97 % (06/19/18 1046)     Post Anesthesia Care and Evaluation    Patient location during evaluation: bedside  Patient participation: complete - patient participated  Level of consciousness: awake and alert  Pain score: 0  Pain management: adequate  Airway patency: patent  Anesthetic complications: No anesthetic complications    Cardiovascular status: acceptable  Respiratory status: acceptable  Hydration status: acceptable

## 2018-06-19 NOTE — ANESTHESIA PROCEDURE NOTES
Spinal Block    Patient location during procedure: OR  Indication:at surgeon's request and post-op pain management  Performed By  CRNA: MARTHA DIAZ  Preanesthetic Checklist  Completed: patient identified, surgical consent, pre-op evaluation, timeout performed, IV checked, risks and benefits discussed and monitors and equipment checked  Spinal Block Prep:  Patient Position:sitting  Sterile Tech:cap, gown, mask, gloves and sterile barriers  Prep:Betadine  Patient Monitoring:blood pressure monitoring, continuous pulse oximetry and EKG  Spinal Block Procedure  Approach:midline  Guidance:landmark technique and palpation technique  Location:L3-L4  Needle Type:Pencan  Needle Gauge:24 G  Placement of Spinal needle event:cerebrospinal fluid aspirated  Paresthesia: left and transient  Fluid Appearance:clear  Post Assessment  Patient Tolerance:patient tolerated the procedure well with no apparent complications  Complications no

## 2018-06-20 LAB
ABO + RH BLD: NORMAL
ABO + RH BLD: NORMAL
APTT PPP: 28.3 SECONDS (ref 24.3–38.1)
BASOPHILS # BLD AUTO: 0.01 10*3/MM3 (ref 0–0.2)
BASOPHILS NFR BLD AUTO: 0.1 % (ref 0–2)
BH BB BLOOD EXPIRATION DATE: NORMAL
BH BB BLOOD EXPIRATION DATE: NORMAL
BH BB BLOOD TYPE BARCODE: 9500
BH BB BLOOD TYPE BARCODE: 9500
BH BB DISPENSE STATUS: NORMAL
BH BB DISPENSE STATUS: NORMAL
BH BB PRODUCT CODE: NORMAL
BH BB PRODUCT CODE: NORMAL
BH BB UNIT NUMBER: NORMAL
BH BB UNIT NUMBER: NORMAL
DEPRECATED RDW RBC AUTO: 48.5 FL (ref 37–54)
EOSINOPHIL # BLD AUTO: 0 10*3/MM3 (ref 0.1–0.3)
EOSINOPHIL NFR BLD AUTO: 0 % (ref 0–4)
ERYTHROCYTE [DISTWIDTH] IN BLOOD BY AUTOMATED COUNT: 15.2 % (ref 11.5–14.5)
HCT VFR BLD AUTO: 32.7 % (ref 37–47)
HGB BLD-MCNC: 10.9 G/DL (ref 12–16)
IMM GRANULOCYTES # BLD: 0.03 10*3/MM3 (ref 0–0.03)
IMM GRANULOCYTES NFR BLD: 0.3 % (ref 0–0.5)
LYMPHOCYTES # BLD AUTO: 1.21 10*3/MM3 (ref 0.6–4.8)
LYMPHOCYTES NFR BLD AUTO: 12.6 % (ref 20–45)
MCH RBC QN AUTO: 29.1 PG (ref 27–31)
MCHC RBC AUTO-ENTMCNC: 33.3 G/DL (ref 31–37)
MCV RBC AUTO: 87.2 FL (ref 81–99)
MONOCYTES # BLD AUTO: 0.8 10*3/MM3 (ref 0–1)
MONOCYTES NFR BLD AUTO: 8.3 % (ref 3–8)
NEUTROPHILS # BLD AUTO: 7.55 10*3/MM3 (ref 1.5–8.3)
NEUTROPHILS NFR BLD AUTO: 78.7 % (ref 45–70)
NRBC BLD MANUAL-RTO: 0 /100 WBC (ref 0–0)
PLATELET # BLD AUTO: 201 10*3/MM3 (ref 140–500)
PMV BLD AUTO: 9.6 FL (ref 7.4–10.4)
RBC # BLD AUTO: 3.75 10*6/MM3 (ref 4.2–5.4)
UNIT  ABO: NORMAL
UNIT  ABO: NORMAL
UNIT  RH: NORMAL
UNIT  RH: NORMAL
WBC NRBC COR # BLD: 9.6 10*3/MM3 (ref 4.8–10.8)

## 2018-06-20 PROCEDURE — 85025 COMPLETE CBC W/AUTO DIFF WBC: CPT | Performed by: ORTHOPAEDIC SURGERY

## 2018-06-20 PROCEDURE — 25010000003 CEFAZOLIN PER 500 MG: Performed by: ORTHOPAEDIC SURGERY

## 2018-06-20 PROCEDURE — 97161 PT EVAL LOW COMPLEX 20 MIN: CPT

## 2018-06-20 PROCEDURE — 85730 THROMBOPLASTIN TIME PARTIAL: CPT | Performed by: ORTHOPAEDIC SURGERY

## 2018-06-20 PROCEDURE — 97165 OT EVAL LOW COMPLEX 30 MIN: CPT

## 2018-06-20 RX ADMIN — PREGABALIN 75 MG: 75 CAPSULE ORAL at 08:13

## 2018-06-20 RX ADMIN — CEFAZOLIN SODIUM 2 G: 2 SOLUTION INTRAVENOUS at 03:02

## 2018-06-20 RX ADMIN — PREGABALIN 75 MG: 75 CAPSULE ORAL at 20:48

## 2018-06-20 RX ADMIN — ACETAMINOPHEN 1000 MG: 500 TABLET, FILM COATED ORAL at 20:48

## 2018-06-20 RX ADMIN — DULOXETINE HYDROCHLORIDE 60 MG: 60 CAPSULE, DELAYED RELEASE ORAL at 10:04

## 2018-06-20 RX ADMIN — RIVAROXABAN 10 MG: 10 TABLET, FILM COATED ORAL at 10:09

## 2018-06-20 RX ADMIN — OXYCODONE HYDROCHLORIDE 10 MG: 5 TABLET ORAL at 17:54

## 2018-06-20 RX ADMIN — ACETAMINOPHEN 1000 MG: 500 TABLET, FILM COATED ORAL at 15:47

## 2018-06-20 RX ADMIN — OXYCODONE HYDROCHLORIDE 10 MG: 5 TABLET ORAL at 13:26

## 2018-06-20 RX ADMIN — OXYCODONE HYDROCHLORIDE 10 MG: 5 TABLET ORAL at 08:12

## 2018-06-20 RX ADMIN — ACETAMINOPHEN 1000 MG: 500 TABLET, FILM COATED ORAL at 10:05

## 2018-06-20 NOTE — PLAN OF CARE
Problem: Patient Care Overview  Goal: Plan of Care Review  Outcome: Ongoing (interventions implemented as appropriate)   06/20/18 0404 06/20/18 1002   Coping/Psychosocial   Plan of Care Reviewed With --  patient   Plan of Care Review   Progress improving --    OTHER   Outcome Summary --  Physical therapy evaluation complete. Patient able to recall 3/3 hip precautions and maintain throughout evaluation without cues from therapist. Patient performs sit to/from stand with SBA and gait with supervision , TTWB x55 feet with rolling walker. Patient with no episodes of balance loss and able to navigate external obstacles without difficulty. Patient reports no concerns regarding return home with family support. Plan to see x1 additional visit to ensure consistency with mobility status. Patient reports no DME needs upon return home.

## 2018-06-20 NOTE — THERAPY EVALUATION
Acute Care - Physical Therapy Initial Evaluation   Deya Causey     Patient Name: Raven Young  : 1955  MRN: [id number]  Today's Date: 2018   Onset of Illness/Injury or Date of Surgery: 18  Date of Referral to PT: 18  Referring Physician: Dr Madden      Admit Date: 2018    Visit Dx:     ICD-10-CM ICD-9-CM   1. Closed subtrochanteric fracture of femur with nonunion, right S72.21XK 733.82     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   • Closed left subtrochanteric femur fracture   • Subtrochanteric fracture, closed, right, with nonunion, subsequent encounter   • Status post orthopedic surgery, follow-up exam   • Closed subtrochanteric fracture of femur with nonunion, right   • Atypical femoral fracture, unspecified, subsequent encounter for fracture with nonunion     Past Medical History:   Diagnosis Date   • Cancer     basal cell   • Depression with anxiety    • PONV (postoperative nausea and vomiting)    • Right hip pain     SCHEDULED FOR SX     Past Surgical History:   Procedure Laterality Date   • FIBULA BONE GRAFT TO HIP Right 12/15/2017    Procedure: FIBULA BONE GRAFT TO HIP with cancellus autograft the [place] cable ;  Surgeon: Keyshawn Madden MD;  Location:  [facility] OR;  Service:    • HARDWARE REMOVAL Right 8/15/2017    Procedure: HARDWARE REMOVAL long gamma nail  with long gamma nail right subtrochanteric femur fracture meir vanco powder ;  Surgeon: Keyshawn Madden MD;  Location: [facility] OR;  Service:    • IM NAILING FEMORAL SHAFT RETROGRADE Right 08/15/2017    replacement leah  fx   • TN OPEN FIX INTER/SUBTROCH FX,IMPLNT Right 2017    Procedure: FEMUR INTRAMEDULLARY NAILING/RODDING long meir gamma nail 7fr hemovac placement;  Surgeon: Keyshawn Madden MD;  Location: [facility] OR;  Service: Orthopedics   • TUBAL ABDOMINAL LIGATION          PT  "ASSESSMENT (last 12 hours)      Physical Therapy Evaluation     Row Name 06/20/18 0855          PT Evaluation Time/Intention    Subjective Information complains of;pain  -JW     Document Type evaluation  -JW     Patient Effort good  -JW     Symptoms Noted During/After Treatment none  -JW     Comment pt reports \"I did pretty well hopping on one foot when I had to before\".  pt able to recall 3/3 hip precautions   -JW     Row Name 06/20/18 0855          General Information    Patient Profile Reviewed? yes  -JW     Onset of Illness/Injury or Date of Surgery 06/19/18  -JW     Referring Physician Dr Madden  -JW     Patient Observations alert;cooperative;agree to therapy  -JW     Patient/Family Observations pt sitting in chair  -JW     Prior Level of Function independent:;all household mobility;community mobility;ADL's  -JW     Equipment Currently Used at Home cane, straight;cane, quad;walker, rolling;walker, standard;wheelchair;lift device   lift chair and hemiwalker  -JW     Pertinent History of Current Functional Problem pt with femur fracture in april 2017 with gamma nailing and subsequent revision surgery on December 2017.  pt presents with non union of previous fracture, s/p right total hip arthroplasty.  -JW     Existing Precautions/Restrictions fall;hip, posterior;right   TTWB right LE  -JW     Risks Reviewed patient:;increased discomfort  -JW     Benefits Reviewed patient:;improve function;increase independence;increase strength;increase balance  -JW     Barriers to Rehab none identified  -JW     Row Name 06/20/18 0855          Relationship/Environment    Primary Source of Support/Comfort spouse  -JW     Lives With spouse  -JW     Row Name 06/20/18 0855          Resource/Environmental Concerns    Current Living Arrangements home/apartment/condo   1 story house  -JW     Row Name 06/20/18 0855          Cognitive Assessment/Intervention- PT/OT    Orientation Status (Cognition) oriented x 4  -JW     Follows Commands " (Cognition) WNL  -     Personal Safety Interventions gait belt;nonskid shoes/slippers when out of bed  -     Row Name 06/20/18 0855          Mobility Assessment/Treatment    Extremity Weight-bearing Status right lower extremity  -     Right Lower Extremity (Weight-bearing Status) toe touch weight-bearing (TTWB)  -     Row Name 06/20/18 0855          Bed Mobility Assessment/Treatment    Comment (Bed Mobility) deferred-up in chair  -[facility] Name 06/20/18 0855          Transfer Assessment/Treatment    Transfer Assessment/Treatment sit-stand transfer;stand-sit transfer  -     Maintains Weight-bearing Status (Transfers) able to maintain  -     Sit-Stand Rabun (Transfers) supervision;verbal cues  -     Stand-Sit Rabun (Transfers) supervision;verbal cues  -[facility] Name 06/20/18 0855          Sit-Stand Transfer    Assistive Device (Sit-Stand Transfers) walker, front-wheeled  -[facility] Name 06/20/18 0855          Stand-Sit Transfer    Assistive Device (Stand-Sit Transfers) walker, front-wheeled  -[facility] Name 06/20/18 0855          Gait/Stairs Assessment/Training    Rabun Level (Gait) supervision  -     Assistive Device (Gait) walker, front-wheeled  -     Distance in Feet (Gait) 55  -     Maintains Weight-bearing Status (Gait) able to maintain  -     Comment (Gait/Stairs) pt able to navigate obstacles and perform direction changes without balance loss.  pt demonstrates proper distance from walker during mobility.  discussed placement of shoe on left LE for increased support and ease to continually maintain TTWB status  -     Row Name 06/20/18 0855          General ROM    GENERAL ROM COMMENTS bilateral LE ROM WFL, increased pain with right knee/hip movement  -[facility] Name 06/20/18 0855          General Assessment (Manual Muscle Testing)    Comment, General Manual Muscle Testing (MMT) Assessment left LE 4+/5, right ankle 4+/5, right knee 4-/5, right hip 3-/5  -      "Row Name 06/20/18 0855          Sensory Assessment/Intervention    Sensory General Assessment no sensation deficits identified  -     Row Name 06/20/18 0855          Pain Assessment    Additional Documentation Pain Scale: Numbers Pre/Post-Treatment (Group)  -     Row Name 06/20/18 0855          Pain Scale: Numbers Pre/Post-Treatment    Pain Scale: Numbers, Pretreatment 1/10  -     Pain Scale: Numbers, Post-Treatment 3/10  -     Pain Location - Side Right  -JW     Pain Location hip  -JW     Pain Intervention(s) Repositioned;Ambulation/increased activity  -     Row Name             Wound 06/19/18 1416 Right hip incision    Wound - Properties Group Date first assessed: 06/19/18  - Time first assessed: 1416  -МАРИНА Side: Right  -МАРИНА Location: hip  -МАРИНА Type: incision  -МАРИНА    Row Name 06/20/18 0855          Plan of Care Review    Plan of Care Reviewed With patient  -     Row Name 06/20/18 0855          Physical Therapy Clinical Impression    Date of Referral to PT 06/19/18  -     Patient/Family Goals Statement (PT Clinical Impression) \"go home\"  -     Criteria for Skilled Interventions Met (PT Clinical Impression) yes;treatment indicated  -     Rehab Potential (PT Clinical Summary) good, to achieve stated therapy goals  -     Predicted Duration of Therapy (PT) 1 day  -     Care Plan Review (PT) evaluation/treatment results reviewed;care plan/treatment goals reviewed;patient/other agree to care plan  -     Row Name 06/20/18 0855          Physical Therapy Goals    Bed Mobility Goal Selection (PT) bed mobility, PT goal 1  -     Transfer Goal Selection (PT) transfer, PT goal 1  -     Gait Training Goal Selection (PT) gait training, PT goal 1  -     Precaution Goal Selection (PT) precaution, PT goal 1  -JW     Additional Documentation Precaution Goal Selection (PT) (Row)  -     Row Name 06/20/18 0855          Bed Mobility Goal 1 (PT)    Activity/Assistive Device (Bed Mobility Goal 1, PT) bed " mobility activities, all  -JW     Tift Level/Cues Needed (Bed Mobility Goal 1, PT) conditional independence  -JW     Time Frame (Bed Mobility Goal 1, PT) 1 day  -JW     Progress/Outcomes (Bed Mobility Goal 1, PT) goal ongoing  -     Row Name 06/20/18 0855          Transfer Goal 1 (PT)    Activity/Assistive Device (Transfer Goal 1, PT) transfers, all;walker, rolling  -JW     Tift Level/Cues Needed (Transfer Goal 1, PT) conditional independence  -JW     Time Frame (Transfer Goal 1, PT) 1 day  -JW     Progress/Outcome (Transfer Goal 1, PT) goal ongoing  -     Row Name 06/20/18 0855          Gait Training Goal 1 (PT)    Activity/Assistive Device (Gait Training Goal 1, PT) gait (walking locomotion);walker, rolling  -JW     Tift Level (Gait Training Goal 1, PT) conditional independence  -JW     Distance (Gait Goal 1, PT) 50  -JW     Time Frame (Gait Training Goal 1, PT) 1 day  -JW     Progress/Outcome (Gait Training Goal 1, PT) goal ongoing  -     Row Name 06/20/18 0855          Precaution Goal 1 (PT)    Activity (Precaution Goal 1, PT) weight bearing restrictions;hip precautions  -JW     Tift Level/Cues Needed (Precautions Goal 1, PT) independently  -JW     Time Frame (Precaution Goal 1, PT) 1 day  -JW     Progress/Outcome (Precaution Goal 1, PT) goal ongoing  -     Row Name 06/20/18 0855          Positioning and Restraints    Pre-Treatment Position sitting in chair/recliner  -JW     Post Treatment Position chair  -JW     In Chair reclined;call light within reach;encouraged to call for assist;notified nsg  -     Row Name 06/20/18 0855          Living Environment    Home Accessibility wheelchair accessible   ramp to enter home  -JW       User Key  (r) = Recorded By, (t) = Taken By, (c) = Cosigned By    Initials Name Provider Type    МАРИНА Gayle, RN Registered Nurse    YVONNE Barragan PT Physical Therapist          Physical Therapy Education     Title: PT OT SLP  Therapies (Done)     Topic: Physical Therapy (Done)     Point: Mobility training (Done)    Learning Progress Summary     Learner Status Readiness Method Response Comment Documented by    Patient Done Acceptance MARYCHUY MOYER 06/20/18 1001          Point: Precautions (Done)    Learning Progress Summary     Learner Status Readiness Method Response Comment Documented by    Patient Done Acceptance MARYCHUY MOYER 06/20/18 1001                      User Key     Initials Effective Dates Name Provider Type Discipline     04/03/18 -  Jessa Barragan, PT Physical Therapist PT                PT Recommendation and Plan  Anticipated Discharge Disposition (PT): home with home health  Planned Therapy Interventions (PT Eval): balance training, bed mobility training, gait training, home exercise program, strengthening, transfer training, patient/family education  Therapy Frequency (PT Clinical Impression): other (see comments) (1 additional visit)  Outcome Summary/Treatment Plan (PT)  Anticipated Discharge Disposition (PT): home with home health  Plan of Care Reviewed With: patient  Outcome Summary: Physical therapy evaluation complete.  Patient able to recall 3/3 hip precautions and maintain throughout evaluation without cues from therapist.  Patient performs sit to/from stand with SBA and gait with supervision , TTWB x55 feet with rolling walker.  Patient with no episodes of balance loss and able to navigate external obstacles without difficulty.  Patient reports no concerns regarding return home with family support.  Plan to see x1 additional visit to ensure consistency with mobility status.  Patient reports no DME needs upon return home.          Outcome Measures     Row Name 06/20/18 0855             How much help from another person do you currently need...    Turning from your back to your side while in flat bed without using bedrails? 4  -JW      Moving from lying on back to sitting on the side of a flat bed without bedrails? 3   -JW      Moving to and from a bed to a chair (including a wheelchair)? 3  -JW      Standing up from a chair using your arms (e.g., wheelchair, bedside chair)? 3  -JW      Climbing 3-5 steps with a railing? 2  -JW      To walk in hospital room? 3  -JW      AM-PAC 6 Clicks Score 18  -JW         Functional Assessment    Outcome Measure Options AM-PAC 6 Clicks Basic Mobility (PT)  -JW        User Key  (r) = Recorded By, (t) = Taken By, (c) = Cosigned By    Initials Name Provider Type    YVONNE Barragan PT Physical Therapist           Time Calculation:         PT Charges     Row Name 06/20/18 1004             Time Calculation    Start Time 0855  -      PT Received On 06/20/18  -YVONNE      PT - Next Appointment 06/21/18  -YVONNE        User Key  (r) = Recorded By, (t) = Taken By, (c) = Cosigned By    Initials Name Provider Type    YVONNE Barragan PT Physical Therapist        Therapy Suggested Charges     Code   Minutes Charges    None           Therapy Charges for Today     Code Description Service Date Service Provider Modifiers Qty    63554690942 HC PT EVAL LOW COMPLEXITY 2 6/20/2018 Jessa Barragan, PT GP 1    94177186545 HC PT THER SUPP EA 15 MIN 6/20/2018 Jessa Barragan, PT GP 1          PT G-Codes  Outcome Measure Options: AM-PAC 6 Clicks Basic Mobility (PT)      Jessa Barragan PT  6/20/2018

## 2018-06-20 NOTE — PLAN OF CARE
Problem: Patient Care Overview  Goal: Plan of Care Review  Outcome: Ongoing (interventions implemented as appropriate)   06/20/18 5683   Coping/Psychosocial   Plan of Care Reviewed With patient   OTHER   Outcome Summary Pt. has been tolerating her diet. Pt. has been ambulating in room assist x1. Pain has been well controlled.      Goal: Individualization and Mutuality  Outcome: Ongoing (interventions implemented as appropriate)    Goal: Discharge Needs Assessment  Outcome: Ongoing (interventions implemented as appropriate)      Problem: Hip Arthroplasty (Total, Partial) (Adult)  Goal: Signs and Symptoms of Listed Potential Problems Will be Absent, Minimized or Managed (Hip Arthroplasty)  Outcome: Ongoing (interventions implemented as appropriate)    Goal: Anesthesia/Sedation Recovery  Outcome: Outcome(s) achieved Date Met: 06/20/18      Problem: Pain, Acute (Adult)  Goal: Identify Related Risk Factors and Signs and Symptoms  Outcome: Outcome(s) achieved Date Met: 06/20/18    Goal: Acceptable Pain Control/Comfort Level  Outcome: Ongoing (interventions implemented as appropriate)      Problem: Mobility, Physical Impaired (Adult)  Goal: Identify Related Risk Factors and Signs and Symptoms  Outcome: Outcome(s) achieved Date Met: 06/20/18    Goal: Enhanced Mobility Skills  Outcome: Ongoing (interventions implemented as appropriate)    Goal: Enhanced Functional Ability  Outcome: Ongoing (interventions implemented as appropriate)

## 2018-06-20 NOTE — PLAN OF CARE
Problem: Patient Care Overview  Goal: Plan of Care Review  Outcome: Ongoing (interventions implemented as appropriate)   06/20/18 1002   Coping/Psychosocial   Plan of Care Reviewed With patient   OTHER   Outcome Summary Physical therapy evaluation complete. Patient able to recall 3/3 hip precautions and maintain throughout evaluation without cues from therapist. Patient performs sit to/from stand with SBA and gait with supervision , TTWB x55 feet with rolling walker. Patient with no episodes of balance loss and able to navigate external obstacles without difficulty. Patient reports no concerns regarding return home with family support. Plan to see x1 additional visit to ensure consistency with mobility status. Patient reports no DME needs upon return home.

## 2018-06-20 NOTE — PROGRESS NOTES
Orthopedic Progress Note   Chief Complaint:  Status post right total hip arthroplasty, proximal femoral replacement    Subjective     Interval History: Patient is postop day 1 was doing well this morning.  She is alert and oriented.  She is afebrile hemodynamically stable hemoglobin at 10.9.  Did receive 2 units of blood intraoperatively yesterday.  Pain well-controlled oral medication.          Objective     Vital Signs  Temp:  [97.3 °F (36.3 °C)-98.9 °F (37.2 °C)] 98.3 °F (36.8 °C)  Heart Rate:  [] 90  Resp:  [12-21] 18  BP: ()/(56-96) 123/59  Body mass index is 31.99 kg/m².    Intake/Output Summary (Last 24 hours) at 06/20/18 0757  Last data filed at 06/20/18 0317   Gross per 24 hour   Intake             2350 ml   Output             1700 ml   Net              650 ml     No intake/output data recorded.       Physical Exam:   General: patient awake, alert and cooperative   Cardiovascular: regular rhythm and rate   Pulm: clear to auscultation bilaterally   Abdomen: Benign.  Soft bowel sounds   Extremities: Right leg is good distal pulses no motor or sensory deficit wound is benign.   Neurologic: Normal mood and behavior     Results Review:     I reviewed the patient's new clinical results.      WBC No results found for: WBCS   HGB Hemoglobin   Date Value Ref Range Status   06/20/2018 10.9 (L) 12.0 - 16.0 g/dL Final      HCT Hematocrit   Date Value Ref Range Status   06/20/2018 32.7 (L) 37.0 - 47.0 % Final      Platlets No results found for: LABPLAT     PT/INR:  No results found for: PROTIME/No results found for: INR    Sodium No results found for: NA   Potassium No results found for: K   Chloride No results found for: CL   Bicarbonate No results found for: PLASMABICARB   BUN No results found for: BUN   Creatinine No results found for: CREATININE   Calcium No results found for: CALCIUM   Magnesium  AST  ALT  Bilirubin, Total  AlkPhos  Albumin    Amylase  Lipase    Radiology: No results found for: MG  No  components found for: AST.*  No components found for: ALT.*  No components found for: BILIRUBIN, TOTAL.*    No components found for: ALKPHOS.*  No components found for: ALBUMIN.*      No components found for: AMYLASE.*  No components found for: LIPASE.*            Imaging Results (most recent)     Procedure Component Value Units Date/Time    XR Hip With or Without Pelvis 1 View Right [[id number]] Collected:  06/19/18 1454     Updated:  06/19/18 1457    Narrative:       POSTOP RIGHT HIP, 6/19/2018     HISTORY:   Postop right hip arthroplasty     TECHNIQUE:  AP radiographs of the pelvis and hips were obtained portably following  surgery.     FINDINGS:  Postoperative changes of proximal right femoral resection with placement  of custom prosthesis. No periprosthetic fracture or joint dislocation is  demonstrated.       Impression:       Postop changes right hip arthroplasty as noted.     This report was finalized on 6/19/2018 2:55 PM by Dr. Ferdinand Salvador MD.                  lactated ringers 30 mL/hr Last Rate: 100 mL/hr (06/19/18 1543)         Assessment/Plan     Patient Active Problem List   Diagnosis Code   • Closed right hip fracture S72.001A   • Subtrochanteric fracture of right femur S72.21XA   • Closed subtrochanteric fracture of femur with nonunion S72.23XK   • Closed left subtrochanteric femur fracture S72.22XA   • Subtrochanteric fracture, closed, right, with nonunion, subsequent encounter S72.21XK   • Status post orthopedic surgery, follow-up exam Z09   • Closed subtrochanteric fracture of femur with nonunion, right S72.21XK   • Atypical femoral fracture, unspecified, subsequent encounter for fracture with nonunion M84.750K       Continue PT OT today touchdown weightbearing.  Discharge home and patient is stable with physical therapy hopefully the next 24-48 hours of the      Keyshawn Madden MD  06/20/18  7:13 AM

## 2018-06-20 NOTE — PROGRESS NOTES
Patient: Raven Young  Procedure(s) with comments:  TOTAL HIP ARTHROPLASTY - RIGHT TOTAL HIP ARTHROPLASTY- HARDWARE REMOVAL  Anesthesia type: [unfilled]    Patient location: [facility] Surgical Floor  Last vitals:   Vitals:    06/20/18 1140   BP: 124/72   Pulse: 77   Resp:    Temp: 97.5 °F (36.4 °C)   SpO2: 97%     Level of consciousness: awake, alert and oriented    Post-anesthesia pain: adequate analgesia  Airway patency: patent  Respiratory: unassisted  Cardiovascular: stable and blood pressure at baseline  Hydration: euvolemic    Anesthetic complications: no

## 2018-06-20 NOTE — THERAPY EVALUATION
Acute Care - Occupational Therapy Initial Evaluation/ Discharge  [facility]     Patient Name: Raven Young  : 1955  MRN: [id number]  Today's Date: 2018  Onset of Illness/Injury or Date of Surgery: 18  Date of Referral to OT: 18  Referring Physician: Chano    Admit Date: 2018       ICD-10-CM ICD-9-CM   1. Closed subtrochanteric fracture of femur with nonunion, right S72.21XK 733.82     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   • Closed left subtrochanteric femur fracture   • Subtrochanteric fracture, closed, right, with nonunion, subsequent encounter   • Status post orthopedic surgery, follow-up exam   • Closed subtrochanteric fracture of femur with nonunion, right   • Atypical femoral fracture, unspecified, subsequent encounter for fracture with nonunion     Past Medical History:   Diagnosis Date   • Cancer     basal cell   • Depression with anxiety    • PONV (postoperative nausea and vomiting)    • Right hip pain     SCHEDULED FOR SX     Past Surgical History:   Procedure Laterality Date   • FIBULA BONE GRAFT TO HIP Right 12/15/2017    Procedure: FIBULA BONE GRAFT TO HIP with cancellus autograft the [place] cable ;  Surgeon: Keyshawn Madden MD;  Location:  [facility] OR;  Service:    • HARDWARE REMOVAL Right 8/15/2017    Procedure: HARDWARE REMOVAL long gamma nail  with long gamma nail right subtrochanteric femur fracture meir vanco powder ;  Surgeon: Keyshawn Madden MD;  Location: [facility] OR;  Service:    • IM NAILING FEMORAL SHAFT RETROGRADE Right 08/15/2017    replacement leah  fx   • FL OPEN FIX INTER/SUBTROCH FX,IMPLNT Right 2017    Procedure: FEMUR INTRAMEDULLARY NAILING/RODDING long meir gamma nail 7fr hemovac placement;  Surgeon: Keyshawn Madden MD;  Location: [facility] OR;  Service: Orthopedics   • TUBAL ABDOMINAL LIGATION            OT  ASSESSMENT FLOWSHEET (last 72 hours)      Occupational Therapy Evaluation     Row Name 06/20/18 1115                   OT Evaluation Time/Intention    Subjective Information complains of;pain  -JJ        Document Type evaluation  -JJ        Mode of Treatment occupational therapy  -JJ        Patient Effort good  -JJ        Comment pt able to recall 3 of 3 hip precautions  -JJ           General Information    Patient Profile Reviewed? yes  -JJ        Onset of Illness/Injury or Date of Surgery 06/19/18  -JJ        Referring Physician Chano  -JJ        Patient Observations alert;cooperative  -JJ        Patient/Family Observations pt supine in bed, family in room, agreed to evaluation  -JJ        Prior Level of Function independent:;all household mobility;ADL's;community mobility;transfer;home management  -JJ        Equipment Currently Used at Home cane, straight;cane, quad;walker, rolling;walker, standard;wheelchair;lift device   lift chair and hemiwalker  -JJ        Pertinent History of Current Functional Problem pt with femur fracture in april 2017 with gamma nailing and subsequent revision sx on December 2017. pt now presents with non union of prevsious fx, sp R total hip arthroplasty  -JJ        Existing Precautions/Restrictions fall;hip, posterior   TTWB on R LE  -JJ        Risks Reviewed patient:;increased discomfort  -JJ        Benefits Reviewed patient:;improve function;increase independence  -JJ        Barriers to Rehab none identified  -JJ           Relationship/Environment    Lives With spouse  -JJ           Resource/Environmental Concerns    Current Living Arrangements home/apartment/condo  -JJ        Resource/Environmental Concerns none  -JJ           Cognitive Assessment/Intervention- PT/OT    Orientation Status (Cognition) oriented x 4  -JJ        Follows Commands (Cognition) WNL  -JJ        Personal Safety Interventions gait belt;nonskid shoes/slippers when out of bed  -JJ           Mobility  Assessment/Treatment    Extremity Weight-bearing Status right lower extremity  -JJ        Right Lower Extremity (Weight-bearing Status) toe touch weight-bearing (TTWB)  -JJ           Transfer Assessment/Treatment    Sit-Stand [place] (Transfers) supervision;verbal cues  -        Stand-Sit [place] (Transfers) verbal cues;supervision  -           Sit-Stand Transfer    Assistive Device (Sit-Stand Transfers) walker, front-wheeled  -           Stand-Sit Transfer    Assistive Device (Stand-Sit Transfers) walker, front-wheeled  -           Bathing Assessment/Intervention    Bathing [place] Level lower body;moderate assist (50% patient effort)  -J           Lower Body Dressing Assessment/Training    Lower Body Dressing [place] Level lower body dressing skills;moderate assist (50% patient effort)  -J           General ROM    GENERAL ROM COMMENTS B UE AROM WFL  -JJ           General Assessment (Manual Muscle Testing)    Comment, General Manual Muscle Testing (MMT) Assessment B UE MMT L  -           Positioning and Restraints    Pre-Treatment Position in bed  -J        Post Treatment Position bed  -JJ        In Bed supine;encouraged to call for assist;call light within reach;with family/caregiver  -J           Pain Scale: Numbers Pre/Post-Treatment    Pain Scale: Numbers, Pretreatment 3/10  -JJ        Pain Scale: Numbers, Post-Treatment 3/10  -JJ        Pain Location - Side Right  -JJ        Pain Location hip  -JJ        Pain Intervention(s) Repositioned;Medication (See MAR);Cold applied  -J           Wound 06/19/18 1416 Right hip incision    Wound - Properties Group Date first assessed: 06/19/18  - Time first assessed: 1416  -МАРИНА Side: Right  -МАРИНА Location: hip  -МАРИНА Type: incision  -МАРИНА       Plan of Care Review    Plan of Care Reviewed With patient  -JJ           Clinical Impression (OT)    Date of Referral to OT 06/20/18  -        OT Diagnosis decreased adl sp hip sx  -JJ         Prognosis (OT Eval) good  -JJ        Patient/Family Goals Statement (OT Eval) pt states plan is to return home with family and home health services  -J        Criteria for Skilled Therapeutic Interventions Met (OT Eval) no problems identified which require skilled intervention  -J        Therapy Frequency (OT Eval) evaluation only  -J        Care Plan Review (OT) evaluation/treatment results reviewed;care plan/treatment goals reviewed;risks/benefits reviewed  -J        Patient/Family Concerns, Equipment Needs at Discharge (OT) pt already owns a reacher, long handled sponge and long handeld shoehorn and is able to use for lb adls. pt states does not need a sockaid as she does not wear socks. no long handled ae provided  -J        Anticipated Discharge Disposition (OT) home with assist;home with home health  -J           Living Environment    Home Accessibility wheelchair accessible   ramp to enter home  -J          User Key  (r) = Recorded By, (t) = Taken By, (c) = Cosigned By    Initials Name Effective Dates    МАРИНА Kavya Gayle RN 06/16/16 -     JCASH Fletcher, OTR 06/22/16 -            Occupational Therapy Education     Title: PT OT SLP Therapies (Done)     Topic: Occupational Therapy (Resolved)     Point: ADL training (Resolved)     Description: Instruct learner(s) on proper safety adaptation and remediation techniques during self care or transfers.   Instruct in proper use of assistive devices.   Learning Progress Summary     Learner Status Readiness Method Response Comment Documented by    Patient Done Acceptance E,TB VU,DU pt educated on th precautions, adls, long handled ae and safety with functional transfers and TTWB status  06/20/18 1446          Point: Precautions (Resolved)     Description: Instruct learner(s) on prescribed precautions during self-care and functional transfers.   Learning Progress Summary     Learner Status Readiness Method Response Comment Documented by    Patient  Done Acceptance E,TB VU,DU pt educated on th precautions, adls, long handled ae and safety with functional transfers and TTWB status  06/20/18 1446                      User Key     Initials Effective Dates Name Provider Type Discipline     06/22/16 -  Antonette Fletcher, OTR Occupational Therapist OT                  OT Recommendation and Plan  Outcome Summary/Treatment Plan (OT)  Patient/Family Concerns, Equipment Needs at Discharge (OT): pt already owns a reacher, long handled sponge and long handeld shoehorn and is able to use for lb adls. pt states does not need a sockaid as she does not wear socks. no long handled ae provided  Anticipated Discharge Disposition (OT): home with assist, home with home health  Therapy Frequency (OT Eval): evaluation only  Plan of Care Review  Plan of Care Reviewed With: patient  Plan of Care Reviewed With: patient  Outcome Summary: OT evaluation complet. pt is supervsion for functional transfers with RW and able to adhere to TTWB on R LE. pt able to name of 3 of 3 hip precautions and able to maintain throughout evaluation. pt states she already owns long handled ae and understands use with lb adls. pt with no further ot concerns at this time.          Outcome Measures     Row Name 06/20/18 1115 06/20/18 0855          How much help from another person do you currently need...    Turning from your back to your side while in flat bed without using bedrails?  -- 4  -JW     Moving from lying on back to sitting on the side of a flat bed without bedrails?  -- 3  -JW     Moving to and from a bed to a chair (including a wheelchair)?  -- 3  -JW     Standing up from a chair using your arms (e.g., wheelchair, bedside chair)?  -- 3  -JW     Climbing 3-5 steps with a railing?  -- 2  -JW     To walk in hospital room?  -- 3  -JW     AM-PAC 6 Clicks Score  -- 18  -JW        How much help from another is currently needed...    Putting on and taking off regular lower body clothing? 2  -JJ  --      Bathing (including washing, rinsing, and drying) 2  -JJ  --     Toileting (which includes using toilet bed pan or urinal) 4  -JJ  --     Putting on and taking off regular upper body clothing 4  -JJ  --     Taking care of personal grooming (such as brushing teeth) 4  -JJ  --     Eating meals 4  -JJ  --     Score 20  -JJ  --        Functional Assessment    Outcome Measure Options AM-PAC 6 Clicks Daily Activity (OT)  -POLLY AM-PAC 6 Clicks Basic Mobility (PT)  -YVONNE       User Key  (r) = Recorded By, (t) = Taken By, (c) = Cosigned By    Initials Name Provider Type    ANDRE Sylvester Occupational Therapist    YVONNE Barragan PT Physical Therapist          Time Calculation:   OT Start Time: 1115  Therapy Suggested Charges     Code   Minutes Charges    None           Therapy Charges for Today     Code Description Service Date Service Provider Modifiers Qty    28008452122 HC OT EVAL LOW COMPLEXITY 2 6/20/2018 ANDRE Blue GO 1               ANDRE Blue  6/20/2018

## 2018-06-20 NOTE — NURSING NOTE
Discharge Planning Assessment   [place]     Patient Name: Raven Young  MRN: [id number]  Today's Date: 6/20/2018    Admit Date: 6/19/2018          Discharge Needs Assessment     Row Name 06/20/18 1113       Living Environment    Lives With spouse    Current Living Arrangements home/apartment/condo    Primary Care Provided by self    Provides Primary Care For no one    Quality of Family Relationships involved;supportive    Able to Return to Prior Arrangements yes       Resource/Environmental Concerns    Transportation Concerns car, none       Transition Planning    Patient/Family Anticipates Transition to home with family    Transportation Anticipated family or friend will provide       Discharge Needs Assessment    Readmission Within the Last 30 Days no previous admission in last 30 days    Concerns to be Addressed denies needs/concerns at this time    Equipment Currently Used at Home cane, straight    Equipment Needed After Discharge none            Discharge Plan     Row Name 06/20/18 111       Plan    Plan plan home with     Patient/Family in Agreement with Plan yes    Plan Comments Spoke with patient at bedside, permission to speak with visitor present. Face sheet verified. Patient is independent of ADLs including driving prior to admission. SHe states she has recently used a striaght cane as needed but denies ues of additional DME, home 02, cpap/bipap. She has not used home health or rehab services previously. She uses [facility] and deies issues obtaining medications. She does not have a living will and declines additional information at this time. She states she has access to bsc/rw. She selects [facility] to follow at [facility]. Spoke to [email or web address], referral given. CM # placed on white board, will continue to follow.        Destination     No service coordination in this encounter.      Durable Medical Equipment     No service coordination in this encounter.      Dialysis/Infusion      No service coordination in this encounter.      Home Medical Care     No service coordination in this encounter.      Social Care     No service coordination in this encounter.                Demographic Summary     Row Name 06/20/18 1113       General Information    Admission Type inpatient    Arrived From home    Referral Source admission list    Reason for Consult discharge planning    Preferred Language English     Used During This Interaction no       Contact Information    Permission Granted to Share Info With             Functional Status    No documentation.           Psychosocial    No documentation.           Abuse/Neglect    No documentation.           Legal    No documentation.           Substance Abuse    No documentation.           Patient Forms    No documentation.         Alejandro Kapoor RN

## 2018-06-20 NOTE — PLAN OF CARE
Problem: Patient Care Overview  Goal: Plan of Care Review  Outcome: Ongoing (interventions implemented as appropriate)   06/20/18 0403   Coping/Psychosocial   Plan of Care Reviewed With patient;family   Plan of Care Review   Progress improving   OTHER   Outcome Summary Pt VSS. Remains on ETCO2 monitor and continuous pulse ox post op. Pt assist x1 with walker, partial WB status to R leg. Abduction pillow in place while resting. IVF maintained. Will continue to monitor patient.     Goal: Individualization and Mutuality  Outcome: Ongoing (interventions implemented as appropriate)    Goal: Discharge Needs Assessment  Outcome: Ongoing (interventions implemented as appropriate)    Goal: Interprofessional Rounds/Family Conf  Outcome: Ongoing (interventions implemented as appropriate)      Problem: Hip Arthroplasty (Total, Partial) (Adult)  Goal: Signs and Symptoms of Listed Potential Problems Will be Absent, Minimized or Managed (Hip Arthroplasty)  Outcome: Ongoing (interventions implemented as appropriate)    Goal: Anesthesia/Sedation Recovery  Outcome: Ongoing (interventions implemented as appropriate)      Problem: Pain, Acute (Adult)  Goal: Identify Related Risk Factors and Signs and Symptoms  Outcome: Ongoing (interventions implemented as appropriate)    Goal: Acceptable Pain Control/Comfort Level  Outcome: Ongoing (interventions implemented as appropriate)      Problem: Mobility, Physical Impaired (Adult)  Goal: Identify Related Risk Factors and Signs and Symptoms  Outcome: Ongoing (interventions implemented as appropriate)    Goal: Enhanced Mobility Skills  Outcome: Ongoing (interventions implemented as appropriate)    Goal: Enhanced Functional Ability  Outcome: Ongoing (interventions implemented as appropriate)

## 2018-06-21 VITALS
OXYGEN SATURATION: 90 % | DIASTOLIC BLOOD PRESSURE: 60 MMHG | BODY MASS INDEX: 31.95 KG/M2 | TEMPERATURE: 98.1 F | WEIGHT: 169.2 LBS | SYSTOLIC BLOOD PRESSURE: 93 MMHG | HEART RATE: 92 BPM | RESPIRATION RATE: 18 BRPM | HEIGHT: 61 IN

## 2018-06-21 PROBLEM — S72.21XK: Status: RESOLVED | Noted: 2018-05-30 | Resolved: 2018-06-21

## 2018-06-21 PROBLEM — M84.750K: Status: RESOLVED | Noted: 2018-06-19 | Resolved: 2018-06-21

## 2018-06-21 LAB
BASOPHILS # BLD AUTO: 0.04 10*3/MM3 (ref 0–0.2)
BASOPHILS NFR BLD AUTO: 0.4 % (ref 0–2)
DEPRECATED RDW RBC AUTO: 51.9 FL (ref 37–54)
EOSINOPHIL # BLD AUTO: 0.5 10*3/MM3 (ref 0.1–0.3)
EOSINOPHIL NFR BLD AUTO: 5 % (ref 0–4)
ERYTHROCYTE [DISTWIDTH] IN BLOOD BY AUTOMATED COUNT: 15.9 % (ref 11.5–14.5)
HCT VFR BLD AUTO: 33 % (ref 37–47)
HGB BLD-MCNC: 10.6 G/DL (ref 12–16)
IMM GRANULOCYTES # BLD: 0.04 10*3/MM3 (ref 0–0.03)
IMM GRANULOCYTES NFR BLD: 0.4 % (ref 0–0.5)
LYMPHOCYTES # BLD AUTO: 2.46 10*3/MM3 (ref 0.6–4.8)
LYMPHOCYTES NFR BLD AUTO: 24.7 % (ref 20–45)
MCH RBC QN AUTO: 29 PG (ref 27–31)
MCHC RBC AUTO-ENTMCNC: 32.1 G/DL (ref 31–37)
MCV RBC AUTO: 90.2 FL (ref 81–99)
MONOCYTES # BLD AUTO: 0.76 10*3/MM3 (ref 0–1)
MONOCYTES NFR BLD AUTO: 7.6 % (ref 3–8)
NEUTROPHILS # BLD AUTO: 6.14 10*3/MM3 (ref 1.5–8.3)
NEUTROPHILS NFR BLD AUTO: 61.9 % (ref 45–70)
NRBC BLD MANUAL-RTO: 0 /100 WBC (ref 0–0)
PLATELET # BLD AUTO: 203 10*3/MM3 (ref 140–500)
PMV BLD AUTO: 9.7 FL (ref 7.4–10.4)
RBC # BLD AUTO: 3.66 10*6/MM3 (ref 4.2–5.4)
WBC NRBC COR # BLD: 9.94 10*3/MM3 (ref 4.8–10.8)

## 2018-06-21 PROCEDURE — 85025 COMPLETE CBC W/AUTO DIFF WBC: CPT | Performed by: ORTHOPAEDIC SURGERY

## 2018-06-21 PROCEDURE — 97110 THERAPEUTIC EXERCISES: CPT

## 2018-06-21 PROCEDURE — 99024 POSTOP FOLLOW-UP VISIT: CPT | Performed by: ORTHOPAEDIC SURGERY

## 2018-06-21 RX ORDER — PREGABALIN 75 MG/1
75 CAPSULE ORAL EVERY 12 HOURS SCHEDULED
Qty: 30 CAPSULE | Refills: 0 | Status: ON HOLD | OUTPATIENT
Start: 2018-06-21 | End: 2018-07-17

## 2018-06-21 RX ORDER — OXYCODONE AND ACETAMINOPHEN 10; 325 MG/1; MG/1
1 TABLET ORAL EVERY 4 HOURS PRN
Qty: 60 TABLET | Refills: 0 | Status: SHIPPED | OUTPATIENT
Start: 2018-06-21 | End: 2019-05-06

## 2018-06-21 RX ADMIN — PREGABALIN 75 MG: 75 CAPSULE ORAL at 08:33

## 2018-06-21 RX ADMIN — ACETAMINOPHEN 1000 MG: 500 TABLET, FILM COATED ORAL at 03:58

## 2018-06-21 RX ADMIN — DULOXETINE HYDROCHLORIDE 60 MG: 60 CAPSULE, DELAYED RELEASE ORAL at 06:08

## 2018-06-21 RX ADMIN — ACETAMINOPHEN 1000 MG: 500 TABLET, FILM COATED ORAL at 09:46

## 2018-06-21 RX ADMIN — OXYCODONE HYDROCHLORIDE 10 MG: 5 TABLET ORAL at 08:33

## 2018-06-21 RX ADMIN — RIVAROXABAN 10 MG: 10 TABLET, FILM COATED ORAL at 08:33

## 2018-06-21 NOTE — THERAPY DISCHARGE NOTE
Acute Care - Physical Therapy Treatment Note/Discharge  [place]     Patient Name: Raven Young  : 1955  MRN: [id number]  Today's Date: 2018  Onset of Illness/Injury or Date of Surgery: 18  Date of Referral to PT: 18  Referring Physician: Chano    Admit Date: 2018    Visit Dx:    ICD-10-CM ICD-9-CM   1. Closed subtrochanteric fracture of femur with nonunion, right S72.21XK 733.82     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   • Closed left subtrochanteric femur fracture   • Subtrochanteric fracture, closed, right, with nonunion, subsequent encounter   • Status post orthopedic surgery, follow-up exam       Physical Therapy Education     Title: PT OT SLP Therapies (Resolved)     Topic: Physical Therapy (Resolved)     Point: Mobility training (Resolved)    Learning Progress Summary     Learner Status Readiness Method Response Comment Documented by    Patient Done Acceptance E,TB [facility] 18 1018     Done Acceptance E,[facility] 18 1001          Point: Precautions (Resolved)    Learning Progress Summary     Learner Status Readiness Method Response Comment Documented by    Patient Done Acceptance E,[facility] 18 1018     Done Acceptance E,[facility] 18 1001                      User Key     Initials Effective Dates Name Provider Type Discipline     18 -  Jessa Barragan, PT Physical Therapist PT                    PT Rehab Goals     Row Name 18 1019             Bed Mobility Goal 1 (PT)    Activity/Assistive Device (Bed Mobility Goal 1, PT) bed mobility activities, all  -      [place] Level/Cues Needed (Bed Mobility Goal 1, PT) conditional independence  -      Time Frame (Bed Mobility Goal 1, PT) 1 day  -      Progress/Outcomes (Bed Mobility Goal 1, PT) goal met  -         Transfer Goal 1 (PT)     Activity/Assistive Device (Transfer Goal 1, PT) transfers, all;walker, rolling  -JW      [place] Level/Cues Needed (Transfer Goal 1, PT) conditional independence  -JW      Time Frame (Transfer Goal 1, PT) 1 day  -JW      Progress/Outcome (Transfer Goal 1, PT) goal met  -JW         Gait Training Goal 1 (PT)    Activity/Assistive Device (Gait Training Goal 1, PT) gait (walking locomotion);walker, rolling  -JW      [place] Level (Gait Training Goal 1, PT) conditional independence  -JW      Distance (Gait Goal 1, PT) 50  -JW      Time Frame (Gait Training Goal 1, PT) 1 day  -JW      Progress/Outcome (Gait Training Goal 1, PT) goal met  -JW        User Key  (r) = Recorded By, (t) = Taken By, (c) = Cosigned By    Initials Name Provider Type Discipline    YVONNE Barragan, PT Physical Therapist PT        Therapy Treatment        Rehabilitation Treatment Summary     Row Name 06/21/18 1000             Treatment Time/Intention    Discipline physical therapist  -JW      Document Type discharge treatment  -JW      Subjective Information complains of;pain  -JW      Mode of Treatment physical therapy  -JW      Patient/Family Observations pt supine, agreeable to therapy  -JW      Care Plan Review care plan/treatment goals reviewed;patient/other agree to care plan  -JW      Patient Effort good  -JW      Existing Precautions/Restrictions fall;hip, posterior   TTWB on right LE  -JW      Treatment Considerations/Comments pt able to maintain weight bearing status throughout evaluation.  pt able to adhere to precautions during mobility  -JW      Recorded by [JW] Jessa Barragan, PT 06/21/18 1018      Row Name 06/21/18 1000             Mobility Assessment/Intervention    Extremity Weight-bearing Status right lower extremity  -JW      Right Lower Extremity (Weight-bearing Status) toe touch weight-bearing (TTWB)  -JW      Recorded by [JW] Jessa Barragan, PT 06/21/18 1018      Row Name 06/21/18 1000             Bed Mobility  Assessment/Treatment    Bed Mobility Assessment/Treatment supine-sit;sit-supine  -JW      Supine-Sit [place] (Bed Mobility) independent  -JW      Sit-Supine [place] (Bed Mobility) independent  -JW      Recorded by [JW] Jessa Barragan, PT 06/21/18 1018      Row Name 06/21/18 1000             Transfer Assessment/Treatment    Maintains Weight-bearing Status (Transfers) able to maintain  -JW      Sit-Stand [place] (Transfers) conditional independence  -JW      Stand-Sit [place] (Transfers) conditional independence  -JW      Recorded by [JW] Jessa Barragan, PT 06/21/18 1018      Row Name 06/21/18 1000             Sit-Stand Transfer    Assistive Device (Sit-Stand Transfers) walker, front-wheeled  -JW      Recorded by [JW] Jessa Barragan, PT 06/21/18 1018      Row Name 06/21/18 1000             Stand-Sit Transfer    Assistive Device (Stand-Sit Transfers) walker, front-wheeled  -JW      Recorded by [JW] Jessa Barragan, PT 06/21/18 1018      Row Name 06/21/18 1000             Gait/Stairs Assessment/Training    [place] Level (Gait) conditional independence  -JW      Assistive Device (Gait) walker, front-wheeled  -JW      Distance in Feet (Gait) 40  -JW      Maintains Weight-bearing Status (Gait) able to maintain  -JW      Comment (Gait/Stairs) pt with no balance loss and able to navigate external obstacles without difficulty.    -JW      Recorded by [JW] Jessa Barragan, PT 06/21/18 1018      Row Name 06/21/18 1000             Motor Skills Assessment/Interventions    Additional Documentation Therapeutic Exercise (Group);Therapeutic Exercise Interventions (Group)  -JW      Recorded by [JW] Jessa Barragan, PT 06/21/18 1018      Row Name 06/21/18 1000             Lower Extremity Supine Therapeutic Exercise    Performed, Supine Lower Extremity (Therapeutic Exercise) hip abduction/adduction;ankle dorsiflexion/plantarflexion;SLR (straight leg raise);quadriceps sets;gluteal sets;ankle pumps;heel slides  -JW       Exercise Type, Supine Lower Extremity (Therapeutic Exercise) AAROM (active assistive range of motion);AROM (active range of motion)  -JW      Sets/Reps Detail, Supine Lower Extremity (Therapeutic Exercise) 1/10  -JW      Comment, Supine Lower Extremity (Therapeutic Exercise) written handout issued  -JW      Recorded by [JW] Jessa Barragan, PT 06/21/18 1018      Row Name 06/21/18 1000             Positioning and Restraints    Pre-Treatment Position in bed  -JW      Post Treatment Position bed  -JW      In Bed supine;call light within reach;encouraged to call for assist  -JW      Recorded by [JW] Jessa Barragan, PT 06/21/18 1018      Row Name 06/21/18 1000             Pain Scale: Numbers Pre/Post-Treatment    Pain Scale: Numbers, Pretreatment 2/10  -JW      Pain Scale: Numbers, Post-Treatment 4/10  -JW      Pain Location - Side Right  -JW      Pain Location hip  -JW      Pain Intervention(s) Repositioned  -JW      Recorded by [JW] Jessa Barragan, PT 06/21/18 1018      Row Name                Wound 06/19/18 1416 Right hip incision    Wound - Properties Group Date first assessed: 06/19/18 [МАРИНА] Time first assessed: 1416 [МАРИНА] Side: Right [МАРИНА] Location: hip [МАРИНА] Type: incision [МАРИНА] Recorded by:  [МАРИНА] Kavya Gayle RN 06/19/18 1416    Row Name 06/21/18 1000             Plan of Care Review    Plan of Care Reviewed With patient  -JW      Recorded by [JW] Jessa Barragan, PT 06/21/18 1018      Row Name 06/21/18 1000             Outcome Summary/Treatment Plan (PT)    Daily Summary of Progress (PT) prepare for discharge;progress toward functional goals is good  -JW      Anticipated Discharge Disposition (PT) home with home health  -JW      Reason for Discharge (PT Discharge Summary) patient met all goals and outcomes  -JW      Recorded by [JW] Jessa Barragan, PT 06/21/18 1018        User Key  (r) = Recorded By, (t) = Taken By, (c) = Cosigned By    Initials Name Effective Dates Discipline    МАРИНА Kavya Gayle RN 06/16/16  -  Nurse    YVONNE Barragan, PT 04/03/18 -  PT        Wound 06/19/18 1416 Right hip incision (Active)   Dressing Appearance dry;intact;no drainage 6/21/2018  8:33 AM   Drainage Amount none 6/20/2018  8:48 PM       PT Recommendation and Plan  Anticipated Discharge Disposition (PT): home with home health  Planned Therapy Interventions (PT Eval): balance training, bed mobility training, gait training, home exercise program, strengthening, transfer training, patient/family education  Therapy Frequency (PT Clinical Impression): other (see comments) (1 additional visit)  Outcome Summary/Treatment Plan (PT)  Daily Summary of Progress (PT): prepare for discharge, progress toward functional goals is good  Anticipated Discharge Disposition (PT): home with home health  Reason for Discharge (PT Discharge Summary): patient met all goals and outcomes  Plan of Care Reviewed With: patient  Outcome Summary: PT: Patient performs bed mobility independently and sit to/from stand transfers with conditional independence.  Patient performs gait with rolling walker, TTWB x40 feet with condtional independence.  patient able to adhere to hip precautions throughout mobility activities and reports no further concerns regarding return home.  Will discharge from PT services at this time.          Outcome Measures     Row Name 06/21/18 1000 06/20/18 1115 06/20/18 0855       How much help from another person do you currently need...    Turning from your back to your side while in flat bed without using bedrails? 4  -JW  -- 4  -JW    Moving from lying on back to sitting on the side of a flat bed without bedrails? 4  -JW  -- 3  -JW    Moving to and from a bed to a chair (including a wheelchair)? 4  -JW  -- 3  -JW    Standing up from a chair using your arms (e.g., wheelchair, bedside chair)? 4  -JW  -- 3  -JW    Climbing 3-5 steps with a railing? 3  -JW  -- 2  -JW    To walk in hospital room? 2  -JW  -- 3  -JW    AM-PAC 6 Clicks Score 21  -JW  --  18  -       How much help from another is currently needed...    Putting on and taking off regular lower body clothing?  -- 2  -JJ  --    Bathing (including washing, rinsing, and drying)  -- 2  -JJ  --    Toileting (which includes using toilet bed pan or urinal)  -- 4  -JJ  --    Putting on and taking off regular upper body clothing  -- 4  -JJ  --    Taking care of personal grooming (such as brushing teeth)  -- 4  -JJ  --    Eating meals  -- 4  -JJ  --    Score  -- 20  -JJ  --       Functional Assessment    Outcome Measure Options AM-PAC 6 Clicks Basic Mobility (PT)  - AM-PAC 6 Clicks Daily Activity (OT)  - AM-PAC 6 Clicks Basic Mobility (PT)  -      User Key  (r) = Recorded By, (t) = Taken By, (c) = Cosigned By    Initials Name Provider Type     Antonette Fletcher, OTR Occupational Therapist    YVONNE Barragan, PT Physical Therapist           Time Calculation:         PT Charges     Row Name 06/21/18 1022             Time Calculation    Start Time 0910  -      Stop Time 0924  -      Time Calculation (min) 14 min  -      PT Received On 06/21/18  -         Timed Charges    27572 - PT Therapeutic Exercise Minutes 14  -JW        User Key  (r) = Recorded By, (t) = Taken By, (c) = Cosigned By    Initials Name Provider Type    YVONNE Barragan, PT Physical Therapist        Therapy Suggested Charges     Code   Minutes Charges    62269 (CPT®)  Pt Neuromusc Re Education Ea 15 Min      45333 (CPT®)  Pt Ther Proc Ea 15 Min 14 1    01062 (CPT®) Hc Gait Training Ea 15 Min      73372 (CPT®) Hc Pt Therapeutic Act Ea 15 Min      57129 (CPT®) Hc Pt Manual Therapy Ea 15 Min      53826 (CPT®) Hc Pt Iontophoresis Ea 15 Min      36535 (CPT®)  Pt Elec Stim Ea-Per 15 Min      84102 (CPT®) Hc Pt Ultrasound Ea 15 Min      71807 (CPT®)  Pt Self Care/Mgmt/Train Ea 15 Min      Total  14 1          Therapy Charges for Today     Code Description Service Date Service Provider Modifiers Qty    95304960353  PT EVAL  LOW COMPLEXITY 2 6/20/2018 Jessa Barragan, PT GP 1    27909984040 HC PT THER SUPP EA 15 MIN 6/20/2018 Jessa Barragan, PT GP 1    61739253337 HC PT THER PROC EA 15 MIN 6/21/2018 Jessa Barragan, PT GP 1          PT G-Codes  Outcome Measure Options: AM-PAC 6 Clicks Basic Mobility (PT)    PT Discharge Summary  Anticipated Discharge Disposition (PT): home with home health  Reason for Discharge: All goals achieved, Maximum functional potential achieved  Outcomes Achieved: Able to achieve all goals within established timeline  Discharge Destination: Home with home health    Jessa Barragan, PT  6/21/2018

## 2018-06-21 NOTE — NURSING NOTE
Case Management Discharge Note    Final Note: dc home with [facility] to follow    Destination     No service coordination in this encounter.      Durable Medical Equipment     No service coordination in this encounter.      Dialysis/Infusion     No service coordination in this encounter.      Home Medical Care - Selection Complete     Service Request Status Selected Specialties Address Phone Number Fax Number    [facility] [place] [facility] [address] [id number] [phone]      Social Care     No service coordination in this encounter.             Final Discharge Disposition Code: 06 - home with home health care

## 2018-06-21 NOTE — PLAN OF CARE
Problem: Patient Care Overview  Goal: Plan of Care Review  Outcome: Outcome(s) achieved Date Met: 06/21/18 06/21/18 0930   Coping/Psychosocial   Plan of Care Reviewed With patient   Plan of Care Review   Progress improving   OTHER   Outcome Summary discharge home     Goal: Individualization and Mutuality  Outcome: Outcome(s) achieved Date Met: 06/21/18    Goal: Discharge Needs Assessment  Outcome: Outcome(s) achieved Date Met: 06/21/18      Problem: Hip Arthroplasty (Total, Partial) (Adult)  Goal: Signs and Symptoms of Listed Potential Problems Will be Absent, Minimized or Managed (Hip Arthroplasty)  Outcome: Outcome(s) achieved Date Met: 06/21/18      Problem: Pain, Acute (Adult)  Goal: Acceptable Pain Control/Comfort Level  Outcome: Outcome(s) achieved Date Met: 06/21/18      Problem: Mobility, Physical Impaired (Adult)  Goal: Enhanced Mobility Skills  Outcome: Outcome(s) achieved Date Met: 06/21/18    Goal: Enhanced Functional Ability  Outcome: Outcome(s) achieved Date Met: 06/21/18

## 2018-06-21 NOTE — PLAN OF CARE
Problem: Patient Care Overview  Goal: Plan of Care Review  Outcome: Ongoing (interventions implemented as appropriate)   06/21/18 1019   Coping/Psychosocial   Plan of Care Reviewed With patient   OTHER   Outcome Summary PT: Patient performs bed mobility independently and sit to/from stand transfers with conditional independence. Patient performs gait with rolling walker, TTWB x40 feet with condtional independence. patient able to adhere to hip precautions throughout mobility activities and reports no further concerns regarding return home. Will discharge from PT services at this time.

## 2018-06-21 NOTE — DISCHARGE SUMMARY
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  Patient fell respond to 3 attempts at fixation of the subtrochanteric femur fracture and after prep evaluation was taken the operating room and the 19th for removal of the previously placed hardware and a right proximal femoral total hip replacement.  Postoperative she is done extremely well.  She is afebrile hemodynamically stable time of discharge.  She stable touchdown weightbearing on the right leg.  Patient is discharged home with Percocet for pain, Lyrica and Xarelto for DVT prophylaxis.    Discharge medications:      Discharge Medications      New Medications      Instructions Start Date   oxyCODONE-acetaminophen  MG per tablet  Commonly known as:  PERCOCET   1 tablet, Oral, Every 4 Hours PRN      pregabalin 75 MG capsule  Commonly known as:  LYRICA   75 mg, Oral, Every 12 Hours Scheduled         Continue These Medications      Instructions Start Date   DULoxetine 60 MG capsule  Commonly known as:  CYMBALTA   60 mg, Oral, Every Morning      rivaroxaban 10 MG tablet  Commonly known as:  XARELTO   10 mg, Oral, Daily, Do not start this medication until the evening that you return home after discharge.         Stop These Medications    aspirin 325 MG tablet     meloxicam 7.5 MG tablet  Commonly known as:  MOBIC      mupirocin 2 % ointment  Commonly known as:  BACTROBAN            Discharge instructions:  Patient may shower, touchdown weightbearing right leg.  She is to take Xarelto daily for DVT prophylaxis remain touchdown weightbearing.      Follow-up appointment: Patient has follow-up appointment scheduled in approximately 2 weeks

## 2018-06-21 NOTE — PLAN OF CARE
Problem: Patient Care Overview  Goal: Plan of Care Review  Outcome: Ongoing (interventions implemented as appropriate)   06/21/18 4977   Coping/Psychosocial   Plan of Care Reviewed With patient   Plan of Care Review   Progress improving   OTHER   Outcome Summary Pt VSS. Partial WB status to RLE. Assist x 1 with walker. POD 2 on 6/21/18. Pain controlled well with scheduled tylenol. Will continue to monitor patient.     Goal: Individualization and Mutuality  Outcome: Ongoing (interventions implemented as appropriate)    Goal: Discharge Needs Assessment  Outcome: Ongoing (interventions implemented as appropriate)    Goal: Interprofessional Rounds/Family Conf  Outcome: Ongoing (interventions implemented as appropriate)      Problem: Hip Arthroplasty (Total, Partial) (Adult)  Goal: Signs and Symptoms of Listed Potential Problems Will be Absent, Minimized or Managed (Hip Arthroplasty)  Outcome: Ongoing (interventions implemented as appropriate)      Problem: Pain, Acute (Adult)  Goal: Acceptable Pain Control/Comfort Level  Outcome: Ongoing (interventions implemented as appropriate)      Problem: Mobility, Physical Impaired (Adult)  Goal: Enhanced Mobility Skills  Outcome: Ongoing (interventions implemented as appropriate)    Goal: Enhanced Functional Ability  Outcome: Ongoing (interventions implemented as appropriate)

## 2018-06-21 NOTE — NURSING NOTE
Continued Stay Note  SHIRA Retana     Patient Name: Raven Young  MRN: [id number]  Today's Date: 6/21/2018    Admit Date: 6/19/2018          Discharge Plan     Row Name 06/21/18 0906       Plan    Plan plan home with     Plan Comments Spoke with [email or web address], informed patient will dc today. Will continue to follow.              Discharge Codes    No documentation.       Expected Discharge Date and Time     Expected Discharge Date Expected Discharge Time    Jun 21, 2018             Alejandro Kapoor RN

## 2018-06-21 NOTE — PROGRESS NOTES
Orthopedic Progress Note   Chief Complaint:  Status post revision right total hip    Subjective     Interval History: Patient is postop day 2 from her right proximal femoral replacement for failed ORIF of a subtrochanteric femur fracture.  She is afebrile with stable hemoglobin 10.2.  Pain well-controlled oral medication and she is independent with gait toe-touch weightbearing on that right leg.          Objective     Vital Signs  Temp:  [97.5 °F (36.4 °C)-98.1 °F (36.7 °C)] 98.1 °F (36.7 °C)  Heart Rate:  [] 92  Resp:  [18] 18  BP: ()/(60-72) 93/60  Body mass index is 31.99 kg/m².    Intake/Output Summary (Last 24 hours) at 06/21/18 0717  Last data filed at 06/21/18 0419   Gross per 24 hour   Intake              970 ml   Output             1650 ml   Net             -680 ml     No intake/output data recorded.       Physical Exam:   General: patient awake, alert and cooperative   Cardiovascular: regular rhythm and rate   Pulm: clear to auscultation bilaterally   Abdomen: Benign.  Soft bowel sounds   Extremities: Right leg is good distal pulses no motor or sensory deficit.  Calf is nontender.  Wound is benign   Neurologic: Normal mood and behavior     Results Review:     I reviewed the patient's new clinical results.      WBC No results found for: WBCS   HGB Hemoglobin   Date Value Ref Range Status   06/21/2018 10.6 (L) 12.0 - 16.0 g/dL Final   06/20/2018 10.9 (L) 12.0 - 16.0 g/dL Final      HCT Hematocrit   Date Value Ref Range Status   06/21/2018 33.0 (L) 37.0 - 47.0 % Final   06/20/2018 32.7 (L) 37.0 - 47.0 % Final      Platlets No results found for: LABPLAT     PT/INR:  No results found for: PROTIME/No results found for: INR    Sodium No results found for: NA   Potassium No results found for: K   Chloride No results found for: CL   Bicarbonate No results found for: PLASMABICARB   BUN No results found for: BUN   Creatinine No results found for: CREATININE   Calcium No results found for: CALCIUM    Magnesium  AST  ALT  Bilirubin, Total  AlkPhos  Albumin    Amylase  Lipase    Radiology: No results found for: MG  No components found for: AST.*  No components found for: ALT.*  No components found for: BILIRUBIN, TOTAL.*    No components found for: ALKPHOS.*  No components found for: ALBUMIN.*      No components found for: AMYLASE.*  No components found for: LIPASE.*            Imaging Results (most recent)     Procedure Component Value Units Date/Time    XR Hip With or Without Pelvis 1 View Right [[id number]] Collected:  06/19/18 1454     Updated:  06/19/18 1457    Narrative:       POSTOP RIGHT HIP, 6/19/2018     HISTORY:   Postop right hip arthroplasty     TECHNIQUE:  AP radiographs of the pelvis and hips were obtained portably following  surgery.     FINDINGS:  Postoperative changes of proximal right femoral resection with placement  of custom prosthesis. No periprosthetic fracture or joint dislocation is  demonstrated.       Impression:       Postop changes right hip arthroplasty as noted.     This report was finalized on 6/19/2018 2:55 PM by Dr. Ferdinand Salvador MD.                       Assessment/Plan     Patient Active Problem List   Diagnosis Code   • Closed right hip fracture S72.001A   • Subtrochanteric fracture of right femur S72.21XA   • Closed subtrochanteric fracture of femur with nonunion S72.23XK   • Closed left subtrochanteric femur fracture S72.22XA   • Subtrochanteric fracture, closed, right, with nonunion, subsequent encounter S72.21XK   • Status post orthopedic surgery, follow-up exam Z09   • Closed subtrochanteric fracture of femur with nonunion, right S72.21XK   • Atypical femoral fracture, unspecified, subsequent encounter for fracture with nonunion M84.750K         Patient ready for discharge.  Has Xarelto at home home health has been arranged.  Prescription for pain medication.  Follow-up appointments have been made    Keyshawn Madden MD  06/21/18  7:17 AM

## 2018-06-22 LAB
BACTERIA SPEC AEROBE CULT: NORMAL
GRAM STN SPEC: NORMAL

## 2018-06-24 LAB — BACTERIA SPEC ANAEROBE CULT: NORMAL

## 2018-06-25 ENCOUNTER — TELEPHONE (OUTPATIENT)
Dept: ORTHOPEDIC SURGERY | Facility: CLINIC | Age: 63
End: 2018-06-25

## 2018-06-25 NOTE — TELEPHONE ENCOUNTER
Keith [phone] calling asking for orders to continue PT and initiate and OT eval, ok given per Dr. Madden.    Advised per Dr. Madden's discharge note: Patient may shower, touchdown weightbearing right leg.  She is to take Xarelto daily for DVT prophylaxis remain touchdown weightbearing.    Thanks.

## 2018-07-02 ENCOUNTER — TELEPHONE (OUTPATIENT)
Dept: ORTHOPEDIC SURGERY | Facility: CLINIC | Age: 63
End: 2018-07-02

## 2018-07-02 NOTE — TELEPHONE ENCOUNTER
[facility] called on Friday 6/29 and left a voicemail stating that patient had taken a fall during their session.  I called Ms. Young to check on her, she said that she is fine, it was nothing to be worried about and she wasn't having any pain.  I advised her to call the office if anything changed.

## 2018-07-05 ENCOUNTER — OFFICE VISIT (OUTPATIENT)
Dept: ORTHOPEDIC SURGERY | Facility: CLINIC | Age: 63
End: 2018-07-05

## 2018-07-05 VITALS — HEIGHT: 62 IN | BODY MASS INDEX: 30.91 KG/M2 | WEIGHT: 168 LBS

## 2018-07-05 DIAGNOSIS — Z96.641 STATUS POST TOTAL HIP REPLACEMENT, RIGHT: Primary | ICD-10-CM

## 2018-07-05 PROCEDURE — 99024 POSTOP FOLLOW-UP VISIT: CPT | Performed by: NURSE PRACTITIONER

## 2018-07-06 NOTE — PROGRESS NOTES
CC: status post right total hip arthroplasty, right femoral replacement and removal of previously placed hardware, DOS 06/19/2018    Subjective: Ms. Young presents back to clinic for follow-up. States she is doing well, tolerating pain well. Does report a pulling sensation inferior portion of dressing that remains intact. Denies presence of drainage from dressing. Denies presence of numbness and tingling right lower extremity.     Exam:    Right lower extremity- dressing intact  Negative erythema, drainage, warmth  2+DP pulses   Brisk cap refill all digits right lower extremity  Negative calf pain, negative mariana's sign  Knee ROM 0 degrees - 90 degrees  Positive sensation all aspects right lower extremity     Assessment: status post total hip arthroplasty    Plan:  1. Long discussion with patient regarding signs and symptoms of infection and instructed to watch for symptoms daily and call immediately if symptoms are present.   2. Will have her follow-up with Dr. Madden in 2 weeks, x-rays right hip at that time. Encouraged to continue with use of wheelchair when traveling outside of home and weightbearing to right lower extremity, toe-touch for transfer and short distances. Patient verbalized understanding of all information and agrees with plan of care. Denies all other concerns present at this time.

## 2018-07-08 ENCOUNTER — HOSPITAL ENCOUNTER (EMERGENCY)
Facility: HOSPITAL | Age: 63
Discharge: HOME | End: 2018-07-08
Attending: EMERGENCY MEDICINE | Admitting: EMERGENCY MEDICINE

## 2018-07-08 ENCOUNTER — APPOINTMENT (OUTPATIENT)
Dept: GENERAL RADIOLOGY | Facility: HOSPITAL | Age: 63
End: 2018-07-08

## 2018-07-08 VITALS
RESPIRATION RATE: 14 BRPM | WEIGHT: 165 LBS | HEART RATE: 89 BPM | OXYGEN SATURATION: 98 % | SYSTOLIC BLOOD PRESSURE: 110 MMHG | BODY MASS INDEX: 30.36 KG/M2 | HEIGHT: 62 IN | TEMPERATURE: 98.3 F | DIASTOLIC BLOOD PRESSURE: 67 MMHG

## 2018-07-08 DIAGNOSIS — S73.034A ANTERIOR DISLOCATION OF RIGHT HIP, INITIAL ENCOUNTER (HCC): Primary | ICD-10-CM

## 2018-07-08 PROCEDURE — 93005 ELECTROCARDIOGRAM TRACING: CPT | Performed by: EMERGENCY MEDICINE

## 2018-07-08 PROCEDURE — 96374 THER/PROPH/DIAG INJ IV PUSH: CPT

## 2018-07-08 PROCEDURE — 99214 OFFICE O/P EST MOD 30 MIN: CPT | Performed by: ORTHOPAEDIC SURGERY

## 2018-07-08 PROCEDURE — 99156 MOD SED OTH PHYS/QHP 5/>YRS: CPT | Performed by: EMERGENCY MEDICINE

## 2018-07-08 PROCEDURE — 72170 X-RAY EXAM OF PELVIS: CPT

## 2018-07-08 PROCEDURE — 73502 X-RAY EXAM HIP UNI 2-3 VIEWS: CPT

## 2018-07-08 PROCEDURE — 27266 TREAT HIP DISLOCATION: CPT | Performed by: ORTHOPAEDIC SURGERY

## 2018-07-08 PROCEDURE — 99285 EMERGENCY DEPT VISIT HI MDM: CPT

## 2018-07-08 PROCEDURE — 96361 HYDRATE IV INFUSION ADD-ON: CPT

## 2018-07-08 PROCEDURE — 99284 EMERGENCY DEPT VISIT MOD MDM: CPT | Performed by: EMERGENCY MEDICINE

## 2018-07-08 PROCEDURE — 94799 UNLISTED PULMONARY SVC/PX: CPT

## 2018-07-08 PROCEDURE — 93010 ELECTROCARDIOGRAM REPORT: CPT | Performed by: INTERNAL MEDICINE

## 2018-07-08 PROCEDURE — 25010000002 ONDANSETRON PER 1 MG: Performed by: EMERGENCY MEDICINE

## 2018-07-08 RX ORDER — ONDANSETRON 2 MG/ML
4 INJECTION INTRAMUSCULAR; INTRAVENOUS ONCE
Status: COMPLETED | OUTPATIENT
Start: 2018-07-08 | End: 2018-07-08

## 2018-07-08 RX ORDER — SODIUM CHLORIDE 0.9 % (FLUSH) 0.9 %
10 SYRINGE (ML) INJECTION AS NEEDED
Status: DISCONTINUED | OUTPATIENT
Start: 2018-07-08 | End: 2018-07-08 | Stop reason: HOSPADM

## 2018-07-08 RX ORDER — SODIUM CHLORIDE 9 MG/ML
125 INJECTION, SOLUTION INTRAVENOUS CONTINUOUS
Status: DISCONTINUED | OUTPATIENT
Start: 2018-07-08 | End: 2018-07-08 | Stop reason: HOSPADM

## 2018-07-08 RX ADMIN — METHOHEXITAL SODIUM 70 MG: 500 INJECTION, POWDER, LYOPHILIZED, FOR SOLUTION INTRAMUSCULAR; INTRAVENOUS; RECTAL at 13:51

## 2018-07-08 RX ADMIN — METHOHEXITAL SODIUM 40 MG: 500 INJECTION, POWDER, LYOPHILIZED, FOR SOLUTION INTRAMUSCULAR; INTRAVENOUS; RECTAL at 13:55

## 2018-07-08 RX ADMIN — ONDANSETRON 4 MG: 2 INJECTION INTRAMUSCULAR; INTRAVENOUS at 13:35

## 2018-07-08 RX ADMIN — SODIUM CHLORIDE 500 ML: 9 INJECTION, SOLUTION INTRAVENOUS at 13:24

## 2018-07-08 RX ADMIN — SODIUM CHLORIDE 125 ML/HR: 9 INJECTION, SOLUTION INTRAVENOUS at 13:45

## 2018-07-08 NOTE — DISCHARGE INSTRUCTIONS
Use your abduction pillow any time that you are not walking.  Follow-up with Dr. Madden in the office on Wednesday.    -------------------------------------------------------------  Return to the emergency department with worsening symptoms, uncontrolled pain, inability to tolerate oral liquids, fever greater than 105°F not controlled by Tylenol and ibuprofen or as needed with emergent concerns.

## 2018-07-08 NOTE — ED PROVIDER NOTES
EMERGENCY DEPARTMENT ENCOUNTER      Room Number: D/D      HPI:    Chief complaint: Right hip discomfort    Location: As above    Quality/Severity:  None while lying still, moderately severe with movement    Timing/Duration: Abrupt onset shortly prior to arrival    Modifying Factors: As above    Associated Symptoms: No other    Narrative: Pt is a 62 y.o. female who presents complaining of concern that her right hip prosthesis is dislocated.  Patient rates her leg in the shower and felt a pop and pain.  Unable to bear weight on the leg.  Last by mouth intake-coffee with cream around 9 AM    PMD: Andres Kathleen MD  Orthoclone: [place]    REVIEW OF SYSTEMS  Review of Systems  All other systems reviewed are otherwise negative as related chief complaint  PAST MEDICAL HISTORY  Active Ambulatory Problems     Diagnosis Date Noted   • Closed right hip fracture (CMS/[facility]) 04/13/2017   • Subtrochanteric fracture of right femur (CMS/[facility]) 04/14/2017   • Closed subtrochanteric fracture of femur with nonunion 08/14/2017   • Closed left subtrochanteric femur fracture (CMS/[facility]) 08/14/2017   • Subtrochanteric fracture, closed, right, with nonunion, subsequent encounter 12/08/2017   • Status post orthopedic surgery, follow-up exam 04/11/2018   • Status post total hip replacement, right 07/05/2018     Resolved Ambulatory Problems     Diagnosis Date Noted   • Subtrochanteric fracture of right femur, closed, with delayed healing, subsequent encounter 12/22/2017   • Closed subtrochanteric fracture of femur with nonunion, right 05/30/2018   • Atypical femoral fracture, unspecified, subsequent encounter for fracture with nonunion 06/19/2018     Past Medical History:   Diagnosis Date   • Cancer (CMS/[facility])    • Depression with anxiety    • PONV (postoperative nausea and vomiting)    • Right hip pain        PAST SURGICAL HISTORY  Past Surgical History:   Procedure Laterality Date   • FIBULA BONE GRAFT TO HIP Right 12/15/2017    Procedure: FIBULA  BONE GRAFT TO HIP with cancellus autograft the [place] cable ;  Surgeon: Keyshawn Madden MD;  Location:  [facility] OR;  Service:    • HARDWARE REMOVAL Right 8/15/2017    Procedure: HARDWARE REMOVAL long gamma nail  with long gamma nail right subtrochanteric femur fracture meir vanco powder ;  Surgeon: Keyshawn Madden MD;  Location:  [facility] OR;  Service:    • IM NAILING FEMORAL SHAFT RETROGRADE Right 08/15/2017    replacement leah  fx   • RI OPEN FIX INTER/SUBTROCH FX,IMPLNT Right 4/14/2017    Procedure: FEMUR INTRAMEDULLARY NAILING/RODDING long meir gamma nail 7fr hemovac placement;  Surgeon: Keyshawn Madden MD;  Location:  [facility] OR;  Service: Orthopedics   • TUBAL ABDOMINAL LIGATION         FAMILY HISTORY  Family History   Problem Relation Age of Onset   • Asthma Mother    • No Known Problems Father        SOCIAL HISTORY  Social History     Social History   • Marital status:      Spouse name: N/A   • Number of children: N/A   • Years of education: N/A     Occupational History   • Not on file.     Social History Main Topics   • Smoking status: Never Smoker   • Smokeless tobacco: Never Used   • Alcohol use Yes      Comment: rare   • Drug use: No   • Sexual activity: Defer     Other Topics Concern   • Not on file     Social History Narrative   • No narrative on file       ALLERGIES  Sulfa antibiotics    PHYSICAL EXAM  ED Triage Vitals [07/08/18 1128]   Temp Heart Rate Resp BP SpO2   98.3 °F (36.8 °C) 87 18 130/77 97 %      Temp src Heart Rate Source Patient Position BP Location FiO2 (%)   Oral Monitor Lying Right arm --       Physical Exam   Constitutional: She is oriented to person, place, and time and well-developed, well-nourished, and in no distress. No distress.   Eyes: No scleral icterus.   Cardiovascular: Normal rate and intact distal pulses.    Pulmonary/Chest: Effort normal. No respiratory distress.   Musculoskeletal:        Legs:  Neurological: She is alert and oriented to person, place, and time.    Skin: Skin is warm and dry.   Psychiatric: Mood and affect normal.       LAB RESULTS        I ordered the above labs and reviewed the results    RADIOLOGY  RADIOLOGY        Study: XR right hip and pelvis    Findings: Dislocated prosthetic hip.  No fracture or malalignment of prosthesis otherwise identified    Interpreted contemporaneously with treatment by me, independently viewed by me      RADIOLOGY        Study: Pelvis, AP    Findings: Successful reduction    Interpreted contemporaneously with treatment by me, independently viewed by me        I ordered the above radiologic testing and reviewed the results    PROCEDURES  Procedural Sedation  Date/Time: 7/8/2018 2:16 PM  Performed by: BOSTON CAMPUZANO  Authorized by: BOSTON CAMPUZANO     Consent:     Consent obtained:  Verbal    Consent given by:  Patient    Risks discussed:  Allergic reaction, dysrhythmia, inadequate sedation, nausea, prolonged hypoxia resulting in organ damage, prolonged sedation necessitating reversal, respiratory compromise necessitating ventilatory assistance and intubation and vomiting  Indications:     Procedure performed:  Dislocation reduction    Procedure necessitating sedation performed by:  Different physician    Intended level of sedation:  Moderate (conscious sedation)  Pre-sedation assessment:     Time since last food or drink:  0900    ASA classification: class 2 - patient with mild systemic disease    Immediate pre-procedure details:     Reviewed: vital signs      Verified: bag valve mask available, emergency equipment available, intubation equipment available, IV patency confirmed and oxygen available    Procedure details (see MAR for exact dosages):     Preoxygenation:  Nasal cannula    Sedation:  Methohexital    Intra-procedure monitoring:  Blood pressure monitoring, cardiac monitor, continuous capnometry, continuous pulse oximetry, frequent vital sign checks and frequent LOC assessments    Intra-procedure events: none     Post-procedure details:     Attendance: Constant attendance by certified staff until patient recovered      Recovery: Patient returned to pre-procedure baseline      Patient is stable for discharge or admission: yes      Patient tolerance:  Tolerated well, no immediate complications      EKG           EKG time: 1254  Rhythm/Rate: Sinus, 85  P waves and CA: ZACHARIAH within normal limits  QRS, axis: Narrow QRS complex   ST and T waves: QTC within normal limits     Interpreted contemporaneously with treatment by me, independently viewed      PROGRESS AND CONSULTS  ED Course as of Jul 08 1421   Sun Jul 08, 2018   1206 CONSULT        Provider: Dr. Bautista-orthopedics    Discussion: Discussed history, ED presentation and evaluation.  He will look at the images and call back.    Agreeable c treatment and planned disposition.          [RS]   1306 Dr. Bautista in route for hospital rounds.  He will do the reduction if we will run the sedation.  I'm agreeable with this plan.  [RS]   1418 See Dr. Bautista's note for closed right hip reduction.  He would like to see the patient follow up on Wednesday.  [RS]   1418 Patient awake and alert.  Agreeable with discharge planning.  Has plenty of medication at home.  She does have a walker at home but we will supply her with an abduction pillow.  [RS]      ED Course User Index  [RS] Duarte Palencia MD           MEDICAL DECISION MAKING  Results were reviewed/discussed with the patient and they were also made aware of online access. Pt also made aware that some labs, such as cultures, will not be resulted during ER visit and follow up with PMD is necessary.     MDM       DIAGNOSIS  Final diagnoses:   Anterior dislocation of right hip, initial encounter (CMS/[facility])       Latest Documented Vital Signs:  As of 2:21 PM  BP- 97/58 HR- 88 Temp- 98.3 °F (36.8 °C) (Oral) O2 sat- 97%    DISPOSITION  Discharged in stable condition       Medication List      No changes were made to your prescriptions during  this visit.       Follow-up Information     Schedule an appointment as soon as possible for a visit  with Andres Kathleen MD.    Specialty:  Family Medicine  Why:  As needed  Contact information:  [address] [facility] [address] [phone]             Keyshawn Madden MD. Call in 1 day.    Specialty:  Orthopedic Surgery  Why:  Schedule appointment for Wednesday  Contact information:  [address] [facility] [address]  [facility] [id number] [phone]                        Duarte Palencia MD  07/08/18 1425

## 2018-07-08 NOTE — CONSULTS
Orthopedic Consult      Patient: Raven Young    Date of Admission: 7/8/2018 11:24 AM    YOB: 1955    Medical Record Number: [id number]    Attending Physician: No att. providers found  Consulting Physician:  Leonardo Bautista      Chief Complaints: Right hip pain      History of Present Illness: 62 y.o. female  evaluate emergency department at [facility] for right hip pain and inability to bear weight.  She had recent complex right total hip arthroplasty for nonunion of a subtrochanteric fracture by Dr. Madden on June 19.  She states that she was in the shower this morning lifting her leg and noted her right hip with sudden sharp pain and pop with inability to bear weight and deformity to the right lower extremity.  Denies any numbness or tingling right lower extremity, notes associated pain as an 8-9 out of 10, exacerbated with any attempts at weightbearing weight or move the right lower extremity, improved with bed rest and IV pain medication emergency department.  I was contacted by Dr. Palencia from the ER to evaluate patient.  She denies any prior instability episodes since the time of her surgery.  Denies any significant radiation of pain from the hip, denies any fevers chills or sweats.     Allergies   Allergen Reactions   • Sulfa Antibiotics Rash       Medications:   Home Medications:    (Not in a hospital admission)    Current Medications:  Scheduled Meds:   Continuous Infusions:  sodium chloride 125 mL/hr Last Rate: Stopped (07/08/18 1438)     PRN Meds:.•  Insert peripheral IV **AND** sodium chloride       Past Medical History:   Diagnosis Date   • Cancer (CMS/HCC)     basal cell   • Depression with anxiety    • PONV (postoperative nausea and vomiting)    • Right hip pain     SCHEDULED FOR SX        Past Surgical History:   Procedure Laterality Date   • FIBULA BONE GRAFT TO HIP Right 12/15/2017    Procedure: FIBULA BONE GRAFT TO HIP with cancellus autograft the [place] cable ;  Surgeon: Keyshawn  MD Chano;  Location: [facility];  Service:    • HARDWARE REMOVAL Right 8/15/2017    Procedure: HARDWARE REMOVAL long gamma nail  with long gamma nail right subtrochanteric femur fracture meir vanco powder ;  Surgeon: Keyshawn Madden MD;  Location:  [facility] OR;  Service:    • IM NAILING FEMORAL SHAFT RETROGRADE Right 08/15/2017    replacement leah  fx   • [place] OPEN FIX INTER/SUBTROCH FX,IMPLNT Right 4/14/2017    Procedure: FEMUR INTRAMEDULLARY NAILING/RODDING long meir gamma nail 7fr hemovac placement;  Surgeon: Keyshawn Madden MD;  Location:  [facility] OR;  Service: Orthopedics   • TUBAL ABDOMINAL LIGATION          Social History     Occupational History   • Not on file.     Social History Main Topics   • Smoking status: Never Smoker   • Smokeless tobacco: Never Used   • Alcohol use Yes      Comment: rare   • Drug use: No   • Sexual activity: Defer    Social History     Social History Narrative   • No narrative on file        Family History   Problem Relation Age of Onset   • Asthma Mother    • No Known Problems Father          Review of Systems:   HEENT: Patient denies any headaches, vision changes, change in hearing, or tinnitus, Patient denies any rhinorrhea,epistaxis, sinus pain, mouth or dental problems, sore throat or hoarseness, or dysphagia  Pulmonary: Patient denies any cough, congestion, SOA, or wheezing  Cardiovascular: Patient denies any chest pain, dyspnea, palpitations, weakness, intolerance of exercise, varicosities, swelling of extremities, known murmur  Gastrointestinal:  Patient denies nausea, vomiting, diarrhea, constipation, loss  of appetite, change in appetite, dysphagia, gas, heartburn, melena, change in bowel habits, use of laxatives or other drugs to alter the function of the gastrointestinal tract.  Genital/Urinary: Patient denies dysuria, change in color of urine, change in frequency of urination, pain with urgency, incontinence, retention, or nocturia.  Musculoskeletal: Patient denies increased  warmth; redness; or swelling of joints; limitation of function; deformity; crepitation: pain in a joint or an extremity, the neck, or the back, especially with movement.  Neurological: Patient denies dizziness, tremor, ataxia, difficulty in speaking, change in speech, paresthesia, loss of sensation, seizures, syncope, changes in memory.  Endocrine system: Patient denies tremors, palpitations, intolerance of heat or cold, polyuria, polydipsia, polyphagia, diaphoresis, exophthalmos, or goiter.  Psychological: Patient denies thoughts/plans or harming self or other; depression,  insomnia, night terrors, gracy, memory loss, disorientation.  Skin: Patient denies any bruising, rashes, discoloration, pruritus, wounds, ulcers, decubiti, changes in the hair or nails  Hematopoietic: Patient denies history of spontaneous or excessive bleeding, epistaxis, hematuria, melena, fatigue, enlarged or tender lymph nodes, pallor, history of anemia.    Physical Exam: 62 y.o. female  Vitals:    07/08/18 1405 07/08/18 1406 07/08/18 1408 07/08/18 1437   BP:  97/58  110/67   BP Location:  Right arm  Right arm   Patient Position:  Lying     Pulse: 88 88 88 89   Resp:  16  14   Temp:       TempSrc:       SpO2:  97%  98%   Weight:       Height:         General Appearance:    Alert, cooperative, in no acute distress                      Head:    Normocephalic, without obvious abnormality, atraumatic   Eyes:            Lids and lashes normal, conjunctivae and sclerae normal, no   icterus, no pallor, corneas clear, PERRLA   Ears:    Ears appear intact with no abnormalities noted   Throat:   No oral lesions, no thrush, oral mucosa moist   Neck:   No adenopathy, supple, trachea midline, no thyromegaly, no   carotid bruit, no JVD   Back:     No kyphosis present, no scoliosis present, no skin lesions,      erythema or scars, no tenderness to percussion or                   palpation,   range of motion normal   Lungs:     Clear to  auscultation,respirations regular, even and                  unlabored    Heart:    Regular rhythm and normal rate, normal S1 and S2, no            murmur, no gallop, no rub, no click   Chest Wall:    No abnormalities observed   Abdomen:     Normal bowel sounds, no masses, no organomegaly, soft        non-tender, non-distended, no guarding, no rebound                tenderness   Rectal:     Deferred   Extremities:   Tenderness noted at Right lower extremity with shortening internal rotation and flexion of the right lower extremity noted in comparison to the left.  Refuses to range right lower from a secondary to pain, she is able to dorsiflex and plantarflex right ankle as well as toes right foot with 4 out of 5 strength.  Positive sensation light touch all discretions right foot symmetric to the left.  Lateral incision well-healed.  Moves all remaining extremities well, no edema, no cyanosis, no redness   Pulses:   Pulses palpable and equal bilaterally   Skin:   No bleeding, bruising or rash   Lymph nodes:   No palpable adenopathy   Neurologic:   Cranial nerves 2 - 12 grossly intact, sensation intact, DTR       present and equal bilaterally           Diagnostic Tests:  No visits with results within 2 Day(s) from this visit.   Latest known visit with results is:   Admission on 06/19/2018, Discharged on 06/21/2018   Component Date Value Ref Range Status   • Culture 06/19/2018 No anaerobes isolated at 5 days   Final   • Wound Culture 06/19/2018 No growth at 3 days   Final   • Gram Stain Result 06/19/2018 No WBCs or organisms seen   Final   • Product Code 06/20/2018 [id number]   Final   • Unit Number 06/20/2018 [id number]   Final   • UNIT  ABO 06/20/2018 O   Final   • UNIT  RH 06/20/2018 NEG   Final   • Dispense Status 06/20/2018 PT   Final   • Blood Type 06/20/2018 ONEG   Final   • Blood Expiration Date 06/20/2018 763713117044   Final   • Blood Type Barcode 06/20/2018 9500   Final   • Product Code 06/20/2018  [id number]   Final   • Unit Number 06/20/2018 [id number]   Final   • UNIT  ABO 06/20/2018 O   Final   • UNIT  RH 06/20/2018 NEG   Final   • Dispense Status 06/20/2018 PT   Final   • Blood Type 06/20/2018 ONEG   Final   • Blood Expiration Date 06/20/2018 [id number]   Final   • Blood Type Barcode 06/20/2018 9500   Final   • PTT 06/20/2018 28.3  24.3 - 38.1 seconds Final   • WBC 06/20/2018 9.60  4.80 - 10.80 10*3/mm3 Final   • RBC 06/20/2018 3.75* 4.20 - 5.40 10*6/mm3 Final   • Hemoglobin 06/20/2018 10.9* 12.0 - 16.0 g/dL Final   • Hematocrit 06/20/2018 32.7* 37.0 - 47.0 % Final   • MCV 06/20/2018 87.2  81.0 - 99.0 fL Final   • MCH 06/20/2018 29.1  27.0 - 31.0 pg Final   • MCHC 06/20/2018 33.3  31.0 - 37.0 g/dL Final   • RDW 06/20/2018 15.2* 11.5 - 14.5 % Final   • RDW-SD 06/20/2018 48.5  37.0 - 54.0 fl Final   • MPV 06/20/2018 9.6  7.4 - 10.4 fL Final   • Platelets 06/20/2018 201  140 - 500 10*3/mm3 Final   • Neutrophil % 06/20/2018 78.7* 45.0 - 70.0 % Final   • Lymphocyte % 06/20/2018 12.6* 20.0 - 45.0 % Final   • Monocyte % 06/20/2018 8.3* 3.0 - 8.0 % Final   • Eosinophil % 06/20/2018 0.0  0.0 - 4.0 % Final   • Basophil % 06/20/2018 0.1  0.0 - 2.0 % Final   • Immature Grans % 06/20/2018 0.3  0.0 - 0.5 % Final   • Neutrophils, Absolute 06/20/2018 7.55  1.50 - 8.30 10*3/mm3 Final   • Lymphocytes, Absolute 06/20/2018 1.21  0.60 - 4.80 10*3/mm3 Final   • Monocytes, Absolute 06/20/2018 0.80  0.00 - 1.00 10*3/mm3 Final   • Eosinophils, Absolute 06/20/2018 0.00* 0.10 - 0.30 10*3/mm3 Final   • Basophils, Absolute 06/20/2018 0.01  0.00 - 0.20 10*3/mm3 Final   • Immature Grans, Absolute 06/20/2018 0.03  0.00 - 0.03 10*3/mm3 Final   • nRBC 06/20/2018 0.0  0.0 - 0.0 /100 WBC Final   • WBC 06/21/2018 9.94  4.80 - 10.80 10*3/mm3 Final   • RBC 06/21/2018 3.66* 4.20 - 5.40 10*6/mm3 Final   • Hemoglobin 06/21/2018 10.6* 12.0 - 16.0 g/dL Final   • Hematocrit 06/21/2018 33.0* 37.0 - 47.0 % Final   • MCV 06/21/2018 90.2  81.0 -  99.0 fL Final   • MCH 06/21/2018 29.0  27.0 - 31.0 pg Final   • MCHC 06/21/2018 32.1  31.0 - 37.0 g/dL Final   • RDW 06/21/2018 15.9* 11.5 - 14.5 % Final   • RDW-SD 06/21/2018 51.9  37.0 - 54.0 fl Final   • MPV 06/21/2018 9.7  7.4 - 10.4 fL Final   • Platelets 06/21/2018 203  140 - 500 10*3/mm3 Final   • Neutrophil % 06/21/2018 61.9  45.0 - 70.0 % Final   • Lymphocyte % 06/21/2018 24.7  20.0 - 45.0 % Final   • Monocyte % 06/21/2018 7.6  3.0 - 8.0 % Final   • Eosinophil % 06/21/2018 5.0* 0.0 - 4.0 % Final   • Basophil % 06/21/2018 0.4  0.0 - 2.0 % Final   • Immature Grans % 06/21/2018 0.4  0.0 - 0.5 % Final   • Neutrophils, Absolute 06/21/2018 6.14  1.50 - 8.30 10*3/mm3 Final   • Lymphocytes, Absolute 06/21/2018 2.46  0.60 - 4.80 10*3/mm3 Final   • Monocytes, Absolute 06/21/2018 0.76  0.00 - 1.00 10*3/mm3 Final   • Eosinophils, Absolute 06/21/2018 0.50* 0.10 - 0.30 10*3/mm3 Final   • Basophils, Absolute 06/21/2018 0.04  0.00 - 0.20 10*3/mm3 Final   • Immature Grans, Absolute 06/21/2018 0.04* 0.00 - 0.03 10*3/mm3 Final   • nRBC 06/21/2018 0.0  0.0 - 0.0 /100 WBC Final     Review of x-rays from emergency department indicates posterior superior dislocation of the right total hip arthroplasty, no evidence of periprosthetic fracture, cable at the proximal edge of the femoral shaft noted in stable position with stable radiolucency in this region compared to immediate postoperative x-rays from June 19.    Assessment:  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   • Closed left subtrochanteric femur fracture (CMS/[facility])   • Subtrochanteric fracture, closed, right, with nonunion, subsequent encounter   • Status post orthopedic surgery, follow-up exam   • Status post total hip replacement, right           Plan:  The patient voiced understanding of the risks, benefits, and  alternative forms of treatment that were discussed and the patient consents to proceed with Closed reduction right hip under sedation emergency department.  I explained her details of procedure as well as risks, benefits, alternatives with the risks including but not limited to periprosthetic fracture, unsuccessful reduction, recurrence of dislocation, chronic pain, limited motion, weakness, instability, need for additional procedures.  She understood these and wished to proceed with reduction at this time.      Procedure note:  Consent was obtained from patient as documented above.  Sedation was provided by emergency department physician with careful monitoring per protocol.  Once patient was appropriately sedated, she was left on her stretcher and a combination of flexion, traction, and internal rotation was applied to the right lower extremity.  Audible pop was noted as the hip was rotated into position with improvement of leg lengths appreciated at this point time.  Abduction pillow was applied.  Postreduction x-ray was obtained confirming appropriate reduction of femoral acetabular joint.  Patient tolerated procedure well without complication.    Patient will follow-up this week with Dr. Madden in office.  Date: 7/8/2018    Leonardo Bautista MD      Dictated utilizing Dragon dictation

## 2018-07-11 ENCOUNTER — OFFICE VISIT (OUTPATIENT)
Dept: ORTHOPEDIC SURGERY | Facility: CLINIC | Age: 63
End: 2018-07-11

## 2018-07-11 VITALS — BODY MASS INDEX: 30.36 KG/M2 | HEIGHT: 62 IN | WEIGHT: 165 LBS

## 2018-07-11 DIAGNOSIS — Z87.828 HISTORY OF CLOSED HIP DISLOCATION: ICD-10-CM

## 2018-07-11 DIAGNOSIS — R52 PAIN: ICD-10-CM

## 2018-07-11 DIAGNOSIS — Z96.641 STATUS POST TOTAL HIP REPLACEMENT, RIGHT: Primary | ICD-10-CM

## 2018-07-11 PROCEDURE — 99024 POSTOP FOLLOW-UP VISIT: CPT | Performed by: ORTHOPAEDIC SURGERY

## 2018-07-11 RX ORDER — RIVAROXABAN 10 MG/1
TABLET, FILM COATED ORAL
Refills: 0 | Status: ON HOLD | COMMUNITY
Start: 2018-06-13 | End: 2018-07-17

## 2018-07-17 ENCOUNTER — APPOINTMENT (OUTPATIENT)
Dept: GENERAL RADIOLOGY | Facility: HOSPITAL | Age: 63
End: 2018-07-17

## 2018-07-17 ENCOUNTER — HOSPITAL ENCOUNTER (OUTPATIENT)
Facility: HOSPITAL | Age: 63
Discharge: HOME | End: 2018-07-18
Attending: EMERGENCY MEDICINE | Admitting: ORTHOPAEDIC SURGERY

## 2018-07-17 ENCOUNTER — PREP FOR SURGERY (OUTPATIENT)
Dept: OTHER | Facility: HOSPITAL | Age: 63
End: 2018-07-17

## 2018-07-17 ENCOUNTER — TELEPHONE (OUTPATIENT)
Dept: ORTHOPEDIC SURGERY | Facility: CLINIC | Age: 63
End: 2018-07-17

## 2018-07-17 ENCOUNTER — ANESTHESIA (OUTPATIENT)
Dept: PERIOP | Facility: HOSPITAL | Age: 63
End: 2018-07-17

## 2018-07-17 ENCOUNTER — ANESTHESIA EVENT (OUTPATIENT)
Dept: PERIOP | Facility: HOSPITAL | Age: 63
End: 2018-07-17

## 2018-07-17 DIAGNOSIS — S73.004A HIP DISLOCATION, RIGHT, INITIAL ENCOUNTER (HCC): ICD-10-CM

## 2018-07-17 DIAGNOSIS — S73.004A HIP DISLOCATION, RIGHT, INITIAL ENCOUNTER (HCC): Primary | ICD-10-CM

## 2018-07-17 DIAGNOSIS — T84.020A DISLOCATION OF INTERNAL RIGHT HIP PROSTHESIS, INITIAL ENCOUNTER (HCC): Primary | ICD-10-CM

## 2018-07-17 LAB
ALBUMIN SERPL-MCNC: 4 G/DL (ref 3.5–5.2)
ALBUMIN/GLOB SERPL: 1.2 G/DL
ALP SERPL-CCNC: 165 U/L (ref 40–129)
ALT SERPL W P-5'-P-CCNC: 13 U/L (ref 5–33)
ANION GAP SERPL CALCULATED.3IONS-SCNC: 11.3 MMOL/L
AST SERPL-CCNC: 21 U/L (ref 5–32)
BASOPHILS # BLD AUTO: 0.03 10*3/MM3 (ref 0–0.2)
BASOPHILS NFR BLD AUTO: 0.5 % (ref 0–2)
BILIRUB SERPL-MCNC: 0.3 MG/DL (ref 0.2–1.2)
BUN BLD-MCNC: 10 MG/DL (ref 8–23)
BUN/CREAT SERPL: 12.8 (ref 7–25)
CALCIUM SPEC-SCNC: 9.2 MG/DL (ref 8.8–10.5)
CHLORIDE SERPL-SCNC: 102 MMOL/L (ref 98–107)
CO2 SERPL-SCNC: 26.7 MMOL/L (ref 22–29)
CREAT BLD-MCNC: 0.78 MG/DL (ref 0.57–1)
DEPRECATED RDW RBC AUTO: 46.4 FL (ref 37–54)
EOSINOPHIL # BLD AUTO: 0.12 10*3/MM3 (ref 0.1–0.3)
EOSINOPHIL NFR BLD AUTO: 1.9 % (ref 0–4)
ERYTHROCYTE [DISTWIDTH] IN BLOOD BY AUTOMATED COUNT: 14.3 % (ref 11.5–14.5)
GFR SERPL CREATININE-BSD FRML MDRD: 75 ML/MIN/1.73
GLOBULIN UR ELPH-MCNC: 3.4 GM/DL
GLUCOSE BLD-MCNC: 129 MG/DL (ref 65–99)
HCT VFR BLD AUTO: 38.1 % (ref 37–47)
HGB BLD-MCNC: 11.7 G/DL (ref 12–16)
IMM GRANULOCYTES # BLD: 0.02 10*3/MM3 (ref 0–0.03)
IMM GRANULOCYTES NFR BLD: 0.3 % (ref 0–0.5)
LYMPHOCYTES # BLD AUTO: 1.54 10*3/MM3 (ref 0.6–4.8)
LYMPHOCYTES NFR BLD AUTO: 24.4 % (ref 20–45)
MCH RBC QN AUTO: 27.1 PG (ref 27–31)
MCHC RBC AUTO-ENTMCNC: 30.7 G/DL (ref 31–37)
MCV RBC AUTO: 88.2 FL (ref 81–99)
MONOCYTES # BLD AUTO: 0.56 10*3/MM3 (ref 0–1)
MONOCYTES NFR BLD AUTO: 8.9 % (ref 3–8)
NEUTROPHILS # BLD AUTO: 4.05 10*3/MM3 (ref 1.5–8.3)
NEUTROPHILS NFR BLD AUTO: 64 % (ref 45–70)
NRBC BLD MANUAL-RTO: 0 /100 WBC (ref 0–0)
PLATELET # BLD AUTO: 334 10*3/MM3 (ref 140–500)
PMV BLD AUTO: 9.4 FL (ref 7.4–10.4)
POTASSIUM BLD-SCNC: 4 MMOL/L (ref 3.5–5.2)
PROT SERPL-MCNC: 7.4 G/DL (ref 6–8.5)
RBC # BLD AUTO: 4.32 10*6/MM3 (ref 4.2–5.4)
SODIUM BLD-SCNC: 140 MMOL/L (ref 136–145)
WBC NRBC COR # BLD: 6.32 10*3/MM3 (ref 4.8–10.8)

## 2018-07-17 PROCEDURE — 99284 EMERGENCY DEPT VISIT MOD MDM: CPT | Performed by: EMERGENCY MEDICINE

## 2018-07-17 PROCEDURE — 25010000002 ONDANSETRON PER 1 MG: Performed by: NURSE ANESTHETIST, CERTIFIED REGISTERED

## 2018-07-17 PROCEDURE — G0378 HOSPITAL OBSERVATION PER HR: HCPCS

## 2018-07-17 PROCEDURE — 99283 EMERGENCY DEPT VISIT LOW MDM: CPT

## 2018-07-17 PROCEDURE — 94799 UNLISTED PULMONARY SVC/PX: CPT

## 2018-07-17 PROCEDURE — 25010000002 SUCCINYLCHOLINE PER 20 MG: Performed by: NURSE ANESTHETIST, CERTIFIED REGISTERED

## 2018-07-17 PROCEDURE — 27266 TREAT HIP DISLOCATION: CPT | Performed by: ORTHOPAEDIC SURGERY

## 2018-07-17 PROCEDURE — 73502 X-RAY EXAM HIP UNI 2-3 VIEWS: CPT

## 2018-07-17 PROCEDURE — 85025 COMPLETE CBC W/AUTO DIFF WBC: CPT | Performed by: EMERGENCY MEDICINE

## 2018-07-17 PROCEDURE — 25010000002 PROPOFOL 10 MG/ML EMULSION: Performed by: NURSE ANESTHETIST, CERTIFIED REGISTERED

## 2018-07-17 PROCEDURE — 80053 COMPREHEN METABOLIC PANEL: CPT | Performed by: EMERGENCY MEDICINE

## 2018-07-17 PROCEDURE — 73501 X-RAY EXAM HIP UNI 1 VIEW: CPT

## 2018-07-17 PROCEDURE — S0260 H&P FOR SURGERY: HCPCS | Performed by: ORTHOPAEDIC SURGERY

## 2018-07-17 RX ORDER — LIDOCAINE HYDROCHLORIDE 20 MG/ML
INJECTION, SOLUTION INFILTRATION; PERINEURAL AS NEEDED
Status: DISCONTINUED | OUTPATIENT
Start: 2018-07-17 | End: 2018-07-17 | Stop reason: SURG

## 2018-07-17 RX ORDER — ASPIRIN 325 MG
325 TABLET ORAL DAILY
COMMUNITY
End: 2018-07-24 | Stop reason: HOSPADM

## 2018-07-17 RX ORDER — HYDROMORPHONE HYDROCHLORIDE 1 MG/ML
0.5 INJECTION, SOLUTION INTRAMUSCULAR; INTRAVENOUS; SUBCUTANEOUS
Status: DISCONTINUED | OUTPATIENT
Start: 2018-07-17 | End: 2018-07-17 | Stop reason: HOSPADM

## 2018-07-17 RX ORDER — SODIUM CHLORIDE, SODIUM LACTATE, POTASSIUM CHLORIDE, CALCIUM CHLORIDE 600; 310; 30; 20 MG/100ML; MG/100ML; MG/100ML; MG/100ML
100 INJECTION, SOLUTION INTRAVENOUS CONTINUOUS
Status: DISCONTINUED | OUTPATIENT
Start: 2018-07-17 | End: 2018-07-18 | Stop reason: HOSPADM

## 2018-07-17 RX ORDER — SODIUM CHLORIDE, SODIUM LACTATE, POTASSIUM CHLORIDE, CALCIUM CHLORIDE 600; 310; 30; 20 MG/100ML; MG/100ML; MG/100ML; MG/100ML
INJECTION, SOLUTION INTRAVENOUS CONTINUOUS PRN
Status: DISCONTINUED | OUTPATIENT
Start: 2018-07-17 | End: 2018-07-17 | Stop reason: SURG

## 2018-07-17 RX ORDER — NALOXONE HCL 0.4 MG/ML
0.4 VIAL (ML) INJECTION AS NEEDED
Status: DISCONTINUED | OUTPATIENT
Start: 2018-07-17 | End: 2018-07-17 | Stop reason: HOSPADM

## 2018-07-17 RX ORDER — SODIUM CHLORIDE 0.9 % (FLUSH) 0.9 %
10 SYRINGE (ML) INJECTION AS NEEDED
Status: DISCONTINUED | OUTPATIENT
Start: 2018-07-17 | End: 2018-07-18 | Stop reason: HOSPADM

## 2018-07-17 RX ORDER — DULOXETIN HYDROCHLORIDE 60 MG/1
60 CAPSULE, DELAYED RELEASE ORAL EVERY MORNING
Status: DISCONTINUED | OUTPATIENT
Start: 2018-07-18 | End: 2018-07-18 | Stop reason: HOSPADM

## 2018-07-17 RX ORDER — ONDANSETRON 2 MG/ML
4 INJECTION INTRAMUSCULAR; INTRAVENOUS ONCE AS NEEDED
Status: DISCONTINUED | OUTPATIENT
Start: 2018-07-17 | End: 2018-07-17 | Stop reason: HOSPADM

## 2018-07-17 RX ORDER — SODIUM CHLORIDE, SODIUM LACTATE, POTASSIUM CHLORIDE, CALCIUM CHLORIDE 600; 310; 30; 20 MG/100ML; MG/100ML; MG/100ML; MG/100ML
20 INJECTION, SOLUTION INTRAVENOUS CONTINUOUS
Status: DISCONTINUED | OUTPATIENT
Start: 2018-07-17 | End: 2018-07-18 | Stop reason: HOSPADM

## 2018-07-17 RX ORDER — ASPIRIN 325 MG
325 TABLET ORAL DAILY
Status: DISCONTINUED | OUTPATIENT
Start: 2018-07-18 | End: 2018-07-18 | Stop reason: HOSPADM

## 2018-07-17 RX ORDER — HYDROCODONE BITARTRATE AND ACETAMINOPHEN 5; 325 MG/1; MG/1
1 TABLET ORAL ONCE AS NEEDED
Status: DISCONTINUED | OUTPATIENT
Start: 2018-07-17 | End: 2018-07-17 | Stop reason: HOSPADM

## 2018-07-17 RX ORDER — PROPOFOL 10 MG/ML
VIAL (ML) INTRAVENOUS AS NEEDED
Status: DISCONTINUED | OUTPATIENT
Start: 2018-07-17 | End: 2018-07-17 | Stop reason: SURG

## 2018-07-17 RX ORDER — SUCCINYLCHOLINE CHLORIDE 20 MG/ML
INJECTION INTRAMUSCULAR; INTRAVENOUS AS NEEDED
Status: DISCONTINUED | OUTPATIENT
Start: 2018-07-17 | End: 2018-07-17 | Stop reason: SURG

## 2018-07-17 RX ORDER — OXYCODONE HYDROCHLORIDE AND ACETAMINOPHEN 5; 325 MG/1; MG/1
1 TABLET ORAL ONCE AS NEEDED
Status: DISCONTINUED | OUTPATIENT
Start: 2018-07-17 | End: 2018-07-17 | Stop reason: HOSPADM

## 2018-07-17 RX ORDER — ONDANSETRON 2 MG/ML
INJECTION INTRAMUSCULAR; INTRAVENOUS AS NEEDED
Status: DISCONTINUED | OUTPATIENT
Start: 2018-07-17 | End: 2018-07-17 | Stop reason: SURG

## 2018-07-17 RX ORDER — OXYCODONE AND ACETAMINOPHEN 10; 325 MG/1; MG/1
1 TABLET ORAL EVERY 4 HOURS PRN
Status: DISCONTINUED | OUTPATIENT
Start: 2018-07-17 | End: 2018-07-18 | Stop reason: HOSPADM

## 2018-07-17 RX ADMIN — OXYCODONE HYDROCHLORIDE AND ACETAMINOPHEN 1 TABLET: 10; 325 TABLET ORAL at 22:21

## 2018-07-17 RX ADMIN — LIDOCAINE HYDROCHLORIDE 60 MG: 20 INJECTION, SOLUTION INFILTRATION; PERINEURAL at 20:11

## 2018-07-17 RX ADMIN — SUCCINYLCHOLINE CHLORIDE 40 MG: 20 INJECTION, SOLUTION INTRAMUSCULAR; INTRAVENOUS at 20:21

## 2018-07-17 RX ADMIN — SODIUM CHLORIDE, POTASSIUM CHLORIDE, SODIUM LACTATE AND CALCIUM CHLORIDE 100 ML/HR: 600; 310; 30; 20 INJECTION, SOLUTION INTRAVENOUS at 21:00

## 2018-07-17 RX ADMIN — SUCCINYLCHOLINE CHLORIDE 40 MG: 20 INJECTION, SOLUTION INTRAMUSCULAR; INTRAVENOUS at 20:18

## 2018-07-17 RX ADMIN — PROPOFOL 80 MG: 10 INJECTION, EMULSION INTRAVENOUS at 20:11

## 2018-07-17 RX ADMIN — SODIUM CHLORIDE, POTASSIUM CHLORIDE, SODIUM LACTATE AND CALCIUM CHLORIDE: 600; 310; 30; 20 INJECTION, SOLUTION INTRAVENOUS at 20:00

## 2018-07-17 RX ADMIN — ONDANSETRON 4 MG: 2 INJECTION, SOLUTION INTRAMUSCULAR; INTRAVENOUS at 20:29

## 2018-07-17 RX ADMIN — PROPOFOL 50 MG: 10 INJECTION, EMULSION INTRAVENOUS at 20:14

## 2018-07-17 NOTE — ED PROVIDER NOTES
Subjective     History provided by:  Patient    History of Present Illness    · Chief complaint: Hip dislocation    · Location: Right hip    · Quality/Severity: The patient felt her hip pop and dislocate.  She states she is unable to move it without severe pain    · Timing/Onset: Occurred when she bent over to  a piece of paper at 12:30 today    · Modifying Factors: Movement of her right hip exacerbates pain.  There is no pain if she holds her hip perfectly still.    · Associated symptoms: She denies any numbness or weakness of her right lower extremity.    · Narrative: The patient is a 62-year-old white female who was bending over to  a piece of paper at 12:30 PM today when her hip dislocated.  She had a hip replaced June 16 of this year by Dr. Madden.  She was seen here on July 8 for a dislocation of her right hip which was reduced by Dr. Bautista.  She last ate at 1 PM today.    ED Triage Vitals [07/17/18 1506]   Temp Heart Rate Resp BP SpO2   97.4 °F (36.3 °C) 83 20 149/84 98 %      Temp src Heart Rate Source Patient Position BP Location FiO2 (%)   Oral Monitor Sitting Right arm --       Review of Systems   Constitutional: Negative for activity change, appetite change, chills, diaphoresis, fatigue and fever.   HENT: Negative for congestion, dental problem, ear pain, hearing loss, mouth sores, postnasal drip, rhinorrhea, sinus pressure, sore throat and voice change.    Eyes: Negative for photophobia, pain, discharge, redness and visual disturbance.   Respiratory: Negative for cough, chest tightness, shortness of breath, wheezing and stridor.    Cardiovascular: Negative for chest pain, palpitations and leg swelling.   Gastrointestinal: Negative for abdominal pain, diarrhea, nausea and vomiting.   Genitourinary: Negative for difficulty urinating, dysuria, flank pain, frequency, hematuria and urgency.   Musculoskeletal: Positive for gait problem. Negative for arthralgias, back pain, joint swelling,  myalgias, neck pain and neck stiffness.        Right hip pain and unable to move her right hip   Skin: Negative for color change and rash.   Neurological: Negative for dizziness, tremors, seizures, syncope, facial asymmetry, speech difficulty, weakness, light-headedness, numbness and headaches.   Hematological: Negative for adenopathy.   Psychiatric/Behavioral: Negative.  Negative for confusion and decreased concentration. The patient is not nervous/anxious.        Past Medical History:   Diagnosis Date   • Cancer (CMS/HCC)     basal cell   • Depression with anxiety    • PONV (postoperative nausea and vomiting)    • Right hip pain     SCHEDULED FOR SX       Allergies   Allergen Reactions   • Sulfa Antibiotics Rash       Past Surgical History:   Procedure Laterality Date   • FIBULA BONE GRAFT TO HIP Right 12/15/2017    Procedure: FIBULA BONE GRAFT TO HIP with cancellus autograft the Jennifer-Miles cable ;  Surgeon: Keyshawn Madden MD;  Location: [facility] OR;  Service:    • HARDWARE REMOVAL Right 8/15/2017    Procedure: HARDWARE REMOVAL long gamma nail  with long gamma nail right subtrochanteric femur fracture meir vanco powder ;  Surgeon: Keyshawn Madden MD;  Location: [facility] OR;  Service:    • IM NAILING FEMORAL SHAFT RETROGRADE Right 08/15/2017    replacement leah  fx   • MI OPEN FIX INTER/SUBTROCH FX,IMPLNT Right 4/14/2017    Procedure: FEMUR INTRAMEDULLARY NAILING/RODDING long meir gamma nail 7fr hemovac placement;  Surgeon: Keyshawn Madden MD;  Location: [facility] OR;  Service: Orthopedics   • TOTAL HIP ARTHROPLASTY Right 6/19/2018    Procedure: TOTAL HIP ARTHROPLASTY;  Surgeon: Keyshawn Madden MD;  Location:  [facility] OR;  Service: Orthopedics   • TUBAL ABDOMINAL LIGATION         Family History   Problem Relation Age of Onset   • Asthma Mother    • No Known Problems Father        Social History     Social History   • Marital status:      Social History Main Topics   • Smoking status: Never Smoker   • Smokeless tobacco:  Never Used   • Alcohol use Yes      Comment: rare   • Drug use: No   • Sexual activity: Defer     Other Topics Concern   • Not on file           Objective   Physical Exam   Constitutional: She is oriented to person, place, and time. She appears well-developed and well-nourished. No distress.   The patient appears in no acute distress.  Vital signs within normal limits.   HENT:   Head: Normocephalic and atraumatic.   Nose: Nose normal.   Mouth/Throat: Oropharynx is clear and moist. No oropharyngeal exudate.   Eyes: EOM are normal. Pupils are equal, round, and reactive to light. Right eye exhibits no discharge. Left eye exhibits no discharge. No scleral icterus.   Neck: Normal range of motion. Neck supple. No JVD present. No thyromegaly present.   Cardiovascular: Normal rate, regular rhythm and normal heart sounds.    No murmur heard.  Pulmonary/Chest: Effort normal and breath sounds normal. She has no wheezes. She has no rales. She exhibits no tenderness.   Abdominal: Soft. Bowel sounds are normal. She exhibits no distension. There is no tenderness.   Musculoskeletal: She exhibits tenderness. She exhibits no edema or deformity.   The patient has tenderness over her right hip.  Knee range of motion of her right hip causes pain.   Lymphadenopathy:     She has no cervical adenopathy.   Neurological: She is alert and oriented to person, place, and time. No cranial nerve deficit. Coordination normal.   No focal motor sensory deficit   Skin: Skin is warm and dry. No rash noted. She is not diaphoretic.   Psychiatric: She has a normal mood and affect. Her behavior is normal. Judgment and thought content normal.   Nursing note and vitals reviewed.      Procedures           ED Course  ED Course as of Jul 17 1918   Tue Jul 17, 2018   1552 [facility] Report [id number]  [TP]   0952 The patient's right hip x-ray shows a dislocated right hip prosthesis without fracture.  The patient is comfortable long as she remains still.  Due to the  fact that the patient ate at 1:00 and the fact that she suffered apnea in the conscious sedation in her last reduction of her hip dislocation 9 days ago, I feel it best that the patient be reduced in the OR.  At 16:48 the patient discussed with Dr. Madden, orthopedist, who will admit the patient to observation pending taking her to the OR for reduction of her right hip dislocation.  [TP]   [address] [facility] Report [id number]  [TP]      ED Course User Index  [TP] Donta Olmedo MD                  MDM  Number of Diagnoses or Management Options  Dislocation of internal right hip prosthesis, initial encounter (CMS/[facility]): new and requires workup     Amount and/or Complexity of Data Reviewed  Clinical lab tests: ordered and reviewed  Tests in the radiology section of CPT®: ordered and reviewed  Discuss the patient with other providers: yes  Independent visualization of images, tracings, or specimens: yes    Patient Progress  Patient progress: stable        Final diagnoses:   Dislocation of internal right hip prosthesis, initial encounter (CMS/[facility])           Labs Reviewed   COMPREHENSIVE METABOLIC PANEL - Abnormal; Notable for the following:        Result Value    Glucose 129 (*)     Alkaline Phosphatase 165 (*)     All other components within normal limits   CBC WITH AUTO DIFFERENTIAL - Abnormal; Notable for the following:     Hemoglobin 11.7 (*)     MCHC 30.7 (*)     Monocyte % 8.9 (*)     All other components within normal limits   CBC AND DIFFERENTIAL    Narrative:     The following orders were created for panel order CBC & Differential.  Procedure                               Abnormality         Status                     ---------                               -----------         ------                     CBC Auto Differential[[id number]]        Abnormal            Final result                 Please view results for these tests on the individual orders.     XR Hip With or Without Pelvis 2 - 3 View Right   ED Interpretation    Dislocated right hip prosthesis.  No fracture.      Final Result   Right hip dislocation.       This report was finalized on 7/17/2018 4:53 PM by Dr. Ferdinand Salvador MD.                 Medication List      No changes were made to your prescriptions during this visit.            Donta Olmedo MD  07/17/18 2524

## 2018-07-17 NOTE — H&P
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  Initially after surgery she did well but she had a dislocation July 8 when she bent over while in the shower.  At that time she underwent closed reduction in the ER however according to the records the patient had decrease and respirations are should be brought to the operating room today for us to close reduction of that dislocated hip.          Past Medical History:   Diagnosis Date   • Cancer (CMS/HCC)     basal cell   • Depression with anxiety    • PONV (postoperative nausea and vomiting)    • Right hip pain     SCHEDULED FOR SX     Past Surgical History:   Procedure Laterality Date   • FIBULA BONE GRAFT TO HIP Right 12/15/2017    Procedure: FIBULA BONE GRAFT TO HIP with cancellus autograft the Dall-Miles cable ;  Surgeon: Keyshawn Madden MD;  Location: [facility] OR;  Service:    • HARDWARE REMOVAL Right 8/15/2017    Procedure: HARDWARE REMOVAL long gamma nail  with long gamma nail right subtrochanteric femur fracture meir vanco powder ;  Surgeon: Keyshawn Madden MD;  Location: [facility] OR;  Service:    • IM NAILING FEMORAL SHAFT RETROGRADE Right 08/15/2017    replacement leah  fx   • SD OPEN FIX INTER/SUBTROCH FX,IMPLNT Right 4/14/2017    Procedure: FEMUR INTRAMEDULLARY NAILING/RODDING long meir gamma nail 7fr hemovac placement;  Surgeon: Keyshawn Madden MD;  Location: [facility] OR;  Service: Orthopedics   • TOTAL HIP ARTHROPLASTY Right 6/19/2018    Procedure: TOTAL HIP ARTHROPLASTY;  Surgeon: Keyshawn Madden MD;  Location: [facility] OR;  Service: Orthopedics   • TUBAL ABDOMINAL LIGATION    "    Family History   Problem Relation Age of Onset   • Asthma Mother    • No Known Problems Father      Social History   Substance Use Topics   • Smoking status: Never Smoker   • Smokeless tobacco: Never Used   • Alcohol use Yes      Comment: rare     Prescriptions Prior to Admission   Medication Sig Dispense Refill Last Dose   • aspirin 325 MG tablet Take 325 mg by mouth Daily.   7/16/2018 at pm   • DULoxetine (CYMBALTA) 60 MG capsule Take 60 mg by mouth Every Morning.   7/17/2018 at am   • oxyCODONE-acetaminophen (PERCOCET)  MG per tablet Take 1 tablet by mouth Every 4 (Four) Hours As Needed for Moderate Pain  or Severe Pain . 60 tablet 0 7/17/2018 at 1300   • pregabalin (LYRICA) 75 MG capsule Take 1 capsule by mouth Every 12 (Twelve) Hours. 30 capsule 0 Taking   • XARELTO 10 MG tablet TAKE 1 TAB DAILY DO NOT START UNTIL YOU ARE HOME FROM HOSPITAL AFTER SURGERY  0 Taking     Allergies:  Sulfa antibiotics    REVIEW OF SYSTEMS:  Please see the above history of present illness for pertinent positives and negatives.  The remainder of the patient's systems have been reviewed and are negative.    Vital Signs  Temp:  [97.2 °F (36.2 °C)-98.1 °F (36.7 °C)] 97.2 °F (36.2 °C)  Heart Rate:  [78-83] 83  Resp:  [16-20] 16  BP: (132-149)/(62-84) 137/62    Flowsheet Rows      First Filed Value   Admission Height  157.4 cm (61.97\") Documented at 07/17/2018 1506   Admission Weight  74.8 kg (165 lb) Documented at 07/17/2018 1506           Physical Exam:  Physical Exam   Constitutional: Patient appears well-developed and well-nourished and in no acute distress   HEENT:   Head: Normocephalic and atraumatic.   Eyes:  Pupils are equal, round, and reactive to light.  Mouth and Throat: Patient has moist mucous membranes. Oropharynx is clear of any erythema or exudate.     Neck: Neck supple. No JVD present. No thyromegaly present. No lymphadenopathy present.  Cardiovascular: Regular rate, regular rhythm.  Pulmonary/Chest: Lungs are " clear to auscultation bilaterally.  Abdominal:benign,soft with bowel sounds  Musculoskeletal: Normal posture.  Extremities: The right leg has good distal pulses and there is no motor sensory deficit good capillary refill.  The leg is shortened and actually rotated   Neurological: Patient is alert and oriented.  Psychological:   Mood and behavior appropriate.  Skin: Skin is warm and dry.     Results Review:    I reviewed the patient's new clinical results.  Lab Results (most recent)     Procedure Component Value Units Date/Time    Comprehensive Metabolic Panel [[id number]]  (Abnormal) Collected:  07/17/18 1718    Specimen:  Blood Updated:  07/17/18 1745     Glucose 129 (H) mg/dL      BUN 10 mg/dL      Creatinine 0.78 mg/dL      Sodium 140 mmol/L      Potassium 4.0 mmol/L      Chloride 102 mmol/L      CO2 26.7 mmol/L      Calcium 9.2 mg/dL      Total Protein 7.4 g/dL      Albumin 4.00 g/dL      ALT (SGPT) 13 U/L      AST (SGOT) 21 U/L      Alkaline Phosphatase 165 (H) U/L      Total Bilirubin 0.3 mg/dL      eGFR Non African Amer 75 mL/min/1.73      Globulin 3.4 gm/dL      A/G Ratio 1.2 g/dL      BUN/Creatinine Ratio 12.8     Anion Gap 11.3 mmol/L     CBC & Differential [[id number]] Collected:  07/17/18 1718    Specimen:  Blood Updated:  07/17/18 1739    Narrative:       The following orders were created for panel order CBC & Differential.  Procedure                               Abnormality         Status                     ---------                               -----------         ------                     CBC Auto Differential[[id number]]        Abnormal            Final result                 Please view results for these tests on the individual orders.    CBC Auto Differential [[id number]]  (Abnormal) Collected:  07/17/18 1718    Specimen:  Blood Updated:  07/17/18 1739     WBC 6.32 10*3/mm3      RBC 4.32 10*6/mm3      Hemoglobin 11.7 (L) g/dL      Hematocrit 38.1 %      MCV 88.2 fL      MCH 27.1 pg      MCHC 30.7  (L) g/dL      RDW 14.3 %      RDW-SD 46.4 fl      MPV 9.4 fL      Platelets 334 10*3/mm3      Neutrophil % 64.0 %      Lymphocyte % 24.4 %      Monocyte % 8.9 (H) %      Eosinophil % 1.9 %      Basophil % 0.5 %      Immature Grans % 0.3 %      Neutrophils, Absolute 4.05 10*3/mm3      Lymphocytes, Absolute 1.54 10*3/mm3      Monocytes, Absolute 0.56 10*3/mm3      Eosinophils, Absolute 0.12 10*3/mm3      Basophils, Absolute 0.03 10*3/mm3      Immature Grans, Absolute 0.02 10*3/mm3      nRBC 0.0 /100 WBC           Imaging Results (most recent)     Procedure Component Value Units Date/Time    XR Hip With or Without Pelvis 2 - 3 View Right [[id number]] Collected:  07/17/18 1651     Updated:  07/17/18 1655    Narrative:       RIGHT HIP, 7/17/2018         HISTORY:  62-year-old female postop right hip arthroplasty 6/19/2018. Right hip  dislocation 7/8/2018. She returns to the ED today complaining of new  onset right hip pain. Suspected recurrent dislocation.     TECHNIQUE:  Two view right hip series.     FINDINGS:  The examination confirms complete superior dislocation of the patient's  right total hip arthroplasty. No visible periprosthetic fracture or  component displacement.       Impression:       Right hip dislocation.     This report was finalized on 7/17/2018 4:53 PM by Dr. Ferdinand Salvador MD.               ECG/EMG Results (most recent)     None            Assessment/Plan x-rays confirm a superiorly dislocated right total hip        I discussed the patients findings and my recommendations with patient and plan to proceed with closed reduction of her right dislocated total hip.  We'll admit the patient postoperatively proceed with a CT scan of fully evaluate the alignment rotation and configuration of the total hip.  Patient was in agreement with the treatment plan    Keyshawn Madden MD  07/17/18  7:07 PM

## 2018-07-17 NOTE — TELEPHONE ENCOUNTER
Patients sister calling stating that the patient was sitting in her wheelchair and leaned forward and she feels like her hip is out of place again.    Patient's sister was advised that Dr. Madden had left the office for the day but he was on call, [facility] Madden was on call for orthopedics.     Thanks.

## 2018-07-17 NOTE — ED NOTES
Page made to Dr. Madden with [facility]. Waiting for call back.      Milady Gramajo, VLAD  07/17/18 1621

## 2018-07-18 ENCOUNTER — APPOINTMENT (OUTPATIENT)
Dept: CT IMAGING | Facility: HOSPITAL | Age: 63
End: 2018-07-18

## 2018-07-18 VITALS
DIASTOLIC BLOOD PRESSURE: 56 MMHG | SYSTOLIC BLOOD PRESSURE: 92 MMHG | RESPIRATION RATE: 16 BRPM | OXYGEN SATURATION: 99 % | WEIGHT: 165 LBS | HEART RATE: 79 BPM | HEIGHT: 62 IN | TEMPERATURE: 97.6 F | BODY MASS INDEX: 30.36 KG/M2

## 2018-07-18 PROBLEM — T84.020A DISPLACEMENT OF INTERNAL RIGHT HIP PROSTHESIS (HCC): Status: RESOLVED | Noted: 2018-07-17 | Resolved: 2018-07-18

## 2018-07-18 PROBLEM — S73.004A HIP DISLOCATION, RIGHT, INITIAL ENCOUNTER: Status: RESOLVED | Noted: 2018-07-17 | Resolved: 2018-07-18

## 2018-07-18 PROCEDURE — G0378 HOSPITAL OBSERVATION PER HR: HCPCS

## 2018-07-18 PROCEDURE — 99024 POSTOP FOLLOW-UP VISIT: CPT | Performed by: ORTHOPAEDIC SURGERY

## 2018-07-18 PROCEDURE — 94799 UNLISTED PULMONARY SVC/PX: CPT

## 2018-07-18 PROCEDURE — 73700 CT LOWER EXTREMITY W/O DYE: CPT

## 2018-07-18 RX ADMIN — DULOXETINE HYDROCHLORIDE 60 MG: 60 CAPSULE, DELAYED RELEASE ORAL at 07:46

## 2018-07-18 RX ADMIN — ASPIRIN 325 MG: 325 TABLET ORAL at 09:41

## 2018-07-18 NOTE — OP NOTE
Preoperative Diagnosis: Dislocated right total hip    Postoperative Diagnosis: Same    Procedure Performed:  Closed reduction right total hip    Surgeon: [place]      Assistant:  None    Anesthesia: Gen.      Anesthetist: [place]    Drains: None    Complications: None        Indications for procedure: 62 y.o. female status post a right total hip with a dislocation          Operative Note: Patient brought to the operating room.  Timeouts completed identifying the patient procedure correctly.  After satisfactory general anesthesia was obtained patient underwent a closed reduction of her dislocated right total hip.  Intraoperative x-rays confirmed reduction of the dislocation.  She is placed in hip abduction pillow awake and taken to recovery having top procedure well.

## 2018-07-18 NOTE — PLAN OF CARE
Problem: Fall Risk (Adult)  Goal: Identify Related Risk Factors and Signs and Symptoms  Outcome: Outcome(s) achieved Date Met: 07/18/18      Problem: Patient Care Overview  Goal: Plan of Care Review  Outcome: Ongoing (interventions implemented as appropriate)   07/18/18 0604   Plan of Care Review   Progress improving       Problem: Skin Injury Risk (Adult)  Goal: Identify Related Risk Factors and Signs and Symptoms  Outcome: Ongoing (interventions implemented as appropriate)   07/18/18 0604   Skin Injury Risk (Adult)   Related Risk Factors (Skin Injury Risk) advanced age;tissue perfusion altered

## 2018-07-18 NOTE — PLAN OF CARE
Problem: Patient Care Overview  Goal: Discharge Needs Assessment  Outcome: Ongoing (interventions implemented as appropriate)   07/17/18 1807 07/18/18 1043   Disability   Equipment Currently Used at Home --  walker, rolling;wheelchair;bath bench   Discharge Needs Assessment   Readmission Within the Last 30 Days --  (inpt 6/19-21/18)   Patient/Family Anticipates Transition to --  home with family   Patient/Family Anticipated Services at Transition home health care --    Transportation Concerns --  car, none   Transportation Anticipated --  family or friend will provide

## 2018-07-18 NOTE — ANESTHESIA PROCEDURE NOTES
Airway  Urgency: elective    Airway not difficult    General Information and Staff    Patient location during procedure: OR  CRNA: ELIZABETH DUFF    Indications and Patient Condition  Indications for airway management: airway protection    Preoxygenated: yes  MILS not maintained throughout  Mask difficulty assessment: 0 - not attempted    Final Airway Details  Final airway type: supraglottic airway      Successful airway: classic  Size 4    Number of attempts at approach: 1    Additional Comments  Atraumatic.  LMA good seal

## 2018-07-18 NOTE — ANESTHESIA PREPROCEDURE EVALUATION
Anesthesia Evaluation     Patient summary reviewed and Nursing notes reviewed   history of anesthetic complications: PONV  NPO Solid Status: > 8 hours  NPO Liquid Status: > 8 hours           Airway   Mallampati: II  TM distance: >3 FB  Neck ROM: full  No difficulty expected  Dental - normal exam     Pulmonary - normal exam    breath sounds clear to auscultation  (+) recent URI (couple of weeks ago) resolved,   (-) not a smokerSleep apnea: snores.  Cardiovascular - normal exam  Exercise tolerance: good (4-7 METS)    ECG reviewed  Rhythm: regular  Rate: normal      ROS comment: Narrative     RR Interval= 845 ms  WA Interval= 148 ms  QRSD Interval= 76 ms  QT Interval= 408 ms  QTc Interval= 444 ms  Heart Rate= 71 ms  P Axis= 13 deg  QRS Axis= 33 deg  T Wave Axis= 38 deg  I: 40 Axis= 44 deg  T: 40 Axis= 23 deg  ST Axis= 62 deg  SINUS RHYTHM  NO SIGNIFICANT CHANGE FROM PREVIOUS ECG  Electronically Signed by:  Geraldo Orozco ([facility]) 05-Jun-2018 15:24:21  Date and Time of Study: 2018-06-05 12:05:18        Neuro/Psych  (+) numbness (fingers hands bilaterally),     GI/Hepatic/Renal/Endo - negative ROS     Musculoskeletal     Abdominal  - normal exam   Substance History   (-) drug useAlcohol use: occ.     OB/GYN negative ob/gyn ROS         Other   (+) arthritis (knee and hip)                       Anesthesia Plan    ASA 2     spinal, MAC and general     intravenous induction   Anesthetic plan and risks discussed with patient and spouse/significant other.  Use of blood products discussed with patient and spouse/significant other  Consented to blood products.

## 2018-07-18 NOTE — PROGRESS NOTES
Orthopedic Progress Note   Chief Complaint:  Status post closed reduction right dislocated total hip    Subjective     Interval History: Patient is afebrile.  Essentially having no pain.  Neurovascular function is intact          Objective     Vital Signs  Temp:  [97.2 °F (36.2 °C)-98.2 °F (36.8 °C)] 98 °F (36.7 °C)  Heart Rate:  [75-99] 75  Resp:  [16-20] 16  BP: ()/(50-84) 107/55  Body mass index is 30.18 kg/m².    Intake/Output Summary (Last 24 hours) at 07/18/18 0709  Last data filed at 07/17/18 2027   Gross per 24 hour   Intake              500 ml   Output                0 ml   Net              500 ml     No intake/output data recorded.       Physical Exam:   General: patient awake, alert and cooperative   Cardiovascular: regular rhythm and rate   Pulm: clear to auscultation bilaterally   Abdomen: Benign.  Soft bowel sounds   Extremities: Right leg is good distal pulses no motor sensory deficit   Neurologic: Normal mood and behavior     Results Review:     I reviewed the patient's new clinical results.      WBC No results found for: WBCS   HGB Hemoglobin   Date Value Ref Range Status   07/17/2018 11.7 (L) 12.0 - 16.0 g/dL Final      HCT Hematocrit   Date Value Ref Range Status   07/17/2018 38.1 37.0 - 47.0 % Final      Platlets No results found for: LABPLAT     PT/INR:  No results found for: PROTIME/No results found for: INR    Sodium Sodium   Date Value Ref Range Status   07/17/2018 140 136 - 145 mmol/L Final      Potassium Potassium   Date Value Ref Range Status   07/17/2018 4.0 3.5 - 5.2 mmol/L Final      Chloride Chloride   Date Value Ref Range Status   07/17/2018 102 98 - 107 mmol/L Final      Bicarbonate No results found for: PLASMABICARB   BUN BUN   Date Value Ref Range Status   07/17/2018 10 8 - 23 mg/dL Final      Creatinine Creatinine   Date Value Ref Range Status   07/17/2018 0.78 0.57 - 1.00 mg/dL Final      Calcium Calcium   Date Value Ref Range Status   07/17/2018 9.2 8.8 - 10.5 mg/dL Final       Magnesium  AST  ALT  Bilirubin, Total  AlkPhos  Albumin    Amylase  Lipase    Radiology: No results found for: MG  No components found for: AST.*  No components found for: ALT.*  No components found for: BILIRUBIN, TOTAL.*    No components found for: ALKPHOS.*  No components found for: ALBUMIN.*      No components found for: AMYLASE.*  No components found for: LIPASE.*            Imaging Results (most recent)     Procedure Component Value Units Date/Time    XR Hip With or Without Pelvis 1 View Right [[id number]] Updated:  07/17/18 2037    XR Hip With or Without Pelvis 2 - 3 View Right [[id number]] Collected:  07/17/18 1651     Updated:  07/17/18 1655    Narrative:       RIGHT HIP, 7/17/2018         HISTORY:  62-year-old female postop right hip arthroplasty 6/19/2018. Right hip  dislocation 7/8/2018. She returns to the ED today complaining of new  onset right hip pain. Suspected recurrent dislocation.     TECHNIQUE:  Two view right hip series.     FINDINGS:  The examination confirms complete superior dislocation of the patient's  right total hip arthroplasty. No visible periprosthetic fracture or  component displacement.       Impression:       Right hip dislocation.     This report was finalized on 7/17/2018 4:53 PM by Dr. Ferdinand Salvador MD.                  lactated ringers 100 mL/hr Last Rate: 100 mL/hr (07/17/18 2100)   lactated ringers 20 mL/hr          Assessment/Plan     Patient Active Problem List   Diagnosis Code   • Closed right hip fracture (CMS/[facility]) S72.001A   • Subtrochanteric fracture of right femur (CMS/[facility]) S72.21XA   • Closed subtrochanteric fracture of femur with nonunion S72.23XK   • Closed left subtrochanteric femur fracture (CMS/[facility]) S72.22XA   • Subtrochanteric fracture, closed, right, with nonunion, subsequent encounter S72.21XK   • Status post orthopedic surgery, follow-up exam Z09   • Status post total hip replacement, right Z96.641   • Displacement of internal right hip prosthesis  (CMS/[facility]) T84.020A   • Hip dislocation, right, initial encounter (CMS/[facility]) S73.004A       CT scan this morning.  Home this afternoon a CT scan does not show any abnormality      Keyshawn Madden MD  07/18/18  7:09 AM

## 2018-07-18 NOTE — NURSING NOTE
Discharge Planning Assessment  SHIRA Retana     Patient Name: Raven Young  MRN: [id number]  Today's Date: 7/18/2018    Admit Date: 7/17/2018          Discharge Needs Assessment     Row Name 07/18/18 1043       Living Environment    Lives With spouse    Current Living Arrangements home/apartment/condo    Primary Care Provided by self    Provides Primary Care For no one    Quality of Family Relationships supportive    Able to Return to Prior Arrangements yes       Resource/Environmental Concerns    Transportation Concerns car, none       Transition Planning    Patient/Family Anticipates Transition to home with family    Transportation Anticipated family or friend will provide       Discharge Needs Assessment    Readmission Within the Last 30 Days --   inpt 6/19-21/18    Equipment Currently Used at Home walker, rolling;wheelchair;bath bench            Discharge Plan     Row Name 07/18/18 1047       Plan    Plan plan home with family    Patient/Family in Agreement with Plan yes    Plan Comments Spoke with pateint at bedside, permission to speak with family present. Face sheet verified. Patient states she was independent of ADLs including driving prior to hip surgery last month. She had not resumed driving prior to this admission. She has a rolling walker, wc and bath bench. She denies use of home 02 or cpap/bipap. She is current with [facility]. Denise/Adebayo  notified of patient admission and anticipated dc today. She uses [facility] [place] and denies issues obtaining medications. She has a living will on file. The patient is concerned about her insurance coverage as she is in the process of retiring and changing her insurance plans. She wants to make sure this hospital stay is covered. Spoke with Griselda/Financial counslor - she will follow up with patient r/t insurance coverage. QI # placed on white board, will continue to follow.        Destination     No service coordination in this encounter.       Durable Medical Equipment     No service coordination in this encounter.      Dialysis/Infusion     No service coordination in this encounter.      Home Medical Care     No service coordination in this encounter.      Social Care     No service coordination in this encounter.                Demographic Summary     Row Name 07/18/18 1042       General Information    Admission Type observation    Arrived From home    Referral Source admission list    Reason for Consult discharge planning    Preferred Language English     Used During This Interaction no       Contact Information    Permission Granted to Share Info With             Functional Status    No documentation.           Psychosocial    No documentation.           Abuse/Neglect    No documentation.           Legal    No documentation.           Substance Abuse    No documentation.           Patient Forms    No documentation.         Alejandro Kapoor RN

## 2018-07-18 NOTE — ANESTHESIA POSTPROCEDURE EVALUATION
Patient: Raven Young    Procedure Summary     Date:  07/17/18 Room / Location:   [facility] OR 3 /  [facility] OR    Anesthesia Start:  2008 Anesthesia Stop:  2045    Procedure:  HIP CLOSED REDUCTION (Right Hip) Diagnosis:       Hip dislocation, right, initial encounter (CMS/[facility])      (Hip dislocation, right, initial encounter (CMS/[facility]) [S73.004A])    Surgeon:  Keyshawn Madden MD Provider:  Sanket Corral CRNA    Anesthesia Type:  spinal, MAC ASA Status:  2          Anesthesia Type: spinal, MAC  Last vitals  BP   123/61 (07/17/18 2035)   Temp   97.9 °F (36.6 °C) (07/17/18 2035)   Pulse   85 (07/17/18 2035)   Resp   16 (07/17/18 2035)     SpO2   98 % (07/17/18 2035)     Post Anesthesia Care and Evaluation    Patient location during evaluation: bedside  Patient participation: complete - patient participated  Level of consciousness: awake and alert  Pain score: 0  Pain management: adequate  Airway patency: patent  Anesthetic complications: No anesthetic complications  PONV Status: none  Cardiovascular status: acceptable  Respiratory status: acceptable  Hydration status: acceptable

## 2018-07-18 NOTE — ADDENDUM NOTE
Addendum  created 07/17/18 2046 by Sanket Saul Breeding, CRNA    Anesthesia Intra Flowsheets edited, Anesthesia Intra SmartForms edited

## 2018-07-18 NOTE — NURSING NOTE
Case Management Discharge Note    Final Note: dc home- resume HH with [facility]    Destination     No service has been selected for the patient.      Durable Medical Equipment     No service has been selected for the patient.      Dialysis/Infusion     No service has been selected for the patient.      Home Medical Care     Service Request Status Selected Specialties Address Phone Number Fax Number    [facility] [place] [facility] Services [address] [address] [phone]      Social Care     No service has been selected for the patient.             Final Discharge Disposition Code: 06 - home with home health care

## 2018-07-18 NOTE — DISCHARGE SUMMARY
Discharge Summary    Patient name: Raven Young    Medical record number: [id number]    Admission date: 7/17/2018  Discharge date:   7/18/2018    Attending physician: Chano    Primary care physician: Andres Kathleen MD    Referring physician:     Consulting physician(s):    Condition on discharge: Stable    Primary Diagnoses: Status post dislocation right total hip arthroplasty    Secondary Diagnoses:     Operative Procedure: Closed reduction right total hip arthroplasty dislocation    Hospital Course: The patient is a very pleasant 62 y.o. female that was admitted to the hospital with a dislocated right total hip after bending over the waist at home.  Was seen in the ER and evaluated show superior dislocated hip.  Was taken to the operating room later that evening for closed reduction which was successful.  A CT scan was completed to be sure that there is no abnormality would necessitate at this time for the surgery and the CT scan was reported to be normal.  She is comfortable with minimal to no pain is discharged home today.  Patient instructed were the hip abduction pillow or hip abduction brace at all times    Discharge medications:      Discharge Medications      Continue These Medications      Instructions Start Date   aspirin 325 MG tablet   325 mg, Oral, Daily      DULoxetine 60 MG capsule  Commonly known as:  CYMBALTA   60 mg, Oral, Every Morning      oxyCODONE-acetaminophen  MG per tablet  Commonly known as:  PERCOCET   1 tablet, Oral, Every 4 Hours PRN             Discharge instructions:  Patient wearing a hip abduction pillow hip brace at all times.  She is to avoid hyperflexion of the waist.      Follow-up appointment: Scheduled consultation with Dr. Short in outpatient I'll see her back after that consultation

## 2018-07-18 NOTE — PROGRESS NOTES
Orthopedic Progress Note   Chief Complaint:  Status post reduction right total hip    Subjective     Interval History: Patient doing well with minimal to no complaints of pain and discomfort.  CT scan does not show any abnormality requiring immediate intervention.          Objective     Vital Signs  Temp:  [97.2 °F (36.2 °C)-98.2 °F (36.8 °C)] 97.6 °F (36.4 °C)  Heart Rate:  [75-99] 79  Resp:  [16-20] 16  BP: ()/(50-84) 92/56  Body mass index is 30.18 kg/m².    Intake/Output Summary (Last 24 hours) at 07/18/18 1231  Last data filed at 07/18/18 0859   Gross per 24 hour   Intake              740 ml   Output                0 ml   Net              740 ml     I/O this shift:  In: 240 [P.O.:240]  Out: -        Physical Exam:   General: patient awake, alert and cooperative   Cardiovascular: regular rhythm and rate   Pulm: clear to auscultation bilaterally   Abdomen: Benign.  Soft bowel sounds   Extremities: Unchanged in the morning visit.  No motor sensory deficit minimal to no pain   Neurologic: Normal mood and behavior     Results Review:     I reviewed the patient's new clinical results.      WBC No results found for: WBCS   HGB Hemoglobin   Date Value Ref Range Status   07/17/2018 11.7 (L) 12.0 - 16.0 g/dL Final      HCT Hematocrit   Date Value Ref Range Status   07/17/2018 38.1 37.0 - 47.0 % Final      Platlets No results found for: LABPLAT     PT/INR:  No results found for: PROTIME/No results found for: INR    Sodium Sodium   Date Value Ref Range Status   07/17/2018 140 136 - 145 mmol/L Final      Potassium Potassium   Date Value Ref Range Status   07/17/2018 4.0 3.5 - 5.2 mmol/L Final      Chloride Chloride   Date Value Ref Range Status   07/17/2018 102 98 - 107 mmol/L Final      Bicarbonate No results found for: PLASMABICARB   BUN BUN   Date Value Ref Range Status   07/17/2018 10 8 - 23 mg/dL Final      Creatinine Creatinine   Date Value Ref Range Status   07/17/2018 0.78 0.57 - 1.00 mg/dL Final      Calcium  Calcium   Date Value Ref Range Status   07/17/2018 9.2 8.8 - 10.5 mg/dL Final      Magnesium  AST  ALT  Bilirubin, Total  AlkPhos  Albumin    Amylase  Lipase    Radiology: No results found for: MG  No components found for: AST.*  No components found for: ALT.*  No components found for: BILIRUBIN, TOTAL.*    No components found for: ALKPHOS.*  No components found for: ALBUMIN.*      No components found for: AMYLASE.*  No components found for: LIPASE.*            Imaging Results (most recent)     Procedure Component Value Units Date/Time    CT Lower Extremity Right Without Contrast [[id number]] Collected:  07/18/18 0959     Updated:  07/18/18 1006    Narrative:       CT RIGHT HIP, 7/18/2018     HISTORY:  62-year-old female status post right total hip arthroplasty 6/19/2018.  She has had two subsequent postoperative hip dislocations, most recently  yesterday, now reduced. CT to evaluate for hip injury.     TECHNIQUE:  Thin section axial CT images through the pelvis and hips. Multiplanar  images were reconstructed, including small field of view images targeted  to the right hip. Radiation dose reduction techniques included automated  exposure control or exposure modulation based on body size. Radiation  audit for CT and nuclear cardiology exams in the last 12 months: 3.     FINDINGS:  Successful reduction of previously demonstrated right hip dislocation.  Previous resection of the right femoral head and neck and  subtrochanteric femur with placement of custom total hip arthroplasty.  Circumferential cortical wire surrounding the proximal femur. There is  no CT evidence of periprosthetic fracture of the visualized femur or  pelvis. The arthroplasty components appear well seated. Soft tissue  swelling surrounds the hip following recent injury with dislocation  reduction. The femoral head component appears well seated within the  acetabular components. No fracture of the pelvis, sacrum or  contralateral left hip.        Impression:       1. Previous right hip dislocation appears fully reduced, and the femoral  head component is well-seated within the acetabular cup.  2. No evidence of periprosthetic fracture or other acute osseous  abnormality.  3. Soft tissue swelling surrounding the hip.     This report was finalized on 7/18/2018 10:04 AM by Dr. Ferdinand Salvador MD.       XR Hip With or Without Pelvis 1 View Right [[id number]] Collected:  07/18/18 0807     Updated:  07/18/18 0810    Narrative:       POST REDUCTION RIGHT HIP, 7/17/2018 (20:24)         HISTORY:  Right hip arthroplasty with recurrent dislocations. Post reduction  image.     TECHNIQUE:  Single AP radiograph of the right hip obtained following reduction.     REPORT:  The image shows successful reduction of previously demonstrated complete  right hip dislocation. Total hip arthroplasty components are now well  positioned. No visible periprosthetic fracture. Distal femoral stem is  not included on this image.     This report was finalized on 7/18/2018 8:08 AM by Dr. Ferdinand Salvador MD.       XR Hip With or Without Pelvis 2 - 3 View Right [[id number]] Collected:  07/17/18 1651     Updated:  07/17/18 1655    Narrative:       RIGHT HIP, 7/17/2018         HISTORY:  62-year-old female postop right hip arthroplasty 6/19/2018. Right hip  dislocation 7/8/2018. She returns to the ED today complaining of new  onset right hip pain. Suspected recurrent dislocation.     TECHNIQUE:  Two view right hip series.     FINDINGS:  The examination confirms complete superior dislocation of the patient's  right total hip arthroplasty. No visible periprosthetic fracture or  component displacement.       Impression:       Right hip dislocation.     This report was finalized on 7/17/2018 4:53 PM by Dr. Ferdinand Salvador MD.                  lactated ringers 100 mL/hr Last Rate: 100 mL/hr (07/17/18 2100)   lactated ringers 20 mL/hr          Assessment/Plan     Patient Active Problem List    Diagnosis Code   • Closed right hip fracture (CMS/HCC) S72.001A   • Subtrochanteric fracture of right femur (CMS/[facility]) S72.21XA   • Closed subtrochanteric fracture of femur with nonunion S72.23XK   • Closed left subtrochanteric femur fracture (CMS/[facility]) S72.22XA   • Subtrochanteric fracture, closed, right, with nonunion, subsequent encounter S72.21XK   • Status post orthopedic surgery, follow-up exam Z09   • Status post total hip replacement, right Z96.641   • Displacement of internal right hip prosthesis (CMS/[facility]) T84.020A   • Hip dislocation, right, initial encounter (CMS/[facility]) S73.004A       Discha the hip range of motion.  We'll schedule a consultation as an outpatient with Dr. Bailey return to see me after completion of the consultation rge home with abduction pillow and hip abduction brace to control      Keyshawn Madden MD  07/18/18  12:31 PM

## 2018-07-23 ENCOUNTER — APPOINTMENT (OUTPATIENT)
Dept: GENERAL RADIOLOGY | Facility: HOSPITAL | Age: 63
End: 2018-07-23

## 2018-07-23 ENCOUNTER — ANESTHESIA EVENT (OUTPATIENT)
Dept: PERIOP | Facility: HOSPITAL | Age: 63
End: 2018-07-23

## 2018-07-23 ENCOUNTER — PREP FOR SURGERY (OUTPATIENT)
Dept: OTHER | Facility: HOSPITAL | Age: 63
End: 2018-07-23

## 2018-07-23 ENCOUNTER — ANESTHESIA (OUTPATIENT)
Dept: PERIOP | Facility: HOSPITAL | Age: 63
End: 2018-07-23

## 2018-07-23 ENCOUNTER — HOSPITAL ENCOUNTER (INPATIENT)
Facility: HOSPITAL | Age: 63
LOS: 1 days | Discharge: ANOTHER HOSPITAL | End: 2018-07-24
Attending: ORTHOPAEDIC SURGERY | Admitting: ORTHOPAEDIC SURGERY

## 2018-07-23 ENCOUNTER — TELEPHONE (OUTPATIENT)
Dept: ORTHOPEDIC SURGERY | Facility: CLINIC | Age: 63
End: 2018-07-23

## 2018-07-23 DIAGNOSIS — S73.004S: ICD-10-CM

## 2018-07-23 DIAGNOSIS — S73.004S: Primary | ICD-10-CM

## 2018-07-23 PROBLEM — T84.028A FAILED TOTAL HIP ARTHROPLASTY WITH DISLOCATION (HCC): Status: ACTIVE | Noted: 2018-07-23

## 2018-07-23 PROBLEM — Z96.649 FAILED TOTAL HIP ARTHROPLASTY WITH DISLOCATION: Status: ACTIVE | Noted: 2018-07-23

## 2018-07-23 LAB
ABO GROUP BLD: NORMAL
BLD GP AB SCN SERPL QL: NEGATIVE
RH BLD: POSITIVE
T&S EXPIRATION DATE: NORMAL

## 2018-07-23 PROCEDURE — 51702 INSERT TEMP BLADDER CATH: CPT

## 2018-07-23 PROCEDURE — 25010000002 ONDANSETRON PER 1 MG: Performed by: NURSE ANESTHETIST, CERTIFIED REGISTERED

## 2018-07-23 PROCEDURE — 0SW9XJZ REVISION OF SYNTHETIC SUBSTITUTE IN RIGHT HIP JOINT, EXTERNAL APPROACH: ICD-10-PCS | Performed by: ORTHOPAEDIC SURGERY

## 2018-07-23 PROCEDURE — 25010000002 DEXAMETHASONE PER 1 MG: Performed by: NURSE ANESTHETIST, CERTIFIED REGISTERED

## 2018-07-23 PROCEDURE — S0260 H&P FOR SURGERY: HCPCS | Performed by: ORTHOPAEDIC SURGERY

## 2018-07-23 PROCEDURE — 73502 X-RAY EXAM HIP UNI 2-3 VIEWS: CPT

## 2018-07-23 PROCEDURE — 25010000002 FENTANYL CITRATE (PF) 100 MCG/2ML SOLUTION: Performed by: NURSE ANESTHETIST, CERTIFIED REGISTERED

## 2018-07-23 PROCEDURE — 86850 RBC ANTIBODY SCREEN: CPT | Performed by: ORTHOPAEDIC SURGERY

## 2018-07-23 PROCEDURE — 73501 X-RAY EXAM HIP UNI 1 VIEW: CPT

## 2018-07-23 PROCEDURE — 25010000002 PROPOFOL 10 MG/ML EMULSION: Performed by: NURSE ANESTHETIST, CERTIFIED REGISTERED

## 2018-07-23 PROCEDURE — 27266 TREAT HIP DISLOCATION: CPT | Performed by: ORTHOPAEDIC SURGERY

## 2018-07-23 PROCEDURE — G0378 HOSPITAL OBSERVATION PER HR: HCPCS

## 2018-07-23 PROCEDURE — 25010000002 SUCCINYLCHOLINE PER 20 MG: Performed by: NURSE ANESTHETIST, CERTIFIED REGISTERED

## 2018-07-23 PROCEDURE — 99283 EMERGENCY DEPT VISIT LOW MDM: CPT

## 2018-07-23 PROCEDURE — 25010000002 MIDAZOLAM PER 1 MG: Performed by: NURSE ANESTHETIST, CERTIFIED REGISTERED

## 2018-07-23 PROCEDURE — 86900 BLOOD TYPING SEROLOGIC ABO: CPT | Performed by: ORTHOPAEDIC SURGERY

## 2018-07-23 PROCEDURE — 86901 BLOOD TYPING SEROLOGIC RH(D): CPT | Performed by: ORTHOPAEDIC SURGERY

## 2018-07-23 RX ORDER — DEXAMETHASONE SODIUM PHOSPHATE 4 MG/ML
8 INJECTION, SOLUTION INTRA-ARTICULAR; INTRALESIONAL; INTRAMUSCULAR; INTRAVENOUS; SOFT TISSUE ONCE AS NEEDED
Status: COMPLETED | OUTPATIENT
Start: 2018-07-23 | End: 2018-07-23

## 2018-07-23 RX ORDER — MEPERIDINE HYDROCHLORIDE 25 MG/ML
12.5 INJECTION INTRAMUSCULAR; INTRAVENOUS; SUBCUTANEOUS
Status: DISCONTINUED | OUTPATIENT
Start: 2018-07-23 | End: 2018-07-23 | Stop reason: HOSPADM

## 2018-07-23 RX ORDER — OXYCODONE HYDROCHLORIDE AND ACETAMINOPHEN 5; 325 MG/1; MG/1
1 TABLET ORAL ONCE AS NEEDED
Status: DISCONTINUED | OUTPATIENT
Start: 2018-07-23 | End: 2018-07-23 | Stop reason: HOSPADM

## 2018-07-23 RX ORDER — MIDAZOLAM HYDROCHLORIDE 1 MG/ML
1 INJECTION INTRAMUSCULAR; INTRAVENOUS
Status: DISCONTINUED | OUTPATIENT
Start: 2018-07-23 | End: 2018-07-23 | Stop reason: HOSPADM

## 2018-07-23 RX ORDER — ONDANSETRON 2 MG/ML
4 INJECTION INTRAMUSCULAR; INTRAVENOUS ONCE AS NEEDED
Status: COMPLETED | OUTPATIENT
Start: 2018-07-23 | End: 2018-07-23

## 2018-07-23 RX ORDER — SUCCINYLCHOLINE CHLORIDE 20 MG/ML
INJECTION INTRAMUSCULAR; INTRAVENOUS AS NEEDED
Status: DISCONTINUED | OUTPATIENT
Start: 2018-07-23 | End: 2018-07-23 | Stop reason: SURG

## 2018-07-23 RX ORDER — LIDOCAINE HYDROCHLORIDE 20 MG/ML
INJECTION, SOLUTION INFILTRATION; PERINEURAL AS NEEDED
Status: DISCONTINUED | OUTPATIENT
Start: 2018-07-23 | End: 2018-07-23 | Stop reason: SURG

## 2018-07-23 RX ORDER — PROMETHAZINE HYDROCHLORIDE 25 MG/ML
12.5 INJECTION, SOLUTION INTRAMUSCULAR; INTRAVENOUS ONCE AS NEEDED
Status: DISCONTINUED | OUTPATIENT
Start: 2018-07-23 | End: 2018-07-23 | Stop reason: HOSPADM

## 2018-07-23 RX ORDER — FENTANYL CITRATE 50 UG/ML
INJECTION, SOLUTION INTRAMUSCULAR; INTRAVENOUS AS NEEDED
Status: DISCONTINUED | OUTPATIENT
Start: 2018-07-23 | End: 2018-07-23 | Stop reason: SURG

## 2018-07-23 RX ORDER — OXYCODONE AND ACETAMINOPHEN 10; 325 MG/1; MG/1
1 TABLET ORAL EVERY 4 HOURS PRN
Status: DISCONTINUED | OUTPATIENT
Start: 2018-07-23 | End: 2018-07-24 | Stop reason: HOSPADM

## 2018-07-23 RX ORDER — PROMETHAZINE HYDROCHLORIDE 25 MG/1
25 TABLET ORAL ONCE AS NEEDED
Status: DISCONTINUED | OUTPATIENT
Start: 2018-07-23 | End: 2018-07-23 | Stop reason: HOSPADM

## 2018-07-23 RX ORDER — ONDANSETRON 2 MG/ML
4 INJECTION INTRAMUSCULAR; INTRAVENOUS ONCE AS NEEDED
Status: DISCONTINUED | OUTPATIENT
Start: 2018-07-23 | End: 2018-07-23 | Stop reason: HOSPADM

## 2018-07-23 RX ORDER — PROPOFOL 10 MG/ML
VIAL (ML) INTRAVENOUS AS NEEDED
Status: DISCONTINUED | OUTPATIENT
Start: 2018-07-23 | End: 2018-07-23 | Stop reason: SURG

## 2018-07-23 RX ORDER — DULOXETIN HYDROCHLORIDE 60 MG/1
60 CAPSULE, DELAYED RELEASE ORAL EVERY MORNING
Status: DISCONTINUED | OUTPATIENT
Start: 2018-07-24 | End: 2018-07-24 | Stop reason: HOSPADM

## 2018-07-23 RX ORDER — MIDAZOLAM HYDROCHLORIDE 1 MG/ML
2 INJECTION INTRAMUSCULAR; INTRAVENOUS
Status: DISCONTINUED | OUTPATIENT
Start: 2018-07-23 | End: 2018-07-23 | Stop reason: HOSPADM

## 2018-07-23 RX ORDER — SODIUM CHLORIDE, SODIUM LACTATE, POTASSIUM CHLORIDE, CALCIUM CHLORIDE 600; 310; 30; 20 MG/100ML; MG/100ML; MG/100ML; MG/100ML
100 INJECTION, SOLUTION INTRAVENOUS CONTINUOUS
Status: DISCONTINUED | OUTPATIENT
Start: 2018-07-23 | End: 2018-07-24 | Stop reason: HOSPADM

## 2018-07-23 RX ORDER — SODIUM CHLORIDE, SODIUM LACTATE, POTASSIUM CHLORIDE, CALCIUM CHLORIDE 600; 310; 30; 20 MG/100ML; MG/100ML; MG/100ML; MG/100ML
INJECTION, SOLUTION INTRAVENOUS CONTINUOUS PRN
Status: DISCONTINUED | OUTPATIENT
Start: 2018-07-23 | End: 2018-07-23 | Stop reason: SURG

## 2018-07-23 RX ORDER — PROMETHAZINE HYDROCHLORIDE 25 MG/1
25 SUPPOSITORY RECTAL ONCE AS NEEDED
Status: DISCONTINUED | OUTPATIENT
Start: 2018-07-23 | End: 2018-07-23 | Stop reason: HOSPADM

## 2018-07-23 RX ADMIN — ONDANSETRON 4 MG: 2 INJECTION INTRAMUSCULAR; INTRAVENOUS at 14:36

## 2018-07-23 RX ADMIN — FENTANYL CITRATE 50 MCG: 50 INJECTION, SOLUTION INTRAMUSCULAR; INTRAVENOUS at 14:43

## 2018-07-23 RX ADMIN — SODIUM CHLORIDE, POTASSIUM CHLORIDE, SODIUM LACTATE AND CALCIUM CHLORIDE 100 ML/HR: 600; 310; 30; 20 INJECTION, SOLUTION INTRAVENOUS at 18:20

## 2018-07-23 RX ADMIN — PROPOFOL 150 MG: 10 INJECTION, EMULSION INTRAVENOUS at 14:43

## 2018-07-23 RX ADMIN — SODIUM CHLORIDE, POTASSIUM CHLORIDE, SODIUM LACTATE AND CALCIUM CHLORIDE: 600; 310; 30; 20 INJECTION, SOLUTION INTRAVENOUS at 14:05

## 2018-07-23 RX ADMIN — DEXAMETHASONE SODIUM PHOSPHATE 8 MG: 4 INJECTION, SOLUTION INTRAMUSCULAR; INTRAVENOUS at 14:36

## 2018-07-23 RX ADMIN — LIDOCAINE HYDROCHLORIDE 100 MG: 20 INJECTION, SOLUTION INFILTRATION; PERINEURAL at 14:43

## 2018-07-23 RX ADMIN — SUCCINYLCHOLINE CHLORIDE 40 MG: 20 INJECTION, SOLUTION INTRAMUSCULAR; INTRAVENOUS at 14:47

## 2018-07-23 RX ADMIN — MIDAZOLAM HYDROCHLORIDE 2 MG: 1 INJECTION, SOLUTION INTRAMUSCULAR; INTRAVENOUS at 14:37

## 2018-07-23 NOTE — ANESTHESIA PREPROCEDURE EVALUATION
Anesthesia Evaluation     Patient summary reviewed and Nursing notes reviewed   history of anesthetic complications: PONV  NPO Solid Status: > 8 hours  NPO Liquid Status: > 8 hours           Airway   Mallampati: II  TM distance: >3 FB  Neck ROM: full  No difficulty expected  Dental - normal exam     Pulmonary - negative pulmonary ROS and normal exam    breath sounds clear to auscultation  (-) recent URI (couple of weeks ago), not a smokerSleep apnea: snores.  Cardiovascular - normal exam  Exercise tolerance: good (4-7 METS)    ECG reviewed  Rhythm: regular  Rate: normal      ROS comment: Narrative     RR Interval= 845 ms  SD Interval= 148 ms  QRSD Interval= 76 ms  QT Interval= 408 ms  QTc Interval= 444 ms  Heart Rate= 71 ms  P Axis= 13 deg  QRS Axis= 33 deg  T Wave Axis= 38 deg  I: 40 Axis= 44 deg  T: 40 Axis= 23 deg  ST Axis= 62 deg  SINUS RHYTHM  NO SIGNIFICANT CHANGE FROM PREVIOUS ECG  Electronically Signed by:  Geraldo Orozco ([facility]) 05-Jun-2018 15:24:21  Date and Time of Study: 2018-06-05 12:05:18        Neuro/Psych  (+) numbness (fingers hands bilaterally ), psychiatric history Anxiety,     GI/Hepatic/Renal/Endo - negative ROS     Musculoskeletal     Abdominal  - normal exam   Substance History   (-) drug useAlcohol use: occ.     OB/GYN negative ob/gyn ROS         Other   (+) arthritis (knee and hip)   history of cancer ( hx basal cell)    ROS/Med Hx Other: Coffee with cream at 0800    Last asa 2 days ago                  Anesthesia Plan    ASA 2 - emergent     general     intravenous induction   Anesthetic plan and risks discussed with patient and spouse/significant other.  Use of blood products discussed with patient and spouse/significant other  Consented to blood products.

## 2018-07-23 NOTE — ANESTHESIA POSTPROCEDURE EVALUATION
Patient: Raven Young    Procedure Summary     Date:  07/23/18 Room / Location:   [facility] OR 4 /  [facility]    Anesthesia Start:  1441 Anesthesia Stop:  1509    Procedure:  HIP CLOSED REDUCTION (Right Hip) Diagnosis:       Dislocated hip, right, sequela      (Dislocated hip, right, sequela [S73.004S])    Surgeon:  Keyshawn Madden MD Provider:  Belén Akins CRNA    Anesthesia Type:  spinal, MAC, general ASA Status:  2 - Emergent          Anesthesia Type: spinal, MAC, general  Last vitals  BP   119/66 (07/23/18 1537)   Temp   97.8 °F (36.6 °C) (07/23/18 1508)   Pulse   82 (07/23/18 1537)   Resp   14 (07/23/18 1508)     SpO2   100 % (07/23/18 1537)     Post Anesthesia Care and Evaluation    Patient location during evaluation: PACU  Patient participation: complete - patient participated  Level of consciousness: awake and alert  Pain score: 0  Pain management: satisfactory to patient  Airway patency: patent  Anesthetic complications: No anesthetic complications  PONV Status: none  Cardiovascular status: acceptable  Respiratory status: acceptable  Hydration status: acceptable

## 2018-07-23 NOTE — TELEPHONE ENCOUNTER
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 Madden the patient is to go to the ED and is requesting our office to contact the ED. Spoke with Oma at [facility] ED and let her know the patient is on her way and Dr. Madden would like an xray to be done and has requested they please call him and he would evaluate and put in orders accordingly. Per the patient she has only had a few sips of coffee this morning.    Thanks.

## 2018-07-24 ENCOUNTER — HOSPITAL ENCOUNTER (INPATIENT)
Facility: HOSPITAL | Age: 63
LOS: 2 days | Discharge: HOME HEALTH | End: 2018-07-26
Attending: ORTHOPAEDIC SURGERY | Admitting: ORTHOPAEDIC SURGERY

## 2018-07-24 ENCOUNTER — APPOINTMENT (OUTPATIENT)
Dept: CT IMAGING | Facility: HOSPITAL | Age: 63
End: 2018-07-24

## 2018-07-24 VITALS
BODY MASS INDEX: 30.8 KG/M2 | SYSTOLIC BLOOD PRESSURE: 117 MMHG | HEART RATE: 80 BPM | DIASTOLIC BLOOD PRESSURE: 57 MMHG | TEMPERATURE: 97.7 F | WEIGHT: 167.4 LBS | RESPIRATION RATE: 18 BRPM | HEIGHT: 62 IN | OXYGEN SATURATION: 97 %

## 2018-07-24 DIAGNOSIS — Z96.641 HISTORY OF RIGHT HIP REPLACEMENT: ICD-10-CM

## 2018-07-24 DIAGNOSIS — R26.2 DIFFICULTY WALKING: Primary | ICD-10-CM

## 2018-07-24 PROCEDURE — 73700 CT LOWER EXTREMITY W/O DYE: CPT

## 2018-07-24 PROCEDURE — 94799 UNLISTED PULMONARY SVC/PX: CPT

## 2018-07-24 PROCEDURE — 99024 POSTOP FOLLOW-UP VISIT: CPT | Performed by: ORTHOPAEDIC SURGERY

## 2018-07-24 PROCEDURE — G0378 HOSPITAL OBSERVATION PER HR: HCPCS

## 2018-07-24 RX ORDER — OXYCODONE AND ACETAMINOPHEN 10; 325 MG/1; MG/1
1 TABLET ORAL EVERY 4 HOURS PRN
Status: DISCONTINUED | OUTPATIENT
Start: 2018-07-24 | End: 2018-07-25

## 2018-07-24 RX ORDER — ONDANSETRON 2 MG/ML
4 INJECTION INTRAMUSCULAR; INTRAVENOUS EVERY 6 HOURS PRN
Status: DISCONTINUED | OUTPATIENT
Start: 2018-07-24 | End: 2018-07-26 | Stop reason: HOSPADM

## 2018-07-24 RX ORDER — ONDANSETRON 4 MG/1
4 TABLET, FILM COATED ORAL EVERY 6 HOURS PRN
Status: DISCONTINUED | OUTPATIENT
Start: 2018-07-24 | End: 2018-07-26 | Stop reason: HOSPADM

## 2018-07-24 RX ORDER — SENNA AND DOCUSATE SODIUM 50; 8.6 MG/1; MG/1
2 TABLET, FILM COATED ORAL NIGHTLY
Status: DISCONTINUED | OUTPATIENT
Start: 2018-07-24 | End: 2018-07-25

## 2018-07-24 RX ORDER — DEXTROSE, SODIUM CHLORIDE, AND POTASSIUM CHLORIDE 5; .45; .15 G/100ML; G/100ML; G/100ML
75 INJECTION INTRAVENOUS CONTINUOUS
Status: DISCONTINUED | OUTPATIENT
Start: 2018-07-25 | End: 2018-07-26 | Stop reason: HOSPADM

## 2018-07-24 RX ORDER — ONDANSETRON 4 MG/1
4 TABLET, ORALLY DISINTEGRATING ORAL EVERY 6 HOURS PRN
Status: DISCONTINUED | OUTPATIENT
Start: 2018-07-24 | End: 2018-07-26 | Stop reason: HOSPADM

## 2018-07-24 RX ORDER — NALOXONE HCL 0.4 MG/ML
0.4 VIAL (ML) INJECTION
Status: DISCONTINUED | OUTPATIENT
Start: 2018-07-24 | End: 2018-07-26 | Stop reason: HOSPADM

## 2018-07-24 RX ORDER — HYDROMORPHONE HYDROCHLORIDE 1 MG/ML
1 INJECTION, SOLUTION INTRAMUSCULAR; INTRAVENOUS; SUBCUTANEOUS
Status: DISCONTINUED | OUTPATIENT
Start: 2018-07-24 | End: 2018-07-26 | Stop reason: HOSPADM

## 2018-07-24 RX ORDER — SODIUM CHLORIDE 0.9 % (FLUSH) 0.9 %
1-10 SYRINGE (ML) INJECTION AS NEEDED
Status: DISCONTINUED | OUTPATIENT
Start: 2018-07-24 | End: 2018-07-25

## 2018-07-24 RX ADMIN — SODIUM CHLORIDE, POTASSIUM CHLORIDE, SODIUM LACTATE AND CALCIUM CHLORIDE 100 ML/HR: 600; 310; 30; 20 INJECTION, SOLUTION INTRAVENOUS at 21:05

## 2018-07-24 RX ADMIN — SODIUM CHLORIDE, POTASSIUM CHLORIDE, SODIUM LACTATE AND CALCIUM CHLORIDE 100 ML/HR: 600; 310; 30; 20 INJECTION, SOLUTION INTRAVENOUS at 05:44

## 2018-07-24 RX ADMIN — DULOXETINE HYDROCHLORIDE 60 MG: 60 CAPSULE, DELAYED RELEASE ORAL at 14:21

## 2018-07-25 ENCOUNTER — APPOINTMENT (OUTPATIENT)
Dept: GENERAL RADIOLOGY | Facility: HOSPITAL | Age: 63
End: 2018-07-25

## 2018-07-25 ENCOUNTER — ANESTHESIA EVENT (OUTPATIENT)
Dept: PERIOP | Facility: HOSPITAL | Age: 63
End: 2018-07-25

## 2018-07-25 ENCOUNTER — ANESTHESIA (OUTPATIENT)
Dept: PERIOP | Facility: HOSPITAL | Age: 63
End: 2018-07-25

## 2018-07-25 LAB
ANION GAP SERPL CALCULATED.3IONS-SCNC: 13.6 MMOL/L
BUN BLD-MCNC: 15 MG/DL (ref 8–23)
BUN/CREAT SERPL: 16.3 (ref 7–25)
CALCIUM SPEC-SCNC: 8.4 MG/DL (ref 8.6–10.5)
CHLORIDE SERPL-SCNC: 106 MMOL/L (ref 98–107)
CO2 SERPL-SCNC: 25.4 MMOL/L (ref 22–29)
CREAT BLD-MCNC: 0.92 MG/DL (ref 0.57–1)
DEPRECATED RDW RBC AUTO: 46.4 FL (ref 37–54)
ERYTHROCYTE [DISTWIDTH] IN BLOOD BY AUTOMATED COUNT: 14.3 % (ref 11.7–13)
GFR SERPL CREATININE-BSD FRML MDRD: 62 ML/MIN/1.73
GLUCOSE BLD-MCNC: 112 MG/DL (ref 65–99)
HCT VFR BLD AUTO: 34.4 % (ref 35.6–45.5)
HCT VFR BLD AUTO: 36 % (ref 35.6–45.5)
HGB BLD-MCNC: 10.2 G/DL (ref 11.9–15.5)
HGB BLD-MCNC: 10.8 G/DL (ref 11.9–15.5)
INR PPP: 1.01 (ref 0.9–1.1)
INR PPP: 1.01 (ref 0.9–1.1)
MCH RBC QN AUTO: 26.4 PG (ref 26.9–32)
MCHC RBC AUTO-ENTMCNC: 29.7 G/DL (ref 32.4–36.3)
MCV RBC AUTO: 89.1 FL (ref 80.5–98.2)
PLATELET # BLD AUTO: 337 10*3/MM3 (ref 140–500)
PMV BLD AUTO: 9.3 FL (ref 6–12)
POTASSIUM BLD-SCNC: 3.7 MMOL/L (ref 3.5–5.2)
PROTHROMBIN TIME: 13.1 SECONDS (ref 11.7–14.2)
PROTHROMBIN TIME: 13.1 SECONDS (ref 11.7–14.2)
RBC # BLD AUTO: 3.86 10*6/MM3 (ref 3.9–5.2)
SODIUM BLD-SCNC: 145 MMOL/L (ref 136–145)
WBC NRBC COR # BLD: 7.9 10*3/MM3 (ref 4.5–10.7)

## 2018-07-25 PROCEDURE — 85610 PROTHROMBIN TIME: CPT | Performed by: ORTHOPAEDIC SURGERY

## 2018-07-25 PROCEDURE — C1713 ANCHOR/SCREW BN/BN,TIS/BN: HCPCS | Performed by: ORTHOPAEDIC SURGERY

## 2018-07-25 PROCEDURE — 87205 SMEAR GRAM STAIN: CPT | Performed by: ORTHOPAEDIC SURGERY

## 2018-07-25 PROCEDURE — 25010000003 CEFAZOLIN IN DEXTROSE 2-4 GM/100ML-% SOLUTION: Performed by: ORTHOPAEDIC SURGERY

## 2018-07-25 PROCEDURE — 97110 THERAPEUTIC EXERCISES: CPT

## 2018-07-25 PROCEDURE — C1776 JOINT DEVICE (IMPLANTABLE): HCPCS | Performed by: ORTHOPAEDIC SURGERY

## 2018-07-25 PROCEDURE — 80048 BASIC METABOLIC PNL TOTAL CA: CPT | Performed by: ORTHOPAEDIC SURGERY

## 2018-07-25 PROCEDURE — 0SP909Z REMOVAL OF LINER FROM RIGHT HIP JOINT, OPEN APPROACH: ICD-10-PCS | Performed by: ORTHOPAEDIC SURGERY

## 2018-07-25 PROCEDURE — 85018 HEMOGLOBIN: CPT | Performed by: ORTHOPAEDIC SURGERY

## 2018-07-25 PROCEDURE — 0SU909Z SUPPLEMENT RIGHT HIP JOINT WITH LINER, OPEN APPROACH: ICD-10-PCS | Performed by: ORTHOPAEDIC SURGERY

## 2018-07-25 PROCEDURE — 87070 CULTURE OTHR SPECIMN AEROBIC: CPT | Performed by: ORTHOPAEDIC SURGERY

## 2018-07-25 PROCEDURE — 25010000002 ROPIVACAINE PER 1 MG: Performed by: ORTHOPAEDIC SURGERY

## 2018-07-25 PROCEDURE — 87075 CULTR BACTERIA EXCEPT BLOOD: CPT | Performed by: ORTHOPAEDIC SURGERY

## 2018-07-25 PROCEDURE — 97161 PT EVAL LOW COMPLEX 20 MIN: CPT

## 2018-07-25 PROCEDURE — 85014 HEMATOCRIT: CPT | Performed by: ORTHOPAEDIC SURGERY

## 2018-07-25 PROCEDURE — 25010000002 MIDAZOLAM PER 1 MG: Performed by: ANESTHESIOLOGY

## 2018-07-25 PROCEDURE — 25010000002 ONDANSETRON PER 1 MG: Performed by: NURSE ANESTHETIST, CERTIFIED REGISTERED

## 2018-07-25 PROCEDURE — 72170 X-RAY EXAM OF PELVIS: CPT

## 2018-07-25 PROCEDURE — 85027 COMPLETE CBC AUTOMATED: CPT | Performed by: ORTHOPAEDIC SURGERY

## 2018-07-25 PROCEDURE — 25010000002 FENTANYL CITRATE (PF) 100 MCG/2ML SOLUTION: Performed by: NURSE ANESTHETIST, CERTIFIED REGISTERED

## 2018-07-25 PROCEDURE — 25010000002 CLONIDINE PER 1 MG: Performed by: ORTHOPAEDIC SURGERY

## 2018-07-25 PROCEDURE — 25010000002 PROPOFOL 10 MG/ML EMULSION: Performed by: NURSE ANESTHETIST, CERTIFIED REGISTERED

## 2018-07-25 PROCEDURE — 25010000002 KETOROLAC TROMETHAMINE PER 15 MG: Performed by: ORTHOPAEDIC SURGERY

## 2018-07-25 DEVICE — ARTICUL/EZE FEMORAL HEAD DIAMETER 28MM +5 12/14 TAPER
Type: IMPLANTABLE DEVICE | Site: HIP | Status: FUNCTIONAL
Brand: ARTICUL/EZE

## 2018-07-25 DEVICE — PINNACLE CANCELLOUS BONE SCREW 6.5MM X 25MM
Type: IMPLANTABLE DEVICE | Site: HIP | Status: FUNCTIONAL
Brand: PINNACLE

## 2018-07-25 DEVICE — PINNACLE HIP SOLUTIONS GVF POLYETHYLENE CONSTRAINED ACETABULAR LINER +4 NEUTRAL 28MM ID 50MM OD
Type: IMPLANTABLE DEVICE | Site: HIP | Status: FUNCTIONAL
Brand: PINNACLE

## 2018-07-25 RX ORDER — LIDOCAINE HYDROCHLORIDE 10 MG/ML
0.5 INJECTION, SOLUTION EPIDURAL; INFILTRATION; INTRACAUDAL; PERINEURAL ONCE AS NEEDED
Status: DISCONTINUED | OUTPATIENT
Start: 2018-07-25 | End: 2018-07-25 | Stop reason: HOSPADM

## 2018-07-25 RX ORDER — NALOXONE HCL 0.4 MG/ML
0.2 VIAL (ML) INJECTION AS NEEDED
Status: DISCONTINUED | OUTPATIENT
Start: 2018-07-25 | End: 2018-07-25 | Stop reason: HOSPADM

## 2018-07-25 RX ORDER — FENTANYL CITRATE 50 UG/ML
INJECTION, SOLUTION INTRAMUSCULAR; INTRAVENOUS AS NEEDED
Status: DISCONTINUED | OUTPATIENT
Start: 2018-07-25 | End: 2018-07-25 | Stop reason: SURG

## 2018-07-25 RX ORDER — ROCURONIUM BROMIDE 10 MG/ML
INJECTION, SOLUTION INTRAVENOUS AS NEEDED
Status: DISCONTINUED | OUTPATIENT
Start: 2018-07-25 | End: 2018-07-25 | Stop reason: SURG

## 2018-07-25 RX ORDER — FENTANYL CITRATE 50 UG/ML
50 INJECTION, SOLUTION INTRAMUSCULAR; INTRAVENOUS
Status: DISCONTINUED | OUTPATIENT
Start: 2018-07-25 | End: 2018-07-25 | Stop reason: HOSPADM

## 2018-07-25 RX ORDER — ONDANSETRON 2 MG/ML
4 INJECTION INTRAMUSCULAR; INTRAVENOUS ONCE AS NEEDED
Status: DISCONTINUED | OUTPATIENT
Start: 2018-07-25 | End: 2018-07-25 | Stop reason: HOSPADM

## 2018-07-25 RX ORDER — ALBUTEROL SULFATE 2.5 MG/3ML
2.5 SOLUTION RESPIRATORY (INHALATION) ONCE AS NEEDED
Status: DISCONTINUED | OUTPATIENT
Start: 2018-07-25 | End: 2018-07-25 | Stop reason: HOSPADM

## 2018-07-25 RX ORDER — ACETAMINOPHEN 325 MG/1
325 TABLET ORAL EVERY 4 HOURS PRN
Status: DISCONTINUED | OUTPATIENT
Start: 2018-07-25 | End: 2018-07-26 | Stop reason: HOSPADM

## 2018-07-25 RX ORDER — SODIUM CHLORIDE, SODIUM LACTATE, POTASSIUM CHLORIDE, CALCIUM CHLORIDE 600; 310; 30; 20 MG/100ML; MG/100ML; MG/100ML; MG/100ML
100 INJECTION, SOLUTION INTRAVENOUS CONTINUOUS
Status: DISCONTINUED | OUTPATIENT
Start: 2018-07-25 | End: 2018-07-26 | Stop reason: HOSPADM

## 2018-07-25 RX ORDER — PROMETHAZINE HYDROCHLORIDE 25 MG/ML
12.5 INJECTION, SOLUTION INTRAMUSCULAR; INTRAVENOUS ONCE AS NEEDED
Status: DISCONTINUED | OUTPATIENT
Start: 2018-07-25 | End: 2018-07-25 | Stop reason: HOSPADM

## 2018-07-25 RX ORDER — OXYCODONE AND ACETAMINOPHEN 10; 325 MG/1; MG/1
1 TABLET ORAL EVERY 4 HOURS PRN
Status: DISCONTINUED | OUTPATIENT
Start: 2018-07-25 | End: 2018-07-26 | Stop reason: HOSPADM

## 2018-07-25 RX ORDER — WARFARIN SODIUM 5 MG/1
5 TABLET ORAL
Status: DISCONTINUED | OUTPATIENT
Start: 2018-07-25 | End: 2018-07-26 | Stop reason: HOSPADM

## 2018-07-25 RX ORDER — SCOLOPAMINE TRANSDERMAL SYSTEM 1 MG/1
1 PATCH, EXTENDED RELEASE TRANSDERMAL
Status: DISCONTINUED | OUTPATIENT
Start: 2018-07-25 | End: 2018-07-25 | Stop reason: HOSPADM

## 2018-07-25 RX ORDER — CEFAZOLIN SODIUM 2 G/100ML
2 INJECTION, SOLUTION INTRAVENOUS EVERY 8 HOURS
Status: COMPLETED | OUTPATIENT
Start: 2018-07-25 | End: 2018-07-26

## 2018-07-25 RX ORDER — PROPOFOL 10 MG/ML
VIAL (ML) INTRAVENOUS AS NEEDED
Status: DISCONTINUED | OUTPATIENT
Start: 2018-07-25 | End: 2018-07-25 | Stop reason: SURG

## 2018-07-25 RX ORDER — BISACODYL 5 MG/1
10 TABLET, DELAYED RELEASE ORAL DAILY PRN
Status: DISCONTINUED | OUTPATIENT
Start: 2018-07-25 | End: 2018-07-26 | Stop reason: HOSPADM

## 2018-07-25 RX ORDER — PROMETHAZINE HYDROCHLORIDE 25 MG/1
25 TABLET ORAL ONCE AS NEEDED
Status: DISCONTINUED | OUTPATIENT
Start: 2018-07-25 | End: 2018-07-25 | Stop reason: HOSPADM

## 2018-07-25 RX ORDER — MIDAZOLAM HYDROCHLORIDE 1 MG/ML
2 INJECTION INTRAMUSCULAR; INTRAVENOUS
Status: DISCONTINUED | OUTPATIENT
Start: 2018-07-25 | End: 2018-07-25 | Stop reason: HOSPADM

## 2018-07-25 RX ORDER — POVIDONE-IODINE 10 MG/G
OINTMENT TOPICAL AS NEEDED
Status: DISCONTINUED | OUTPATIENT
Start: 2018-07-25 | End: 2018-07-25 | Stop reason: HOSPADM

## 2018-07-25 RX ORDER — FAMOTIDINE 10 MG/ML
20 INJECTION, SOLUTION INTRAVENOUS ONCE
Status: COMPLETED | OUTPATIENT
Start: 2018-07-25 | End: 2018-07-25

## 2018-07-25 RX ORDER — LABETALOL HYDROCHLORIDE 5 MG/ML
5 INJECTION, SOLUTION INTRAVENOUS
Status: DISCONTINUED | OUTPATIENT
Start: 2018-07-25 | End: 2018-07-25 | Stop reason: HOSPADM

## 2018-07-25 RX ORDER — SODIUM CHLORIDE 0.9 % (FLUSH) 0.9 %
1-10 SYRINGE (ML) INJECTION AS NEEDED
Status: DISCONTINUED | OUTPATIENT
Start: 2018-07-25 | End: 2018-07-26 | Stop reason: HOSPADM

## 2018-07-25 RX ORDER — LIDOCAINE HYDROCHLORIDE 20 MG/ML
INJECTION, SOLUTION INFILTRATION; PERINEURAL AS NEEDED
Status: DISCONTINUED | OUTPATIENT
Start: 2018-07-25 | End: 2018-07-25 | Stop reason: SURG

## 2018-07-25 RX ORDER — HYDROMORPHONE HYDROCHLORIDE 1 MG/ML
0.5 INJECTION, SOLUTION INTRAMUSCULAR; INTRAVENOUS; SUBCUTANEOUS
Status: DISCONTINUED | OUTPATIENT
Start: 2018-07-25 | End: 2018-07-25 | Stop reason: HOSPADM

## 2018-07-25 RX ORDER — EPHEDRINE SULFATE 50 MG/ML
5 INJECTION, SOLUTION INTRAVENOUS ONCE AS NEEDED
Status: DISCONTINUED | OUTPATIENT
Start: 2018-07-25 | End: 2018-07-25 | Stop reason: HOSPADM

## 2018-07-25 RX ORDER — SODIUM CHLORIDE 0.9 % (FLUSH) 0.9 %
1-10 SYRINGE (ML) INJECTION AS NEEDED
Status: DISCONTINUED | OUTPATIENT
Start: 2018-07-25 | End: 2018-07-25 | Stop reason: HOSPADM

## 2018-07-25 RX ORDER — HYDROCODONE BITARTRATE AND ACETAMINOPHEN 7.5; 325 MG/1; MG/1
1 TABLET ORAL ONCE AS NEEDED
Status: DISCONTINUED | OUTPATIENT
Start: 2018-07-25 | End: 2018-07-25 | Stop reason: HOSPADM

## 2018-07-25 RX ORDER — MEPERIDINE HYDROCHLORIDE 25 MG/ML
12.5 INJECTION INTRAMUSCULAR; INTRAVENOUS; SUBCUTANEOUS
Status: DISCONTINUED | OUTPATIENT
Start: 2018-07-25 | End: 2018-07-25 | Stop reason: HOSPADM

## 2018-07-25 RX ORDER — CEFAZOLIN SODIUM 2 G/100ML
2 INJECTION, SOLUTION INTRAVENOUS ONCE
Status: COMPLETED | OUTPATIENT
Start: 2018-07-25 | End: 2018-07-25

## 2018-07-25 RX ORDER — DIPHENHYDRAMINE HYDROCHLORIDE 50 MG/ML
12.5 INJECTION INTRAMUSCULAR; INTRAVENOUS
Status: DISCONTINUED | OUTPATIENT
Start: 2018-07-25 | End: 2018-07-25 | Stop reason: HOSPADM

## 2018-07-25 RX ORDER — DOCUSATE SODIUM 100 MG/1
100 CAPSULE, LIQUID FILLED ORAL 2 TIMES DAILY PRN
Status: DISCONTINUED | OUTPATIENT
Start: 2018-07-25 | End: 2018-07-26 | Stop reason: HOSPADM

## 2018-07-25 RX ORDER — FLUMAZENIL 0.1 MG/ML
0.2 INJECTION INTRAVENOUS AS NEEDED
Status: DISCONTINUED | OUTPATIENT
Start: 2018-07-25 | End: 2018-07-25 | Stop reason: HOSPADM

## 2018-07-25 RX ORDER — SODIUM CHLORIDE, SODIUM LACTATE, POTASSIUM CHLORIDE, CALCIUM CHLORIDE 600; 310; 30; 20 MG/100ML; MG/100ML; MG/100ML; MG/100ML
9 INJECTION, SOLUTION INTRAVENOUS CONTINUOUS
Status: DISCONTINUED | OUTPATIENT
Start: 2018-07-25 | End: 2018-07-25

## 2018-07-25 RX ORDER — SENNA AND DOCUSATE SODIUM 50; 8.6 MG/1; MG/1
2 TABLET, FILM COATED ORAL NIGHTLY
Status: DISCONTINUED | OUTPATIENT
Start: 2018-07-25 | End: 2018-07-26 | Stop reason: HOSPADM

## 2018-07-25 RX ORDER — ONDANSETRON 2 MG/ML
INJECTION INTRAMUSCULAR; INTRAVENOUS AS NEEDED
Status: DISCONTINUED | OUTPATIENT
Start: 2018-07-25 | End: 2018-07-25 | Stop reason: SURG

## 2018-07-25 RX ORDER — TRANEXAMIC ACID 100 MG/ML
INJECTION, SOLUTION INTRAVENOUS AS NEEDED
Status: DISCONTINUED | OUTPATIENT
Start: 2018-07-25 | End: 2018-07-25 | Stop reason: SURG

## 2018-07-25 RX ORDER — DULOXETIN HYDROCHLORIDE 60 MG/1
60 CAPSULE, DELAYED RELEASE ORAL EVERY MORNING
Status: DISCONTINUED | OUTPATIENT
Start: 2018-07-26 | End: 2018-07-26 | Stop reason: HOSPADM

## 2018-07-25 RX ORDER — PROMETHAZINE HYDROCHLORIDE 25 MG/1
25 SUPPOSITORY RECTAL ONCE AS NEEDED
Status: DISCONTINUED | OUTPATIENT
Start: 2018-07-25 | End: 2018-07-25 | Stop reason: HOSPADM

## 2018-07-25 RX ORDER — MIDAZOLAM HYDROCHLORIDE 1 MG/ML
1 INJECTION INTRAMUSCULAR; INTRAVENOUS
Status: DISCONTINUED | OUTPATIENT
Start: 2018-07-25 | End: 2018-07-25 | Stop reason: HOSPADM

## 2018-07-25 RX ORDER — FERROUS SULFATE 325(65) MG
325 TABLET ORAL
Status: DISCONTINUED | OUTPATIENT
Start: 2018-07-26 | End: 2018-07-26 | Stop reason: HOSPADM

## 2018-07-25 RX ADMIN — DOCUSATE SODIUM -SENNOSIDES 2 TABLET: 50; 8.6 TABLET, COATED ORAL at 20:01

## 2018-07-25 RX ADMIN — CEFAZOLIN SODIUM 2 G: 2 INJECTION, SOLUTION INTRAVENOUS at 20:01

## 2018-07-25 RX ADMIN — MIDAZOLAM 1 MG: 1 INJECTION INTRAMUSCULAR; INTRAVENOUS at 10:56

## 2018-07-25 RX ADMIN — FAMOTIDINE 20 MG: 10 INJECTION, SOLUTION INTRAVENOUS at 10:56

## 2018-07-25 RX ADMIN — FENTANYL CITRATE 100 MCG: 50 INJECTION INTRAMUSCULAR; INTRAVENOUS at 11:43

## 2018-07-25 RX ADMIN — SUGAMMADEX 300 MG: 100 INJECTION, SOLUTION INTRAVENOUS at 13:20

## 2018-07-25 RX ADMIN — SODIUM CHLORIDE, POTASSIUM CHLORIDE, SODIUM LACTATE AND CALCIUM CHLORIDE: 600; 310; 30; 20 INJECTION, SOLUTION INTRAVENOUS at 12:26

## 2018-07-25 RX ADMIN — WARFARIN SODIUM 5 MG: 5 TABLET ORAL at 18:26

## 2018-07-25 RX ADMIN — CEFAZOLIN SODIUM 2 G: 2 INJECTION, SOLUTION INTRAVENOUS at 11:35

## 2018-07-25 RX ADMIN — PROPOFOL 150 MG: 10 INJECTION, EMULSION INTRAVENOUS at 11:43

## 2018-07-25 RX ADMIN — SODIUM CHLORIDE, POTASSIUM CHLORIDE, SODIUM LACTATE AND CALCIUM CHLORIDE 9 ML/HR: 600; 310; 30; 20 INJECTION, SOLUTION INTRAVENOUS at 10:57

## 2018-07-25 RX ADMIN — ONDANSETRON 4 MG: 2 INJECTION INTRAMUSCULAR; INTRAVENOUS at 13:20

## 2018-07-25 RX ADMIN — FENTANYL CITRATE 50 MCG: 50 INJECTION INTRAMUSCULAR; INTRAVENOUS at 13:34

## 2018-07-25 RX ADMIN — SCOPALAMINE 1 PATCH: 1 PATCH, EXTENDED RELEASE TRANSDERMAL at 10:56

## 2018-07-25 RX ADMIN — LIDOCAINE HYDROCHLORIDE 60 MG: 20 INJECTION, SOLUTION INFILTRATION; PERINEURAL at 11:43

## 2018-07-25 RX ADMIN — SODIUM CHLORIDE, POTASSIUM CHLORIDE, SODIUM LACTATE AND CALCIUM CHLORIDE 100 ML/HR: 600; 310; 30; 20 INJECTION, SOLUTION INTRAVENOUS at 23:39

## 2018-07-25 RX ADMIN — TRANEXAMIC ACID 1000 MG: 100 INJECTION, SOLUTION INTRAVENOUS at 11:56

## 2018-07-25 RX ADMIN — POTASSIUM CHLORIDE, DEXTROSE MONOHYDRATE AND SODIUM CHLORIDE 75 ML/HR: 150; 5; 450 INJECTION, SOLUTION INTRAVENOUS at 00:34

## 2018-07-25 RX ADMIN — OXYCODONE HYDROCHLORIDE AND ACETAMINOPHEN 1 TABLET: 10; 325 TABLET ORAL at 15:36

## 2018-07-25 RX ADMIN — ROCURONIUM BROMIDE 50 MG: 10 INJECTION INTRAVENOUS at 11:43

## 2018-07-25 RX ADMIN — SODIUM CHLORIDE, POTASSIUM CHLORIDE, SODIUM LACTATE AND CALCIUM CHLORIDE 100 ML/HR: 600; 310; 30; 20 INJECTION, SOLUTION INTRAVENOUS at 15:09

## 2018-07-25 NOTE — H&P
Patient Care Team:  Andres Kathleen MD as PCP - General (Family Medicine)    Chief complaint right hip dislocation    Subjective pain due to hip dislocation    History of Present Illness this is a 62-year-old lady who was referred to me yesterday from [facility], operated by [facility].  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  She underwent gamma nail fixation and went on to a nonunion.  She's had 4 surgeries on the hip including a proximal femoral replacement approximately 4 weeks ago.  She's had 3 dislocations since her procedure and she is referred here for further treatment of her right hip.    Review of Systems     Past Medical History:   Diagnosis Date   • Cancer (CMS/HCC)     basal cell   • Depression with anxiety    • PONV (postoperative nausea and vomiting)    • Right hip pain     SCHEDULED FOR SX     Past Surgical History:   Procedure Laterality Date   • COLONOSCOPY     • FIBULA BONE GRAFT TO HIP Right 12/15/2017    Procedure: FIBULA BONE GRAFT TO HIP with cancellus autograft the Dall-Miles cable ;  Surgeon: Keyshawn Madden MD;  Location:  [facility] OR;  Service:    • FRACTURE SURGERY     • HARDWARE REMOVAL Right 8/15/2017    Procedure: HARDWARE REMOVAL long gamma nail  with long gamma nail right subtrochanteric femur fracture meir vanco powder ;  Surgeon: Keyshawn Madden MD;  Location: [facility] OR;  Service:    • HIP CLOSED REDUCTION Right 7/17/2018    Procedure: HIP CLOSED REDUCTION;  Surgeon: Keyshawn Madden MD;  Location:  [facility] OR;  Service: Orthopedics   • HIP CLOSED REDUCTION Right 7/23/2018    Procedure: HIP CLOSED REDUCTION;  Surgeon: Keyshawn Madden MD;  Location: [facility] OR;  Service: Orthopedics   • IM NAILING FEMORAL SHAFT RETROGRADE Right 08/15/2017    replacement elah  fx   • JOINT REPLACEMENT     • [place] OPEN FIX INTER/SUBTROCH FX,IMPLNT Right 4/14/2017    Procedure: FEMUR INTRAMEDULLARY NAILING/RODDING long meir gamma nail  7fr hemovac placement;  Surgeon: Keyshawn Madden MD;  Location:  [facility] OR;  Service: Orthopedics   • SKIN BIOPSY     • TOTAL HIP ARTHROPLASTY Right 6/19/2018    Procedure: TOTAL HIP ARTHROPLASTY;  Surgeon: Keyshawn Madden MD;  Location:  [facility] OR;  Service: Orthopedics   • TUBAL ABDOMINAL LIGATION       Family History   Problem Relation Age of Onset   • Asthma Mother    • No Known Problems Father      Social History   Substance Use Topics   • Smoking status: Never Smoker   • Smokeless tobacco: Never Used   • Alcohol use Yes      Comment: rare     Prescriptions Prior to Admission   Medication Sig Dispense Refill Last Dose   • DULoxetine (CYMBALTA) 60 MG capsule Take 60 mg by mouth Every Morning.   7/23/2018 at Unknown time   • oxyCODONE-acetaminophen (PERCOCET)  MG per tablet Take 1 tablet by mouth Every 4 (Four) Hours As Needed for Moderate Pain  or Severe Pain . 60 tablet 0 Past Week at Unknown time     Allergies:  Sulfa antibiotics    Objective      Vital Signs  Temp:  [97.3 °F (36.3 °C)-98.1 °F (36.7 °C)] 98.1 °F (36.7 °C)  Heart Rate:  [77-85] 77  Resp:  [16-18] 16  BP: (101-117)/(57-65) 116/65    Physical Exam patient is alert awake oriented ×3.    Neck is supple.    Heart has regular rate and rhythm with no large gallops or murmurs.    Lungs are clear to auscultation.    Abdomen is benign with positive bowel sounds no rebound.    Neuro exam is normal.    Vascular exam reveals palpable pulses in all 4 extremities with good capillary refill.    With peek exam reveals a well-healed incision on the right hip.  Leg lengths are equal today.  Neurovascular exam is intact.    Results Review:   I reviewed the patient's new clinical results.      Assessment/Plan the assessment is instability status post proximal femoral replacement right hip.  The plan is for exploration and probable revision to a constrained liner.    Active Problems:    Hx of total hip arthroplasty, right      Assessment & Plan    I discussed the  patients findings and my recommendations with patient    Andriy Bailey MD  07/25/18  6:57 AM    Time:

## 2018-07-25 NOTE — ANESTHESIA PREPROCEDURE EVALUATION
Anesthesia Evaluation     Patient summary reviewed and Nursing notes reviewed   history of anesthetic complications: PONV               Airway   Mallampati: II  Dental      Pulmonary - negative pulmonary ROS   Cardiovascular - negative cardio ROS    ECG reviewed  Rhythm: regular  Rate: normal        Neuro/Psych  (+) psychiatric history Anxiety and Depression,     GI/Hepatic/Renal/Endo - negative ROS     Musculoskeletal (-) negative ROS    Abdominal    Substance History - negative use     OB/GYN negative ob/gyn ROS         Other      history of cancer                  Anesthesia Plan    ASA 2     general     intravenous induction   Anesthetic plan and risks discussed with patient.

## 2018-07-25 NOTE — THERAPY EVALUATION
Acute Care - Physical Therapy Initial Evaluation  [facility]     Patient Name: Raven Young  : 1955  MRN: [id number]  Today's Date: 2018   Onset of Illness/Injury or Date of Surgery: 18  Date of Referral to PT: 18  Referring Physician: Andriy Moss      Admit Date: 2018    Visit Dx:     ICD-10-CM ICD-9-CM   1. Difficulty walking R26.2 719.7   2. 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)   • Hx of total hip arthroplasty, right     Past Medical History:   Diagnosis Date   • Cancer (CMS/HCC)     basal cell   • Depression with anxiety    • PONV (postoperative nausea and vomiting)    • Right hip pain     SCHEDULED FOR SX     Past Surgical History:   Procedure Laterality Date   • COLONOSCOPY     • FIBULA BONE GRAFT TO HIP Right 12/15/2017    Procedure: FIBULA BONE GRAFT TO HIP with cancellus autograft the Dall-Miles cable ;  Surgeon: Keyshawn Madden MD;  Location: [facility] OR;  Service:    • FRACTURE SURGERY     • HARDWARE REMOVAL Right 8/15/2017    Procedure: HARDWARE REMOVAL long gamma nail  with long gamma nail right subtrochanteric femur fracture meir vanco powder ;  Surgeon: Keyshawn Madden MD;  Location: [facility] OR;  Service:    • HIP CLOSED REDUCTION Right 2018    Procedure: HIP CLOSED REDUCTION;  Surgeon: Keyshawn Madden MD;  Location: [facility] OR;  Service: Orthopedics   • HIP CLOSED REDUCTION Right 2018    Procedure: HIP CLOSED REDUCTION;   Surgeon: Keyshawn Madden MD;  Location:  [facility] OR;  Service: Orthopedics   • IM NAILING FEMORAL SHAFT RETROGRADE Right 08/15/2017    replacement leah  fx   • JOINT REPLACEMENT     • [place] OPEN FIX INTER/SUBTROCH FX,IMPLNT Right 4/14/2017    Procedure: FEMUR INTRAMEDULLARY NAILING/RODDING long meir gamma nail 7fr hemovac placement;  Surgeon: Keyshawn Madden MD;  Location:  [facility] OR;  Service: Orthopedics   • SKIN BIOPSY     • TOTAL HIP ARTHROPLASTY Right 6/19/2018    Procedure: TOTAL HIP ARTHROPLASTY;  Surgeon: Keyshawn Madden MD;  Location:  [facility] OR;  Service: Orthopedics   • TUBAL ABDOMINAL LIGATION          PT ASSESSMENT (last 12 hours)      Physical Therapy Evaluation     Row Name 07/25/18 1631          PT Evaluation Time/Intention    Subjective Information complains of;fatigue;pain  -MS     Document Type evaluation  -MS     Mode of Treatment physical therapy;individual therapy  -MS     Patient Effort good  -MS     Comment Pt. reports pain/soreness in her Right hip this PM.  -MS     Row Name 07/25/18 2866          General Information    Onset of Illness/Injury or Date of Surgery 07/24/18  -MS     Referring Physician Andriy Moss  -MS     Patient Observations agree to therapy;cooperative  -MS     Prior Level of Function independent:  -MS     Equipment Currently Used at Home none  -MS     Pertinent History of Current Functional Problem Pt. is s/p Right Total Hip Revision  -MS     Existing Precautions/Restrictions fall;hip, posterior   Right L.E.  -MS     Equipment Ordered for Patient --   Pt. already owns a Rwx for home use.  -MS     Risks Reviewed patient:  -MS     Benefits Reviewed patient:  -MS     Barriers to Rehab none identified  -MS     Row Name 07/25/18 1151          Cognitive Assessment/Intervention- PT/OT    Orientation Status (Cognition) oriented x 3  -MS     Follows Commands (Cognition) WNL  -MS     Personal Safety Interventions fall prevention program maintained;gait belt;nonskid shoes/slippers when  out of bed;supervised activity  -MS     Row Name 07/25/18 1633          Mobility Assessment/Treatment    Extremity Weight-bearing Status right lower extremity  -MS     Right Lower Extremity (Weight-bearing Status) weight-bearing as tolerated (WBAT)  -MS     Row Name 07/25/18 1633          Bed Mobility Assessment/Treatment    Bed Mobility Assessment/Treatment supine-sit;sit-supine  -MS     Supine-Sit [place] (Bed Mobility) independent  -MS     Row Name 07/25/18 1633          Transfer Assessment/Treatment    Transfer Assessment/Treatment sit-stand transfer;stand-sit transfer  -MS     Sit-Stand [place] (Transfers) contact guard  -MS     Stand-Sit [place] (Transfers) contact guard  -MS     Row Name 07/25/18 1633          Sit-Stand Transfer    Assistive Device (Sit-Stand Transfers) walker, front-wheeled  -MS     Row Name 07/25/18 1633          Stand-Sit Transfer    Assistive Device (Stand-Sit Transfers) walker, front-wheeled  -MS     Row Name 07/25/18 1633          Gait/Stairs Assessment/Training    [place] Level (Gait) contact guard  -MS     Assistive Device (Gait) walker, front-wheeled  -MS     Distance in Feet (Gait) 15 feet  -MS     Pattern (Gait) step-to  -MS     Deviations/Abnormal Patterns (Gait) antalgic;nish decreased  -MS     Comment (Gait/Stairs) Limited by overall pain, fatigue this PM.  -MS     Row Name 07/25/18 1633          General ROM    GENERAL ROM COMMENTS BUE/LLE (WFL's)  -MS     Row Name 07/25/18 1633          General Assessment (Manual Muscle Testing)    Comment, General Manual Muscle Testing (MMT) Assessment BUE/LLE (3+/5)  -MS     Row Name 07/25/18 1633          Therapeutic Exercise    Comment (Therapeutic Exercise) Right THR Protocol x 10 reps completed  -MS     Row Name 07/25/18 1633          Pain Assessment    Additional Documentation Pain Scale: Numbers Pre/Post-Treatment (Group)  -MS     Row Name 07/25/18 1633          Pain Scale: Numbers Pre/Post-Treatment    Pain  Scale: Numbers, Pretreatment 5/10  -MS     Pain Scale: Numbers, Post-Treatment 5/10  -MS     Pain Location - Side Right  -MS     Pain Location hip  -MS     Pain Intervention(s) Medication (See MAR);Cold applied;Repositioned;Elevated;Rest  -MS     Row Name             Wound 06/19/18 1416 Right hip incision    Wound - Properties Group Date first assessed: 06/19/18  -МАРИНА Time first assessed: 1416  -МАРНИА Side: Right  -МАРИНА Location: hip  -МАРИНА Type: incision  -МАРИНА    Row Name             Wound 07/25/18 1228 Right hip incision    Wound - Properties Group Date first assessed: 07/25/18  -CG Time first assessed: 1228  -CG Side: Right  -CG Location: hip  -CG Type: incision  -CG    Row Name 07/25/18 1633          Physical Therapy Clinical Impression    Date of Referral to PT 07/25/18  -MS     Criteria for Skilled Interventions Met (PT Clinical Impression) treatment indicated  -MS     Rehab Potential (PT Clinical Summary) good, to achieve stated therapy goals  -MS     Row Name 07/25/18 1633          Physical Therapy Goals    Transfer Goal Selection (PT) transfer, PT goal 1  -MS     Gait Training Goal Selection (PT) gait training, PT goal 1  -MS     Row Name 07/25/18 1633          Transfer Goal 1 (PT)    Activity/Assistive Device (Transfer Goal 1, PT) transfers, all;walker, rolling  -MS     [place] Level/Cues Needed (Transfer Goal 1, PT) independent  -MS     Time Frame (Transfer Goal 1, PT) long term goal (LTG);2 - 3 days  -MS     Row Name 07/25/18 1633          Gait Training Goal 1 (PT)    Activity/Assistive Device (Gait Training Goal 1, PT) gait (walking locomotion);walker, rolling  -MS     [place] Level (Gait Training Goal 1, PT) independent  -MS     Distance (Gait Goal 1, PT) 120 feet  -MS     Time Frame (Gait Training Goal 1, PT) long term goal (LTG);2 - 3 days  -MS     Row Name 07/25/18 1633          Positioning and Restraints    Pre-Treatment Position in bed  -MS     Post Treatment Position chair  -MS     In Chair  notified nsg;reclined;sitting;call light within reach;encouraged to call for assist;with family/caregiver   All lines intact. Ice pack to Right hip.  -MS       User Key  (r) = Recorded By, (t) = Taken By, (c) = Cosigned By    Initials Name Provider Type    МАРИНА Gayle, RN Registered Nurse    GEORGE Alcazar RN Registered Nurse    MS Gordy Daigle, PT Physical Therapist          Physical Therapy Education     Title: PT OT SLP Therapies (Done)     Topic: Physical Therapy (Done)     Point: Mobility training (Done)    Learning Progress Summary     Learner Status Readiness Method Response Comment Documented by    Patient Done Acceptance E,D VU,NR  MS 07/25/18 1638          Point: Home exercise program (Done)    Learning Progress Summary     Learner Status Readiness Method Response Comment Documented by    Patient Done Acceptance EMARAHNR  MS 07/25/18 1638          Point: Body mechanics (Done)    Learning Progress Summary     Learner Status Readiness Method Response Comment Documented by    Patient Done Acceptance MARAH MOYERNR  MS 07/25/18 1638          Point: Precautions (Done)    Learning Progress Summary     Learner Status Readiness Method Response Comment Documented by    Patient Done Acceptance EMARAH VUNR  MS 07/25/18 1638                      User Key     Initials Effective Dates Name Provider Type Discipline    MS 04/03/18 -  Gordy Daigle, PT Physical Therapist PT                PT Recommendation and Plan  Anticipated Discharge Disposition (PT): home with assist, home with home health  Planned Therapy Interventions (PT Eval): balance training, bed mobility training, gait training, home exercise program, patient/family education, postural re-education, strengthening, transfer training  Therapy Frequency (PT Clinical Impression): 2 times/day  Outcome Summary/Treatment Plan (PT)  Anticipated Discharge Disposition (PT): home with assist, home with home health  Plan of Care Reviewed With: patient,  family  Outcome Summary: Pt. will benefit from skilled inpt. P.T. to address her functional deficits and to assist pt. in regaining her maximum level of independence with functional mobility.           Outcome Measures     Row Name 07/25/18 1600             How much help from another person do you currently need...    Turning from your back to your side while in flat bed without using bedrails? 4  -MS      Moving from lying on back to sitting on the side of a flat bed without bedrails? 4  -MS      Moving to and from a bed to a chair (including a wheelchair)? 3  -MS      Standing up from a chair using your arms (e.g., wheelchair, bedside chair)? 3  -MS      Climbing 3-5 steps with a railing? 3  -MS      To walk in hospital room? 3  -MS      AM-PAC 6 Clicks Score 20  -MS         Functional Assessment    Outcome Measure Options AM-PAC 6 Clicks Basic Mobility (PT)  -MS        User Key  (r) = Recorded By, (t) = Taken By, (c) = Cosigned By    Initials Name Provider Type    MS Gordy Daigle PT Physical Therapist           Time Calculation:         PT Charges     Row Name 07/25/18 1639             Time Calculation    Start Time 1551  -MS      Stop Time 1608  -MS      Time Calculation (min) 17 min  -MS      PT Received On 07/25/18  -MS      PT - Next Appointment 07/26/18  -MS      PT Goal Re-Cert Due Date 07/27/18  -MS         Time Calculation- PT    Total Timed Code Minutes- PT 12 minute(s)  -MS        User Key  (r) = Recorded By, (t) = Taken By, (c) = Cosigned By    Initials Name Provider Type    MS Gordy Daigle PT Physical Therapist        Therapy Suggested Charges     Code   Minutes Charges    None           Therapy Charges for Today     Code Description Service Date Service Provider Modifiers Qty    55051627537 HC PT EVAL LOW COMPLEXITY 1 7/25/2018 Gordy Daigle, PT GP 1    29835806045 HC PT THER PROC EA 15 MIN 7/25/2018 Goryd Daigle PT GP 1          PT G-Codes  Outcome Measure Options: AM-PAC 6 Clicks  Basic Mobility (PT)      Gordy Daigle, PT  7/25/2018

## 2018-07-25 NOTE — PLAN OF CARE
Problem: Patient Care Overview  Goal: Plan of Care Review  Outcome: Ongoing (interventions implemented as appropriate)   07/25/18 1801   Coping/Psychosocial   Plan of Care Reviewed With patient   Plan of Care Review   Progress improving   OTHER   Outcome Summary Postop right total hip revision. Vitals stable. Pain controlled with oral analgesic. Up to chair with PT. FC to bedside drainage bag.      Goal: Individualization and Mutuality  Outcome: Ongoing (interventions implemented as appropriate)    Goal: Discharge Needs Assessment  Outcome: Ongoing (interventions implemented as appropriate)    Goal: Interprofessional Rounds/Family Conf  Outcome: Ongoing (interventions implemented as appropriate)      Problem: Fall Risk (Adult)  Goal: Absence of Fall  Outcome: Ongoing (interventions implemented as appropriate)      Problem: Hip Arthroplasty (Total, Partial) (Adult)  Goal: Signs and Symptoms of Listed Potential Problems Will be Absent, Minimized or Managed (Hip Arthroplasty)  Outcome: Ongoing (interventions implemented as appropriate)   07/25/18 1801   Goal/Outcome Evaluation   Problems Assessed (Hip Arthroplasty) all   Problems Present (Hip Arthroplasty) pain     Goal: Anesthesia/Sedation Recovery  Outcome: Ongoing (interventions implemented as appropriate)   07/25/18 1801   Goal/Outcome Evaluation   Anesthesia/Sedation Recovery progressing toward baseline

## 2018-07-25 NOTE — PROGRESS NOTES
Continued Stay Note  [facility]     Patient Name: Raven Young  MRN: [id number]  Today's Date: 7/25/2018    Admit Date: 7/24/2018          Discharge Plan     Row Name 07/25/18 1438       Plan    Plan Comments Patient currently states that she is getting her insurance through COBRA.  She is employed by the [employer].  She siad that she has [place] ( ID [id number], Group- [id number]) and it is effective July 1st, 2018.  Patient does not have an insurance card, but states that she should be receiving one in the mail.  Information given to , Catalina.  CCP will follow. Magaly Oneill RN    Row Name 07/25/18 8799       Plan    Plan return home- [facility]  following    Patient/Family in Agreement with Plan yes    Plan Comments Spoke with patient in room.  Introduced self and explained role.  Patient transferred from [facility].  Spoke with patient in room.  She lives in a single story house with her .  She has a daughter, sister, and granddaughter who will be able to help her at home.  She has a rolling walker, wheelchair,  and bath bench.  She has used [facility] in the past and would like to use them again in the past if needed.  Referral called to [facility].                Discharge Codes    No documentation.           Magaly Oneill RN

## 2018-07-25 NOTE — ANESTHESIA PROCEDURE NOTES
Airway  Urgency: elective    Airway not difficult    General Information and Staff    Patient location during procedure: OR  Anesthesiologist: JACOBO SAWYER  CRNA: JOSÉ MIGUEL ALCOCER    Indications and Patient Condition  Indications for airway management: airway protection    Preoxygenated: yes  MILS not maintained throughout  Mask difficulty assessment: 1 - vent by mask    Final Airway Details  Final airway type: endotracheal airway      Successful airway: ETT  Cuffed: yes   Successful intubation technique: direct laryngoscopy  Facilitating devices/methods: intubating stylet  Endotracheal tube insertion site: oral  Blade: Raji  Blade size: #3  ETT size: 7.0 mm  Cormack-Lehane Classification: grade I - full view of glottis  Placement verified by: chest auscultation   Cuff volume (mL): 8  Measured from: lips  ETT to lips (cm): 21  Number of attempts at approach: 1    Additional Comments  PreO2 100% face mask, IV induction, easy mask, DVL x1, cords noted, tube through, cuff up, EBBSH, +etCO2, = chest movement, tube secured in place, atraumatic, teeth and lips intact as preop.

## 2018-07-25 NOTE — ANESTHESIA POSTPROCEDURE EVALUATION
Patient: Raven Young    Procedure Summary     Date:  07/25/18 Room / Location:  [facility] OR [address] [place] [address] MAIN OR    Anesthesia Start:  1135 Anesthesia Stop:  1346    Procedure:  RT TOTAL HIP ARTHROPLASTY REVISION (Right Hip) Diagnosis:      Surgeon:  Andriy Bailey MD Provider:  Paramjit Bush MD    Anesthesia Type:  general ASA Status:  2          Anesthesia Type: general  Last vitals  BP   143/80 (07/25/18 1350)   Temp   36.7 °C (98.1 °F) (07/25/18 1339)   Pulse   75 (07/25/18 1350)   Resp   12 (07/25/18 1350)     SpO2   100 % (07/25/18 1350)     Post Anesthesia Care and Evaluation    Patient location during evaluation: PACU  Patient participation: complete - patient participated  Level of consciousness: awake and alert  Pain management: adequate  Airway patency: patent  Anesthetic complications: No anesthetic complications    Cardiovascular status: acceptable  Respiratory status: acceptable  Hydration status: acceptable    Comments: --------------------            07/25/18               1350     --------------------   BP:       143/80     Pulse:      75       Resp:       12       Temp:                SpO2:      100%     --------------------

## 2018-07-25 NOTE — DISCHARGE PLACEMENT REQUEST
"Raven Young (62 y.o. Female)     Date of Birth Social Security Number Address Home Phone MRN    1955  [address]  [place] [address] [phone] [id number]    Judaism Marital Status          None        Admission Date Admission Type Admitting Provider Attending Provider Department, Room/Bed    7/24/18 Urgent Andriy Bailey MD Pomeroy, Donald L, MD [facility] MAIN OR, [facility] Main OR/MAIN OR    Discharge Date Discharge Disposition Discharge Destination                       Attending Provider:  Andriy Bailey MD    Allergies:  Sulfa Antibiotics    Isolation:  None   Infection:  None   Code Status:  Prior    Ht:  157.5 cm (62\")   Wt:  76.2 kg (168 lb)    Admission Cmt:  None   Principal Problem:  None                Active Insurance as of 7/24/2018     Patient has no active insurance coverage on file for 7/24/2018.          Emergency Contacts      (Rel.) Home Phone Work Phone Mobile Phone    Xin Arana (Sister) [phone] -- --              "

## 2018-07-25 NOTE — PLAN OF CARE
Problem: Patient Care Overview  Goal: Plan of Care Review   07/25/18 4184   Coping/Psychosocial   Plan of Care Reviewed With patient;family   OTHER   Outcome Summary Pt. will benefit from skilled inpt. P.T. to address her functional deficits and to assist pt. in regaining her maximum level of independence with functional mobility.

## 2018-07-25 NOTE — PLAN OF CARE
Problem: Patient Care Overview  Goal: Plan of Care Review  Outcome: Ongoing (interventions implemented as appropriate)   07/25/18 0214   Coping/Psychosocial   Plan of Care Reviewed With patient   Plan of Care Review   Progress no change   OTHER   Outcome Summary IV PAIN MEDICATION FOR PAIN. BEDREST. NPO . POSSIBLE OR TODAY. [place] TO BEDSIDE. EDUCATION PROVIDED ON THE IMPORTANCE OF STRESS REDUCTION TO HELP REDUCE ANXIETY.      Goal: Individualization and Mutuality  Outcome: Ongoing (interventions implemented as appropriate)    Goal: Discharge Needs Assessment  Outcome: Ongoing (interventions implemented as appropriate)      Problem: Fall Risk (Adult)  Goal: Identify Related Risk Factors and Signs and Symptoms  Outcome: Outcome(s) achieved Date Met: 07/25/18    Goal: Absence of Fall  Outcome: Ongoing (interventions implemented as appropriate)

## 2018-07-26 VITALS
WEIGHT: 167.99 LBS | RESPIRATION RATE: 16 BRPM | OXYGEN SATURATION: 96 % | DIASTOLIC BLOOD PRESSURE: 58 MMHG | HEIGHT: 62 IN | TEMPERATURE: 97.1 F | HEART RATE: 88 BPM | SYSTOLIC BLOOD PRESSURE: 98 MMHG | BODY MASS INDEX: 30.91 KG/M2

## 2018-07-26 LAB
HCT VFR BLD AUTO: 32.1 % (ref 35.6–45.5)
HGB BLD-MCNC: 9.7 G/DL (ref 11.9–15.5)
INR PPP: 1.02 (ref 0.9–1.1)
PROTHROMBIN TIME: 13.2 SECONDS (ref 11.7–14.2)

## 2018-07-26 PROCEDURE — 85018 HEMOGLOBIN: CPT | Performed by: ORTHOPAEDIC SURGERY

## 2018-07-26 PROCEDURE — 97110 THERAPEUTIC EXERCISES: CPT

## 2018-07-26 PROCEDURE — G8980 MOBILITY D/C STATUS: HCPCS

## 2018-07-26 PROCEDURE — 25010000003 CEFAZOLIN IN DEXTROSE 2-4 GM/100ML-% SOLUTION: Performed by: ORTHOPAEDIC SURGERY

## 2018-07-26 PROCEDURE — G8979 MOBILITY GOAL STATUS: HCPCS

## 2018-07-26 PROCEDURE — 85610 PROTHROMBIN TIME: CPT | Performed by: ORTHOPAEDIC SURGERY

## 2018-07-26 PROCEDURE — 85014 HEMATOCRIT: CPT | Performed by: ORTHOPAEDIC SURGERY

## 2018-07-26 RX ORDER — OXYCODONE AND ACETAMINOPHEN 10; 325 MG/1; MG/1
1 TABLET ORAL EVERY 4 HOURS PRN
Qty: 56 TABLET | Refills: 0 | Status: SHIPPED | OUTPATIENT
Start: 2018-07-26 | End: 2019-05-06

## 2018-07-26 RX ORDER — WARFARIN SODIUM 7.5 MG/1
7.5 TABLET ORAL
Status: DISCONTINUED | OUTPATIENT
Start: 2018-07-26 | End: 2018-07-26 | Stop reason: HOSPADM

## 2018-07-26 RX ORDER — WARFARIN SODIUM 5 MG/1
TABLET ORAL
Qty: 40 TABLET | Refills: 0 | Status: SHIPPED | OUTPATIENT
Start: 2018-07-26 | End: 2019-05-06

## 2018-07-26 RX ORDER — WARFARIN SODIUM 5 MG/1
TABLET ORAL
Qty: 40 TABLET | Refills: 0 | Status: SHIPPED | OUTPATIENT
Start: 2018-07-26 | End: 2018-07-26

## 2018-07-26 RX ADMIN — FERROUS SULFATE TAB 325 MG (65 MG ELEMENTAL FE) 325 MG: 325 (65 FE) TAB at 08:24

## 2018-07-26 RX ADMIN — OXYCODONE HYDROCHLORIDE AND ACETAMINOPHEN 1 TABLET: 10; 325 TABLET ORAL at 08:24

## 2018-07-26 RX ADMIN — DULOXETINE HYDROCHLORIDE 60 MG: 60 CAPSULE, DELAYED RELEASE ORAL at 06:27

## 2018-07-26 RX ADMIN — CEFAZOLIN SODIUM 2 G: 2 INJECTION, SOLUTION INTRAVENOUS at 03:23

## 2018-07-26 NOTE — PLAN OF CARE
Problem: Patient Care Overview  Goal: Plan of Care Review  Outcome: Ongoing (interventions implemented as appropriate)   07/26/18 1314   Coping/Psychosocial   Plan of Care Reviewed With patient   Plan of Care Review   Progress improving   OTHER   Outcome Summary Postop day 1 right total hip revision. Minimal c/o pain, controlled with oral analgesic. Ambulating well with walker. Voiding without difficulty. DC home with home health.     Goal: Individualization and Mutuality  Outcome: Ongoing (interventions implemented as appropriate)    Goal: Discharge Needs Assessment  Outcome: Ongoing (interventions implemented as appropriate)    Goal: Interprofessional Rounds/Family Conf  Outcome: Ongoing (interventions implemented as appropriate)      Problem: Fall Risk (Adult)  Goal: Absence of Fall  Outcome: Ongoing (interventions implemented as appropriate)   07/26/18 1314   Fall Risk (Adult)   Absence of Fall achieves outcome       Problem: Hip Arthroplasty (Total, Partial) (Adult)  Goal: Signs and Symptoms of Listed Potential Problems Will be Absent, Minimized or Managed (Hip Arthroplasty)  Outcome: Ongoing (interventions implemented as appropriate)   07/26/18 1314   Goal/Outcome Evaluation   Problems Assessed (Hip Arthroplasty) all   Problems Present (Hip Arthroplasty) pain

## 2018-07-26 NOTE — PLAN OF CARE
Problem: Patient Care Overview  Goal: Plan of Care Review   07/26/18 1053   Coping/Psychosocial   Plan of Care Reviewed With patient   Plan of Care Review   Progress improving   OTHER   Outcome Summary Pt demonstrates good progress w/mobility. Pt able to tolerate increased ther ex and demonstrates increased independence with transfers and ambulation. Pt now 50% PWB per MD; demonstrates good understanding of weight bearing status and able to safely maintain PWB during transfers and ambulation. Pt has all equipment needed at home; plans to DC home today and continue w/HH PT. No problems anticipated; no further acute PT concerns.

## 2018-07-26 NOTE — OP NOTE
PREOPERATIVE DIAGNOSIS: Unstable right total hip[]    POSTOPERATIVE DIAGNOSIS: Same []    PROCEDURE PERFORMED: Revision to constrained liner right hip []    ANESTHESIA: Gen. []    SURGEON:  Andriy Bailey MD    ASSISTANT SURGEON: Sergio Alexander PA-C []    BLOOD LOSS: 200 cc[]    SPECIMEN: Cultures []    [This is a 62-year-old lady who underwent a right total hip with a proximal femoral replacement for a nonunion of an intertrochanteric fracture.  Since her procedure about a month ago she's dislocated 3 times.  This is felt to be too due to abductor weakness.  She's brought to the operating room today to convert her to a constrained liner.    Patient's brought to the holding room given appropriate IV antibiotics.  Brought back to the operating room given a general anesthetic placed in decubitus decubitus position with the right side up.  Right hip was prepped and draped in sterile fashion.  Previous skin incision was used subcutaneous dissected away and the fascia was split longitudinally.  Posterior approach was carried out to the hip.  The previous hip was then dislocated the femoral component appeared to be in appropriate anteversion and the cup was in good position.  We placed a fourth screw through the cup with a fairly secure purchase on the bone.  We then removed after we had done this we then placed a neutral constrained liner for a 28 head in the cup.  The hip was reduced the locking ring positioned and then the wound was irrigated with Betadine and bacitracin.  The wound was closed using 0 Vicryl interrupted sutures in the deep tissue the fascia was closed with a running #1 strata fix.  Subcutaneous was closed with 0 and 2-0 Vicryl and staples in the skin.  Abduction pillow positioned for comfort and then her general anesthetic was reversed.]

## 2018-07-26 NOTE — THERAPY DISCHARGE NOTE
Acute Care - Physical Therapy Treatment Note/Discharge  [facility]     Patient Name: Raven Young  : 1955  MRN: [id number]  Today's Date: 2018  Onset of Illness/Injury or Date of Surgery: 18  Date of Referral to PT: 18  Referring Physician: Andriy Moss    Admit Date: 2018    Visit Dx:    ICD-10-CM ICD-9-CM   1. Difficulty walking R26.2 719.7   2. 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)   • Hx of total hip arthroplasty, right       Physical Therapy Education     Title: PT OT SLP Therapies (Resolved)     Topic: Physical Therapy (Resolved)     Point: Mobility training (Resolved)    Learning Progress Summary     Learner Status Readiness Method Response Comment Documented by    Patient Done Acceptance TB,E VU,DU  EE 18 1052     Done Acceptance E,D VU,NR  MS 18 1638          Point: Home exercise program (Resolved)    Learning Progress Summary     Learner Status Readiness Method Response Comment Documented by    Patient Done Acceptance TB,E VU,DU  EE 18 1052     Done Acceptance E,D VU,NR  MS 18 1638          Point: Body mechanics (Resolved)    Learning Progress Summary     Learner Status Readiness Method Response Comment Documented by    Patient Done Acceptance TB,E VU,DU  EE 18 1052     Done Acceptance E,D VU,NR  MS 18 1638          Point: Precautions (Resolved)    Learning Progress Summary     Learner Status Readiness  Method Response Comment Documented by    Patient Done Acceptance JOÃO PERRIN DU   07/26/18 1052     Done Acceptance E,MARAH ROMANO,KINSEY  MS 07/25/18 1638                      User Key     Initials Effective Dates Name Provider Type Discipline    EE 04/03/18 -  Rufina Chowdhury, PT Physical Therapist PT    MS 04/03/18 -  Gordy Daigle, PT Physical Therapist PT                Therapy Treatment        Rehabilitation Treatment Summary     Row Name 07/26/18 0940             Treatment Time/Intention    Discipline physical therapist  -EE      Document Type therapy note (daily note);discharge treatment  -EE      Subjective Information complains of;pain  -EE      Mode of Treatment physical therapy  -EE      Patient/Family Observations Pt sitting up in recliner in therapy gym; no acute distress  -EE      Patient Effort good  -EE      Existing Precautions/Restrictions fall;right;hip, posterior  -EE      Recorded by [EE] Rufina Chowdhury, PT 07/26/18 1052      Row Name 07/26/18 0940             Cognitive Assessment/Intervention- PT/OT    Orientation Status (Cognition) oriented x 4  -EE      Follows Commands (Cognition) WNL  -EE      Personal Safety Interventions fall prevention program maintained;gait belt;muscle strengthening facilitated;nonskid shoes/slippers when out of bed;supervised activity  -EE      Recorded by [EE] Rufina Chowdhury, PT 07/26/18 1052      Row Name 07/26/18 0940             Mobility Assessment/Intervention    Extremity Weight-bearing Status right lower extremity  -EE      Right Lower Extremity (Weight-bearing Status) partial weight-bearing (PWB)   50% weight bearing  -EE      Recorded by [EE] Rufina Chowdhury, PT 07/26/18 1052      Row Name 07/26/18 0940             Sit-Stand Transfer    Sit-Stand [place] (Transfers) stand by assist  -EE      Assistive Device (Sit-Stand Transfers) walker, front-wheeled  -EE      Recorded by [EE] Rufina Chowdhury, PT 07/26/18 1052      Row Name 07/26/18 0940             Stand-Sit Transfer    Stand-Sit  [place] (Transfers) stand by assist  -EE      Assistive Device (Stand-Sit Transfers) walker, front-wheeled  -EE      Recorded by [EE] Rufina Chowdhury, PT 07/26/18 1052      Row Name 07/26/18 0940             Gait/Stairs Assessment/Training    [place] Level (Gait) stand by assist  -EE      Assistive Device (Gait) walker, front-wheeled  -EE      Distance in Feet (Gait) 60  -EE      Pattern (Gait) step-to  -EE      Deviations/Abnormal Patterns (Gait) antalgic;nish decreased  -EE      Right Sided Gait Deviations heel strike decreased   weight bearing on toe primarily due to 50% WBing  -EE      Maintains Weight-bearing Status (Gait) able to maintain  -EE      Comment (Gait/Stairs) cues for sequencing with walker and technique for 50% partial weight bearing; pt demonstrates good awareness of safety precautions and weight bearing status  -EE      Recorded by [EE] Rufina Chowdhury, PT 07/26/18 1052      Row Name 07/26/18 0940             Therapeutic Exercise    Comment (Therapeutic Exercise) 15 reps MARAL protocol  -EE      Recorded by [EE] Rufina Chowdhury, PT 07/26/18 1052      Row Name 07/26/18 0940             Positioning and Restraints    Pre-Treatment Position sitting in chair/recliner  -EE      Post Treatment Position chair  -EE      In Chair reclined;call light within reach;encouraged to call for assist;legs elevated  -EE      Recorded by [EE] Rufina Chowdhury, PT 07/26/18 1052      Row Name 07/26/18 0940             Pain Scale: Numbers Pre/Post-Treatment    Pain Scale: Numbers, Pretreatment 4/10  -EE      Pain Scale: Numbers, Post-Treatment 4/10  -EE      Pain Location - Side Right  -EE      Pain Location hip  -EE      Pain Intervention(s) Repositioned;Ambulation/increased activity;Cold applied  -EE      Recorded by [EE] Rufina Chowdhury, PT 07/26/18 1052      Row Name                Wound 07/25/18 1228 Right hip incision    Wound - Properties Group Date first assessed: 07/25/18 [CG] Time first assessed: 1228 [CG] Side: Right  [CG] Location: hip [CG] Type: incision [CG] Recorded by:  [CG] Jaqui Alcazar RN 07/25/18 1228    Row Name 07/26/18 0940             Outcome Summary/Treatment Plan (PT)    Anticipated Discharge Disposition (PT) home with home health;home with assist  -EE      Recorded by [EE] Rufina Chowdhury, PT 07/26/18 1052        User Key  (r) = Recorded By, (t) = Taken By, (c) = Cosigned By    Initials Name Effective Dates Discipline    EE Rufina Chowdhury, PT 04/03/18 -  PT    CG Jaqui Alcazar RN 06/16/16 -  Nurse        Wound 07/25/18 1228 Right hip incision (Active)   Dressing Appearance dry;intact;no drainage 7/26/2018  8:24 AM   Closure LURDES 7/26/2018  8:24 AM   Base dressing in place, unable to visualize 7/25/2018  7:55 PM       PT Recommendation and Plan  Anticipated Discharge Disposition (PT): home with home health, home with assist  Outcome Summary/Treatment Plan (PT)  Anticipated Discharge Disposition (PT): home with home health, home with assist  Plan of Care Reviewed With: patient  Progress: improving  Outcome Summary: Pt demonstrates good progress w/mobility. Pt able to tolerate increased ther ex and demonstrates increased independence with transfers and ambulation. Pt now 50% PWB per MD; demonstrates good understanding of weight bearing status and able to safely maintain PWB during transfers and ambulation. Pt has all equipment needed at home; plans to DC home today and continue w/HH PT. No problems anticipated; no further acute PT concerns.           Outcome Measures     Row Name 07/26/18 1000 07/25/18 1600          How much help from another person do you currently need...    Turning from your back to your side while in flat bed without using bedrails? 4  -EE 4  -MS     Moving from lying on back to sitting on the side of a flat bed without bedrails? 4  -EE 4  -MS     Moving to and from a bed to a chair (including a wheelchair)? 4  -EE 3  -MS     Standing up from a chair using your arms (e.g., wheelchair, bedside  chair)? 4  -EE 3  -MS     Climbing 3-5 steps with a railing? 3  -EE 3  -MS     To walk in hospital room? 3  -EE 3  -MS     AM-PAC 6 Clicks Score 22  -EE 20  -MS        Functional Assessment    Outcome Measure Options AM-PAC 6 Clicks Basic Mobility (PT)  -EE AM-PAC 6 Clicks Basic Mobility (PT)  -MS       User Key  (r) = Recorded By, (t) = Taken By, (c) = Cosigned By    Initials Name Provider Type    EE Rufina Chowdhury, PT Physical Therapist    MS Gordy Daigle, PT Physical Therapist           Time Calculation:         PT Charges     Row Name 07/26/18 1054             Time Calculation    Start Time 0940  -EE      Stop Time 1000  -EE      Time Calculation (min) 20 min  -EE      PT Received On 07/26/18  -EE         Time Calculation- PT    Total Timed Code Minutes- PT 20 minute(s)  -EE        User Key  (r) = Recorded By, (t) = Taken By, (c) = Cosigned By    Initials Name Provider Type    EE Rufina Chowdhury, PT Physical Therapist        Therapy Suggested Charges     Code   Minutes Charges    None             Therapy Charges for Today     Code Description Service Date Service Provider Modifiers Qty    56024066963 HC PT THER PROC EA 15 MIN 7/26/2018 Rufina Chowdhury, PT GP 1    11816754192 HC PT MOBILITY PROJECTED 7/26/2018 Rufina Chowdhury, PT GP, CJ 1    18110242445 HC PT MOBILITY DISCHARGE 7/26/2018 Rufina Chowdhury, PT GP, CJ 1          PT G-Codes  Outcome Measure Options: AM-PAC 6 Clicks Basic Mobility (PT)  Functional Limitation: Mobility: Walking and moving around  Mobility: Walking and Moving Around Goal Status (): At least 20 percent but less than 40 percent impaired, limited or restricted  Mobility: Walking and Moving Around Discharge Status (): At least 20 percent but less than 40 percent impaired, limited or restricted    PT Discharge Summary  Anticipated Discharge Disposition (PT): home with home health, home with assist  Reason for Discharge: Discharge from facility  Outcomes Achieved: Patient able to partially acheive  established goals  Discharge Destination: Home with assist, Home with home health    Rufina Chowdhury, PT  7/26/2018

## 2018-07-26 NOTE — PROGRESS NOTES
Orthopedic Total Progress Note        Patient: Raven Young    Date of Admission: 7/24/2018 11:40 PM    YOB: 1955    Medical Record Number: [id number]    Attending Physician: Andriy Bailey MD      POD # 1     Status post: RT TOTAL HIP ARTHROPLASTY REVISION      Systemic or Specific Complaints: No Complaints      Allergies   Allergen Reactions   • Sulfa Antibiotics Rash         Current Medications:  Scheduled Meds:  DULoxetine 60 mg Oral QAM   ferrous sulfate 325 mg Oral Daily With Breakfast   sennosides-docusate sodium 2 tablet Oral Nightly   warfarin 5 mg Oral Daily     Continuous Infusions:  dextrose 5 % and sodium chloride 0.45 % with KCl 20 mEq/L 75 mL/hr Last Rate: 75 mL/hr (07/25/18 0034)   lactated ringers 100 mL/hr Last Rate: 100 mL/hr (07/25/18 2339)   lactated ringers 100 mL/hr Last Rate: Stopped (07/25/18 1523)     PRN Meds:.•  acetaminophen  •  bisacodyl  •  docusate sodium  •  [MAR Hold] HYDROmorphone **AND** [MAR Hold] naloxone  •  magnesium hydroxide  •  [MAR Hold] ondansetron **OR** [MAR Hold] ondansetron ODT **OR** [MAR Hold] ondansetron  •  oxyCODONE-acetaminophen  •  sodium chloride      Physical Exam: 62 y.o. female  General Appearance:    alert and oriented                Pain Relief: Patient reports some relief       Vitals:    07/25/18 1730 07/25/18 1946 07/25/18 2244 07/26/18 0322   BP: 110/64 99/58 100/62 106/65   BP Location:  Right arm Left arm Left arm   Patient Position:  Lying Lying Lying   Pulse: 72 76 63 63   Resp: 16 16 16 16   Temp:  97.5 °F (36.4 °C) 98 °F (36.7 °C) 98.5 °F (36.9 °C)   TempSrc:  Oral Oral Oral   SpO2: 97% 97% 97% 98%   Weight:       Height:               Extremities:   Operative extremity neurovascular status intact. ROM intact.    Incision intact w/out signs or  symptoms of infection. No           edema, no cyanosis, no calf tenderness     Pulses:     Pulses palpable and equal bilaterally     Skin:     Skin Warm/Dry w/out ulceration,  ecchymosis, rash, or   cyanosis     Activity: Mobilizing Per P.T.       Diagnostic Tests:   Admission on 07/24/2018   Component Date Value Ref Range Status   • WBC 07/25/2018 7.90  4.50 - 10.70 10*3/mm3 Final   • RBC 07/25/2018 3.86* 3.90 - 5.20 10*6/mm3 Final   • Hemoglobin 07/25/2018 10.2* 11.9 - 15.5 g/dL Final   • Hematocrit 07/25/2018 34.4* 35.6 - 45.5 % Final   • MCV 07/25/2018 89.1  80.5 - 98.2 fL Final   • MCH 07/25/2018 26.4* 26.9 - 32.0 pg Final   • MCHC 07/25/2018 29.7* 32.4 - 36.3 g/dL Final   • RDW 07/25/2018 14.3* 11.7 - 13.0 % Final   • RDW-SD 07/25/2018 46.4  37.0 - 54.0 fl Final   • MPV 07/25/2018 9.3  6.0 - 12.0 fL Final   • Platelets 07/25/2018 337  140 - 500 10*3/mm3 Final   • Glucose 07/25/2018 112* 65 - 99 mg/dL Final   • BUN 07/25/2018 15  8 - 23 mg/dL Final   • Creatinine 07/25/2018 0.92  0.57 - 1.00 mg/dL Final   • Sodium 07/25/2018 145  136 - 145 mmol/L Final   • Potassium 07/25/2018 3.7  3.5 - 5.2 mmol/L Final   • Chloride 07/25/2018 106  98 - 107 mmol/L Final   • CO2 07/25/2018 25.4  22.0 - 29.0 mmol/L Final   • Calcium 07/25/2018 8.4* 8.6 - 10.5 mg/dL Final   • eGFR Non  Amer 07/25/2018 62  >60 mL/min/1.73 Final   • BUN/Creatinine Ratio 07/25/2018 16.3  7.0 - 25.0 Final   • Anion Gap 07/25/2018 13.6  mmol/L Final   • Protime 07/25/2018 13.1  11.7 - 14.2 Seconds Final   • INR 07/25/2018 1.01  0.90 - 1.10 Final   • Hemoglobin 07/25/2018 10.8* 11.9 - 15.5 g/dL Final   • Hematocrit 07/25/2018 36.0  35.6 - 45.5 % Final   • Protime 07/25/2018 13.1  11.7 - 14.2 Seconds Final   • INR 07/25/2018 1.01  0.90 - 1.10 Final   • Hemoglobin 07/26/2018 9.7* 11.9 - 15.5 g/dL Final   • Hematocrit 07/26/2018 32.1* 35.6 - 45.5 % Final   • Protime 07/26/2018 13.2  11.7 - 14.2 Seconds Final   • INR 07/26/2018 1.02  0.90 - 1.10 Final       No results found.      Assessment:  Patient Active Problem List   Diagnosis   • Closed right hip fracture (CMS/HCC)   • Subtrochanteric fracture of right femur  (CMS/formerly [facility])   • Closed subtrochanteric fracture of femur with nonunion   • Closed left subtrochanteric femur fracture (CMS/formerly [facility])   • Subtrochanteric fracture, closed, right, with nonunion, subsequent encounter   • Status post orthopedic surgery, follow-up exam   • Status post total hip replacement, right   • Dislocated hip, right, sequela   • Failed total hip arthroplasty with dislocation (CMS/formerly [facility])   • Hx of total hip arthroplasty, right     Post-operative Pain  Immobility    Plan:    Continue Physical Therapy, increase mobility as tolerated.  Continue SCDs, Continue DVT prophalaxis.  Continue Pain management efforts  Continue Incisional care      Discharge Plan: today to home and home health    Date: 7/26/2018   Time: 6:40 AM    Andriy Bailey MD

## 2018-07-26 NOTE — DISCHARGE SUMMARY
[facility].  This patient was admitted on 724 with a dislocating right total hip.  She does undergone a proximal femoral replacement at another hospital and dislocated 3 times since the surgery about a month ago she's brought to the hospital here today for revision surgery patient was taken to the operating room on 725 where she underwent the revision surgery to a constrained liner.  She will be up with physical therapy today she has very little pain she'll be discharged home this afternoon 50% weightbearing we'll check her pro times on Friday and Monday.  She'll be on Coumadin 5 mg daily along with her Percocet 10/3/25 for pain.  She is to return to the office in about 3 weeks and she is to have her staples removed 2 weeks postop by home health.  Medications on discharge are her routine home medicines plus the Percocet and the Coumadin thank you

## 2018-07-26 NOTE — PLAN OF CARE
Problem: Patient Care Overview  Goal: Plan of Care Review  Outcome: Ongoing (interventions implemented as appropriate)   07/26/18 0107   Coping/Psychosocial   Plan of Care Reviewed With patient   Plan of Care Review   Progress improving   OTHER   Outcome Summary patient resting comfortably through night, pain controlled at this time, patient educated on PONV and CAUTI prevention     Goal: Individualization and Mutuality  Outcome: Ongoing (interventions implemented as appropriate)    Goal: Discharge Needs Assessment  Outcome: Ongoing (interventions implemented as appropriate)    Goal: Interprofessional Rounds/Family Conf  Outcome: Ongoing (interventions implemented as appropriate)      Problem: Fall Risk (Adult)  Goal: Absence of Fall  Outcome: Ongoing (interventions implemented as appropriate)      Problem: Hip Arthroplasty (Total, Partial) (Adult)  Goal: Signs and Symptoms of Listed Potential Problems Will be Absent, Minimized or Managed (Hip Arthroplasty)  Outcome: Ongoing (interventions implemented as appropriate)    Goal: Anesthesia/Sedation Recovery  Outcome: Outcome(s) achieved Date Met: 07/26/18

## 2018-07-26 NOTE — PROGRESS NOTES
Continued Stay Note  [facility]     Patient Name: Raven Young  MRN: [id number]  Today's Date: 7/26/2018    Admit Date: 7/24/2018          Discharge Plan     Row Name 07/26/18 1109       Plan    Plan Comments Spoek with [facility] [place].  Patient is current with them and they will follow at [place].  [facility] is aware that patient is d/d'd today. Magaly Oneill RN    Final Discharge Disposition Code 06 - home with home health care              Discharge Codes    No documentation.       Expected Discharge Date and Time     Expected Discharge Date Expected Discharge Time    Jul 26, 2018             Magaly Oneill RN

## 2018-07-27 NOTE — PROGRESS NOTES
"Case Management Discharge Note    Final Note: DC\"d home via private auto with [facility]. Magaly Oneill RN    Destination     No service has been selected for the patient.      Durable Medical Equipment     No service has been selected for the patient.      Dialysis/Infusion     No service has been selected for the patient.      Home Medical Care - Selection Complete     Service Request Status Selected Specialties Address Phone Number Fax Number    [facility] Selected Home Health Services [address] [place] [id number] [phone]      Social Care     No service has been selected for the patient.        Other: Other (private auto)    Final Discharge Disposition Code: 06 - home with home health care  "

## 2018-07-28 LAB
BACTERIA SPEC AEROBE CULT: NO GROWTH
GRAM STN SPEC: NORMAL
GRAM STN SPEC: NORMAL

## 2018-07-30 LAB — BACTERIA SPEC ANAEROBE CULT: NORMAL

## 2019-04-10 NOTE — PROGRESS NOTES
Subjective: Status post IM rodding right subtrochanteric femur fracture, chondromalacia right knee     Patient ID: Raven Young is a 62 y.o. female.    Chief Complaint:    History of Present Illness 62-year-old female returns with a new complaint of right knee pain that she's had for the past week or so.  She is just under 3 months status post re-rodding of his subtrochanteric femur fracture is doing well from that standpoint but the knees recently become symptomatic.  Again no history of trauma.       Social History     Occupational History   • Not on file.     Social History Main Topics   • Smoking status: Never Smoker   • Smokeless tobacco: Never Used   • Alcohol use Yes      Comment: rare   • Drug use: No   • Sexual activity: Defer      Review of Systems   Constitutional: Negative for chills, diaphoresis, fever and unexpected weight change.   HENT: Negative for hearing loss, nosebleeds, sore throat and tinnitus.    Eyes: Negative for pain and visual disturbance.   Respiratory: Negative for cough, shortness of breath and wheezing.    Cardiovascular: Negative for chest pain and palpitations.   Gastrointestinal: Negative for abdominal pain, diarrhea, nausea and vomiting.   Endocrine: Negative for cold intolerance, heat intolerance and polydipsia.   Genitourinary: Negative for difficulty urinating, dysuria and hematuria.   Musculoskeletal: Positive for arthralgias. Negative for joint swelling and myalgias.   Skin: Negative for rash and wound.   Allergic/Immunologic: Negative for environmental allergies.   Neurological: Negative for dizziness, syncope and numbness.   Hematological: Does not bruise/bleed easily.   Psychiatric/Behavioral: Negative for dysphoric mood and sleep disturbance. The patient is not nervous/anxious.    All other systems reviewed and are negative.        Past Medical History:   Diagnosis Date   • Cancer     basal cell   • Depression with anxiety      Past Surgical History:   Procedure  Laterality Date   • HARDWARE REMOVAL Right 8/15/2017    Procedure: HARDWARE REMOVAL long gamma nail  with long gamma nail right subtrochanteric femur fracture meir vanco powder ;  Surgeon: Keyshawn Madden MD;  Location:  [facility] OR;  Service:    • IM NAILING FEMORAL SHAFT RETROGRADE Right 08/15/2017    replacement leah  fx   • TX OPEN FIX INTER/SUBTROCH FX,IMPLNT Right 4/14/2017    Procedure: FEMUR INTRAMEDULLARY NAILING/RODDING long meir gamma nail 7fr hemovac placement;  Surgeon: Keyshawn Madden MD;  Location:  [facility] OR;  Service: Orthopedics   • TUBAL ABDOMINAL LIGATION       Family History   Problem Relation Age of Onset   • Asthma Mother    • No Known Problems Father          Objective:  There were no vitals filed for this visit.  There were no vitals filed for this visit.  There is no height or weight on file to calculate BMI.       Ortho Exam  AP lateral of the right femur to evaluate the fracture in the knee shows increasing callus at the fracture site of the subtrochanteric area and the knee is completely benign.  Compared to previous x-rays there is further healing of the subtrochanteric fracture.  She is alert and oriented ×3.  The right knee shows no swelling effusion erythema she has 0-125° of motion but there is pain and mild crepitus with range of motion.  There is no instability.  Normal patellar tracking.  Negative Jam's.  Skin is cool to touch.  Calf is nontender negative Homans.  Quad function is 5 over 5.    Assessment:       1. Pain    2. Status post orthopedic surgery, follow-up exam    3. Subtrochanteric fracture of right femur, closed, with nonunion, subsequent encounter    4. Chondromalacia of right knee          Plan: Reviewed the x-rays and physical findings with the patient.  Believe she is developing chondromalacia of the knee.  I will begin meloxicam 7.5 daily and also will inject the knee after discussion treatment options with the patient with 8 cc lidocaine 1 cc Kenalog.  That  was accomplished through the superolateral portal to sterile prep without complications tolerating it well.  Return to see me in 3 weeks for follow-up  Large Joint Arthrocentesis  Date/Time: 11/9/2017 9:51 AM  Consent given by: patient  Site marked: site marked  Timeout: Immediately prior to procedure a time out was called to verify the correct patient, procedure, equipment, support staff and site/side marked as required   Supporting Documentation  Indications: pain   Procedure Details  Location: knee - R knee  Preparation: Patient was prepped and draped in the usual sterile fashion  Needle size: 22 G  Approach: superior  Medications administered: 8 mL lidocaine PF 1% 1 %; 40 mg triamcinolone acetonide 40 MG/ML  Patient tolerance: patient tolerated the procedure well with no immediate complications                  Work Status:    DANIA query complete.    Orders:  Orders Placed This Encounter   Procedures   • Large Joint Arthrocentesis   • XR Femur 2 View Right       Medications:      Followup:  Return in about 3 weeks (around 11/30/2017).          Dragon transcription disclaimer     Much of this encounter note is an electronic transcription/translation of spoken language to printed text. The electronic translation of spoken language may permit erroneous, or at times, nonsensical words or phrases to be inadvertently transcribed. Although I have reviewed the note for such errors, some may still exist.   [Normal] : affect appropriate

## 2019-05-06 ENCOUNTER — PROCEDURE VISIT (OUTPATIENT)
Dept: OBSTETRICS AND GYNECOLOGY | Age: 64
End: 2019-05-06

## 2019-05-06 ENCOUNTER — OFFICE VISIT (OUTPATIENT)
Dept: OBSTETRICS AND GYNECOLOGY | Age: 64
End: 2019-05-06

## 2019-05-06 VITALS
SYSTOLIC BLOOD PRESSURE: 108 MMHG | HEIGHT: 62 IN | BODY MASS INDEX: 29.77 KG/M2 | WEIGHT: 161.8 LBS | DIASTOLIC BLOOD PRESSURE: 68 MMHG

## 2019-05-06 DIAGNOSIS — Z12.31 SCREENING MAMMOGRAM, ENCOUNTER FOR: ICD-10-CM

## 2019-05-06 DIAGNOSIS — Z01.419 WELL WOMAN EXAM WITH ROUTINE GYNECOLOGICAL EXAM: Primary | ICD-10-CM

## 2019-05-06 DIAGNOSIS — N95.0 PMB (POSTMENOPAUSAL BLEEDING): Primary | ICD-10-CM

## 2019-05-06 DIAGNOSIS — N95.0 POSTMENOPAUSAL BLEEDING: ICD-10-CM

## 2019-05-06 DIAGNOSIS — Z13.89 SCREENING FOR BLOOD OR PROTEIN IN URINE: ICD-10-CM

## 2019-05-06 LAB
BILIRUB BLD-MCNC: NEGATIVE MG/DL
CLARITY, POC: CLEAR
COLOR UR: YELLOW
GLUCOSE UR STRIP-MCNC: NEGATIVE MG/DL
KETONES UR QL: NEGATIVE
LEUKOCYTE EST, POC: ABNORMAL
NITRITE UR-MCNC: NEGATIVE MG/ML
PH UR: 5.5 [PH] (ref 5–8)
PROT UR STRIP-MCNC: NEGATIVE MG/DL
RBC # UR STRIP: ABNORMAL /UL
SP GR UR: 1.03 (ref 1–1.03)
UROBILINOGEN UR QL: NORMAL

## 2019-05-06 PROCEDURE — 99213 OFFICE O/P EST LOW 20 MIN: CPT | Performed by: NURSE PRACTITIONER

## 2019-05-06 PROCEDURE — 76830 TRANSVAGINAL US NON-OB: CPT | Performed by: OBSTETRICS & GYNECOLOGY

## 2019-05-06 PROCEDURE — 99386 PREV VISIT NEW AGE 40-64: CPT | Performed by: NURSE PRACTITIONER

## 2019-05-06 PROCEDURE — 81002 URINALYSIS NONAUTO W/O SCOPE: CPT | Performed by: NURSE PRACTITIONER

## 2019-05-06 RX ORDER — ASPIRIN 81 MG/1
81 TABLET, CHEWABLE ORAL DAILY
COMMUNITY
End: 2022-11-09

## 2019-05-06 NOTE — PROGRESS NOTES
[facility]  Routine Annual Visit    2019    Patient: Raven Young          MR#:[id number]      History of Present Illness    63 y.o. female  who presents for annual exam as an old/new patient for annual exam and with complaint of PMB. Last here in - pap ASCUS HPV negative at that time. 3D mammogram negative 2018. DEXA normal 2018. Pt reports up to date on colon screening.  Raven sees Dr. Kathleen for primary care. Raven is postmenopausal and has never been on HRT. She does report light intermittent vaginal bleeding x several days associated with mild cramping- has not noticed any since yesterday. She reports this as her first episode of postmenopausal bleeding. She is no longer sexually active. Denies pelvic pain, dysuria, hematuria, fever, or other concerns. US completed and shows normal endometrial lining and normal ovaries bilaterally.     No LMP recorded (lmp unknown). Patient is postmenopausal.  Obstetric History:  OB History      Para Term  AB Living    2 2 2     2    SAB TAB Ectopic Molar Multiple Live Births              2         Menstrual History:     No LMP recorded (lmp unknown). Patient is postmenopausal.       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)   • Hx of total hip arthroplasty, right       Past Medical History:   Diagnosis Date   • Cancer (CMS/HCC)     basal cell   • Depression with anxiety    • PONV (postoperative  nausea and vomiting)    • Right hip pain     SCHEDULED FOR SX       Past Surgical History:   Procedure Laterality Date   • COLONOSCOPY     • FIBULA BONE GRAFT TO HIP Right 12/15/2017    Procedure: FIBULA BONE GRAFT TO HIP with cancellus autograft the [place] cable ;  Surgeon: Keyshawn Madden MD;  Location: [facility] OR;  Service:    • FRACTURE SURGERY     • HARDWARE REMOVAL Right 8/15/2017    Procedure: HARDWARE REMOVAL long gamma nail  with long gamma nail right subtrochanteric femur fracture meir vanco powder ;  Surgeon: Keyshawn Madden MD;  Location: [facility] OR;  Service:    • HIP CLOSED REDUCTION Right 7/17/2018    Procedure: HIP CLOSED REDUCTION;  Surgeon: Keyshawn Madden MD;  Location: [facility] OR;  Service: Orthopedics   • HIP CLOSED REDUCTION Right 7/23/2018    Procedure: HIP CLOSED REDUCTION;  Surgeon: Keyshawn Madden MD;  Location: [facility] OR;  Service: Orthopedics   • IM NAILING FEMORAL SHAFT RETROGRADE Right 08/15/2017    replacement leah  fx   • JOINT REPLACEMENT     • KS OPEN FIX INTER/SUBTROCH FX,IMPLNT Right 4/14/2017    Procedure: FEMUR INTRAMEDULLARY NAILING/RODDING long meir gamma nail 7fr hemovac placement;  Surgeon: Keyshawn Madden MD;  Location: [facility] OR;  Service: Orthopedics   • SKIN BIOPSY     • TOTAL HIP ARTHROPLASTY Right 6/19/2018    Procedure: TOTAL HIP ARTHROPLASTY;  Surgeon: Keyshawn Madden MD;  Location: [facility] OR;  Service: Orthopedics   • TOTAL HIP ARTHROPLASTY REVISION Right 7/25/2018    Procedure: RT TOTAL HIP ARTHROPLASTY REVISION;  Surgeon: Andriy Bailey MD;  Location: [facility] MAIN OR;  Service: Orthopedics   • TUBAL ABDOMINAL LIGATION         Social History     Tobacco Use   Smoking Status Never Smoker   Smokeless Tobacco Never Used       Family History   Problem Relation Age of Onset   • Asthma Mother    • No Known Problems Father        Prior to Admission medications    Medication Sig Start Date End Date Taking? Authorizing Provider   aspirin 81 MG chewable tablet Chew 81 mg Daily.   " Yes Provider, MD Zackery   DULoxetine (CYMBALTA) 60 MG capsule Take 60 mg by mouth Every Morning.   Yes Zackery Lind MD   oxyCODONE-acetaminophen (PERCOCET)  MG per tablet Take 1 tablet by mouth Every 4 (Four) Hours As Needed for Moderate Pain  or Severe Pain . 6/21/18   Keyshawn Madden MD   oxyCODONE-acetaminophen (PERCOCET)  MG per tablet Take 1 tablet by mouth Every 4 (Four) Hours As Needed for Moderate Pain  or Severe Pain . 7/26/18   Andriy Bailey MD   warfarin (COUMADIN) 5 MG tablet 1 po daily 7/26/18   Andriy Bailey MD     ________________________________________    The following portions of the patient's history were reviewed and updated as appropriate: allergies, current medications, past family history, past medical history, past social history, past surgical history and problem list.    Review of Systems   Constitutional: Negative.    HENT: Negative.    Eyes: Negative for visual disturbance.   Respiratory: Negative for cough, shortness of breath and wheezing.    Cardiovascular: Negative for chest pain, palpitations and leg swelling.   Gastrointestinal: Negative for abdominal distention, abdominal pain, blood in stool, constipation, diarrhea, nausea and vomiting.   Endocrine: Negative for cold intolerance and heat intolerance.   Genitourinary: Positive for menstrual problem. Negative for difficulty urinating, dyspareunia, dysuria, frequency, genital sores, hematuria, pelvic pain, urgency, vaginal bleeding, vaginal discharge and vaginal pain.   Musculoskeletal: Negative.    Skin: Negative.    Neurological: Negative for dizziness, weakness, light-headedness, numbness and headaches.   Hematological: Negative.    Psychiatric/Behavioral: Negative.    Breasts: negative for lumps skin changes, dimpling, swelling, nipple changes/discharge bilaterally         Objective   Physical Exam    /68   Ht 157.5 cm (62\")   Wt 73.4 kg (161 lb 12.8 oz)   LMP  (LMP Unknown) Comment: " "lmp around 2005  Breastfeeding? No   BMI 29.59 kg/m²    BP Readings from Last 3 Encounters:   05/06/19 108/68   07/26/18 98/58   07/24/18 117/57      Wt Readings from Last 3 Encounters:   05/06/19 73.4 kg (161 lb 12.8 oz)   07/25/18 76.2 kg (167 lb 15.9 oz)   07/23/18 75.9 kg (167 lb 6.4 oz)        BMI: Estimated body mass index is 29.59 kg/m² as calculated from the following:    Height as of this encounter: 157.5 cm (62\").    Weight as of this encounter: 73.4 kg (161 lb 12.8 oz).        General:   alert, appears stated age and cooperative   Heart: regular rate and rhythm, S1, S2 normal, no murmur, click, rub or gallop   Lungs: clear to auscultation bilaterally   Abdomen: soft, non-tender, without masses or organomegaly   Breast: inspection negative, no nipple discharge or bleeding, no masses or nodularity palpable   Vulva: normal   Vagina: normal mucosa, normal discharge, atrophic appearance    Cervix: no cervical motion tenderness and no lesions   Uterus: normal size, mobile, non-tender, normal shape and consistency or exam is limited habitus    Adnexa: no mass, fullness, tenderness     Assessment:    1. Normal annual exam   2. PMB - US normal today; Raven is to continue monitoring symptoms and to call persist or if new symptoms develop. Explained even though US normal, persistent postmenopausal bleeding will need further evaluation.   3. Healthy lifestyle modifications discussed, counseled on self breast exams and bone health      Plan:    []  Rx:   []  Mammogram request made  []  PAP done  []  Occult fecal blood test (Insure)  []  Labs:   []  GC/Chl/TV  []  DEXA scan   []  Referral for colonoscopy:           MARTY Lai  5/6/2019 10:52 AM  "

## 2019-05-08 ENCOUNTER — TELEPHONE (OUTPATIENT)
Dept: OBSTETRICS AND GYNECOLOGY | Age: 64
End: 2019-05-08

## 2019-05-08 LAB
BACTERIA UR CULT: NORMAL
BACTERIA UR CULT: NORMAL
CYTOLOGIST CVX/VAG CYTO: ABNORMAL
CYTOLOGY CVX/VAG DOC CYTO: ABNORMAL
CYTOLOGY CVX/VAG DOC THIN PREP: ABNORMAL
DX ICD CODE: ABNORMAL
HIV 1 & 2 AB SER-IMP: ABNORMAL
HPV I/H RISK 4 DNA CVX QL PROBE+SIG AMP: POSITIVE
OTHER STN SPEC: ABNORMAL
STAT OF ADQ CVX/VAG CYTO-IMP: ABNORMAL

## 2019-05-08 NOTE — TELEPHONE ENCOUNTER
Informed patient of negative urine culture. Raven states feeling well with no further symptoms of spotting/cramping. She is instructed to call if these symptoms reoccur.

## 2019-05-10 ENCOUNTER — APPOINTMENT (OUTPATIENT)
Dept: MAMMOGRAPHY | Facility: HOSPITAL | Age: 64
End: 2019-05-10

## 2019-05-14 ENCOUNTER — TELEPHONE (OUTPATIENT)
Dept: OBSTETRICS AND GYNECOLOGY | Age: 64
End: 2019-05-14

## 2019-05-14 NOTE — TELEPHONE ENCOUNTER
Patient stated she had off and on spotting on Friday, May 13 and again yesterday, along with pressure, rather than cramping feeling.  Please advise.  yamileth

## 2019-05-14 NOTE — TELEPHONE ENCOUNTER
Pt advised.  Scheduled appointment for follow up 6 month pap on 11/13/19.  Also stated she is having off and on bleeding still.  Please advise.  yamileth

## 2019-05-14 NOTE — TELEPHONE ENCOUNTER
----- Message from MARTY Gannon sent at 5/14/2019  9:48 AM EDT -----  Please inform pt her PAP test was negative for any precancerous cell changes. A test for HPV (human papilloma virus) was also ran. This returned positive. This is a very common virus that nearly all sexually active women get at some point in their lives. It can cause cancer of the cervix or more commonly precancerous changes. It takes years for these changes to occur. In most cases, the immune system clears the virus from the genital tract in 12-24 months.     Since it has been a while since your last pap, lets repeat a pap smear in 6 months to be safe. Please help patient schedule appointment with me in 6 months.    Thank you.

## 2019-05-14 NOTE — TELEPHONE ENCOUNTER
Is the bleeding any heavier or still only light spotting? Pain, cramping, or any other symptoms present?

## 2019-05-14 NOTE — TELEPHONE ENCOUNTER
As my initial workup returned negative, I recommend consult with Dr. Morocho for further evaluation. Please help pt arrange appointment. Thank you.

## 2019-05-29 ENCOUNTER — OFFICE VISIT (OUTPATIENT)
Dept: OBSTETRICS AND GYNECOLOGY | Age: 64
End: 2019-05-29

## 2019-05-29 VITALS
DIASTOLIC BLOOD PRESSURE: 82 MMHG | HEIGHT: 62 IN | BODY MASS INDEX: 30.18 KG/M2 | WEIGHT: 164 LBS | SYSTOLIC BLOOD PRESSURE: 118 MMHG

## 2019-05-29 DIAGNOSIS — N95.0 PMB (POSTMENOPAUSAL BLEEDING): Primary | ICD-10-CM

## 2019-05-29 PROCEDURE — 99213 OFFICE O/P EST LOW 20 MIN: CPT | Performed by: OBSTETRICS & GYNECOLOGY

## 2019-05-29 NOTE — PROGRESS NOTES
2019      Patient:  Raven Young   MR#:[id number]    Office note    Chief Complaint   Patient presents with   • Follow-up     pmb per EP, U/S done 19.   c/o spotting still, some left side tenderness.        Subjective     History of Present Illness  63 y.o. female  presents for follow-up on postmenopausal bleeding first reported to the nurse practitioner.  Patient had ultrasound performed 2019 that showed endometrial thickness of 3.6 mm.  Since being seen the patient continues to have intermittent mild spotting with episodes 2 to 3/week.  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)   • Hx of total hip arthroplasty, right   • PMB (postmenopausal bleeding)       Past Medical History:   Diagnosis Date   • Cancer (CMS/HCC)     basal cell   • Depression with anxiety    • PONV (postoperative nausea and vomiting)    • Right hip pain     SCHEDULED FOR SX       Past Surgical History:   Procedure Laterality Date   • COLONOSCOPY     • FIBULA BONE GRAFT TO HIP Right 12/15/2017    Procedure: FIBULA BONE GRAFT TO HIP with cancellus autograft the [place] cable ;  Surgeon: Keyshawn Madden MD;  Location: [facility] OR;  Service:    • FRACTURE SURGERY     • HARDWARE REMOVAL Right 8/15/2017    Procedure: HARDWARE REMOVAL long gamma nail  with long gamma nail right subtrochanteric femur fracture meir vanco powder ;  Surgeon: Keyshawn Madden MD;  Location: [facility]  OR;  Service:    • HIP CLOSED REDUCTION Right 2018    Procedure: HIP CLOSED REDUCTION;  Surgeon: Keyshawn Madden MD;  Location:  [facility] OR;  Service: Orthopedics   • HIP CLOSED REDUCTION Right 2018    Procedure: HIP CLOSED REDUCTION;  Surgeon: Keyshawn Madden MD;  Location:  [facility] OR;  Service: Orthopedics   • IM NAILING FEMORAL SHAFT RETROGRADE Right 08/15/2017    replacement leah  fx   • JOINT REPLACEMENT     • [place] OPEN FIX INTER/SUBTROCH FX,IMPLNT Right 2017    Procedure: FEMUR INTRAMEDULLARY NAILING/RODDING long meir gamma nail 7fr hemovac placement;  Surgeon: Keyshawn Madden MD;  Location:  [facility] OR;  Service: Orthopedics   • SKIN BIOPSY     • TOTAL HIP ARTHROPLASTY Right 2018    Procedure: TOTAL HIP ARTHROPLASTY;  Surgeon: Keyshawn Madden MD;  Location: [facility] OR;  Service: Orthopedics   • TOTAL HIP ARTHROPLASTY REVISION Right 2018    Procedure: RT TOTAL HIP ARTHROPLASTY REVISION;  Surgeon: Andriy Bailey MD;  Location: [facility] MAIN OR;  Service: Orthopedics   • TUBAL ABDOMINAL LIGATION         Obstetric History:  OB History      Para Term  AB Living    2 2 2     2    SAB TAB Ectopic Molar Multiple Live Births              2         Menstrual History:     No LMP recorded (lmp unknown). Patient is postmenopausal.       #: 1, Date: 1975, Sex: Female, Weight: 3147 g (6 lb 15 oz), GA: None, Delivery: Vaginal, Spontaneous, Apgar1: None, Apgar5: None, Living: Living, Birth Comments: None    #: 2, Date: 1978, Sex: Male, Weight: 3090 g (6 lb 13 oz), GA: None, Delivery: Vaginal, Spontaneous, Apgar1: None, Apgar5: None, Living: Living, Birth Comments: None      Family History   Problem Relation Age of Onset   • Asthma Mother    • Lung disease Mother    • No Known Problems Father    • Heart disease Sister    • Heart disease Brother    • Liver cancer Brother        Social History     Tobacco Use   • Smoking status: Never Smoker   • Smokeless tobacco: Never Used   Substance Use Topics  "  • Alcohol use: Yes     Comment: rare   • Drug use: No       Sulfa antibiotics      Current Outpatient Medications:   •  aspirin 81 MG chewable tablet, Chew 81 mg Daily., Disp: , Rfl:   •  DULoxetine (CYMBALTA) 60 MG capsule, Take 60 mg by mouth Every Morning., Disp: , Rfl:     The following portions of the patient's history were reviewed and updated as appropriate: allergies, current medications, past family history, past medical history, past social history, past surgical history and problem list.    Review of Systems   Constitutional: Negative.    Respiratory: Negative.    Cardiovascular: Negative.    Gastrointestinal: Negative.    Genitourinary: Positive for vaginal bleeding.   Psychiatric/Behavioral: Negative.        BP Readings from Last 3 Encounters:   05/29/19 118/82   05/06/19 108/68   07/26/18 98/58      Wt Readings from Last 3 Encounters:   05/29/19 74.4 kg (164 lb)   05/06/19 73.4 kg (161 lb 12.8 oz)   07/25/18 76.2 kg (167 lb 15.9 oz)      BMI: Estimated body mass index is 30 kg/m² as calculated from the following:    Height as of this encounter: 157.5 cm (62\").    Weight as of this encounter: 74.4 kg (164 lb).  BSA: Estimated body surface area is 1.76 meters squared as calculated from the following:    Height as of this encounter: 157.5 cm (62\").    Weight as of this encounter: 74.4 kg (164 lb).    Objective   Physical Exam   Constitutional: She is oriented to person, place, and time. She appears well-developed and well-nourished.   HENT:   Head: Normocephalic and atraumatic.   Cardiovascular: Normal rate.   Pulmonary/Chest: Effort normal.   Abdominal: Soft. Bowel sounds are normal. She exhibits no distension. There is no tenderness.   Genitourinary: Vagina normal and uterus normal.   Genitourinary Comments: Normal-appearing cervix without polyp or any lesion.  No evidence of any bleeding   Neurological: She is alert and oriented to person, place, and time.   Skin: Skin is warm and dry.   Psychiatric: " She has a normal mood and affect. Her behavior is normal. Judgment and thought content normal.   Nursing note and vitals reviewed.        Assessment/Plan     Raven was seen today for follow-up.    Diagnoses and all orders for this visit:    PMB (postmenopausal bleeding)      Discussed normal work-up.  I do not feel endometrial biopsy is indicated at this time.  If episodic spotting bleeding continues endometrial biopsy may be indicated.  The patient is instructed return in 8 weeks if she still having spotting after that time.    No Follow-up on file.        Hussein Morocho MD   5/29/2019 2:03 PM

## 2019-08-07 ENCOUNTER — OFFICE VISIT (OUTPATIENT)
Dept: OBSTETRICS AND GYNECOLOGY | Age: 64
End: 2019-08-07

## 2019-08-07 VITALS — BODY MASS INDEX: 30 KG/M2 | SYSTOLIC BLOOD PRESSURE: 124 MMHG | DIASTOLIC BLOOD PRESSURE: 78 MMHG | HEIGHT: 62 IN

## 2019-08-07 DIAGNOSIS — N95.0 PMB (POSTMENOPAUSAL BLEEDING): ICD-10-CM

## 2019-08-07 DIAGNOSIS — N39.3 STRESS INCONTINENCE OF URINE: Primary | ICD-10-CM

## 2019-08-07 DIAGNOSIS — Z13.89 SCREENING FOR BLOOD OR PROTEIN IN URINE: ICD-10-CM

## 2019-08-07 LAB
BILIRUB BLD-MCNC: NEGATIVE MG/DL
CLARITY, POC: CLEAR
COLOR UR: YELLOW
GLUCOSE UR STRIP-MCNC: NEGATIVE MG/DL
KETONES UR QL: NEGATIVE
LEUKOCYTE EST, POC: ABNORMAL
NITRITE UR-MCNC: NEGATIVE MG/ML
PH UR: 5 [PH] (ref 5–8)
PROT UR STRIP-MCNC: ABNORMAL MG/DL
RBC # UR STRIP: ABNORMAL /UL
SP GR UR: 1.03 (ref 1–1.03)
UROBILINOGEN UR QL: NORMAL

## 2019-08-07 PROCEDURE — 81002 URINALYSIS NONAUTO W/O SCOPE: CPT | Performed by: OBSTETRICS & GYNECOLOGY

## 2019-08-07 PROCEDURE — 99213 OFFICE O/P EST LOW 20 MIN: CPT | Performed by: OBSTETRICS & GYNECOLOGY

## 2019-08-07 NOTE — PROGRESS NOTES
2019      Patient:  Raven Young   MR#:[id number]    Office note    Chief Complaint   Patient presents with   • Follow-up     AUB. No bleeding since approx 19.         Subjective     History of Present Illness  63 y.o. female  presents for follow-up on postmenopausal bleeding that appears to have mostly resolved.  The patient had a brief episode 2019 and has had no bleeding since.  The patient's previous endometrial lining measured quite thin.    The patient is complaining of foul-smelling urine with intermittent moderate daily leaking with cough and sneeze.  She states the staining of her undergarments is quite problematic and she wished to pursue evaluation and treatment.        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)   • Hx of total hip arthroplasty, right   • PMB (postmenopausal bleeding)   • Stress incontinence of urine       Past Medical History:   Diagnosis Date   • Cancer (CMS/HCC)     basal cell   • Depression with anxiety    • PONV (postoperative nausea and vomiting)    • Right hip pain     SCHEDULED FOR SX       Past Surgical History:   Procedure Laterality Date   • COLONOSCOPY     • FIBULA BONE GRAFT TO HIP Right 12/15/2017    Procedure: FIBULA BONE GRAFT TO HIP with cancellus autograft the Jennifer-Marquis cable ;  Surgeon: Keyshawn Madden MD;  Location: [facility];  Service:    • FRACTURE SURGERY     • HARDWARE REMOVAL Right 8/15/2017    Procedure: HARDWARE REMOVAL long gamma nail  with long gamma nail right  subtrochanteric femur fracture meir vanco powder ;  Surgeon: Keyshawn Madden MD;  Location: [facility] OR;  Service:    • HIP CLOSED REDUCTION Right 2018    Procedure: HIP CLOSED REDUCTION;  Surgeon: Keyshawn Madden MD;  Location:  [facility] OR;  Service: Orthopedics   • HIP CLOSED REDUCTION Right 2018    Procedure: HIP CLOSED REDUCTION;  Surgeon: Keyshawn Madden MD;  Location:  [facility] OR;  Service: Orthopedics   • IM NAILING FEMORAL SHAFT RETROGRADE Right 08/15/2017    replacement leah  fx   • JOINT REPLACEMENT     • CO OPEN FIX INTER/SUBTROCH FX,IMPLNT Right 2017    Procedure: FEMUR INTRAMEDULLARY NAILING/RODDING long mier gamma nail 7fr hemovac placement;  Surgeon: Keyshawn Madden MD;  Location: [facility] OR;  Service: Orthopedics   • SKIN BIOPSY     • TOTAL HIP ARTHROPLASTY Right 2018    Procedure: TOTAL HIP ARTHROPLASTY;  Surgeon: Keyshawn Madden MD;  Location: [facility] OR;  Service: Orthopedics   • TOTAL HIP ARTHROPLASTY REVISION Right 2018    Procedure: RT TOTAL HIP ARTHROPLASTY REVISION;  Surgeon: Andriy Bailey MD;  Location: [facility] MAIN OR;  Service: Orthopedics   • TUBAL ABDOMINAL LIGATION         Obstetric History:  OB History      Para Term  AB Living    2 2 2     2    SAB TAB Ectopic Molar Multiple Live Births              2         Menstrual History:     No LMP recorded (lmp unknown). Patient is postmenopausal.       #: 1, Date: 1975, Sex: Female, Weight: 3147 g (6 lb 15 oz), GA: None, Delivery: Vaginal, Spontaneous, Apgar1: None, Apgar5: None, Living: Living, Birth Comments: None    #: 2, Date: 1977, Sex: Male, Weight: 3090 g (6 lb 13 oz), GA: None, Delivery: Vaginal, Spontaneous, Apgar1: None, Apgar5: None, Living: Living, Birth Comments: None      Family History   Problem Relation Age of Onset   • Asthma Mother    • Lung disease Mother    • No Known Problems Father    • Heart disease Sister    • Heart disease Brother    • Liver cancer Brother        Social History  "    Tobacco Use   • Smoking status: Never Smoker   • Smokeless tobacco: Never Used   Substance Use Topics   • Alcohol use: Yes     Comment: rare   • Drug use: No       Sulfa antibiotics      Current Outpatient Medications:   •  DULoxetine (CYMBALTA) 60 MG capsule, Take 60 mg by mouth Every Morning., Disp: , Rfl:   •  aspirin 81 MG chewable tablet, Chew 81 mg Daily., Disp: , Rfl:     The following portions of the patient's history were reviewed and updated as appropriate: allergies, current medications, past family history, past medical history, past social history, past surgical history and problem list.    Review of Systems   Constitutional: Negative.    Respiratory: Negative.    Cardiovascular: Negative.    Gastrointestinal: Negative.    Genitourinary: Positive for dysuria, menstrual problem and vaginal bleeding.        Leak of urine with cough, sneeze or any valsalva    Psychiatric/Behavioral: Negative.        BP Readings from Last 3 Encounters:   08/07/19 124/78   05/29/19 118/82   05/06/19 108/68      Wt Readings from Last 3 Encounters:   05/29/19 74.4 kg (164 lb)   05/06/19 73.4 kg (161 lb 12.8 oz)   07/25/18 76.2 kg (167 lb 15.9 oz)      BMI: Estimated body mass index is 30 kg/m² as calculated from the following:    Height as of this encounter: 157.5 cm (62\").    Weight as of 5/29/19: 74.4 kg (164 lb).  BSA: Estimated body surface area is 1.76 meters squared as calculated from the following:    Height as of this encounter: 157.5 cm (62\").    Weight as of 5/29/19: 74.4 kg (164 lb).    Objective   Physical Exam   Constitutional: She is oriented to person, place, and time. She appears well-developed and well-nourished.   HENT:   Head: Normocephalic and atraumatic.   Cardiovascular: Normal rate.   Pulmonary/Chest: Effort normal.   Abdominal: Soft. Bowel sounds are normal. She exhibits no distension. There is no tenderness.   Genitourinary: Vagina normal and uterus normal.   Genitourinary Comments: Moderate " atrophy   + hypermobility of bladder neck    Neurological: She is alert and oriented to person, place, and time.   Skin: Skin is warm and dry.   Psychiatric: She has a normal mood and affect. Her behavior is normal. Judgment and thought content normal.   Nursing note and vitals reviewed.        Assessment/Plan     Raven was seen today for follow-up.    Diagnoses and all orders for this visit:    Stress incontinence of urine  -     Ambulatory Referral to Gynecologic Urology    Screening for blood or protein in urine  -     POC Urinalysis Dipstick    PMB (postmenopausal bleeding)          Return if symptoms worsen or fail to improve.        Hussein Morocho MD   8/14/2019 8:52 AM

## 2019-11-13 ENCOUNTER — OFFICE VISIT (OUTPATIENT)
Dept: OBSTETRICS AND GYNECOLOGY | Age: 64
End: 2019-11-13

## 2019-11-13 VITALS
HEIGHT: 62 IN | DIASTOLIC BLOOD PRESSURE: 74 MMHG | SYSTOLIC BLOOD PRESSURE: 120 MMHG | WEIGHT: 164 LBS | BODY MASS INDEX: 30.18 KG/M2

## 2019-11-13 DIAGNOSIS — Z13.89 SCREENING FOR BLOOD OR PROTEIN IN URINE: ICD-10-CM

## 2019-11-13 DIAGNOSIS — R87.619 UNSPECIFIED ABNORMAL CYTOLOGICAL FINDINGS IN SPECIMENS FROM CERVIX UTERI: ICD-10-CM

## 2019-11-13 DIAGNOSIS — B97.7 HPV IN FEMALE: Primary | ICD-10-CM

## 2019-11-13 LAB
BILIRUB BLD-MCNC: NEGATIVE MG/DL
GLUCOSE UR STRIP-MCNC: NEGATIVE MG/DL
KETONES UR QL: NEGATIVE
LEUKOCYTE EST, POC: ABNORMAL
NITRITE UR-MCNC: NEGATIVE MG/ML
PH UR: 5 [PH] (ref 5–8)
PROT UR STRIP-MCNC: NEGATIVE MG/DL
RBC # UR STRIP: ABNORMAL /UL
SP GR UR: 1.02 (ref 1–1.03)
UROBILINOGEN UR QL: NORMAL

## 2019-11-13 PROCEDURE — 81002 URINALYSIS NONAUTO W/O SCOPE: CPT | Performed by: NURSE PRACTITIONER

## 2019-11-13 PROCEDURE — 99212 OFFICE O/P EST SF 10 MIN: CPT | Performed by: NURSE PRACTITIONER

## 2019-11-13 RX ORDER — CETIRIZINE HYDROCHLORIDE 10 MG/1
10 TABLET ORAL NIGHTLY
COMMUNITY

## 2019-11-13 NOTE — PROGRESS NOTES
Subjective   Raven Young is a 64 y.o. female.     History of Present Illness     Pt here for 6 month follow up repeat pap smear due to + HPV. Pap itself negative.   Raven also mentions she has continued to struggle with light spotting since she was last here in August.   Denies pain, cramping, or associated symptoms.   No longer sexually active with .    The following portions of the patient's history were reviewed and updated as appropriate: allergies, current medications, past family history, past medical history, past social history, past surgical history and problem list.    Review of Systems   Constitutional: Negative for chills, fatigue and fever.   Gastrointestinal: Negative for abdominal distention and abdominal pain.   Genitourinary: Positive for vaginal bleeding. Negative for decreased urine volume, dyspareunia, dysuria, frequency, genital sores, hematuria, menstrual problem, pelvic pain, urgency, urinary incontinence, vaginal discharge and vaginal pain.       Objective   Physical Exam   Constitutional: She is oriented to person, place, and time. She appears well-developed and well-nourished. No distress.   Genitourinary: Vagina normal, uterus normal and cervix normal. There is no rash, tenderness or lesion on the right labia. There is no rash, tenderness or lesion on the left labia. Right adnexum displays no mass, no tenderness and no fullness. Left adnexum displays no mass, no tenderness and no fullness.   Genitourinary Comments: Moderate atrophy present   Neurological: She is alert and oriented to person, place, and time.   Skin: Skin is warm and dry.   Psychiatric: She has a normal mood and affect. Her behavior is normal.       Assessment/Plan   Raven was seen today for gynecologic exam.    Diagnoses and all orders for this visit:    HPV in female  -     IgP, Aptima HPV    Screening for blood or protein in urine  -     POC Urinalysis Dipstick    Unspecified abnormal cytological findings in  specimens from cervix uteri   -     IgP, Aptima HPV        Counseled on importance of following up on PMB. US and follow up appointment made for patient today.     Leticia Neumann, APRN

## 2019-11-15 LAB
CYTOLOGIST CVX/VAG CYTO: NORMAL
CYTOLOGY CVX/VAG DOC CYTO: NORMAL
CYTOLOGY CVX/VAG DOC THIN PREP: NORMAL
DX ICD CODE: NORMAL
HIV 1 & 2 AB SER-IMP: NORMAL
HPV I/H RISK 4 DNA CVX QL PROBE+SIG AMP: NEGATIVE
OTHER STN SPEC: NORMAL
STAT OF ADQ CVX/VAG CYTO-IMP: NORMAL

## 2019-11-18 ENCOUNTER — TELEPHONE (OUTPATIENT)
Dept: OBSTETRICS AND GYNECOLOGY | Age: 64
End: 2019-11-18

## 2019-11-18 NOTE — TELEPHONE ENCOUNTER
----- Message from MARTY Gannon sent at 11/18/2019  8:53 AM EST -----  Please inform patient her pap smear returned negative (normal). Advise patient to keep scheduled US next week. Thank you.

## 2019-12-11 ENCOUNTER — PROCEDURE VISIT (OUTPATIENT)
Dept: OBSTETRICS AND GYNECOLOGY | Age: 64
End: 2019-12-11

## 2019-12-11 ENCOUNTER — OFFICE VISIT (OUTPATIENT)
Dept: OBSTETRICS AND GYNECOLOGY | Age: 64
End: 2019-12-11

## 2019-12-11 VITALS
WEIGHT: 166 LBS | SYSTOLIC BLOOD PRESSURE: 118 MMHG | HEIGHT: 62 IN | DIASTOLIC BLOOD PRESSURE: 76 MMHG | BODY MASS INDEX: 30.55 KG/M2

## 2019-12-11 DIAGNOSIS — N95.0 PMB (POSTMENOPAUSAL BLEEDING): Primary | ICD-10-CM

## 2019-12-11 DIAGNOSIS — R82.90 ABNORMAL URINE ODOR: ICD-10-CM

## 2019-12-11 DIAGNOSIS — N39.3 STRESS INCONTINENCE OF URINE: ICD-10-CM

## 2019-12-11 DIAGNOSIS — Z13.89 SCREENING FOR BLOOD OR PROTEIN IN URINE: ICD-10-CM

## 2019-12-11 LAB
BILIRUB BLD-MCNC: NEGATIVE MG/DL
CLARITY, POC: CLEAR
COLOR UR: YELLOW
GLUCOSE UR STRIP-MCNC: NEGATIVE MG/DL
KETONES UR QL: NEGATIVE
LEUKOCYTE EST, POC: ABNORMAL
NITRITE UR-MCNC: NEGATIVE MG/ML
PH UR: 5.5 [PH] (ref 5–8)
PROT UR STRIP-MCNC: NEGATIVE MG/DL
RBC # UR STRIP: ABNORMAL /UL
SP GR UR: 1 (ref 1–1.03)
UROBILINOGEN UR QL: NORMAL

## 2019-12-11 PROCEDURE — 99213 OFFICE O/P EST LOW 20 MIN: CPT | Performed by: OBSTETRICS & GYNECOLOGY

## 2019-12-11 PROCEDURE — 76830 TRANSVAGINAL US NON-OB: CPT | Performed by: OBSTETRICS & GYNECOLOGY

## 2019-12-11 PROCEDURE — 81002 URINALYSIS NONAUTO W/O SCOPE: CPT | Performed by: OBSTETRICS & GYNECOLOGY

## 2019-12-11 NOTE — PROGRESS NOTES
2019      Patient:  Raven Young   MR#:[id number]    Office note    Chief Complaint   Patient presents with   • Follow-up     U/S, PMB       Subjective     History of Present Illness  64 y.o. female  the patient presents for follow-up on episode of postmenopausal bleeding.  She reports no further episodes since last evaluation.  Interval ultrasound shows stable endometrium that appears atrophic 2.96 mm.  The remainder of her pelvic ultrasound is negative.    The patient does complain of intermittent moderate foul-smelling urine for the last week.  Routine urine dip is negative and the urine will be sent for culture.      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)   • Hx of total hip arthroplasty, right   • PMB (postmenopausal bleeding)   • Stress incontinence of urine       Past Medical History:   Diagnosis Date   • Cancer (CMS/HCC)     basal cell   • Depression with anxiety    • PONV (postoperative nausea and vomiting)    • Right hip pain     SCHEDULED FOR SX       Past Surgical History:   Procedure Laterality Date   • COLONOSCOPY     • FIBULA BONE GRAFT TO HIP Right 12/15/2017    Procedure: FIBULA BONE GRAFT TO HIP with cancellus autograft the Luis Alfredo cable ;  Surgeon: Keyshawn Madden MD;  Location: [facility];  Service:    • FRACTURE SURGERY     • HARDWARE REMOVAL Right 8/15/2017    Procedure: HARDWARE REMOVAL long gamma nail  with long gamma nail right subtrochanteric femur fracture meir evanso powder ;  Surgeon: Keyshawn  MD Chano;  Location: [facility] OR;  Service:    • HIP CLOSED REDUCTION Right 2018    Procedure: HIP CLOSED REDUCTION;  Surgeon: Keyshawn Madden MD;  Location: [facility] OR;  Service: Orthopedics   • HIP CLOSED REDUCTION Right 2018    Procedure: HIP CLOSED REDUCTION;  Surgeon: Keyshawn Madden MD;  Location: [facility] OR;  Service: Orthopedics   • IM NAILING FEMORAL SHAFT RETROGRADE Right 08/15/2017    replacement leah  fx   • JOINT REPLACEMENT     • KS OPEN FIX INTER/SUBTROCH FX,IMPLNT Right 2017    Procedure: FEMUR INTRAMEDULLARY NAILING/RODDING long meir gamma nail 7fr hemovac placement;  Surgeon: Keyshawn Madden MD;  Location: [facility] OR;  Service: Orthopedics   • SKIN BIOPSY     • TOTAL HIP ARTHROPLASTY Right 2018    Procedure: TOTAL HIP ARTHROPLASTY;  Surgeon: Keyshawn Madden MD;  Location: [facility] OR;  Service: Orthopedics   • TOTAL HIP ARTHROPLASTY REVISION Right 2018    Procedure: RT TOTAL HIP ARTHROPLASTY REVISION;  Surgeon: Andriy Bailey MD;  Location: [facility] OR;  Service: Orthopedics   • TUBAL ABDOMINAL LIGATION         Obstetric History:  OB History        2    Para   2    Term   2            AB        Living   2       SAB        TAB        Ectopic        Molar        Multiple        Live Births   2               Menstrual History:     No LMP recorded (lmp unknown). Patient is postmenopausal.       #: 1, Date: 1975, Sex: Female, Weight: 3147 g (6 lb 15 oz), GA: None, Delivery: Vaginal, Spontaneous, Apgar1: None, Apgar5: None, Living: Living, Birth Comments: None    #: 2, Date: 1977, Sex: Male, Weight: 3090 g (6 lb 13 oz), GA: None, Delivery: Vaginal, Spontaneous, Apgar1: None, Apgar5: None, Living: Living, Birth Comments: None      Family History   Problem Relation Age of Onset   • Asthma Mother    • Lung disease Mother    • No Known Problems Father    • Heart disease Sister    • Heart disease Brother    • Liver cancer Brother        Social History     Tobacco Use   •  "Smoking status: Never Smoker   • Smokeless tobacco: Never Used   Substance Use Topics   • Alcohol use: Yes     Comment: rare   • Drug use: No       Sulfa antibiotics      Current Outpatient Medications:   •  aspirin 81 MG chewable tablet, Chew 81 mg Daily., Disp: , Rfl:   •  cetirizine (zyrTEC) 10 MG tablet, Take 10 mg by mouth Daily., Disp: , Rfl:   •  DULoxetine (CYMBALTA) 60 MG capsule, Take 60 mg by mouth Every Morning., Disp: , Rfl:     The following portions of the patient's history were reviewed and updated as appropriate: allergies, current medications, past family history, past medical history, past social history, past surgical history and problem list.    Review of Systems    BP Readings from Last 3 Encounters:   12/11/19 118/76   11/13/19 120/74   08/07/19 124/78      Wt Readings from Last 3 Encounters:   12/11/19 75.3 kg (166 lb)   11/13/19 74.4 kg (164 lb)   05/29/19 74.4 kg (164 lb)      BMI: Estimated body mass index is 30.36 kg/m² as calculated from the following:    Height as of this encounter: 157.5 cm (62\").    Weight as of this encounter: 75.3 kg (166 lb).  BSA: Estimated body surface area is 1.77 meters squared as calculated from the following:    Height as of this encounter: 157.5 cm (62\").    Weight as of this encounter: 75.3 kg (166 lb).    Objective   Physical Exam   Constitutional: She is oriented to person, place, and time. She appears well-developed and well-nourished.   HENT:   Head: Normocephalic and atraumatic.   Cardiovascular: Normal rate.   Pulmonary/Chest: Effort normal.   Abdominal: Soft. Bowel sounds are normal. She exhibits no distension. There is no tenderness.   Neurological: She is alert and oriented to person, place, and time.   Skin: Skin is warm and dry.   Psychiatric: She has a normal mood and affect. Her behavior is normal. Judgment and thought content normal.   Nursing note and vitals reviewed.        Assessment/Plan     Raven was seen today for follow-up.    Diagnoses " and all orders for this visit:    PMB (postmenopausal bleeding)    Screening for blood or protein in urine  -     POC Urinalysis Dipstick    Stress incontinence of urine    Abnormal urine odor          Return if symptoms worsen or fail to improve, for Annual GYN exam.        Hussein Morocho MD   12/11/2019 1:35 PM

## 2019-12-13 ENCOUNTER — TELEPHONE (OUTPATIENT)
Dept: OBSTETRICS AND GYNECOLOGY | Age: 64
End: 2019-12-13

## 2019-12-13 LAB
BACTERIA UR CULT: NO GROWTH
BACTERIA UR CULT: NORMAL

## 2019-12-13 NOTE — TELEPHONE ENCOUNTER
----- Message from Hussein Morocho MD sent at 12/13/2019  6:53 AM EST -----  Notify patient: urine culture is negative

## 2020-02-26 ENCOUNTER — TRANSCRIBE ORDERS (OUTPATIENT)
Dept: ADMINISTRATIVE | Facility: HOSPITAL | Age: 65
End: 2020-02-26

## 2020-02-26 DIAGNOSIS — Z12.31 VISIT FOR SCREENING MAMMOGRAM: Primary | ICD-10-CM

## 2020-03-10 ENCOUNTER — HOSPITAL ENCOUNTER (OUTPATIENT)
Dept: MAMMOGRAPHY | Facility: HOSPITAL | Age: 65
Discharge: HOME | End: 2020-03-10
Admitting: FAMILY MEDICINE

## 2020-03-10 DIAGNOSIS — Z12.31 VISIT FOR SCREENING MAMMOGRAM: ICD-10-CM

## 2020-03-10 PROCEDURE — 77067 SCR MAMMO BI INCL CAD: CPT

## 2020-03-10 PROCEDURE — 77063 BREAST TOMOSYNTHESIS BI: CPT

## 2021-09-09 NOTE — PLAN OF CARE
Problem: Patient Care Overview  Goal: Plan of Care Review   06/20/18 5939   Coping/Psychosocial   Plan of Care Reviewed With patient   OTHER   Outcome Summary OT evaluation complet. pt is supervsion for functional transfers with RW and able to adhere to TTWB on R LE. pt able to name of 3 of 3 hip precautions and able to maintain throughout evaluation. pt states she already owns long handled ae and understands use with lb adls. pt with no further ot concerns at this time.          09-Sep-2021 08:36

## 2021-11-10 ENCOUNTER — TRANSCRIBE ORDERS (OUTPATIENT)
Dept: ADMINISTRATIVE | Facility: HOSPITAL | Age: 66
End: 2021-11-10

## 2021-11-10 DIAGNOSIS — Z12.31 VISIT FOR SCREENING MAMMOGRAM: Primary | ICD-10-CM

## 2021-12-06 ENCOUNTER — HOSPITAL ENCOUNTER (OUTPATIENT)
Dept: MAMMOGRAPHY | Facility: HOSPITAL | Age: 66
Discharge: HOME | End: 2021-12-06
Admitting: FAMILY MEDICINE

## 2021-12-06 DIAGNOSIS — Z12.31 VISIT FOR SCREENING MAMMOGRAM: ICD-10-CM

## 2021-12-06 PROCEDURE — 77067 SCR MAMMO BI INCL CAD: CPT

## 2021-12-06 PROCEDURE — 77063 BREAST TOMOSYNTHESIS BI: CPT

## 2021-12-08 ENCOUNTER — TRANSCRIBE ORDERS (OUTPATIENT)
Dept: ADMINISTRATIVE | Facility: HOSPITAL | Age: 66
End: 2021-12-08

## 2021-12-08 DIAGNOSIS — R92.8 ABNORMAL MAMMOGRAM: Primary | ICD-10-CM

## 2022-01-01 NOTE — PROGRESS NOTES
Acute Care - Physical Therapy Initial Evaluation   Deya Causey     Patient Name: Raven Young  : 1955  MRN: [id number]  Today's Date: 4/15/2017   Onset of Illness/Injury or Date of Surgery Date: 17  Date of Referral to PT: 17  Referring Physician: Dr. Madden      Admit Date: 2017     Visit Dx:    ICD-10-CM ICD-9-CM   1. Subtrochanteric fracture of right femur, closed, initial encounter S72.21XA 820.22   2. Closed right hip fracture, initial encounter S72.001A 820.8   3. Fall, initial encounter W19.XXXA E888.9   4. Pain R52 780.96     Patient Active Problem List   Diagnosis   • Closed right hip fracture   • Subtrochanteric fracture of right femur     Past Medical History:   Diagnosis Date   • Cancer     basal cell     Past Surgical History:   Procedure Laterality Date   • TUBAL ABDOMINAL LIGATION            PT ASSESSMENT (last 72 hours)      PT Evaluation       04/15/17 0900 17 0757    Rehab Evaluation    Document Type evaluation  -     Subjective Information agree to therapy;complains of;pain  -     Patient Effort, Rehab Treatment good  -     Symptoms Noted During/After Treatment none  -     General Information    Patient Profile Review yes  -     Onset of Illness/Injury or Date of Surgery Date 17  -     Referring Physician Dr. Madden  -     General Observations Patient supine in bed with sister present.  IV/O2 in place.    -     Pertinent History Of Current Problem Patient reports she was playing soccer in her living room with family and twisted and felt a pop in hip, then fell to floor.  Brought to ER, diagnosed with displaced subtrochanteric right femur fracture.  Now s/p right hip gamma nailing.  -     Precautions/Limitations other (see comments)   toe-touch weightbearing right LE  -LH     Prior Level of Function independent:;all household mobility;community mobility;ADL's  -     Equipment Currently Used at Home none  - none  -CP    Plans/Goals Discussed  Breastfeeding fair with a nipple shield-encouraged skin to skin contact. Bottlefeeding 12 ml of donor milk. Voiding and stooling. Will continue to monitor.   With patient;agreed upon  -     Risks Reviewed patient:;dizziness;increased discomfort  -     Benefits Reviewed patient:;improve function;increase independence;decrease risk of DVT  -     Barriers to Rehab none identified  -     Living Environment    Lives With spouse;other (see comments)   and sister  - spouse  -CP    Living Arrangements house  -     Home Accessibility no concerns  - no concerns  -CP    Living Environment Comment one level home with ramp.   is in wheelchair and patient helps him with baths.  -     Clinical Impression    Date of Referral to PT 04/14/17  -     PT Diagnosis s/p right hip gamma nail with impaired functional  mobility.  -     Patient/Family Goals Statement go home  -     Criteria for Skilled Therapeutic Interventions Met yes  -     Impairments Found (describe specific impairments) gait, locomotion, and balance;muscle performance;ROM  -     Functional Limitations in Following Categories (Describe Specific Limitations) self-care;home management;work  -     Rehab Potential good, to achieve stated therapy goals  -     Predicted Duration of Therapy Intervention (days/wks) 3 days  -     Pain Assessment    Pain Assessment 0-10  -     Pain Score 2  -     Post Pain Score 2  -     Pain Type Acute pain;Surgical pain  -     Pain Location Hip  -     Pain Orientation Right  -     Pain Descriptors Aching  -     Pain Frequency Constant/continuous  -     Pain Intervention(s) Medication (See MAR);Repositioned  -     Response to Interventions tolerated with no c/o increased pain with mobility  -     Cognitive Assessment/Intervention    Current Cognitive/Communication Assessment functional  -     Orientation Status oriented x 4  -     Follows Commands/Answers Questions 100% of the time  -     Personal Safety WNL/WFL  -     Personal Safety Interventions gait belt;nonskid shoes/slippers when out of bed;supervised activity  -     ROM (Range  "of Motion)    General ROM Detail bilateral UE AROM WFL, patient mentions she thinks she may have fell on left shoulder, that it is \"a little sore.\"  L LE AROM WFL, right ankle AROM WFL, and right knee AAROM WFL, right hip ROM not assessed  -     MMT (Manual Muscle Testing)    General MMT Assessment Detail left LE at least 4/5 - attempted MMT, however patient c/o increase in pain in right leg with attempts at stabilizing for MMT of left LE therefore further manual resistance deferred.    -     Mobility Assessment/Training    Extremity Weight-Bearing Status right lower extremity  -LH     Right Lower Extremity Weight-Bearing toe touch weight-bearing  -     Bed Mobility, Assessment/Treatment    Bed Mobility, Assistive Device bed rails;head of bed elevated  -     Bed Mob, Supine to Sit, [place] minimum assist (75% patient effort);verbal cues required  -     Bed Mobility, Comment requires increased time and assist with moving right LE  -LH     Transfer Assessment/Treatment    Transfers, Bed-Chair [place] contact guard assist;minimum assist (75% patient effort);1 person + 1 person to manage equipment;verbal cues required  -     Transfers, Bed-Chair-Bed, Assist Device standard walker  -     Transfers, Sit-Stand [place] contact guard assist;minimum assist (75% patient effort);1 person + 1 person to manage equipment;verbal cues required  -     Transfers, Stand-Sit [place] contact guard assist;verbal cues required;1 person + 1 person to manage equipment  -     Transfers, Sit-Stand-Sit, Assist Device standard walker  -LH     Transfer, Comment Patient able to maintain toe touch weightbearing on right LE.  Cues to decrease speed and for hand placement with sit to/from stand stand.  Patient ambulated 3 feet forward to [facility] then performed BSC to chair transfer.  -     Gait Assessment/Treatment    Gait, [place] Level contact guard assist;minimum assist (75% patient effort);1 person + 1 " person to manage equipment;verbal cues required  -     Gait, Assistive Device standard walker  -     Gait, Distance (Feet) 3  -     Gait, Maintain Weight Bearing Status able to maintain weight bearing status  -     Gait, Safety Issues balance decreased during turns  -     Therapy Exercises    Right Lower Extremity AROM:;10 reps;ankle pumps/circles;quad sets  -     Sensory Assessment/Intervention    Sensory Impairment --   reports normal sensation bilateral LE's  -     Positioning and Restraints    Pre-Treatment Position in bed  -     Post Treatment Position chair  -     In Chair notified nsg;reclined;call light within reach;encouraged to call for assist;with family/caregiver  -       04/13/17 2150 04/13/17 2148    General Information    Equipment Currently Used at Home  none  -TL    Living Environment    Lives With other (see comments)  -TL     Living Arrangements house  -     Home Accessibility bed and bath on same level  -TL     Stair Railings at Home none  -TL     Type of Financial/Environmental Concern none  -TL     Transportation Available family or friend will provide;car  -TL       User Key  (r) = Recorded By, (t) = Taken By, (c) = Cosigned By    Initials Name Provider Type     Adelaide Call, PT Physical Therapist     Esha Moffett, RN Registered Nurse    CP Mercy Bradshaw, RALPH Registered Nurse          Physical Therapy Education     Title: PT OT SLP Therapies (Done)     Topic: Physical Therapy (Done)     Point: Mobility training (Done)    Learning Progress Summary    Learner Readiness Method Response Comment Documented by Status   Patient Acceptance E,TB,D LUZ ROMANO Patient educated on weight bearing restrictions, encouraged to do ankle pumps and quad sets independently in chair/bed  04/15/17 1007 Done               Point: Home exercise program (Done)    Learning Progress Summary    Learner Readiness Method Response Comment Documented by Status   Patient Acceptance E,TB,D  LUZ ROMANO Patient educated on weight bearing restrictions, encouraged to do ankle pumps and quad sets independently in chair/bed  04/15/17 1007 Done               Point: Precautions (Done)    Learning Progress Summary    Learner Readiness Method Response Comment Documented by Status   Patient Acceptance E,TB,D ROSALINALUZ Patient educated on weight bearing restrictions, encouraged to do ankle pumps and quad sets independently in chair/bed  04/15/17 1007 Done                      User Key     Initials Effective Dates Name Provider Type Discipline     08/11/15 -  Adelaide Call, PT Physical Therapist PT                PT Recommendation and Plan  Anticipated Equipment Needs At Discharge: walker  Anticipated Discharge Disposition: home, home with assist  Planned Therapy Interventions: gait training, home exercise program, patient/family education, strengthening, transfer training  PT Frequency: 2 times/day, daily  Plan of Care Review  Plan Of Care Reviewed With: patient  Progress: progress toward functional goals as expected  Outcome Summary/Follow up Plan: PT: physical therapy evaluation completed.  Patient able to ambulate 3 feet forward and perform pivot transfer BSC to chair with min A and second person assist for equipment.  She is able to maintain toe-touch weightbearing restrictions on right LE during transfers and ambulation.  Minimal c/o pain.  Recomend home with assist/home health up on discharge.  REcommend standard walker upon discharge home.  PT to see while in hospital to progress safety and independence with functional mobility.          IP PT Goals       04/15/17 1010          Bed Mobility PT STG    Bed Mobility PT STG, Date Established 04/15/17  -      Bed Mobility PT STG, Time to Achieve 2 days  -      Bed Mobility PT STG, Activity Type supine to sit/sit to supine  -      Bed Mobility PT STG, [place] Level supervision required  -      Transfer Training PT STG    Transfer Training PT STG,  Date Established 04/15/17  -      Transfer Training PT STG, Time to Achieve 2 days  -      Transfer Training PT STG, Activity Type sit to stand/stand to sit  -      Transfer Training PT STG, [place] Level supervision required  -      Transfer Training PT STG, Assist Device walker, standard  -      Gait Training PT LTG    Gait Training Goal PT LTG, Time to Achieve 3 days  -      Gait Training Goal PT LTG, [place] Level supervision required  -      Gait Training Goal PT LTG, Assist Device walker, standard  -      Gait Training Goal PT LTG, Distance to Achieve 50 feet, toe touch weightbearing right LE  -        User Key  (r) = Recorded By, (t) = Taken By, (c) = Cosigned By    Initials Name Provider Type     Adelaide Call PT Physical Therapist                Outcome Measures       04/15/17 1000          How much help from another person do you currently need...    Turning from your back to your side while in flat bed without using bedrails? 3  -LH      Moving from lying on back to sitting on the side of a flat bed without bedrails? 3  -LH      Moving to and from a bed to a chair (including a wheelchair)? 3  -LH      Standing up from a chair using your arms (e.g., wheelchair, bedside chair)? 3  -LH      Climbing 3-5 steps with a railing? 2  -LH      To walk in hospital room? 3  -LH      AM-PAC 6 Clicks Score 17  -      Functional Assessment    Outcome Measure Options AM-PAC 6 Clicks Basic Mobility (PT)  -        User Key  (r) = Recorded By, (t) = Taken By, (c) = Cosigned By    Initials Name Provider Type     Adelaide Call PT Physical Therapist           Time Calculation:         PT Charges       04/15/17 1012          Time Calculation    Start Time 0840  -      Stop Time 0859  -      Time Calculation (min) 19 min  -      PT Received On 04/15/17  -      PT - Next Appointment 04/16/17  -        User Key  (r) = Recorded By, (t) = Taken By, (c) = Cosigned By    Initials  Name Provider Type     Adelaide Call, PT Physical Therapist          Therapy Charges for Today     Code Description Service Date Service Provider Modifiers Qty    87153712439 HC PT EVAL LOW COMPLEXITY 1 4/15/2017 Adelaide Call, PT GP 1    11091298639 HC PT THER SUPP EA 15 MIN 4/15/2017 Adelaide Call, PT GP 1          PT G-Codes  Outcome Measure Options: AM-PAC 6 Clicks Basic Mobility (PT)      Adelaide Call PT  4/15/2017

## 2022-01-05 ENCOUNTER — HOSPITAL ENCOUNTER (OUTPATIENT)
Dept: MAMMOGRAPHY | Facility: HOSPITAL | Age: 67
Discharge: HOME | End: 2022-01-05

## 2022-01-05 ENCOUNTER — HOSPITAL ENCOUNTER (OUTPATIENT)
Dept: ULTRASOUND IMAGING | Facility: HOSPITAL | Age: 67
Discharge: HOME | End: 2022-01-05

## 2022-01-05 DIAGNOSIS — R92.8 ABNORMAL MAMMOGRAM: ICD-10-CM

## 2022-01-05 PROCEDURE — 76642 ULTRASOUND BREAST LIMITED: CPT

## 2022-01-05 PROCEDURE — G0279 TOMOSYNTHESIS, MAMMO: HCPCS

## 2022-01-05 PROCEDURE — 77065 DX MAMMO INCL CAD UNI: CPT

## 2022-03-08 ENCOUNTER — OFFICE VISIT (OUTPATIENT)
Dept: ORTHOPEDIC SURGERY | Facility: CLINIC | Age: 67
End: 2022-03-08

## 2022-03-08 VITALS
BODY MASS INDEX: 31.28 KG/M2 | SYSTOLIC BLOOD PRESSURE: 133 MMHG | HEIGHT: 62 IN | DIASTOLIC BLOOD PRESSURE: 80 MMHG | WEIGHT: 170 LBS | HEART RATE: 80 BPM

## 2022-03-08 DIAGNOSIS — M70.62 TROCHANTERIC BURSITIS OF LEFT HIP: ICD-10-CM

## 2022-03-08 DIAGNOSIS — M25.552 LEFT HIP PAIN: Primary | ICD-10-CM

## 2022-03-08 PROCEDURE — 73502 X-RAY EXAM HIP UNI 2-3 VIEWS: CPT | Performed by: ORTHOPAEDIC SURGERY

## 2022-03-08 PROCEDURE — 20610 DRAIN/INJ JOINT/BURSA W/O US: CPT | Performed by: ORTHOPAEDIC SURGERY

## 2022-03-08 PROCEDURE — 99204 OFFICE O/P NEW MOD 45 MIN: CPT | Performed by: ORTHOPAEDIC SURGERY

## 2022-03-08 RX ORDER — TRIAMCINOLONE ACETONIDE 40 MG/ML
40 INJECTION, SUSPENSION INTRA-ARTICULAR; INTRAMUSCULAR
Status: COMPLETED | OUTPATIENT
Start: 2022-03-08 | End: 2022-03-08

## 2022-03-08 RX ORDER — LIDOCAINE HYDROCHLORIDE 10 MG/ML
4 INJECTION, SOLUTION EPIDURAL; INFILTRATION; INTRACAUDAL; PERINEURAL
Status: COMPLETED | OUTPATIENT
Start: 2022-03-08 | End: 2022-03-08

## 2022-03-08 RX ORDER — ATORVASTATIN CALCIUM 10 MG/1
10 TABLET, FILM COATED ORAL NIGHTLY
COMMUNITY
Start: 2021-12-13

## 2022-03-08 RX ADMIN — LIDOCAINE HYDROCHLORIDE 4 ML: 10 INJECTION, SOLUTION EPIDURAL; INFILTRATION; INTRACAUDAL; PERINEURAL at 10:28

## 2022-03-08 RX ADMIN — TRIAMCINOLONE ACETONIDE 40 MG: 40 INJECTION, SUSPENSION INTRA-ARTICULAR; INTRAMUSCULAR at 10:28

## 2022-03-08 NOTE — PROGRESS NOTES
Subjective: Left hip pain     Patient ID: Raven Young is a 66 y.o. female.    Chief Complaint:    History of Present Illness 66-year-old female known to me presents with a new problem involving her left hip that she has had for the past 2 weeks.  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  Left pain when she lies on that side goes down to the level of the knee.  To not been on any anti-inflammatory medication or any other medications  Raven Young       Social History     Occupational History   • Not on file   Tobacco Use   • Smoking status: Never Smoker   • Smokeless tobacco: Never Used   Vaping Use   • Vaping Use: Never used   Substance and Sexual Activity   • Alcohol use: Yes     Comment: rare   • Drug use: No   • Sexual activity: Never     Partners: Male     Birth control/protection: Post-menopausal      Past Medical History:   Diagnosis Date   • Cancer (HCC)     basal cell   • Depression with anxiety    • PONV (postoperative nausea and vomiting)    • Right hip pain     SCHEDULED FOR SX     Past Surgical History:   Procedure Laterality Date   • COLONOSCOPY     • FIBULA BONE GRAFT TO HIP Right 12/15/2017    Procedure: FIBULA BONE GRAFT TO HIP with cancellus autograft the [place] cable ;  Surgeon: Keyshawn Madden MD;  Location: [facility] OR;  Service:    • FRACTURE SURGERY     • HARDWARE REMOVAL Right 8/15/2017    Procedure: HARDWARE REMOVAL long gamma nail  with long gamma nail right subtrochanteric femur fracture meir vanco powder ;  Surgeon: Keyshawn Madden MD;  Location: [facility] OR;  Service:    • HIP CLOSED REDUCTION Right 7/17/2018    Procedure: HIP CLOSED REDUCTION;  Surgeon: Keyshawn Madden MD;  Location: [facility] OR;  Service: Orthopedics   • HIP CLOSED REDUCTION Right 7/23/2018    Procedure: HIP CLOSED REDUCTION;  Surgeon: Keyshawn Madden MD;  Location: [facility] OR;  Service:  "Orthopedics   • IM NAILING FEMORAL SHAFT RETROGRADE Right 08/15/2017    replacement leah  fx   • JOINT REPLACEMENT     • IA OPEN FIX INTER/SUBTROCH FX,IMPLNT Right 4/14/2017    Procedure: FEMUR INTRAMEDULLARY NAILING/RODDING long meir gamma nail 7fr hemovac placement;  Surgeon: Keyshawn Madden MD;  Location:  [facility] OR;  Service: Orthopedics   • SKIN BIOPSY     • TOTAL HIP ARTHROPLASTY Right 6/19/2018    Procedure: TOTAL HIP ARTHROPLASTY;  Surgeon: Kyeshawn Madden MD;  Location:  [facility] OR;  Service: Orthopedics   • TOTAL HIP ARTHROPLASTY REVISION Right 7/25/2018    Procedure: RT TOTAL HIP ARTHROPLASTY REVISION;  Surgeon: Andriy Bailey MD;  Location:  [facility];  Service: Orthopedics   • TUBAL ABDOMINAL LIGATION         Family History   Problem Relation Age of Onset   • Asthma Mother    • Lung disease Mother    • No Known Problems Father    • Heart disease Sister    • Heart disease Brother    • Liver cancer Brother    • Breast cancer Neg Hx          Review of Systems negative        Objective:  Physical Exam    Vital signs reviewed.   General: No acute distress.  Eyes: conjunctiva clear; pupils equally round and reactive  ENT: external ears and nose atraumatic; oropharynx clear  CV: no peripheral edema  Resp: normal respiratory effort  Skin: no rashes or wounds; normal turgor  Psych: mood and affect appropriate; recent and remote memory intact    Vitals:    03/08/22 1004   BP: 133/80   BP Location: Left arm   Pulse: 80   Weight: 77.1 kg (170 lb)   Height: 157.5 cm (62\")         03/08/22  1004   Weight: 77.1 kg (170 lb)     Body mass index is 31.09 kg/m².      Ortho Exam   AP and lateral of her left hip and AP of the pelvis shows no acute changes as far as the left hip.  No arthritic changes noted.  She is a well-positioned right total hip arthroplasty with constrained ring.  She is alert and oriented x3.  The left leg has no motor or sensory deficit.  Her calf is nontender.  She has no pain with passive internal " or external rotation of the hip with a negative Stinchfield test.  Negative straight leg raise.  There is pain to palpation over the left greater trochanter with a positive Noah's test and pain with abduction against resistance.  Skin is cool to touch.  There is no bruising or ecchymosis noted.  She cannot tolerate anti-inflammatory medication she takes it but she is not currently taking any medication      Imaging: X-ray of the left hip is completely within normal limits.    Assessment:        1. Left hip pain    2. Trochanteric bursitis of left hip           Plan: Reviewed the hip x-rays with the patient, her exam and her history.  She has developed an acute trochanteric bursitis of that left hip.  After reviewing treatment options again proceed with a cortisone injection of 4 cc of lidocaine and 1 cc Kenalog which is given after sterile prepping tolerating well.  Also advised the patient on 2 Aleve twice a day with meals as long as the hip is symptomatic.  If she still experiencing pain in 3 to 4 weeks she will call we will probably schedule her for physical therapy and possibly a steroid pack.  Patient was in agreement the treatment plan.  Answered all questions.  Return as needed.  Large Joint Arthrocentesis: L greater trochanteric bursa  Date/Time: 3/8/2022 10:28 AM  Consent given by: patient  Site marked: site marked  Timeout: Immediately prior to procedure a time out was called to verify the correct patient, procedure, equipment, support staff and site/side marked as required   Supporting Documentation  Indications: pain   Procedure Details  Location: hip - L greater trochanteric bursa  Preparation: Patient was prepped and draped in the usual sterile fashion  Needle size: 22 G  Approach: lateral  Medications administered: 4 mL lidocaine PF 1% 1 %; 40 mg triamcinolone acetonide 40 MG/ML  Patient tolerance: patient tolerated the procedure well with no immediate complications                      I ordered and  reviewed the DANIA today.

## 2022-08-04 ENCOUNTER — OFFICE VISIT (OUTPATIENT)
Dept: OBSTETRICS AND GYNECOLOGY | Age: 67
End: 2022-08-04

## 2022-08-04 VITALS
HEIGHT: 62 IN | DIASTOLIC BLOOD PRESSURE: 72 MMHG | WEIGHT: 174.8 LBS | SYSTOLIC BLOOD PRESSURE: 124 MMHG | BODY MASS INDEX: 32.17 KG/M2

## 2022-08-04 DIAGNOSIS — Z01.419 WELL WOMAN EXAM WITH ROUTINE GYNECOLOGICAL EXAM: Primary | ICD-10-CM

## 2022-08-04 PROBLEM — S72.21XA: Status: RESOLVED | Noted: 2017-04-14 | Resolved: 2022-08-04

## 2022-08-04 PROBLEM — Z96.641 HX OF TOTAL HIP ARTHROPLASTY, RIGHT: Status: RESOLVED | Noted: 2018-07-24 | Resolved: 2022-08-04

## 2022-08-04 PROBLEM — S73.004S: Status: RESOLVED | Noted: 2018-07-23 | Resolved: 2022-08-04

## 2022-08-04 PROBLEM — S72.23XK: Status: RESOLVED | Noted: 2017-08-14 | Resolved: 2022-08-04

## 2022-08-04 PROBLEM — T84.028A FAILED TOTAL HIP ARTHROPLASTY WITH DISLOCATION: Status: RESOLVED | Noted: 2018-07-23 | Resolved: 2022-08-04

## 2022-08-04 PROBLEM — S72.22XA CLOSED LEFT SUBTROCHANTERIC FEMUR FRACTURE (HCC): Status: RESOLVED | Noted: 2017-08-14 | Resolved: 2022-08-04

## 2022-08-04 PROBLEM — S72.001A CLOSED RIGHT HIP FRACTURE: Status: RESOLVED | Noted: 2017-04-13 | Resolved: 2022-08-04

## 2022-08-04 PROBLEM — N95.0 PMB (POSTMENOPAUSAL BLEEDING): Status: RESOLVED | Noted: 2019-05-29 | Resolved: 2022-08-04

## 2022-08-04 PROBLEM — Z96.649 FAILED TOTAL HIP ARTHROPLASTY WITH DISLOCATION (HCC): Status: RESOLVED | Noted: 2018-07-23 | Resolved: 2022-08-04

## 2022-08-04 PROBLEM — S72.21XK: Status: RESOLVED | Noted: 2017-12-08 | Resolved: 2022-08-04

## 2022-08-04 PROBLEM — Z96.641 STATUS POST TOTAL HIP REPLACEMENT, RIGHT: Status: RESOLVED | Noted: 2018-07-05 | Resolved: 2022-08-04

## 2022-08-04 PROCEDURE — G0101 CA SCREEN;PELVIC/BREAST EXAM: HCPCS | Performed by: NURSE PRACTITIONER

## 2022-08-04 NOTE — PROGRESS NOTES
[facility]  Routine Annual Visit    2022    Patient: Raven Young          MR#:[id number]      History of Present Illness    66 y.o. female  who presents for annual exam    Raven is doing overall well. Her sister, age 87, was recently diagnosed with metastatic ovarian cancer. She is helping take care of her. Raven is coping well and states her sister is as well.     Mammogram incomplete followed by benign follow up images 2021.    Raven reports she has had random, very light spotting every 3 months or so for the past year. No associated pain or bloating. She has not been sexually active in > 10 years. She is not on HRT. She has had negative workups in the past with dr. Morocho for this but I advise workup is again indicated as PMB is never normal- she agrees and will schedule.        No LMP recorded (lmp unknown). Patient is postmenopausal.  Obstetric History:  OB History        2    Para   2    Term   2            AB        Living   2       SAB        IAB        Ectopic        Molar        Multiple        Live Births   2               Menstrual History:     No LMP recorded (lmp unknown). Patient is postmenopausal.       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   • Hx of total hip arthroplasty, right 2018   • PMB (postmenopausal bleeding) 2019   • PONV (postoperative  nausea and vomiting)    • Right hip pain     SCHEDULED FOR SX   • Status post total hip replacement, right 7/5/2018   • Subtrochanteric fracture of right femur (HCC) 4/14/2017   • Subtrochanteric fracture, closed, right, with nonunion, subsequent encounter 12/8/2017    Added automatically from request for surgery [id number]       Past Surgical History:   Procedure Laterality Date   • COLONOSCOPY     • FIBULA BONE GRAFT TO HIP Right 12/15/2017    Procedure: FIBULA BONE GRAFT TO HIP with cancellus autograft the Jennifer-Miles cable ;  Surgeon: Keyshawn Madden MD;  Location:  [facility] OR;  Service:    • FRACTURE SURGERY     • HARDWARE REMOVAL Right 8/15/2017    Procedure: HARDWARE REMOVAL long gamma nail  with long gamma nail right subtrochanteric femur fracture meir vanco powder ;  Surgeon: Keyshawn Madden MD;  Location:  [facility] OR;  Service:    • HIP CLOSED REDUCTION Right 7/17/2018    Procedure: HIP CLOSED REDUCTION;  Surgeon: Keyshawn Maddne MD;  Location:  [facility] OR;  Service: Orthopedics   • HIP CLOSED REDUCTION Right 7/23/2018    Procedure: HIP CLOSED REDUCTION;  Surgeon: Keyshawn Madden MD;  Location: [facility] OR;  Service: Orthopedics   • IM NAILING FEMORAL SHAFT RETROGRADE Right 08/15/2017    replacement leah  fx   • JOINT REPLACEMENT     • IL OPEN FIX INTER/SUBTROCH FX,IMPLNT Right 4/14/2017    Procedure: FEMUR INTRAMEDULLARY NAILING/RODDING long meir gamma nail 7fr hemovac placement;  Surgeon: Keyshawn Madden MD;  Location:  [facility] OR;  Service: Orthopedics   • SKIN BIOPSY     • TOTAL HIP ARTHROPLASTY Right 6/19/2018    Procedure: TOTAL HIP ARTHROPLASTY;  Surgeon: Keyshawn Madden MD;  Location:  [facility] OR;  Service: Orthopedics   • TOTAL HIP ARTHROPLASTY REVISION Right 7/25/2018    Procedure: RT TOTAL HIP ARTHROPLASTY REVISION;  Surgeon: Andriy Bailey MD;  [facility];  Service: Orthopedics   • TUBAL ABDOMINAL LIGATION         Social History     Tobacco Use   Smoking Status Never Smoker   Smokeless Tobacco Never  Used       Family History   Problem Relation Age of Onset   • No Known Problems Father    • Asthma Mother    • Lung disease Mother    • Heart disease Brother    • Liver cancer Brother    • Heart disease Sister    • Ovarian cancer Sister    • Breast cancer Neg Hx        Prior to Admission medications    Medication Sig Start Date End Date Taking? Authorizing Provider   aspirin 81 MG chewable tablet Chew 81 mg Daily.   Yes Zackery Lind MD   atorvastatin (LIPITOR) 10 MG tablet  12/13/21  Yes Zackery Lind MD   cetirizine (zyrTEC) 10 MG tablet Take 10 mg by mouth Daily.   Yes Zackery Lind MD   DULoxetine (CYMBALTA) 60 MG capsule Take 60 mg by mouth Every Morning.   Yes Zackery Lind MD     ________________________________________  The following portions of the patient's history were reviewed and updated as appropriate: allergies, current medications, past family history, past medical history, past social history, past surgical history and problem list.    Review of Systems   Constitutional: Negative.    HENT: Negative.    Eyes: Negative for visual disturbance.   Respiratory: Negative for cough, shortness of breath and wheezing.    Cardiovascular: Negative for chest pain, palpitations and leg swelling.   Gastrointestinal: Negative for abdominal distention, abdominal pain, blood in stool, constipation, diarrhea, nausea and vomiting.   Endocrine: Negative for cold intolerance and heat intolerance.   Genitourinary: Positive for vaginal bleeding. Negative for difficulty urinating, dyspareunia, dysuria, frequency, genital sores, hematuria, menstrual problem, pelvic pain, urgency, vaginal discharge and vaginal pain.   Musculoskeletal: Negative.    Skin: Negative.    Neurological: Negative for dizziness, weakness, light-headedness, numbness and headaches.   Hematological: Negative.    Psychiatric/Behavioral: Negative.    Breasts: negative for lumps skin changes, dimpling, swelling, nipple  "changes/discharge bilaterally         Objective   Physical Exam    /72   Ht 157.5 cm (62\")   Wt 79.3 kg (174 lb 12.8 oz)   LMP  (LMP Unknown) Comment: lmp around 2005  Breastfeeding No   BMI 31.97 kg/m²    BP Readings from Last 3 Encounters:   08/04/22 124/72   03/08/22 133/80   12/11/19 118/76      Wt Readings from Last 3 Encounters:   08/04/22 79.3 kg (174 lb 12.8 oz)   03/08/22 77.1 kg (170 lb)   12/11/19 75.3 kg (166 lb)        BMI: Estimated body mass index is 31.97 kg/m² as calculated from the following:    Height as of this encounter: 157.5 cm (62\").    Weight as of this encounter: 79.3 kg (174 lb 12.8 oz).             General:   alert, appears stated age and cooperative   Heart: regular rate and rhythm, S1, S2 normal, no murmur, click, rub or gallop   Lungs: clear to auscultation bilaterally   Abdomen: soft, non-tender, without masses or organomegaly   Breast: inspection negative, no nipple discharge or bleeding, no masses or nodularity palpable   Vulva: External genitalia including bartholin's glands, Urethra, [place]'s gland and urethra meatus are normal, Perineum, rectum and anus appear normal  and Bladder appears normal without significant prolapse    Vagina: normal mucosa, normal discharge   Cervix: no cervical motion tenderness and no lesions   Uterus: normal size, mobile, non-tender, normal shape and consistency and exam is limited habitus    Adnexa: exam limited by habitus     Assessment:    Diagnoses and all orders for this visit:    1. Well woman exam with routine gynecological exam (Primary)  -     IgP, Aptima HPV      Return for TV US and workup- scheduled today    Healthy lifestyle modifications discussed, counseled on self breast exams and bone health        Leticia Neumann, MARTY  8/4/2022 10:47 EDT  "

## 2022-08-08 LAB
CYTOLOGIST CVX/VAG CYTO: ABNORMAL
CYTOLOGY CVX/VAG DOC CYTO: ABNORMAL
CYTOLOGY CVX/VAG DOC THIN PREP: ABNORMAL
DX ICD CODE: ABNORMAL
HIV 1 & 2 AB SER-IMP: ABNORMAL
HPV I/H RISK 4 DNA CVX QL PROBE+SIG AMP: POSITIVE
OTHER STN SPEC: ABNORMAL
STAT OF ADQ CVX/VAG CYTO-IMP: ABNORMAL

## 2022-08-10 PROBLEM — B97.7 HPV IN FEMALE: Status: ACTIVE | Noted: 2022-08-10

## 2022-08-10 NOTE — PROGRESS NOTES
Please inform patient her pap smear returned negative for cancer cells. It did show + for HPV. To be safe, lets schedule her a colposcopy with Dr. Morocho in about 8 weeks. Thank you.

## 2022-08-18 ENCOUNTER — OFFICE VISIT (OUTPATIENT)
Dept: OBSTETRICS AND GYNECOLOGY | Age: 67
End: 2022-08-18

## 2022-08-18 VITALS
WEIGHT: 173.8 LBS | BODY MASS INDEX: 31.98 KG/M2 | SYSTOLIC BLOOD PRESSURE: 130 MMHG | DIASTOLIC BLOOD PRESSURE: 78 MMHG | HEIGHT: 62 IN

## 2022-08-18 DIAGNOSIS — N39.3 STRESS INCONTINENCE OF URINE: ICD-10-CM

## 2022-08-18 DIAGNOSIS — N95.0 PMB (POSTMENOPAUSAL BLEEDING): Primary | ICD-10-CM

## 2022-08-18 PROCEDURE — 99213 OFFICE O/P EST LOW 20 MIN: CPT | Performed by: NURSE PRACTITIONER

## 2022-08-18 NOTE — PROGRESS NOTES
[facility]   Obstetrics and Gynecology     2022      Patient:  Raven Young   MR#:[id number]    Office note    Chief Complaint   Patient presents with   • Gynecologic Exam     Pt states she has been having pmb for a few years now and she is just here for a f/u       Subjective     History of Present Illness  66 y.o. female  presents for evaluation of postmenopausal bleeding.  This is not a new problem.  She has been evaluated for this several times in the last 5 years.  Ultrasound findings have been benign, with endometrial lining very thin.  She has not had an endometrial biopsy.  She had vaginal spotting yesterday when she wiped it was a small dot of blood.  Pap smear on  was NILM with positive HPV.  She is scheduled for colposcopy in October with Dr. Morocho.  She has not been sexually active for 10 years.  She denies vaginal itching, burning, dryness.  She wears a panty liner daily for urge and stress urinary incontinence.  She denies dysuria.        Relevant data reviewed:    Uterus: with uterine volume of 14.5 ml, with uterine dimensions 4.2 x 2.7 x 2.3 cm and Anteverted     2.  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   • Hx of total hip arthroplasty, right 2018   • PMB (postmenopausal bleeding) 2019   • PMB (postmenopausal bleeding) 2022   • PONV  (postoperative nausea and vomiting)    • Right hip pain     SCHEDULED FOR SX   • Status post total hip replacement, right 2018   • Subtrochanteric fracture of right femur (HCC) 2017   • Subtrochanteric fracture, closed, right, with nonunion, subsequent encounter 2017    Added automatically from request for surgery [id number]     Past Surgical History:   Procedure Laterality Date   • COLONOSCOPY     • FIBULA BONE GRAFT TO HIP Right 12/15/2017    Procedure: FIBULA BONE GRAFT TO HIP with cancellus autograft the [place]-Miles cable ;  Surgeon: Keyshawn Madden MD;  Location: [facility] OR;  Service:    • FRACTURE SURGERY     • HARDWARE REMOVAL Right 8/15/2017    Procedure: HARDWARE REMOVAL long gamma nail  with long gamma nail right subtrochanteric femur fracture meir vanco powder ;  Surgeon: Keyshawn Madden MD;  Location: [facility] OR;  Service:    • HIP CLOSED REDUCTION Right 2018    Procedure: HIP CLOSED REDUCTION;  Surgeon: Keyshawn Madden MD;  Location:  [facility] OR;  Service: Orthopedics   • HIP CLOSED REDUCTION Right 2018    Procedure: HIP CLOSED REDUCTION;  Surgeon: Keyshawn Madden MD;  Location: [facility] OR;  Service: Orthopedics   • IM NAILING FEMORAL SHAFT RETROGRADE Right 08/15/2017    replacement leah  fx   • JOINT REPLACEMENT     • CA OPEN FIX INTER/SUBTROCH FX,IMPLNT Right 2017    Procedure: FEMUR INTRAMEDULLARY NAILING/RODDING long meir gamma nail 7fr hemovac placement;  Surgeon: Keyshawn Madden MD;  Location:  [facility] OR;  Service: Orthopedics   • SKIN BIOPSY     • TOTAL HIP ARTHROPLASTY Right 2018    Procedure: TOTAL HIP ARTHROPLASTY;  Surgeon: Keyshawn Madden MD;  Location:  [facility] OR;  Service: Orthopedics   • TOTAL HIP ARTHROPLASTY REVISION Right 2018    Procedure: RT TOTAL HIP ARTHROPLASTY REVISION;  Surgeon: Andriy Bailey MD;  Location: [facility] MAIN OR;  Service: Orthopedics   • TUBAL ABDOMINAL LIGATION       Obstetric History:  OB History        2    Para   2    Term   2             AB        Living   2       SAB        IAB        Ectopic        Molar        Multiple        Live Births   2               Menstrual History:     No LMP recorded (lmp unknown). Patient is postmenopausal.       # 1 - Date: 1975, Sex: Female, Weight: 3147 g (6 lb 15 oz), GA: None, Delivery: Vaginal, Spontaneous, Apgar1: None, Apgar5: None, Living: Living, Birth Comments: None    # 2 - Date: 1978, Sex: Male, Weight: 3090 g (6 lb 13 oz), GA: None, Delivery: Vaginal, Spontaneous, Apgar1: None, Apgar5: None, Living: Living, Birth Comments: None    Family History   Problem Relation Age of Onset   • Asthma Mother    • Lung disease Mother    • No Known Problems Father    • Heart disease Sister    • Ovarian cancer Sister 87   • Heart disease Brother    • Liver cancer Brother    • Breast cancer Neg Hx      Social History     Tobacco Use   • Smoking status: Never Smoker   • Smokeless tobacco: Never Used   Vaping Use   • Vaping Use: Never used   Substance Use Topics   • Alcohol use: Yes     Comment: rare   • Drug use: No     Sulfa antibiotics    Current Outpatient Medications:   •  aspirin 81 MG chewable tablet, Chew 81 mg Daily., Disp: , Rfl:   •  atorvastatin (LIPITOR) 10 MG tablet, , Disp: , Rfl:   •  cetirizine (zyrTEC) 10 MG tablet, Take 10 mg by mouth Daily., Disp: , Rfl:   •  DULoxetine (CYMBALTA) 60 MG capsule, Take 60 mg by mouth Every Morning., Disp: , Rfl:     The following portions of the patient's history were reviewed and updated as appropriate: allergies, current medications, past family history, past medical history, past social history, past surgical history, and problem list.    Review of Systems   Constitutional: Negative for activity change, appetite change, chills, fatigue and fever.   Respiratory: Negative for cough and shortness of breath.    Cardiovascular: Negative for chest pain.   Gastrointestinal: Negative for constipation, diarrhea, nausea and vomiting.   Genitourinary: Positive  "for vaginal bleeding. Negative for dysuria, flank pain, genital sores, hematuria and menstrual problem.       BP Readings from Last 3 Encounters:   08/18/22 130/78   08/04/22 124/72   03/08/22 133/80      Wt Readings from Last 3 Encounters:   08/18/22 78.8 kg (173 lb 12.8 oz)   08/04/22 79.3 kg (174 lb 12.8 oz)   03/08/22 77.1 kg (170 lb)      BMI: Estimated body mass index is 31.79 kg/m² as calculated from the following:    Height as of this encounter: 157.5 cm (62\").    Weight as of this encounter: 78.8 kg (173 lb 12.8 oz). BSA: Estimated body surface area is 1.8 meters squared as calculated from the following:    Height as of this encounter: 157.5 cm (62\").    Weight as of this encounter: 78.8 kg (173 lb 12.8 oz).    Objective   Physical Exam  Vitals reviewed.   Constitutional:       Appearance: Normal appearance. She is normal weight.   HENT:      Head: Normocephalic and atraumatic.      Nose: Nose normal.      Mouth/Throat:      Mouth: Mucous membranes are moist.   Eyes:      Pupils: Pupils are equal, round, and reactive to light.   Pulmonary:      Effort: Pulmonary effort is normal.   Abdominal:      General: Abdomen is flat.      Palpations: Abdomen is soft.   Musculoskeletal:         General: Normal range of motion.      Cervical back: Normal range of motion and neck supple.   Skin:     General: Skin is warm and dry.   Neurological:      Mental Status: She is alert and oriented to person, place, and time.     Discussed ultrasound findings with patient.  Offered endometrial biopsy today.  She prefers to wait until October for her colposcopy appointment with Dr. Morocho to do them at the same time, if deemed appropriate by Dr. Morocho.    Assessment & Plan     Diagnoses and all orders for this visit:    1. PMB (postmenopausal bleeding) (Primary)    2. Stress incontinence of urine  -     POC Urinalysis Dipstick  -     Urine Culture - Urine, Urine, Clean Catch         Return in about 2 months (around " 10/18/2022).    Deisi Austin, MARTY   8/18/2022 12:05 EDT

## 2022-08-20 LAB
BACTERIA UR CULT: NO GROWTH
BACTERIA UR CULT: NORMAL

## 2022-10-12 ENCOUNTER — OFFICE VISIT (OUTPATIENT)
Dept: OBSTETRICS AND GYNECOLOGY | Age: 67
End: 2022-10-12

## 2022-10-12 VITALS
DIASTOLIC BLOOD PRESSURE: 70 MMHG | HEIGHT: 62 IN | BODY MASS INDEX: 31.65 KG/M2 | WEIGHT: 172 LBS | SYSTOLIC BLOOD PRESSURE: 120 MMHG

## 2022-10-12 DIAGNOSIS — Z13.89 SCREENING FOR BLOOD OR PROTEIN IN URINE: ICD-10-CM

## 2022-10-12 DIAGNOSIS — R87.810 PAP SMEAR OF CERVIX SHOWS HIGH RISK HPV PRESENT: ICD-10-CM

## 2022-10-12 DIAGNOSIS — N95.0 PMB (POSTMENOPAUSAL BLEEDING): Primary | ICD-10-CM

## 2022-10-12 DIAGNOSIS — N89.8 VAGINAL DISCHARGE: ICD-10-CM

## 2022-10-12 DIAGNOSIS — Z13.9 SPECIAL SCREENING: ICD-10-CM

## 2022-10-12 LAB
BILIRUB BLD-MCNC: NEGATIVE MG/DL
GLUCOSE UR STRIP-MCNC: NEGATIVE MG/DL
KETONES UR QL: NEGATIVE
LEUKOCYTE EST, POC: ABNORMAL
NITRITE UR-MCNC: NEGATIVE MG/ML
PH UR: 5 [PH] (ref 5–8)
PROT UR STRIP-MCNC: ABNORMAL MG/DL
RBC # UR STRIP: ABNORMAL /UL
SP GR UR: 1.02 (ref 1–1.03)
UROBILINOGEN UR QL: NORMAL

## 2022-10-12 PROCEDURE — 81002 URINALYSIS NONAUTO W/O SCOPE: CPT | Performed by: OBSTETRICS & GYNECOLOGY

## 2022-10-12 PROCEDURE — 99213 OFFICE O/P EST LOW 20 MIN: CPT | Performed by: OBSTETRICS & GYNECOLOGY

## 2022-10-12 PROCEDURE — 58100 BIOPSY OF UTERUS LINING: CPT | Performed by: OBSTETRICS & GYNECOLOGY

## 2022-10-12 NOTE — PROGRESS NOTES
[facility]   Obstetrics and Gynecology     10/12/2022      Patient:  Raven Young   MR#:[id number]    Office note    Chief Complaint   Patient presents with   • Follow-up     f/u PMB- pt reports the bleeding is still the same   Colpo- last pap 2022 neg HPV+       Subjective     History of Present Illness  67 y.o. female  previously seen by the nurse practitioner for postmenopausal bleeding with ultrasound showing a relatively atrophic appearing lining.  Since having had the ultrasound the patient continues to have periodic brown discharge and periodic small amounts of bleeding.  The patient is concerned because her sister was recently diagnosed with ovarian cancer.  Incidentally the patient had a Pap smear that was HPV positive with normal cellularity.        Relevant data reviewed:  US Non-ob Transvaginal (2022 09:16)  IgP, Aptima HPV (2022 14:32)      Patient Active Problem List   Diagnosis   • Stress incontinence of urine   • HPV in female   • PMB (postmenopausal bleeding)       Past Medical History:   Diagnosis Date   • Cancer (HCC)     basal cell   • Closed left subtrochanteric femur fracture (HCC) 2017    Added automatically from request for surgery [id number]   • Closed right hip fracture (HCC) 2017   • Closed subtrochanteric fracture of femur with nonunion 2017   • Depression with anxiety    • Dislocated hip, right, sequela 2018    Added automatically from request for surgery [id number]   • 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   • Hx of total hip arthroplasty, right 2018   • PMB (postmenopausal bleeding) 2019   • PMB (postmenopausal bleeding) 2022   • PONV (postoperative nausea and vomiting)    • Right hip pain     SCHEDULED FOR SX   • Status post total hip replacement, right 2018   • Subtrochanteric fracture of right femur (HCC) 2017   • Subtrochanteric fracture, closed, right, with nonunion, subsequent  encounter 2017    Added automatically from request for surgery [id number]     Past Surgical History:   Procedure Laterality Date   • COLONOSCOPY     • FIBULA BONE GRAFT TO HIP Right 12/15/2017    Procedure: FIBULA BONE GRAFT TO HIP with cancellus autograft the Dall-Miles cable ;  Surgeon: Keyshawn Madden MD;  Location: [facility] OR;  Service:    • FRACTURE SURGERY     • HARDWARE REMOVAL Right 8/15/2017    Procedure: HARDWARE REMOVAL long gamma nail  with long gamma nail right subtrochanteric femur fracture meir vanco powder ;  Surgeon: Keyshawn Madden MD;  Location: [facility] OR;  Service:    • HIP CLOSED REDUCTION Right 2018    Procedure: HIP CLOSED REDUCTION;  Surgeon: Keyshawn Madden MD;  Location: [facility] OR;  Service: Orthopedics   • HIP CLOSED REDUCTION Right 2018    Procedure: HIP CLOSED REDUCTION;  Surgeon: Keyshawn Madden MD;  Location: [facility] OR;  Service: Orthopedics   • IM NAILING FEMORAL SHAFT RETROGRADE Right 08/15/2017    replacement leah  fx   • JOINT REPLACEMENT     • ME OPEN FIX INTER/SUBTROCH FX,IMPLNT Right 2017    Procedure: FEMUR INTRAMEDULLARY NAILING/RODDING long meir gamma nail 7fr hemovac placement;  Surgeon: Keyshawn Madden MD;  Location: [facility] OR;  Service: Orthopedics   • SKIN BIOPSY     • TOTAL HIP ARTHROPLASTY Right 2018    Procedure: TOTAL HIP ARTHROPLASTY;  Surgeon: Keyshawn Madden MD;  Location: [facility] OR;  Service: Orthopedics   • TOTAL HIP ARTHROPLASTY REVISION Right 2018    Procedure: RT TOTAL HIP ARTHROPLASTY REVISION;  Surgeon: Andriy Bailey MD;  Location: [facility] OR;  Service: Orthopedics   • TUBAL ABDOMINAL LIGATION       Obstetric History:  OB History        2    Para   2    Term   2            AB        Living   2       SAB        IAB        Ectopic        Molar        Multiple        Live Births   2               Menstrual History:     No LMP recorded (lmp unknown). Patient is postmenopausal.       # 1 - Date: 1975, Sex: Female,  Weight: 3147 g (6 lb 15 oz), GA: None, Delivery: Vaginal, Spontaneous, Apgar1: None, Apgar5: None, Living: Living, Birth Comments: None    # 2 - Date: [birth year]1977, Sex: Male, Weight: 3090 g (6 lb 13 oz), GA: None, Delivery: Vaginal, Spontaneous, Apgar1: None, Apgar5: None, Living: Living, Birth Comments: None    Family History   Problem Relation Age of Onset   • Asthma Mother    • Lung disease Mother    • No Known Problems Father    • Heart disease Sister    • Ovarian cancer Sister 87   • Heart disease Brother    • Liver cancer Brother    • Breast cancer Neg Hx      Social History     Tobacco Use   • Smoking status: Never   • Smokeless tobacco: Never   Vaping Use   • Vaping Use: Never used   Substance Use Topics   • Alcohol use: Yes     Comment: rare   • Drug use: No     Sulfa antibiotics    Current Outpatient Medications:   •  aspirin 81 MG chewable tablet, Chew 81 mg Daily., Disp: , Rfl:   •  atorvastatin (LIPITOR) 10 MG tablet, , Disp: , Rfl:   •  cetirizine (zyrTEC) 10 MG tablet, Take 10 mg by mouth Daily., Disp: , Rfl:   •  DULoxetine (CYMBALTA) 60 MG capsule, Take 60 mg by mouth Every Morning., Disp: , Rfl:     The following portions of the patient's history were reviewed and updated as appropriate: allergies, current medications, past family history, past medical history, past social history, past surgical history and problem list.    Review of Systems   Constitutional: Negative.    Respiratory: Negative.    Cardiovascular: Negative.    Gastrointestinal: Negative.    Genitourinary: Negative.         Menopausal bleeding: periodic spotting and brown discharge   Psychiatric/Behavioral: Negative.        BP Readings from Last 3 Encounters:   10/12/22 120/70   08/18/22 130/78   08/04/22 124/72      Wt Readings from Last 3 Encounters:   10/12/22 78 kg (172 lb)   08/18/22 78.8 kg (173 lb 12.8 oz)   08/04/22 79.3 kg (174 lb 12.8 oz)      BMI: Estimated body mass index is 31.46 kg/m² as calculated from the following:     "Height as of this encounter: 157.5 cm (62\").    Weight as of this encounter: 78 kg (172 lb). BSA: Estimated body surface area is 1.79 meters squared as calculated from the following:    Height as of this encounter: 157.5 cm (62\").    Weight as of this encounter: 78 kg (172 lb).    Objective   Physical Exam  Vitals and nursing note reviewed.   Constitutional:       General: She is not in acute distress.     Appearance: Normal appearance. She is well-developed. She is not ill-appearing.   HENT:      Head: Normocephalic.   Pulmonary:      Effort: Pulmonary effort is normal.   Abdominal:      General: Abdomen is flat. There is no distension.      Palpations: Abdomen is soft. There is no mass.      Tenderness: There is no abdominal tenderness.   Genitourinary:     Vagina: Normal.      Comments: Very scant brown discharge    Musculoskeletal:         General: No swelling or tenderness.   Neurological:      Mental Status: She is alert and oriented to person, place, and time.   Psychiatric:         Behavior: Behavior normal.         Thought Content: Thought content normal.         Judgment: Judgment normal.         Endometrial Biopsy Procedure Note    Pre-operative Diagnosis: Postmenopausal bleeding    Post-operative Diagnosis: same    Indications: postmenopausal bleeding    Procedure Details    Urine pregnancy test was done and was NEGATIVE .  The risks (including infection, bleeding, pain, and uterine perforation) and benefits of the procedure were explained to the patient and Written informed consent was obtained.  Antibiotic prophylaxis against endocarditis was not indicated.     The patient was placed in the dorsal lithotomy position.  Bimanual exam showed the uterus to be in the neutral position.  A Graves' speculum inserted in the vagina, and the cervix prepped with povidone iodine.  Endocervical curettage with a Kevorkian curette was not performed.     A sharp tenaculum was applied to the anterior lip of the cervix " for stabilization.  Fairly marked cervical stenosis is noted and the cervix is dilated with sequential dilators.  A sterile uterine sound was used to sound the uterus to a depth of 5.5cm.  A Pipelle endometrial aspirator was used to sample the endometrium.  Sample was sent for pathologic examination.    Condition:  Stable    Complications:  None  Patient tolerated the procedure well without complications.    Plan:    The patient was advised to call for any fever or for prolonged or severe pain or bleeding. She was advised to use NSAID as needed for mild to moderate pain. She was advised to avoid vaginal intercourse for 48 hours or until the bleeding has completely stopped.    Attending Physician Documentation:  I was present for the entire procedure.     Assessment & Plan     Diagnoses and all orders for this visit:    1. PMB (postmenopausal bleeding) (Primary)  -     Reference Histopathology    2. Pap smear of cervix shows high risk HPV present    3. Screening for blood or protein in urine  -     POC Urinalysis Dipstick          No follow-ups on file.    Hussein Morocho MD   10/12/2022 11:59 EDT

## 2022-10-14 LAB
A VAGINAE DNA VAG QL NAA+PROBE: ABNORMAL SCORE
BVAB2 DNA VAG QL NAA+PROBE: ABNORMAL SCORE
C ALBICANS DNA VAG QL NAA+PROBE: NEGATIVE
C GLABRATA DNA VAG QL NAA+PROBE: NEGATIVE
C TRACH DNA VAG QL NAA+PROBE: NEGATIVE
MEGA1 DNA VAG QL NAA+PROBE: ABNORMAL SCORE
N GONORRHOEA DNA VAG QL NAA+PROBE: NEGATIVE
T VAGINALIS DNA VAG QL NAA+PROBE: NEGATIVE

## 2022-10-18 LAB
DX ICD CODE: NORMAL
DX ICD CODE: NORMAL
PATH REPORT.FINAL DX SPEC: NORMAL
PATH REPORT.GROSS SPEC: NORMAL
PATH REPORT.SITE OF ORIGIN SPEC: NORMAL
PATHOLOGIST NAME: NORMAL
PAYMENT PROCEDURE: NORMAL

## 2022-10-19 ENCOUNTER — OFFICE VISIT (OUTPATIENT)
Dept: OBSTETRICS AND GYNECOLOGY | Age: 67
End: 2022-10-19

## 2022-10-19 VITALS
HEIGHT: 62 IN | BODY MASS INDEX: 31.83 KG/M2 | WEIGHT: 173 LBS | SYSTOLIC BLOOD PRESSURE: 120 MMHG | DIASTOLIC BLOOD PRESSURE: 74 MMHG

## 2022-10-19 DIAGNOSIS — Z13.89 SCREENING FOR BLOOD OR PROTEIN IN URINE: ICD-10-CM

## 2022-10-19 DIAGNOSIS — B97.7 HPV IN FEMALE: ICD-10-CM

## 2022-10-19 DIAGNOSIS — R87.613 HIGH GRADE SQUAMOUS INTRAEPITHELIAL LESION (HGSIL) ON CERVICOVAGINAL CYTOLOGY: Primary | ICD-10-CM

## 2022-10-19 DIAGNOSIS — N95.0 PMB (POSTMENOPAUSAL BLEEDING): ICD-10-CM

## 2022-10-19 LAB
BILIRUB BLD-MCNC: NEGATIVE MG/DL
GLUCOSE UR STRIP-MCNC: NEGATIVE MG/DL
KETONES UR QL: NEGATIVE
LEUKOCYTE EST, POC: ABNORMAL
NITRITE UR-MCNC: NEGATIVE MG/ML
PH UR: 5.5 [PH] (ref 5–8)
PROT UR STRIP-MCNC: NEGATIVE MG/DL
RBC # UR STRIP: ABNORMAL /UL
SP GR UR: 1.02 (ref 1–1.03)
UROBILINOGEN UR QL: NORMAL

## 2022-10-19 PROCEDURE — 81002 URINALYSIS NONAUTO W/O SCOPE: CPT | Performed by: OBSTETRICS & GYNECOLOGY

## 2022-10-19 PROCEDURE — 99213 OFFICE O/P EST LOW 20 MIN: CPT | Performed by: OBSTETRICS & GYNECOLOGY

## 2022-10-19 NOTE — PROGRESS NOTES
[facility]   Obstetrics and Gynecology     10/19/2022      Patient:  Raven Young   MR#:[id number]    Office note    Chief Complaint   Patient presents with   • Consult     abn EMB        Subjective     History of Present Illness  67 y.o. female  who was seen originally for postmenopausal bleeding who had an endometrial biopsy that returned atrophic endometrium with detached fragments of high-grade dysplasia.  The patient had a previous normal Pap that was HPV positive.  We discussed these findings and the need for further diagnostic studies.      Relevant data reviewed:  Reference Histopathology (10/12/2022 14:29)      Patient Active Problem List   Diagnosis   • Stress incontinence of urine   • HPV in female   • PMB (postmenopausal bleeding)   • High grade squamous intraepithelial lesion (HGSIL) on cervicovaginal cytology       Past Medical History:   Diagnosis Date   • Cancer (HCC)     basal cell   • Closed left subtrochanteric femur fracture ([facility]) 2017    Added automatically from request for surgery [id number]   • Closed right hip fracture ([facility]) 2017   • Closed subtrochanteric fracture of femur with nonunion 2017   • Depression with anxiety    • Dislocated hip, right, sequela 2018    Added automatically from request for surgery [id number]   • Failed total hip arthroplasty with dislocation ([facility]) 2018   • Hx of total hip arthroplasty, right 2018   • PMB (postmenopausal bleeding) 2019   • PMB (postmenopausal bleeding) 2022   • PONV (postoperative nausea and vomiting)    • Right hip pain     SCHEDULED FOR SX   • Status post total hip replacement, right 2018   • Subtrochanteric fracture of right femur ([facility]) 2017   • Subtrochanteric fracture, closed, right, with nonunion, subsequent encounter 2017    Added automatically from request for surgery [id number]     Past Surgical History:   Procedure Laterality Date   • COLONOSCOPY     • FIBULA BONE GRAFT TO  HIP Right 12/15/2017    Procedure: FIBULA BONE GRAFT TO HIP with cancellus autograft the Dall-Miles cable ;  Surgeon: Keyshawn Madden MD;  Location: [facility] OR;  Service:    • FRACTURE SURGERY     • HARDWARE REMOVAL Right 08/15/2017    Procedure: HARDWARE REMOVAL long gamma nail  with long gamma nail right subtrochanteric femur fracture meir vanco powder ;  Surgeon: Keyshawn Madden MD;  Location:  [facility] OR;  Service:    • HIP CLOSED REDUCTION Right 2018    Procedure: HIP CLOSED REDUCTION;  Surgeon: Keyshawn Madden MD;  Location: [facility] OR;  Service: Orthopedics   • HIP CLOSED REDUCTION Right 2018    Procedure: HIP CLOSED REDUCTION;  Surgeon: Keyshawn Madden MD;  Location: [facility] OR;  Service: Orthopedics   • IM NAILING FEMORAL SHAFT RETROGRADE Right 08/15/2017    replacement leah  fx   • MT OPEN FIX INTER/SUBTROCH FX,IMPLNT Right 2017    Procedure: FEMUR INTRAMEDULLARY NAILING/RODDING long meir gamma nail 7fr hemovac placement;  Surgeon: Keyshawn Madden MD;  Location: [facility] OR;  Service: Orthopedics   • SKIN BIOPSY     • TOTAL HIP ARTHROPLASTY Right 2018    Procedure: TOTAL HIP ARTHROPLASTY;  Surgeon: Keyshawn Madden MD;  Location: [facility] OR;  Service: Orthopedics   • TOTAL HIP ARTHROPLASTY REVISION Right 2018    Procedure: RT TOTAL HIP ARTHROPLASTY REVISION;  Surgeon: Andriy Bailey MD;  Location: [facility] OR;  Service: Orthopedics   • TUBAL ABDOMINAL LIGATION       Obstetric History:  OB History        2    Para   2    Term   2            AB        Living   2       SAB        IAB        Ectopic        Molar        Multiple        Live Births   2               Menstrual History:     No LMP recorded (lmp unknown). Patient is postmenopausal.       # 1 - Date: 1975, Sex: Female, Weight: 3147 g (6 lb 15 oz), GA: None, Delivery: Vaginal, Spontaneous, Apgar1: None, Apgar5: None, Living: Living, Birth Comments: None    # 2 - Date: 1977, Sex: Male, Weight: 3090 g (6 lb 13  "oz), GA: None, Delivery: Vaginal, Spontaneous, Apgar1: None, Apgar5: None, Living: Living, Birth Comments: None    Family History   Problem Relation Age of Onset   • Asthma Mother    • Lung disease Mother    • No Known Problems Father    • Heart disease Sister    • Ovarian cancer Sister 87   • Heart disease Brother    • Liver cancer Brother    • Breast cancer Neg Hx      Social History     Tobacco Use   • Smoking status: Never   • Smokeless tobacco: Never   Vaping Use   • Vaping Use: Never used   Substance Use Topics   • Alcohol use: Yes     Comment: rare   • Drug use: No     Sulfa antibiotics    Current Outpatient Medications:   •  aspirin 81 MG chewable tablet, Chew 81 mg Daily., Disp: , Rfl:   •  atorvastatin (LIPITOR) 10 MG tablet, , Disp: , Rfl:   •  cetirizine (zyrTEC) 10 MG tablet, Take 10 mg by mouth Daily., Disp: , Rfl:   •  DULoxetine (CYMBALTA) 60 MG capsule, Take 60 mg by mouth Every Morning., Disp: , Rfl:     The following portions of the patient's history were reviewed and updated as appropriate: allergies, current medications, past family history, past medical history, past social history, past surgical history and problem list.    Review of Systems   Constitutional: Negative.    Respiratory: Negative.    Cardiovascular: Negative.    Gastrointestinal: Negative.    Genitourinary: Negative.    Psychiatric/Behavioral: Negative.        BP Readings from Last 3 Encounters:   10/19/22 120/74   10/12/22 120/70   08/18/22 130/78      Wt Readings from Last 3 Encounters:   10/19/22 78.5 kg (173 lb)   10/12/22 78 kg (172 lb)   08/18/22 78.8 kg (173 lb 12.8 oz)      BMI: Estimated body mass index is 31.64 kg/m² as calculated from the following:    Height as of this encounter: 157.5 cm (62\").    Weight as of this encounter: 78.5 kg (173 lb). BSA: Estimated body surface area is 1.8 meters squared as calculated from the following:    Height as of this encounter: 157.5 cm (62\").    Weight as of this encounter: 78.5 kg " (173 lb).    Objective   Physical Exam  Vitals and nursing note reviewed.   Constitutional:       Appearance: She is well-developed.   HENT:      Head: Normocephalic and atraumatic.   Cardiovascular:      Rate and Rhythm: Normal rate.   Pulmonary:      Effort: Pulmonary effort is normal.   Abdominal:      General: Bowel sounds are normal. There is no distension.      Palpations: Abdomen is soft.      Tenderness: There is no abdominal tenderness.   Skin:     General: Skin is warm and dry.   Neurological:      Mental Status: She is alert and oriented to person, place, and time.   Psychiatric:         Behavior: Behavior normal.         Thought Content: Thought content normal.         Judgment: Judgment normal.         Assessment & Plan     Diagnoses and all orders for this visit:    1. High grade squamous intraepithelial lesion (HGSIL) on cervicovaginal cytology (Primary)  -     Case Request    2. Screening for blood or protein in urine  -     POC Urinalysis Dipstick    3. HPV in female    4. PMB (postmenopausal bleeding)      Discussed the surgical procedure at length and ACOG handout provided on LEEP    No follow-ups on file.    Hussein Morocho MD   10/19/2022 10:43 EDT

## 2022-11-09 ENCOUNTER — PRE-ADMISSION TESTING (OUTPATIENT)
Dept: PREADMISSION TESTING | Facility: HOSPITAL | Age: 67
End: 2022-11-09

## 2022-11-09 VITALS
HEART RATE: 83 BPM | SYSTOLIC BLOOD PRESSURE: 116 MMHG | WEIGHT: 171 LBS | DIASTOLIC BLOOD PRESSURE: 65 MMHG | RESPIRATION RATE: 16 BRPM | TEMPERATURE: 97.6 F | OXYGEN SATURATION: 99 % | HEIGHT: 62 IN | BODY MASS INDEX: 31.47 KG/M2

## 2022-11-09 LAB
ANION GAP SERPL CALCULATED.3IONS-SCNC: 9.2 MMOL/L (ref 5–15)
BUN SERPL-MCNC: 17 MG/DL (ref 8–23)
BUN/CREAT SERPL: 16.5 (ref 7–25)
CALCIUM SPEC-SCNC: 9.1 MG/DL (ref 8.6–10.5)
CHLORIDE SERPL-SCNC: 104 MMOL/L (ref 98–107)
CO2 SERPL-SCNC: 26.8 MMOL/L (ref 22–29)
CREAT SERPL-MCNC: 1.03 MG/DL (ref 0.57–1)
DEPRECATED RDW RBC AUTO: 40.8 FL (ref 37–54)
EGFRCR SERPLBLD CKD-EPI 2021: 59.3 ML/MIN/1.73
ERYTHROCYTE [DISTWIDTH] IN BLOOD BY AUTOMATED COUNT: 12.8 % (ref 12.3–15.4)
GLUCOSE SERPL-MCNC: 106 MG/DL (ref 65–99)
HCT VFR BLD AUTO: 43.6 % (ref 34–46.6)
HGB BLD-MCNC: 14.5 G/DL (ref 12–15.9)
MCH RBC QN AUTO: 29.2 PG (ref 26.6–33)
MCHC RBC AUTO-ENTMCNC: 33.3 G/DL (ref 31.5–35.7)
MCV RBC AUTO: 87.9 FL (ref 79–97)
PLATELET # BLD AUTO: 237 10*3/MM3 (ref 140–450)
PMV BLD AUTO: 9.3 FL (ref 6–12)
POTASSIUM SERPL-SCNC: 4.2 MMOL/L (ref 3.5–5.2)
QT INTERVAL: 405 MS
RBC # BLD AUTO: 4.96 10*6/MM3 (ref 3.77–5.28)
SODIUM SERPL-SCNC: 140 MMOL/L (ref 136–145)
WBC NRBC COR # BLD: 5.7 10*3/MM3 (ref 3.4–10.8)

## 2022-11-09 PROCEDURE — 93005 ELECTROCARDIOGRAM TRACING: CPT

## 2022-11-09 PROCEDURE — 36415 COLL VENOUS BLD VENIPUNCTURE: CPT

## 2022-11-09 PROCEDURE — 80048 BASIC METABOLIC PNL TOTAL CA: CPT

## 2022-11-09 PROCEDURE — 93010 ELECTROCARDIOGRAM REPORT: CPT | Performed by: INTERNAL MEDICINE

## 2022-11-09 PROCEDURE — 85027 COMPLETE CBC AUTOMATED: CPT

## 2022-11-09 NOTE — DISCHARGE INSTRUCTIONS
Take the following medications the morning of surgery:    DULOXETINE    TIME OF ARRIVAL 9:30      If you are on prescription narcotic pain medication to control your pain you may also take that medication the morning of surgery.    General Instructions:  Do not eat solid food after midnight the night before surgery.  You may drink clear liquids day of surgery but must stop at least one hour before your hospital arrival time.  It is beneficial for you to have a clear drink that contains carbohydrates the day of surgery.  We suggest a 12 to 20 ounce bottle of Gatorade or Powerade for non-diabetic patients or a 12 to 20 ounce bottle of G2 or Powerade Zero for diabetic patients. (Pediatric patients, are not advised to drink a 12 to 20 ounce carbohydrate drink)    Clear liquids are liquids you can see through.  Nothing red in color.     Plain water                               Sports drinks  Sodas                                   Gelatin (Jell-O)  Fruit juices without pulp such as white grape juice and apple juice  Popsicles that contain no fruit or yogurt  Tea or coffee (no cream or milk added)  Gatorade / Powerade  G2 / Powerade Zero      Patients who avoid smoking, chewing tobacco and alcohol for 4 weeks prior to surgery have a reduced risk of post-operative complications.  Quit smoking as many days before surgery as you can.  Do not smoke, use chewing tobacco or drink alcohol the day of surgery.   If applicable bring your C-PAP/ BI-PAP machine.  Bring any papers given to you in the doctor’s office.  Wear clean comfortable clothes.  Do not wear contact lenses, false eyelashes or make-up.  Bring a case for your glasses.   Bring crutches or walker if applicable.  Remove all piercings.  Leave jewelry and any other valuables at home.  Hair extensions with metal clips must be removed prior to surgery.  The Pre-Admission Testing nurse will instruct you to bring medications if unable to obtain an accurate list in  Pre-Admission Testing.          Preventing a Surgical Site Infection:  For 2 to 3 days before surgery, avoid shaving with a razor because the razor can irritate skin and make it easier to develop an infection.    Any areas of open skin can increase the risk of a post-operative wound infection by allowing bacteria to enter and travel throughout the body.  Notify your surgeon if you have any skin wounds / rashes even if it is not near the expected surgical site.  The area will need assessed to determine if surgery should be delayed until it is healed.  The night prior to surgery shower using a fresh bar of anti-bacterial soap (such as Dial) and clean washcloth.  Sleep in a clean bed with clean clothing.  Do not allow pets to sleep with you.  Shower on the morning of surgery using a fresh bar of anti-bacterial soap (such as Dial) and clean washcloth.  Dry with a clean towel and dress in clean clothing.  Ask your surgeon if you will be receiving antibiotics prior to surgery.  Make sure you, your family, and all healthcare providers clean their hands with soap and water or an alcohol based hand  before caring for you or your wound.    Day of surgery:  Your arrival time is approximately two hours before your scheduled surgery time.  Upon arrival, a Pre-op nurse and Anesthesiologist will review your health history, obtain vital signs, and answer questions you may have.  The only belongings needed at this time will be a list of your home medications and if applicable your C-PAP/BI-PAP machine.  A Pre-op nurse will start an IV and you may receive medication in preparation for surgery, including something to help you relax.     Please be aware that surgery does come with discomfort.  We want to make every effort to control your discomfort so please discuss any uncontrolled symptoms with your nurse.   Your doctor will most likely have prescribed pain medications.      If you are going home after surgery you will  receive individualized written care instructions before being discharged.  A responsible adult must drive you to and from the hospital on the day of your surgery and stay with you for 24 hours.  Discharge prescriptions can be filled by the hospital pharmacy during regular pharmacy hours.  If you are having surgery late in the day/evening your prescription may be e-prescribed to your pharmacy.  Please verify your pharmacy hours or chose a 24 hour pharmacy to avoid not having access to your prescription because your pharmacy has closed for the day.    If you are staying overnight following surgery, you will be transported to your hospital room following the recovery period.  [facility] has all private rooms.    If you have any questions please call Pre-Admission Testing at [phone].  Deductibles and co-payments are collected on the day of service. Please be prepared to pay the required co-pay, deductible or deposit on the day of service as defined by your plan.    Call your surgeon immediately if you experience any of the following symptoms:  Sore Throat  Shortness of Breath or difficulty breathing  Cough  Chills  Body soreness or muscle pain  Headache  Fever  New loss of taste or smell  Do not arrive for your surgery ill.  Your procedure will need to be rescheduled to another time.  You will need to call your physician before the day of surgery to avoid any unnecessary exposure to hospital staff as well as other patients.

## 2022-11-11 ENCOUNTER — ANESTHESIA (OUTPATIENT)
Dept: PERIOP | Facility: HOSPITAL | Age: 67
End: 2022-11-11

## 2022-11-11 ENCOUNTER — ANESTHESIA EVENT (OUTPATIENT)
Dept: PERIOP | Facility: HOSPITAL | Age: 67
End: 2022-11-11

## 2022-11-11 ENCOUNTER — HOSPITAL ENCOUNTER (OUTPATIENT)
Facility: HOSPITAL | Age: 67
Setting detail: HOSPITAL OUTPATIENT SURGERY
Discharge: HOME | End: 2022-11-11
Attending: OBSTETRICS & GYNECOLOGY | Admitting: OBSTETRICS & GYNECOLOGY

## 2022-11-11 VITALS
HEART RATE: 78 BPM | SYSTOLIC BLOOD PRESSURE: 119 MMHG | DIASTOLIC BLOOD PRESSURE: 67 MMHG | BODY MASS INDEX: 32.22 KG/M2 | HEIGHT: 61 IN | OXYGEN SATURATION: 96 % | WEIGHT: 170.64 LBS | TEMPERATURE: 97.5 F | RESPIRATION RATE: 18 BRPM

## 2022-11-11 DIAGNOSIS — Z98.890 S/P LEEP: Primary | ICD-10-CM

## 2022-11-11 DIAGNOSIS — R87.613 HIGH GRADE SQUAMOUS INTRAEPITHELIAL LESION (HGSIL) ON CERVICOVAGINAL CYTOLOGY: ICD-10-CM

## 2022-11-11 PROCEDURE — 25010000002 PROPOFOL 200 MG/20ML EMULSION: Performed by: NURSE ANESTHETIST, CERTIFIED REGISTERED

## 2022-11-11 PROCEDURE — 57522 CONIZATION OF CERVIX: CPT | Performed by: OBSTETRICS & GYNECOLOGY

## 2022-11-11 PROCEDURE — S0260 H&P FOR SURGERY: HCPCS | Performed by: OBSTETRICS & GYNECOLOGY

## 2022-11-11 PROCEDURE — 25010000002 FENTANYL CITRATE (PF) 50 MCG/ML SOLUTION: Performed by: NURSE ANESTHETIST, CERTIFIED REGISTERED

## 2022-11-11 PROCEDURE — 25010000002 ONDANSETRON PER 1 MG: Performed by: NURSE ANESTHETIST, CERTIFIED REGISTERED

## 2022-11-11 PROCEDURE — 88342 IMHCHEM/IMCYTCHM 1ST ANTB: CPT | Performed by: OBSTETRICS & GYNECOLOGY

## 2022-11-11 PROCEDURE — 88305 TISSUE EXAM BY PATHOLOGIST: CPT | Performed by: OBSTETRICS & GYNECOLOGY

## 2022-11-11 PROCEDURE — 88307 TISSUE EXAM BY PATHOLOGIST: CPT | Performed by: OBSTETRICS & GYNECOLOGY

## 2022-11-11 PROCEDURE — 25010000002 DEXAMETHASONE SODIUM PHOSPHATE 20 MG/5ML SOLUTION: Performed by: NURSE ANESTHETIST, CERTIFIED REGISTERED

## 2022-11-11 RX ORDER — HYDRALAZINE HYDROCHLORIDE 20 MG/ML
5 INJECTION INTRAMUSCULAR; INTRAVENOUS
Status: DISCONTINUED | OUTPATIENT
Start: 2022-11-11 | End: 2022-11-11 | Stop reason: HOSPADM

## 2022-11-11 RX ORDER — PROPOFOL 10 MG/ML
INJECTION, EMULSION INTRAVENOUS AS NEEDED
Status: DISCONTINUED | OUTPATIENT
Start: 2022-11-11 | End: 2022-11-11 | Stop reason: SURG

## 2022-11-11 RX ORDER — MAGNESIUM HYDROXIDE 1200 MG/15ML
LIQUID ORAL AS NEEDED
Status: DISCONTINUED | OUTPATIENT
Start: 2022-11-11 | End: 2022-11-11 | Stop reason: HOSPADM

## 2022-11-11 RX ORDER — LIDOCAINE HYDROCHLORIDE 10 MG/ML
0.5 INJECTION, SOLUTION EPIDURAL; INFILTRATION; INTRACAUDAL; PERINEURAL ONCE AS NEEDED
Status: DISCONTINUED | OUTPATIENT
Start: 2022-11-11 | End: 2022-11-11 | Stop reason: HOSPADM

## 2022-11-11 RX ORDER — SODIUM CHLORIDE 0.9 % (FLUSH) 0.9 %
3 SYRINGE (ML) INJECTION EVERY 12 HOURS SCHEDULED
Status: DISCONTINUED | OUTPATIENT
Start: 2022-11-11 | End: 2022-11-11 | Stop reason: HOSPADM

## 2022-11-11 RX ORDER — LIDOCAINE HYDROCHLORIDE 20 MG/ML
INJECTION, SOLUTION INFILTRATION; PERINEURAL AS NEEDED
Status: DISCONTINUED | OUTPATIENT
Start: 2022-11-11 | End: 2022-11-11 | Stop reason: SURG

## 2022-11-11 RX ORDER — ONDANSETRON 2 MG/ML
INJECTION INTRAMUSCULAR; INTRAVENOUS AS NEEDED
Status: DISCONTINUED | OUTPATIENT
Start: 2022-11-11 | End: 2022-11-11 | Stop reason: SURG

## 2022-11-11 RX ORDER — OXYCODONE HYDROCHLORIDE AND ACETAMINOPHEN 5; 325 MG/1; MG/1
1-2 TABLET ORAL EVERY 4 HOURS PRN
Qty: 10 TABLET | Refills: 0 | Status: SHIPPED | OUTPATIENT
Start: 2022-11-11

## 2022-11-11 RX ORDER — SODIUM CHLORIDE, SODIUM LACTATE, POTASSIUM CHLORIDE, CALCIUM CHLORIDE 600; 310; 30; 20 MG/100ML; MG/100ML; MG/100ML; MG/100ML
9 INJECTION, SOLUTION INTRAVENOUS CONTINUOUS
Status: DISCONTINUED | OUTPATIENT
Start: 2022-11-11 | End: 2022-11-11 | Stop reason: HOSPADM

## 2022-11-11 RX ORDER — FENTANYL CITRATE 50 UG/ML
50 INJECTION, SOLUTION INTRAMUSCULAR; INTRAVENOUS
Status: DISCONTINUED | OUTPATIENT
Start: 2022-11-11 | End: 2022-11-11 | Stop reason: HOSPADM

## 2022-11-11 RX ORDER — FLUMAZENIL 0.1 MG/ML
0.2 INJECTION INTRAVENOUS AS NEEDED
Status: DISCONTINUED | OUTPATIENT
Start: 2022-11-11 | End: 2022-11-11 | Stop reason: HOSPADM

## 2022-11-11 RX ORDER — DIPHENHYDRAMINE HYDROCHLORIDE 50 MG/ML
12.5 INJECTION INTRAMUSCULAR; INTRAVENOUS
Status: DISCONTINUED | OUTPATIENT
Start: 2022-11-11 | End: 2022-11-11 | Stop reason: HOSPADM

## 2022-11-11 RX ORDER — BUPIVACAINE HYDROCHLORIDE AND EPINEPHRINE 5; 5 MG/ML; UG/ML
INJECTION, SOLUTION EPIDURAL; INTRACAUDAL; PERINEURAL AS NEEDED
Status: DISCONTINUED | OUTPATIENT
Start: 2022-11-11 | End: 2022-11-11 | Stop reason: HOSPADM

## 2022-11-11 RX ORDER — ONDANSETRON 2 MG/ML
4 INJECTION INTRAMUSCULAR; INTRAVENOUS ONCE AS NEEDED
Status: DISCONTINUED | OUTPATIENT
Start: 2022-11-11 | End: 2022-11-11 | Stop reason: HOSPADM

## 2022-11-11 RX ORDER — OXYCODONE AND ACETAMINOPHEN 7.5; 325 MG/1; MG/1
1 TABLET ORAL EVERY 4 HOURS PRN
Status: DISCONTINUED | OUTPATIENT
Start: 2022-11-11 | End: 2022-11-11 | Stop reason: HOSPADM

## 2022-11-11 RX ORDER — EPHEDRINE SULFATE 50 MG/ML
5 INJECTION, SOLUTION INTRAVENOUS ONCE AS NEEDED
Status: DISCONTINUED | OUTPATIENT
Start: 2022-11-11 | End: 2022-11-11 | Stop reason: HOSPADM

## 2022-11-11 RX ORDER — PROMETHAZINE HYDROCHLORIDE 25 MG/1
25 TABLET ORAL ONCE AS NEEDED
Status: DISCONTINUED | OUTPATIENT
Start: 2022-11-11 | End: 2022-11-11 | Stop reason: HOSPADM

## 2022-11-11 RX ORDER — DEXAMETHASONE SODIUM PHOSPHATE 4 MG/ML
INJECTION, SOLUTION INTRA-ARTICULAR; INTRALESIONAL; INTRAMUSCULAR; INTRAVENOUS; SOFT TISSUE AS NEEDED
Status: DISCONTINUED | OUTPATIENT
Start: 2022-11-11 | End: 2022-11-11 | Stop reason: SURG

## 2022-11-11 RX ORDER — HYDROMORPHONE HYDROCHLORIDE 1 MG/ML
0.5 INJECTION, SOLUTION INTRAMUSCULAR; INTRAVENOUS; SUBCUTANEOUS
Status: DISCONTINUED | OUTPATIENT
Start: 2022-11-11 | End: 2022-11-11 | Stop reason: HOSPADM

## 2022-11-11 RX ORDER — DIPHENHYDRAMINE HCL 25 MG
25 CAPSULE ORAL
Status: DISCONTINUED | OUTPATIENT
Start: 2022-11-11 | End: 2022-11-11 | Stop reason: HOSPADM

## 2022-11-11 RX ORDER — SCOLOPAMINE TRANSDERMAL SYSTEM 1 MG/1
1 PATCH, EXTENDED RELEASE TRANSDERMAL ONCE
Status: DISCONTINUED | OUTPATIENT
Start: 2022-11-11 | End: 2022-11-11 | Stop reason: HOSPADM

## 2022-11-11 RX ORDER — SODIUM CHLORIDE 0.9 % (FLUSH) 0.9 %
3-10 SYRINGE (ML) INJECTION AS NEEDED
Status: DISCONTINUED | OUTPATIENT
Start: 2022-11-11 | End: 2022-11-11 | Stop reason: HOSPADM

## 2022-11-11 RX ORDER — LABETALOL HYDROCHLORIDE 5 MG/ML
5 INJECTION, SOLUTION INTRAVENOUS
Status: DISCONTINUED | OUTPATIENT
Start: 2022-11-11 | End: 2022-11-11 | Stop reason: HOSPADM

## 2022-11-11 RX ORDER — MIDAZOLAM HYDROCHLORIDE 1 MG/ML
0.5 INJECTION INTRAMUSCULAR; INTRAVENOUS
Status: DISCONTINUED | OUTPATIENT
Start: 2022-11-11 | End: 2022-11-11 | Stop reason: HOSPADM

## 2022-11-11 RX ORDER — FENTANYL CITRATE 50 UG/ML
INJECTION, SOLUTION INTRAMUSCULAR; INTRAVENOUS AS NEEDED
Status: DISCONTINUED | OUTPATIENT
Start: 2022-11-11 | End: 2022-11-11 | Stop reason: SURG

## 2022-11-11 RX ORDER — PROMETHAZINE HYDROCHLORIDE 25 MG/1
25 SUPPOSITORY RECTAL ONCE AS NEEDED
Status: DISCONTINUED | OUTPATIENT
Start: 2022-11-11 | End: 2022-11-11 | Stop reason: HOSPADM

## 2022-11-11 RX ORDER — NALOXONE HCL 0.4 MG/ML
0.2 VIAL (ML) INJECTION AS NEEDED
Status: DISCONTINUED | OUTPATIENT
Start: 2022-11-11 | End: 2022-11-11 | Stop reason: HOSPADM

## 2022-11-11 RX ORDER — IBUPROFEN 600 MG/1
600 TABLET ORAL EVERY 6 HOURS PRN
Qty: 30 TABLET | Refills: 3 | Status: SHIPPED | OUTPATIENT
Start: 2022-11-11

## 2022-11-11 RX ORDER — IBUPROFEN 600 MG/1
600 TABLET ORAL ONCE
Status: COMPLETED | OUTPATIENT
Start: 2022-11-11 | End: 2022-11-11

## 2022-11-11 RX ORDER — HYDROCODONE BITARTRATE AND ACETAMINOPHEN 7.5; 325 MG/1; MG/1
1 TABLET ORAL ONCE AS NEEDED
Status: DISCONTINUED | OUTPATIENT
Start: 2022-11-11 | End: 2022-11-11 | Stop reason: HOSPADM

## 2022-11-11 RX ADMIN — FENTANYL CITRATE 25 MCG: 50 INJECTION, SOLUTION INTRAMUSCULAR; INTRAVENOUS at 12:27

## 2022-11-11 RX ADMIN — FENTANYL CITRATE 25 MCG: 50 INJECTION, SOLUTION INTRAMUSCULAR; INTRAVENOUS at 12:29

## 2022-11-11 RX ADMIN — SCOPALAMINE 1 PATCH: 1 PATCH, EXTENDED RELEASE TRANSDERMAL at 10:46

## 2022-11-11 RX ADMIN — SODIUM CHLORIDE, POTASSIUM CHLORIDE, SODIUM LACTATE AND CALCIUM CHLORIDE 9 ML/HR: 600; 310; 30; 20 INJECTION, SOLUTION INTRAVENOUS at 10:40

## 2022-11-11 RX ADMIN — IBUPROFEN 600 MG: 600 TABLET, FILM COATED ORAL at 13:43

## 2022-11-11 RX ADMIN — FENTANYL CITRATE 25 MCG: 50 INJECTION, SOLUTION INTRAMUSCULAR; INTRAVENOUS at 12:51

## 2022-11-11 RX ADMIN — DEXAMETHASONE SODIUM PHOSPHATE 10 MG: 4 INJECTION, SOLUTION INTRAMUSCULAR; INTRAVENOUS at 12:09

## 2022-11-11 RX ADMIN — PROPOFOL 200 MG: 10 INJECTION, EMULSION INTRAVENOUS at 12:06

## 2022-11-11 RX ADMIN — FENTANYL CITRATE 25 MCG: 50 INJECTION, SOLUTION INTRAMUSCULAR; INTRAVENOUS at 12:55

## 2022-11-11 RX ADMIN — ONDANSETRON 4 MG: 2 INJECTION INTRAMUSCULAR; INTRAVENOUS at 12:09

## 2022-11-11 RX ADMIN — LIDOCAINE HYDROCHLORIDE 100 MG: 20 INJECTION, SOLUTION INFILTRATION; PERINEURAL at 12:06

## 2022-11-11 NOTE — OP NOTE
LOOP ELECTROCAUTERY EXCISION PROCEDURE, CERVICAL CONIZATION  Procedure Report    Patient Name:  Raven Young  YOB: 1955    Date of Surgery:  11/11/2022     Indications:  Detached high grade dysplastic fragments on EBX     Pre-op Diagnosis:   High grade squamous intraepithelial lesion (HGSIL) on cervicovaginal cytology [R87.613]       Post-Op Diagnosis Codes:     * High grade squamous intraepithelial lesion (HGSIL) on cervicovaginal cytology [R87.613]    Procedure/CPT® Codes:  LOOP ELECTROCAUTERY EXCISION PROCEDURE  CERVICAL CONIZATION      Staff:  Surgeon(s):  Hussein Morocho MD        Anesthesia: General    Estimated Blood Loss: minimal    Implants:    Nothing was implanted during the procedure    Specimen:          ID Type Source Tests Collected by Time   A (Not marked as sent) :  Tissue Endometrial Curettings TISSUE PATHOLOGY EXAM Hussein Morocho MD 11/11/2022 1244   B (Not marked as sent) : CERVICAL CONE FIXED IN FORMALIN Tissue Cervix TISSUE PATHOLOGY EXAM Hussein Morocho MD 11/11/2022 1255         Findings: Atrophic appearing vagina with very small introitus with no visible lesions    Complications: none were noted     Description of Procedure:  The patient was taken to the operating room and placed in supine position.  Anesthesia was administered and the patient was prepped and draped in the usual sterile fashion with her legs placed in Candycane stirrups.  The surgical time-out is completed for the procedure.  Padding was provided so that her legs did not make contact with the stirrups.    Weighted speculum was placed in the vagina and the cervix was grasped anteriorly with a single-tooth tenaculum.  Paracervical block is placed by injecting 5 cc  1% lidocaine with epinephrine at the 3:00 and 9:00 position of the cervix.    The large loop was taken and a single pass made to incorporate the entirety of the transformation zone.  The specimen is tagged at 12:00.  Marga curet is use sample the  endocervical canal in all quadrants.     LEEP bed is cauterized with Bovie cautery and hemostasis is obtained.       At the conclusion of procedure, the canal is dilated with a 12 Maori Hegar dilator to prevent stenosis.    There was a small vaginal tear anteriorly just inferior to the urethra as result of the stretch injury from performing the procedure and a narrow introitus.  A single interrupted figure-of-eight stitch was placed in the tear and hemostasis was noted.    The patient is awakened and taken the recovery room in stable condition.      Hussein Morocho MD     Date: 11/11/2022  Time: 13:04 EST

## 2022-11-11 NOTE — ANESTHESIA POSTPROCEDURE EVALUATION
"Patient: Raven Young    Procedure Summary     Date: 11/11/22 Room / Location: [facility] OR  /  [facility]    Anesthesia Start: 1201 Anesthesia Stop: 1309    Procedures:       LOOP ELECTROCAUTERY EXCISION PROCEDURE (Cervix)      CERVICAL CONIZATION (Cervix) Diagnosis:       High grade squamous intraepithelial lesion (HGSIL) on cervicovaginal cytology      (High grade squamous intraepithelial lesion (HGSIL) on cervicovaginal cytology [R87.613])    Surgeons: Hussein Morocho MD Provider: Micheal Pacheco MD    Anesthesia Type: general ASA Status: 2          Anesthesia Type: general    Vitals  Vitals Value Taken Time   /89 11/11/22 1401   Temp 36.4 °C (97.5 °F) 11/11/22 1400   Pulse 78 11/11/22 1414   Resp 18 11/11/22 1400   SpO2 92 % 11/11/22 1414   Vitals shown include unvalidated device data.        Post Anesthesia Care and Evaluation    Patient location during evaluation: bedside  Patient participation: complete - patient participated  Level of consciousness: awake and alert  Pain score: 0  Pain management: adequate    Airway patency: patent  Anesthetic complications: No anesthetic complications    Cardiovascular status: acceptable  Respiratory status: acceptable  Hydration status: acceptable    Comments: /72 (BP Location: Right arm, Patient Position: Lying)   Pulse 75   Temp 36.4 °C (97.5 °F) (Oral)   Resp 18   Ht 154.9 cm (61\")   Wt 77.4 kg (170 lb 10.2 oz)   LMP  (LMP Unknown) Comment: lmp around 2005  SpO2 96%   BMI 32.24 kg/m²       "

## 2022-11-11 NOTE — ANESTHESIA PROCEDURE NOTES
Airway  Urgency: elective    Date/Time: 11/11/2022 12:07 PM    General Information and Staff    Patient location during procedure: OR  CRNA/CAA: Kelli Reyna CRNA    Indications and Patient Condition  Indications for airway management: airway protection    Preoxygenated: yes  Mask difficulty assessment: 0 - not attempted    Final Airway Details  Final airway type: supraglottic airway      Successful airway: unique  Size 4     Number of attempts at approach: 1    Additional Comments  Atraumatic placement of Unique LMA. Cuff  WNL. Dentition unchanged from preop.

## 2022-11-11 NOTE — ANESTHESIA PREPROCEDURE EVALUATION
Anesthesia Evaluation     Patient summary reviewed and Nursing notes reviewed   history of anesthetic complications: PONV  NPO Solid Status: > 8 hours  NPO Liquid Status: > 2 hours           Airway   Mallampati: II  TM distance: >3 FB  Neck ROM: full  Dental      Pulmonary    Cardiovascular         Neuro/Psych  GI/Hepatic/Renal/Endo    (+) obesity,       Musculoskeletal     Abdominal    Substance History      OB/GYN          Other                        Anesthesia Plan    ASA 2     general     intravenous induction     Anesthetic plan, risks, benefits, and alternatives have been provided, discussed and informed consent has been obtained with: patient.        CODE STATUS:

## 2022-11-11 NOTE — H&P
[facility]   Obstetrics and Gynecology   History & Physical    2022    Patient: Raven Young          MR#:[id number]    Chief complaint:  PMB     Subjective     67 y.o. female  previously seen by the nurse practitioner for postmenopausal bleeding with ultrasound showing a relatively atrophic appearing lining.  Since having had the ultrasound the patient continues to have periodic brown discharge and periodic small amounts of bleeding.  The patient is concerned because her sister was recently diagnosed with ovarian cancer.  Incidentally the patient had a Pap smear that was HPV positive with normal cellularity.    Endometrial biopsy was performed that showed detached high grade dysplasia.        Relevant data reviewed:  US Non-ob Transvaginal (2022 09:16)  IgP, Aptima HPV (2022 14:32)   Reference Histopathology (10/12/2022 14:29)    Patient Active Problem List   Diagnosis   • Stress incontinence of urine   • HPV in female   • PMB (postmenopausal bleeding)   • High grade squamous intraepithelial lesion (HGSIL) on cervicovaginal cytology       Past Medical History:   Diagnosis Date   • Cancer (HCC)     basal cell   • Closed left subtrochanteric femur fracture (HCC) 2017    Added automatically from request for surgery [id number]   • Closed right hip fracture (HCC) 2017   • Closed subtrochanteric fracture of femur with nonunion 2017   • Depression with anxiety    • Dislocated hip, right, sequela 2018    Added automatically from request for surgery [id number]   • 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   • Hx of total hip arthroplasty, right 2018   • PMB (postmenopausal bleeding) 2019   • PMB (postmenopausal bleeding) 2022   • PONV (postoperative nausea and vomiting)    • Right hip pain     SCHEDULED FOR SX   • Status post total hip replacement, right 2018   • Stress incontinence in female    • Subtrochanteric  fracture of right femur (HCC) 2017   • Subtrochanteric fracture, closed, right, with nonunion, subsequent encounter 2017    Added automatically from request for surgery [id number]       Past Surgical History:   Procedure Laterality Date   • COLONOSCOPY     • FIBULA BONE GRAFT TO HIP Right 12/15/2017    Procedure: FIBULA BONE GRAFT TO HIP with cancellus autograft the D[place]-Miles cable ;  Surgeon: Keyshawn Madden MD;  Location:  [facility] OR;  Service:    • FRACTURE SURGERY     • HARDWARE REMOVAL Right 08/15/2017    Procedure: HARDWARE REMOVAL long gamma nail  with long gamma nail right subtrochanteric femur fracture meir vanco powder ;  Surgeon: Keyshawn Madden MD;  Location:  [facility] OR;  Service:    • HIP CLOSED REDUCTION Right 2018    Procedure: HIP CLOSED REDUCTION;  Surgeon: Keyshawn Madden MD;  Location:  [facility] OR;  Service: Orthopedics   • HIP CLOSED REDUCTION Right 2018    Procedure: HIP CLOSED REDUCTION;  Surgeon: Keyshawn Madden MD;  Location:  [facility] OR;  Service: Orthopedics   • IM NAILING FEMORAL SHAFT RETROGRADE Right 08/15/2017    replacement leah  fx   • SC OPEN FIX INTER/SUBTROCH FX,IMPLNT Right 2017    Procedure: FEMUR INTRAMEDULLARY NAILING/RODDING long meir gamma nail 7fr hemovac placement;  Surgeon: Keyshawn Madden MD;  Location:  [facility] OR;  Service: Orthopedics   • SKIN BIOPSY     • TOTAL HIP ARTHROPLASTY Right 2018    Procedure: TOTAL HIP ARTHROPLASTY;  Surgeon: Keyshawn Madden MD;  Location:  [facility] OR;  Service: Orthopedics   • TOTAL HIP ARTHROPLASTY REVISION Right 2018    Procedure: RT TOTAL HIP ARTHROPLASTY REVISION;  Surgeon: Andriy Bailey MD;  Location: [facility] MAIN OR;  Service: Orthopedics   • TUBAL ABDOMINAL LIGATION         Obstetric History:  OB History        2    Para   2    Term   2            AB        Living   2       SAB        IAB        Ectopic        Molar        Multiple        Live Births   2               Menstrual History:     No  LMP recorded (lmp unknown). Patient is postmenopausal.       # 1 - Date: [birth year]1974, Sex: Female, Weight: 3147 g (6 lb 15 oz), GA: None, Delivery: Vaginal, Spontaneous, Apgar1: None, Apgar5: None, Living: Living, Birth Comments: None    # 2 - Date: 08/1977, Sex: Male, Weight: 3090 g (6 lb 13 oz), GA: None, Delivery: Vaginal, Spontaneous, Apgar1: None, Apgar5: None, Living: Living, Birth Comments: None      Family History   Problem Relation Age of Onset   • Asthma Mother    • Lung disease Mother    • No Known Problems Father    • Heart disease Sister    • Ovarian cancer Sister 87   • Heart disease Brother    • Liver cancer Brother    • Breast cancer Neg Hx    • Malig Hyperthermia Neg Hx        Social History     Tobacco Use   • Smoking status: Never   • Smokeless tobacco: Never   Vaping Use   • Vaping Use: Never used   Substance Use Topics   • Alcohol use: Yes     Comment: rare   • Drug use: No       Sulfa antibiotics    No current facility-administered medications for this encounter.    Current Outpatient Medications:   •  ASPIRIN 81 PO, Take 1 tablet by mouth Every Morning. PT HOLDING FOR SURGERY, Disp: , Rfl:   •  atorvastatin (LIPITOR) 10 MG tablet, Take 1 tablet by mouth Every Night., Disp: , Rfl:   •  cetirizine (zyrTEC) 10 MG tablet, Take 10 mg by mouth Daily., Disp: , Rfl:   •  DULoxetine (CYMBALTA) 60 MG capsule, Take 60 mg by mouth Every Morning., Disp: , Rfl:     Review of Systems  Review of Systems   Constitutional: Negative.    Respiratory: Negative.    Cardiovascular: Negative.    Gastrointestinal: Negative.    Genitourinary: Positive for menstrual problem.   Psychiatric/Behavioral: Negative.        Objective     Vital Signs       Physical Exam:  Physical Exam  Vitals and nursing note reviewed.   Constitutional:       Appearance: Normal appearance. She is well-developed.   HENT:      Head: Normocephalic and atraumatic.      Nose: Nose normal.   Eyes:      Pupils: Pupils are equal, round, and reactive to  light.   Neck:      Thyroid: No thyromegaly.      Trachea: No tracheal deviation.   Cardiovascular:      Rate and Rhythm: Normal rate and regular rhythm.      Heart sounds: Normal heart sounds.   Pulmonary:      Effort: Pulmonary effort is normal.      Breath sounds: Normal breath sounds.   Chest:      Chest wall: No mass.   Breasts:     Right: No mass, nipple discharge or skin change.      Left: No mass, nipple discharge or skin change.   Abdominal:      General: Bowel sounds are normal.      Palpations: Abdomen is soft. There is no mass.      Tenderness: There is no abdominal tenderness. There is no rebound.      Hernia: No hernia is present. There is no hernia in the left inguinal area.   Genitourinary:     Labia:         Right: No rash or lesion.         Left: No rash or lesion.       Vagina: Normal.   Musculoskeletal:         General: Normal range of motion.      Cervical back: Normal range of motion and neck supple.   Skin:     General: Skin is warm.      Findings: No rash.   Neurological:      Mental Status: She is alert and oriented to person, place, and time.      Deep Tendon Reflexes: Reflexes are normal and symmetric.   Psychiatric:         Behavior: Behavior normal.         Thought Content: Thought content normal.         Judgment: Judgment normal.         Labs:  Results from last 7 days   Lab Units 11/09/22  1406   WBC 10*3/mm3 5.70   HEMOGLOBIN g/dL 14.5   HEMATOCRIT % 43.6   PLATELETS 10*3/mm3 237     Results from last 7 days   Lab Units 11/09/22  1406   SODIUM mmol/L 140   POTASSIUM mmol/L 4.2   CHLORIDE mmol/L 104   CO2 mmol/L 26.8   BUN mg/dL 17   CREATININE mg/dL 1.03*   CALCIUM mg/dL 9.1   GLUCOSE mg/dL 106*       Hospital problem list:    High grade squamous intraepithelial lesion (HGSIL) on cervicovaginal cytology      Assessment & Plan     1. Postmenopausal bleeding     2.  Abnormal endometrial biopsy with detached high grade dysplasia.    Plan:  Hysteroscopy and diagnostic LEEP, D & C      Reviewed procedure with patient.  I discussed the risks including but not limited to bleeding, infection and damage to internal organs.  Understanding of the procedure is voiced.     Hussein Morocho MD  11/11/22  06:09 EST      Patient Care Team:  Andres Kathleen MD as PCP - General (Family Medicine)

## 2022-11-15 LAB
LAB AP CASE REPORT: NORMAL
LAB AP DIAGNOSIS COMMENT: NORMAL
LAB AP SPECIAL STAINS: NORMAL
PATH REPORT.FINAL DX SPEC: NORMAL
PATH REPORT.GROSS SPEC: NORMAL

## 2022-11-22 ENCOUNTER — TELEPHONE (OUTPATIENT)
Dept: OBSTETRICS AND GYNECOLOGY | Age: 67
End: 2022-11-22

## 2022-11-22 NOTE — TELEPHONE ENCOUNTER
Pt notified will have telemed visit. Advised phone genevieve will probably be after lunch but would let her know if it will be earlier.   Pt still having some minor spotting but advised that is normal as long as it is not heavy

## 2022-11-22 NOTE — TELEPHONE ENCOUNTER
Caller: JAYCE CALDERON    Relationship to patient: SELF    Best call back number: [phone]    Patient is needing: PATIENT CALLED AND STATED THAT SHE HAS AN APPT FOR POST-OP TOMORROW FOLLOWING A D&C ON 11/11/22 AND SHE WOULD LIKE TO KNOW IF THAT CAN BE DONE AS A TELEHEATH VISIT BECAUSE SHE HAS RECENTLY HAD THE FLU - SHE IS FEELING BETTER NOW BUT DOESN'T WANT TO GO OUT

## 2023-05-05 ENCOUNTER — TRANSCRIBE ORDERS (OUTPATIENT)
Dept: ADMINISTRATIVE | Facility: HOSPITAL | Age: 68
End: 2023-05-05
Payer: MEDICARE

## 2023-05-05 DIAGNOSIS — Z12.31 SCREENING MAMMOGRAM FOR BREAST CANCER: Primary | ICD-10-CM

## 2023-05-24 ENCOUNTER — OFFICE VISIT (OUTPATIENT)
Dept: OBSTETRICS AND GYNECOLOGY | Age: 68
End: 2023-05-24
Payer: MEDICARE

## 2023-05-24 VITALS
WEIGHT: 174 LBS | SYSTOLIC BLOOD PRESSURE: 134 MMHG | DIASTOLIC BLOOD PRESSURE: 72 MMHG | HEIGHT: 61 IN | BODY MASS INDEX: 32.85 KG/M2

## 2023-05-24 DIAGNOSIS — Z11.51 SCREENING FOR HUMAN PAPILLOMAVIRUS (HPV): ICD-10-CM

## 2023-05-24 DIAGNOSIS — C53.0 MALIGNANT NEOPLASM OF ENDOCERVIX: ICD-10-CM

## 2023-05-24 DIAGNOSIS — Z01.812 PRE-PROCEDURE LAB EXAM: Primary | ICD-10-CM

## 2023-05-24 LAB
B-HCG UR QL: NEGATIVE
EXPIRATION DATE: NORMAL
INTERNAL NEGATIVE CONTROL: NEGATIVE
INTERNAL POSITIVE CONTROL: POSITIVE
Lab: NORMAL

## 2023-05-24 NOTE — PROGRESS NOTES
Procedures    [facility]   Obstetrics and Gynecology     5/24/2023    Patient: Raven Young          MR#:[id number]        Colposcopy Procedure Note    Chief Complaint   Patient presents with   • Gynecologic Exam     CC: Colpo, last pap 8/4/22 neg, HPV +       Indications: Pap smear 6 months ago showed: high-grade squamous intraepithelial neoplasia  (HGSIL-encompassing moderate and severe dysplasia).     Tissue Pathology Exam (11/11/2022 12:44)    Procedure Details   The risks and benefits of the procedure and Written informed consent obtained.    UCG: Negative    Speculum placed in vagina and excellent visualization of cervix achieved, cervix swabbed x 3 with acetic acid solution.    Findings:  Cervix: no visible lesions; visualization of the transformation zone is not possible.  Vaginal inspection: vaginal colposcopy not performed.  Vulvar colposcopy: vulvar colposcopy not performed.    Physical Exam    Specimens: PAP    Impression:  Normal exam, no lesions      Complications: none.    The procedure was well tolerated by the patient without problems.    Plan:  Will base further treatment on Pathology findings.          Hussein Morocho MD  5/24/2023  09:53 EDT

## 2023-06-01 LAB
CYTOLOGIST CVX/VAG CYTO: ABNORMAL
CYTOLOGY CVX/VAG DOC CYTO: ABNORMAL
CYTOLOGY CVX/VAG DOC THIN PREP: ABNORMAL
DX ICD CODE: ABNORMAL
DX ICD CODE: ABNORMAL
HIV 1 & 2 AB SER-IMP: ABNORMAL
HPV I/H RISK 4 DNA CVX QL PROBE+SIG AMP: NEGATIVE
OTHER STN SPEC: ABNORMAL
PATHOLOGIST CVX/VAG CYTO: ABNORMAL
RECOM F/U CVX/VAG CYTO: ABNORMAL
STAT OF ADQ CVX/VAG CYTO-IMP: ABNORMAL

## 2023-07-27 ENCOUNTER — TELEPHONE (OUTPATIENT)
Dept: OBSTETRICS AND GYNECOLOGY | Age: 68
End: 2023-07-27
Payer: MEDICARE

## 2023-07-27 NOTE — TELEPHONE ENCOUNTER
I called pt after reviewing abn pap logs and noticing pt did not have annual schedule, I called pt and she states she did not want to schedule, she will call back to schedule annual.

## 2023-11-08 ENCOUNTER — OFFICE VISIT (OUTPATIENT)
Dept: OBSTETRICS AND GYNECOLOGY | Age: 68
End: 2023-11-08
Payer: MEDICARE

## 2023-11-08 VITALS
HEIGHT: 61 IN | BODY MASS INDEX: 30.78 KG/M2 | WEIGHT: 163 LBS | SYSTOLIC BLOOD PRESSURE: 124 MMHG | DIASTOLIC BLOOD PRESSURE: 76 MMHG

## 2023-11-08 DIAGNOSIS — Z98.890 S/P LEEP: ICD-10-CM

## 2023-11-08 DIAGNOSIS — N95.0 PMB (POSTMENOPAUSAL BLEEDING): Primary | ICD-10-CM

## 2023-11-08 DIAGNOSIS — Z13.89 SCREENING FOR BLOOD OR PROTEIN IN URINE: ICD-10-CM

## 2023-11-08 DIAGNOSIS — R39.15 URINARY URGENCY: ICD-10-CM

## 2023-11-08 LAB
BILIRUB BLD-MCNC: ABNORMAL MG/DL
CLARITY, POC: CLEAR
COLOR UR: YELLOW
GLUCOSE UR STRIP-MCNC: NEGATIVE MG/DL
KETONES UR QL: ABNORMAL
LEUKOCYTE EST, POC: ABNORMAL
NITRITE UR-MCNC: NEGATIVE MG/ML
PH UR: 5.5 [PH] (ref 5–8)
PROT UR STRIP-MCNC: ABNORMAL MG/DL
RBC # UR STRIP: ABNORMAL /UL
SP GR UR: 1.02 (ref 1–1.03)
UROBILINOGEN UR QL: NORMAL

## 2023-11-08 PROCEDURE — 99213 OFFICE O/P EST LOW 20 MIN: CPT | Performed by: OBSTETRICS & GYNECOLOGY

## 2023-11-08 PROCEDURE — 81002 URINALYSIS NONAUTO W/O SCOPE: CPT | Performed by: OBSTETRICS & GYNECOLOGY

## 2023-11-08 RX ORDER — SOLIFENACIN SUCCINATE 5 MG/1
10 TABLET, FILM COATED ORAL DAILY
Qty: 30 TABLET | Refills: 4 | Status: SHIPPED | OUTPATIENT
Start: 2023-11-08

## 2023-11-08 NOTE — PROGRESS NOTES
[facility]   Obstetrics and Gynecology     2023      Patient:  Raven Young   MR#:[id number]    Office note    Chief Complaint   Patient presents with    Gynecologic Exam     CC: Postmenopausal bleeding x3 months on and off, gotten worse over last week. U/S today. Last pap 2023 ASCUS hpv neg, Last mammo .        Subjective     History of Present Illness  68 y.o. female  presents with recurrent episodes of vaginal bleeding/spotting.  Previous work-up was negative.  Today's work-up shows an atrophic uterus with an atrophic lining.  There is scant spotting noted on the exam.    The patient's had prior LEEP procedure and endometrial sampling showing no evidence of pathology.  The etiology of her bleeding appears uterine but the actual cause is unclear.        Relevant data reviewed:  Tissue Pathology Exam (2022 12:44)     Patient Active Problem List   Diagnosis    Stress incontinence of urine    HPV in female    PMB (postmenopausal bleeding)    High grade squamous intraepithelial lesion (HGSIL) on cervicovaginal cytology    S/P LEEP       Past Medical History:   Diagnosis Date    Cancer     basal cell    Closed left subtrochanteric femur fracture 2017    Added automatically from request for surgery [id number]    Closed right hip fracture 2017    Closed subtrochanteric fracture of femur with nonunion 2017    Depression with anxiety     Dislocated hip, right, sequela 2018    Added automatically from request for surgery [id number]    Failed total hip arthroplasty with dislocation 2018    Hx of total hip arthroplasty, right 2018    PMB (postmenopausal bleeding) 2019    PMB (postmenopausal bleeding) 2022    PONV (postoperative nausea and vomiting)     Right hip pain     SCHEDULED FOR SX    Status post total hip replacement, right 2018    Stress incontinence in female     Subtrochanteric fracture of right femur 2017     Subtrochanteric fracture, closed, right, with nonunion, subsequent encounter 12/08/2017    Added automatically from request for surgery [id number]     Past Surgical History:   Procedure Laterality Date    CERVICAL CONIZATION N/A 11/11/2022    Procedure: CERVICAL CONIZATION;  Surgeon: Hussein Morocho MD;  Location: [facility] MAIN OR;  Service: Obstetrics/Gynecology;  Laterality: N/A;    COLONOSCOPY      FIBULA BONE GRAFT TO HIP Right 12/15/2017    Procedure: FIBULA BONE GRAFT TO HIP with cancellus autograft the [place] cable ;  Surgeon: Keyshawn Madden MD;  Location: [facility] OR;  Service:     FRACTURE SURGERY      HARDWARE REMOVAL Right 08/15/2017    Procedure: HARDWARE REMOVAL long gamma nail  with long gamma nail right subtrochanteric femur fracture meir vanco powder ;  Surgeon: Keyshawn Madden MD;  Location: [facility] OR;  Service:     HIP CLOSED REDUCTION Right 07/17/2018    Procedure: HIP CLOSED REDUCTION;  Surgeon: Keyshawn Madden MD;  Location: [facility] OR;  Service: Orthopedics    HIP CLOSED REDUCTION Right 07/23/2018    Procedure: HIP CLOSED REDUCTION;  Surgeon: Keyshawn Madden MD;  Location: [facility] OR;  Service: Orthopedics    IM NAILING FEMORAL SHAFT RETROGRADE Right 08/15/2017    replacement leah  fx    LEEP N/A 11/11/2022    Procedure: LOOP ELECTROCAUTERY EXCISION PROCEDURE;  Surgeon: Hussein Morocho MD;  Location: [facility] MAIN OR;  Service: Obstetrics/Gynecology;  Laterality: N/A;    MN TX INTER/MN/SUBTRCHNTRIC FEM FX IMED IMPLTSCREW Right 04/14/2017    Procedure: FEMUR INTRAMEDULLARY NAILING/RODDING long meir gamma nail 7fr hemovac placement;  Surgeon: Keyshawn Madden MD;  Location: [facility] OR;  Service: Orthopedics    SKIN BIOPSY      TOTAL HIP ARTHROPLASTY Right 06/19/2018    Procedure: TOTAL HIP ARTHROPLASTY;  Surgeon: Keyshawn Madden MD;  Location: [facility] OR;  Service: Orthopedics    TOTAL HIP ARTHROPLASTY REVISION Right 07/25/2018    Procedure: RT TOTAL HIP ARTHROPLASTY REVISION;  Surgeon: Andriy Bailey MD;   Location: [facility];  Service: Orthopedics    TUBAL ABDOMINAL LIGATION       Obstetric History:  OB History          2    Para   2    Term   2            AB        Living   2         SAB        IAB        Ectopic        Molar        Multiple        Live Births   2               Menstrual History:     No LMP recorded (lmp unknown). Patient is postmenopausal.       # 1 - Date: 1975, Sex: Female, Weight: 3147 g (6 lb 15 oz), GA: None, Delivery: Vaginal, Spontaneous, Apgar1: None, Apgar5: None, Living: Living, Birth Comments: None    # 2 - Date: 1977, Sex: Male, Weight: 3090 g (6 lb 13 oz), GA: None, Delivery: Vaginal, Spontaneous, Apgar1: None, Apgar5: None, Living: Living, Birth Comments: None    Family History   Problem Relation Age of Onset    No Known Problems Father     Asthma Mother     Lung disease Mother     Heart disease Brother     Liver cancer Brother     Prader-Willi syndrome Brother     Heart disease Sister     Ovarian cancer Sister 87    Breast cancer Neg Hx     Malig Hyperthermia Neg Hx     Uterine cancer Neg Hx     Colon cancer Neg Hx      Social History     Tobacco Use    Smoking status: Never     Passive exposure: Never    Smokeless tobacco: Never   Vaping Use    Vaping Use: Never used   Substance Use Topics    Alcohol use: Yes     Comment: rare    Drug use: No     Sulfa antibiotics    Current Outpatient Medications:     ASPIRIN 81 PO, Take 1 tablet by mouth Every Morning. PT HOLDING FOR SURGERY, Disp: , Rfl:     atorvastatin (LIPITOR) 10 MG tablet, Take 1 tablet by mouth Every Night., Disp: , Rfl:     cetirizine (zyrTEC) 10 MG tablet, Take 1 tablet by mouth Every Night., Disp: , Rfl:     DULoxetine (CYMBALTA) 60 MG capsule, Take 1 capsule by mouth Every Morning., Disp: , Rfl:     solifenacin (VESIcare) 5 MG tablet, Take 2 tablets by mouth Daily., Disp: 30 tablet, Rfl: 4    The following portions of the patient's history were reviewed and updated as appropriate: allergies,  "current medications, past family history, past medical history, past social history, past surgical history, and problem list.    Review of Systems   Constitutional: Negative.    Respiratory: Negative.     Cardiovascular: Negative.    Gastrointestinal: Negative.    Genitourinary:  Positive for menstrual problem.   Psychiatric/Behavioral: Negative.         BP Readings from Last 3 Encounters:   11/08/23 124/76   05/24/23 134/72   11/11/22 119/67      Wt Readings from Last 3 Encounters:   11/08/23 73.9 kg (163 lb)   05/24/23 78.9 kg (174 lb)   11/11/22 77.4 kg (170 lb 10.2 oz)      BMI: Estimated body mass index is 30.8 kg/m² as calculated from the following:    Height as of this encounter: 154.9 cm (61\").    Weight as of this encounter: 73.9 kg (163 lb). BSA: Estimated body surface area is 1.73 meters squared as calculated from the following:    Height as of this encounter: 154.9 cm (61\").    Weight as of this encounter: 73.9 kg (163 lb).    Objective   Physical Exam  Vitals and nursing note reviewed.   Constitutional:       Appearance: Normal appearance. She is well-developed.   HENT:      Head: Normocephalic and atraumatic.      Nose: Nose normal.   Eyes:      Pupils: Pupils are equal, round, and reactive to light.   Neck:      Thyroid: No thyromegaly.      Trachea: No tracheal deviation.   Cardiovascular:      Rate and Rhythm: Normal rate and regular rhythm.      Heart sounds: Normal heart sounds.   Pulmonary:      Effort: Pulmonary effort is normal.      Breath sounds: Normal breath sounds.   Chest:      Chest wall: No mass.   Breasts:     Right: No mass, nipple discharge or skin change.      Left: No mass, nipple discharge or skin change.   Abdominal:      General: Bowel sounds are normal.      Palpations: Abdomen is soft. There is no mass.      Tenderness: There is no abdominal tenderness. There is no rebound.      Hernia: No hernia is present. There is no hernia in the left inguinal area.   Genitourinary:     " Labia:         Right: No rash or lesion.         Left: No rash or lesion.       Vagina: Normal.      Comments: Small atrophic uterus mildly tender on exam    Extremely scant spotting that appears uterine in origin.    Previous LEEP procedure evident with new transformation zone    No active acute bleeding noted  Musculoskeletal:         General: Normal range of motion.      Cervical back: Normal range of motion and neck supple.   Skin:     General: Skin is warm.      Findings: No rash.   Neurological:      Mental Status: She is alert and oriented to person, place, and time.      Deep Tendon Reflexes: Reflexes are normal and symmetric.   Psychiatric:         Behavior: Behavior normal.         Thought Content: Thought content normal.         Judgment: Judgment normal.         Assessment & Plan     Diagnoses and all orders for this visit:    1. PMB (postmenopausal bleeding) (Primary)    2. Screening for blood or protein in urine  -     POC Urinalysis Dipstick    3. S/P LEEP    4. Urinary urgency  -     solifenacin (VESIcare) 5 MG tablet; Take 2 tablets by mouth Daily.  Dispense: 30 tablet; Refill: 4          No follow-ups on file.    uHssein Morocho MD   11/8/2023 12:23 EST

## 2024-02-13 ENCOUNTER — APPOINTMENT (OUTPATIENT)
Dept: CT IMAGING | Facility: HOSPITAL | Age: 69
End: 2024-02-13
Payer: MEDICARE

## 2024-02-13 ENCOUNTER — APPOINTMENT (OUTPATIENT)
Dept: GENERAL RADIOLOGY | Facility: HOSPITAL | Age: 69
End: 2024-02-13
Payer: MEDICARE

## 2024-02-13 ENCOUNTER — HOSPITAL ENCOUNTER (EMERGENCY)
Facility: HOSPITAL | Age: 69
Discharge: HOME | End: 2024-02-13
Attending: EMERGENCY MEDICINE | Admitting: EMERGENCY MEDICINE
Payer: MEDICARE

## 2024-02-13 VITALS
HEART RATE: 78 BPM | SYSTOLIC BLOOD PRESSURE: 107 MMHG | RESPIRATION RATE: 16 BRPM | HEIGHT: 61 IN | OXYGEN SATURATION: 96 % | TEMPERATURE: 99 F | WEIGHT: 166 LBS | DIASTOLIC BLOOD PRESSURE: 62 MMHG | BODY MASS INDEX: 31.34 KG/M2

## 2024-02-13 DIAGNOSIS — R51.9 NONINTRACTABLE HEADACHE, UNSPECIFIED CHRONICITY PATTERN, UNSPECIFIED HEADACHE TYPE: ICD-10-CM

## 2024-02-13 DIAGNOSIS — J18.9 PNEUMONIA DUE TO INFECTIOUS ORGANISM, UNSPECIFIED LATERALITY, UNSPECIFIED PART OF LUNG: ICD-10-CM

## 2024-02-13 DIAGNOSIS — R31.9 HEMATURIA, UNSPECIFIED TYPE: ICD-10-CM

## 2024-02-13 DIAGNOSIS — R06.00 DYSPNEA, UNSPECIFIED TYPE: Primary | ICD-10-CM

## 2024-02-13 LAB
ALBUMIN SERPL-MCNC: 3.6 G/DL (ref 3.5–5.2)
ALBUMIN/GLOB SERPL: 1.3 G/DL
ALP SERPL-CCNC: 104 U/L (ref 39–117)
ALT SERPL W P-5'-P-CCNC: 26 U/L (ref 1–33)
ANION GAP SERPL CALCULATED.3IONS-SCNC: 12.7 MMOL/L (ref 5–15)
AST SERPL-CCNC: 32 U/L (ref 1–32)
BACTERIA UR QL AUTO: ABNORMAL /HPF
BASOPHILS # BLD AUTO: 0.01 10*3/MM3 (ref 0–0.2)
BASOPHILS NFR BLD AUTO: 0.1 % (ref 0–1.5)
BILIRUB SERPL-MCNC: 1 MG/DL (ref 0–1.2)
BILIRUB UR QL STRIP: NEGATIVE
BUN SERPL-MCNC: 15 MG/DL (ref 8–23)
BUN/CREAT SERPL: 12.2 (ref 7–25)
CALCIUM SPEC-SCNC: 8.4 MG/DL (ref 8.6–10.5)
CHLORIDE SERPL-SCNC: 98 MMOL/L (ref 98–107)
CLARITY UR: CLEAR
CO2 SERPL-SCNC: 22.3 MMOL/L (ref 22–29)
COLOR UR: YELLOW
CREAT SERPL-MCNC: 1.23 MG/DL (ref 0.57–1)
D DIMER PPP FEU-MCNC: 1.31 MCGFEU/ML (ref 0–0.69)
DEPRECATED RDW RBC AUTO: 45 FL (ref 37–54)
EGFRCR SERPLBLD CKD-EPI 2021: 47.7 ML/MIN/1.73
EOSINOPHIL # BLD AUTO: 0.07 10*3/MM3 (ref 0–0.4)
EOSINOPHIL NFR BLD AUTO: 0.8 % (ref 0.3–6.2)
ERYTHROCYTE [DISTWIDTH] IN BLOOD BY AUTOMATED COUNT: 13.3 % (ref 12.3–15.4)
FLUAV RNA RESP QL NAA+PROBE: NOT DETECTED
FLUBV RNA RESP QL NAA+PROBE: NOT DETECTED
GLOBULIN UR ELPH-MCNC: 2.7 GM/DL
GLUCOSE SERPL-MCNC: 140 MG/DL (ref 65–99)
GLUCOSE UR STRIP-MCNC: NEGATIVE MG/DL
HCT VFR BLD AUTO: 46.7 % (ref 34–46.6)
HGB BLD-MCNC: 15 G/DL (ref 12–15.9)
HGB UR QL STRIP.AUTO: ABNORMAL
HYALINE CASTS UR QL AUTO: ABNORMAL /LPF
IMM GRANULOCYTES # BLD AUTO: 0.01 10*3/MM3 (ref 0–0.05)
IMM GRANULOCYTES NFR BLD AUTO: 0.1 % (ref 0–0.5)
KETONES UR QL STRIP: NEGATIVE
LEUKOCYTE ESTERASE UR QL STRIP.AUTO: ABNORMAL
LYMPHOCYTES # BLD AUTO: 0.19 10*3/MM3 (ref 0.7–3.1)
LYMPHOCYTES NFR BLD AUTO: 2.2 % (ref 19.6–45.3)
MCH RBC QN AUTO: 28.7 PG (ref 26.6–33)
MCHC RBC AUTO-ENTMCNC: 32.1 G/DL (ref 31.5–35.7)
MCV RBC AUTO: 89.3 FL (ref 79–97)
MONOCYTES # BLD AUTO: 0.54 10*3/MM3 (ref 0.1–0.9)
MONOCYTES NFR BLD AUTO: 6.3 % (ref 5–12)
NEUTROPHILS NFR BLD AUTO: 7.74 10*3/MM3 (ref 1.7–7)
NEUTROPHILS NFR BLD AUTO: 90.5 % (ref 42.7–76)
NITRITE UR QL STRIP: NEGATIVE
PH UR STRIP.AUTO: 6 [PH] (ref 4.5–8)
PLATELET # BLD AUTO: 208 10*3/MM3 (ref 140–450)
PMV BLD AUTO: 9 FL (ref 6–12)
POTASSIUM SERPL-SCNC: 3.7 MMOL/L (ref 3.5–5.2)
PROT SERPL-MCNC: 6.3 G/DL (ref 6–8.5)
PROT UR QL STRIP: NEGATIVE
QT INTERVAL: 350 MS
QTC INTERVAL: 432 MS
RBC # BLD AUTO: 5.23 10*6/MM3 (ref 3.77–5.28)
RBC # UR STRIP: ABNORMAL /HPF
REF LAB TEST METHOD: ABNORMAL
SARS-COV-2 RNA RESP QL NAA+PROBE: NOT DETECTED
SODIUM SERPL-SCNC: 133 MMOL/L (ref 136–145)
SP GR UR STRIP: <=1.005 (ref 1–1.03)
SQUAMOUS #/AREA URNS HPF: ABNORMAL /HPF
TROPONIN T SERPL HS-MCNC: 6 NG/L
UROBILINOGEN UR QL STRIP: ABNORMAL
WBC # UR STRIP: ABNORMAL /HPF
WBC NRBC COR # BLD AUTO: 8.56 10*3/MM3 (ref 3.4–10.8)

## 2024-02-13 PROCEDURE — 81001 URINALYSIS AUTO W/SCOPE: CPT | Performed by: PHYSICIAN ASSISTANT

## 2024-02-13 PROCEDURE — 25810000003 SODIUM CHLORIDE 0.9 % SOLUTION: Performed by: PHYSICIAN ASSISTANT

## 2024-02-13 PROCEDURE — 84484 ASSAY OF TROPONIN QUANT: CPT | Performed by: PHYSICIAN ASSISTANT

## 2024-02-13 PROCEDURE — 70450 CT HEAD/BRAIN W/O DYE: CPT

## 2024-02-13 PROCEDURE — 80053 COMPREHEN METABOLIC PANEL: CPT | Performed by: EMERGENCY MEDICINE

## 2024-02-13 PROCEDURE — 25510000001 IOPAMIDOL PER 1 ML: Performed by: EMERGENCY MEDICINE

## 2024-02-13 PROCEDURE — 85025 COMPLETE CBC W/AUTO DIFF WBC: CPT | Performed by: EMERGENCY MEDICINE

## 2024-02-13 PROCEDURE — 71046 X-RAY EXAM CHEST 2 VIEWS: CPT

## 2024-02-13 PROCEDURE — 85379 FIBRIN DEGRADATION QUANT: CPT | Performed by: PHYSICIAN ASSISTANT

## 2024-02-13 PROCEDURE — 87636 SARSCOV2 & INF A&B AMP PRB: CPT | Performed by: PHYSICIAN ASSISTANT

## 2024-02-13 PROCEDURE — 93005 ELECTROCARDIOGRAM TRACING: CPT | Performed by: EMERGENCY MEDICINE

## 2024-02-13 PROCEDURE — 71275 CT ANGIOGRAPHY CHEST: CPT

## 2024-02-13 PROCEDURE — 96360 HYDRATION IV INFUSION INIT: CPT

## 2024-02-13 PROCEDURE — 99285 EMERGENCY DEPT VISIT HI MDM: CPT

## 2024-02-13 RX ORDER — CEFUROXIME AXETIL 500 MG/1
500 TABLET ORAL 2 TIMES DAILY
Qty: 20 TABLET | Refills: 0 | Status: SHIPPED | OUTPATIENT
Start: 2024-02-13 | End: 2024-02-23

## 2024-02-13 RX ORDER — ERGOCALCIFEROL (VITAMIN D2) 10 MCG
400 TABLET ORAL DAILY
COMMUNITY

## 2024-02-13 RX ORDER — ACETAMINOPHEN 500 MG
1000 TABLET ORAL ONCE
Status: COMPLETED | OUTPATIENT
Start: 2024-02-13 | End: 2024-02-13

## 2024-02-13 RX ADMIN — ACETAMINOPHEN 1000 MG: 500 TABLET ORAL at 13:23

## 2024-02-13 RX ADMIN — IOPAMIDOL 100 ML: 755 INJECTION, SOLUTION INTRAVENOUS at 14:50

## 2024-02-13 RX ADMIN — SODIUM CHLORIDE 1000 ML: 9 INJECTION, SOLUTION INTRAVENOUS at 13:24

## 2024-06-10 ENCOUNTER — TRANSCRIBE ORDERS (OUTPATIENT)
Dept: ADMINISTRATIVE | Facility: HOSPITAL | Age: 69
End: 2024-06-10
Payer: MEDICARE

## 2024-06-10 DIAGNOSIS — Z12.31 VISIT FOR SCREENING MAMMOGRAM: Primary | ICD-10-CM

## 2024-07-12 ENCOUNTER — HOSPITAL ENCOUNTER (OUTPATIENT)
Dept: MAMMOGRAPHY | Facility: HOSPITAL | Age: 69
Discharge: HOME | End: 2024-07-12
Admitting: OBSTETRICS & GYNECOLOGY
Payer: MEDICARE

## 2024-07-12 DIAGNOSIS — Z12.31 VISIT FOR SCREENING MAMMOGRAM: ICD-10-CM

## 2024-07-12 PROCEDURE — 77063 BREAST TOMOSYNTHESIS BI: CPT | Performed by: RADIOLOGY

## 2024-07-12 PROCEDURE — 77067 SCR MAMMO BI INCL CAD: CPT | Performed by: RADIOLOGY

## 2024-07-12 PROCEDURE — 77067 SCR MAMMO BI INCL CAD: CPT

## 2024-07-12 PROCEDURE — 77063 BREAST TOMOSYNTHESIS BI: CPT

## (undated) DEVICE — MAT FLR ABSORBENT LG 4FT 10 2.5FT

## (undated) DEVICE — GLV SURG NEOLON 2G PF LF 8.5 STRL

## (undated) DEVICE — THORACIC CATHETER,STRAIGHT: Brand: ARGYLE

## (undated) DEVICE — MEDI-VAC YANKAUER SUCTION HANDLE W/BULBOUS TIP: Brand: CARDINAL HEALTH

## (undated) DEVICE — HEWSON SUTURE RETRIEVER: Brand: HEWSON SUTURE RETRIEVER

## (undated) DEVICE — 2108 SERIES SAGITTAL BLADE AGGRESSIVE  (25.0 X 0.89 X 89.5MM)

## (undated) DEVICE — INTENDED FOR TISSUE SEPARATION, AND OTHER PROCEDURES THAT REQUIRE A SHARP SURGICAL BLADE TO PUNCTURE OR CUT.: Brand: BARD-PARKER ® STAINLESS STEEL BLADES

## (undated) DEVICE — Device: Brand: LIGHT GUARD™ FLEXIBLE LIGHT HANDLE COVER (1 EACH/PKG)

## (undated) DEVICE — GLV SURG BIOGEL LTX PF 7 1/2

## (undated) DEVICE — MAYO STAND COVER: Brand: CONVERTORS

## (undated) DEVICE — SUT VIC TP1 SZ2 27IN J849G

## (undated) DEVICE — TRY SKINPREP WET CHG 4PCT SPNG HIB

## (undated) DEVICE — PREP SOL POVIDONE/IODINE BT 4OZ

## (undated) DEVICE — SUT SILK 2/0 FS BLK 18IN 685G

## (undated) DEVICE — SYS SKIN CLS DERMABOND PRINEO W/22CM MESH TP

## (undated) DEVICE — INTENDED FOR TISSUE SEPARATION, AND OTHER PROCEDURES THAT REQUIRE A SHARP SURGICAL BLADE TO PUNCTURE OR CUT.: Brand: BARD-PARKER ® CARBON RIB-BACK BLADES

## (undated) DEVICE — GLV SURG NEOLON 2G PF LF 8 STRL

## (undated) DEVICE — NDL SPINE 22G 31/2IN BLK

## (undated) DEVICE — TOWEL,OR,DSP,ST,BLUE,STD,4/PK,20PK/CS: Brand: MEDLINE

## (undated) DEVICE — SPNG GZ WOVN 4X4IN 12PLY 10/BX STRL

## (undated) DEVICE — IRRIGATOR BULB 60CC

## (undated) DEVICE — 3M™ MEDIPORE™ H SOFT CLOTH SURGICAL TAPE 2864, 4 INCH X 10 YARD (10CM X 9,14M), 12 ROLLS/CASE: Brand: 3M™ MEDIPORE™

## (undated) DEVICE — DRSNG TELFA PAD NONADH STR 1S 3X8IN

## (undated) DEVICE — DRAPE,HIP,W/POUCHES,STERILE: Brand: MEDLINE

## (undated) DEVICE — SUT VIC 0 CT1 CR8 18IN J840D

## (undated) DEVICE — APPL CHLORAPREP W/TINT 26ML ORNG

## (undated) DEVICE — CULT AER/ANAEROB FASTIDIOUS BACT

## (undated) DEVICE — 2108 SERIES SAGITTAL BLADE AGGRESSIVE  (12.5 X 1.19 X 81.5MM)

## (undated) DEVICE — 3M™ STERI-STRIP™ COMPOUND BENZOIN TINCTURE 40 BAGS/CARTON 4 CARTONS/CASE C1544: Brand: 3M™ STERI-STRIP™

## (undated) DEVICE — SPONGE,DRAIN,NONWVN,4"X4",6PLY,STRL,LF: Brand: MEDLINE

## (undated) DEVICE — SYR CONTRL LUERLOK 10CC

## (undated) DEVICE — FRAZIER SUCTION INSTRUMENT 10 FR W/CONTROL VENT & OBTURATOR: Brand: FRAZIER

## (undated) DEVICE — PREP IM ENCHANCED TOTAL HIP BONE                                    PREPARATION KIT: Brand: PREP-IM

## (undated) DEVICE — Device

## (undated) DEVICE — ZIPPERED TOGA, 2X LARGE: Brand: FLYTE, SURGICOOL

## (undated) DEVICE — GLV SURG EUDERMIC PF LTX 8 STRL

## (undated) DEVICE — DRP Z/FRICTION 10X16IN

## (undated) DEVICE — K-WIRE
Type: IMPLANTABLE DEVICE | Site: TROCHANTER | Status: NON-FUNCTIONAL
Removed: 2017-04-14

## (undated) DEVICE — ENCORE® LATEX ORTHO SIZE 6.5, STERILE LATEX POWDER-FREE SURGICAL GLOVE: Brand: ENCORE

## (undated) DEVICE — DRP C/ARM 41X74IN

## (undated) DEVICE — NEEDLE, QUINCKE, 20GX3.5": Brand: MEDLINE

## (undated) DEVICE — PK HIP PINNING 40

## (undated) DEVICE — GLV SURG SENSICARE ORTHO PF LF 8 STRL

## (undated) DEVICE — 3M™ STERI-STRIP™ REINFORCED ADHESIVE SKIN CLOSURES, R1547, 1/2 IN X 4 IN (12 MM X 100 MM), 6 STRIPS/ENVELOPE: Brand: 3M™ STERI-STRIP™

## (undated) DEVICE — DRAPE,REIN 53X77,STERILE: Brand: MEDLINE

## (undated) DEVICE — SUT VIC 2/0 CT1 CR8 18IN J839D

## (undated) DEVICE — INTENDED USE FOR SURGICAL MARKING ON INTACT SKIN, ALSO PROVIDES A PERMANENT METHOD OF IDENTIFYING OBJECTS IN THE OPERATING ROOM: Brand: WRITESITE® REGULAR TIP SKIN MARKER

## (undated) DEVICE — TUBING, SUCTION, 1/4" X 20', STRAIGHT: Brand: MEDLINE INDUSTRIES, INC.

## (undated) DEVICE — TRAP FLD MINIVAC MEGADYNE 100ML

## (undated) DEVICE — GLV SURG TRIUMPH CLASSIC PF LTX 6.5 STRL

## (undated) DEVICE — GLV SURG SENSICARE ORTHO PF LF 9 STRL

## (undated) DEVICE — GOWN,PREVENTION PLUS,XLARGE,STERILE: Brand: MEDLINE

## (undated) DEVICE — ELECTRD BLD EZ CLN STD 6.5IN

## (undated) DEVICE — LOCKING SCREW, FULLY THREADED
Type: IMPLANTABLE DEVICE | Site: TROCHANTER | Status: NON-FUNCTIONAL
Removed: 2017-04-14

## (undated) DEVICE — PILLW ABD SM

## (undated) DEVICE — INTENDED FOR TISSUE SEPARATION, AND OTHER PROCEDURES THAT REQUIRE A SHARP SURGICAL BLADE TO PUNCTURE OR CUT.: Brand: BARD-PARKER ® DISPOSABLE SCALPELS

## (undated) DEVICE — PAD,ABDOMINAL,8"X10",ST,LF: Brand: MEDLINE

## (undated) DEVICE — SPNG LAP PREWASH SFTPK 18X18IN 5PK STRL

## (undated) DEVICE — ELECTRD BLD EXT EDGE 1P COAT 6.5IN STRL

## (undated) DEVICE — ANTIBACTERIAL UNDYED BRAIDED (POLYGLACTIN 910), SYNTHETIC ABSORBABLE SUTURE: Brand: COATED VICRYL

## (undated) DEVICE — NON-ADHERENT STRIPS,OIL EMULSION: Brand: CURITY

## (undated) DEVICE — GLV SURG TRIUMPH CLASSIC PF LTX 8 STRL

## (undated) DEVICE — PK HIP TOTL 40

## (undated) DEVICE — GLV SURG BIOGEL LTX PF 6 1/2

## (undated) DEVICE — REAMER SHAFT, MOD.TRINKLE: Brand: BIXCUT

## (undated) DEVICE — BOWL UTIL STRL 32OZ

## (undated) DEVICE — SUT VIC 2/0 CP2 CR8 18IN J762D

## (undated) DEVICE — VAGINAL PACKING: Brand: DEROYAL

## (undated) DEVICE — GUIDE WIRE, BALL-TIPPED, STERILE

## (undated) DEVICE — PROXIMATE RH ROTATING HEAD SKIN STAPLERS (35 WIDE) CONTAINS 35 STAINLESS STEEL STAPLES: Brand: PROXIMATE

## (undated) DEVICE — PINNACLE HIP SOLUTIONS GVF POLYETHYLENE CONSTRAINED ACETABULAR LINER +4 10 DEGREE 28MM ID 50MM OD
Type: IMPLANTABLE DEVICE | Site: HIP | Status: NON-FUNCTIONAL
Brand: PINNACLE
Removed: 2018-07-25

## (undated) DEVICE — DECANTER: Brand: UNBRANDED

## (undated) DEVICE — DRSNG ADAPTIC 3X8

## (undated) DEVICE — CONTAINER,SPECIMEN,OR STERILE,4OZ: Brand: MEDLINE

## (undated) DEVICE — DRAPE,U/ SHT,SPLIT,PLAS,STERIL: Brand: MEDLINE

## (undated) DEVICE — 3M™ STERI-DRAPE™ INSTRUMENT POUCH 1018L: Brand: STERI-DRAPE™

## (undated) DEVICE — STPLR SKIN VISISTAT WD 35CT

## (undated) DEVICE — SUT NUROLON 1 OS8 CR3 18IN C595T

## (undated) DEVICE — DRSNG WND GZ CURAD OIL EMULSION 3X8IN LF STRL 1PK

## (undated) DEVICE — PENCL E/S HNDSWCH PUSHBTN HOLSTR 10FT

## (undated) DEVICE — DRSNG PAD ABD 8X10IN STRL

## (undated) DEVICE — CONN TBG Y 5 IN 1 LF STRL

## (undated) DEVICE — ENCORE® LATEX ORTHO SIZE 8.5, STERILE LATEX POWDER-FREE SURGICAL GLOVE: Brand: ENCORE

## (undated) DEVICE — GLV SURG BIOGEL LTX PF 8 1/2

## (undated) DEVICE — HOOD: Brand: FLYTE

## (undated) DEVICE — HOOD, PEEL-AWAY: Brand: FLYTE

## (undated) DEVICE — K-WIRE
Type: IMPLANTABLE DEVICE | Site: LEG | Status: NON-FUNCTIONAL
Removed: 2017-08-15

## (undated) DEVICE — 6617 IOBAN II PATIENT ISOLATION DRAPE 5/BX,4BX/CS: Brand: STERI-DRAPE™ IOBAN™ 2

## (undated) DEVICE — SUT VIC 0 UR5 27IN DYED J376H

## (undated) DEVICE — KT DRN EVAC WND PVC PCH WTROC RND 10F400

## (undated) DEVICE — 3M™ STERI-DRAPE™ INSTRUMENT POUCH 1018: Brand: STERI-DRAPE™

## (undated) DEVICE — GLV SURG TRIUMPH CLASSIC PF LTX 8.5 STRL

## (undated) DEVICE — DRILL, AO SMALL: Brand: GAMMA

## (undated) DEVICE — DRSNG SURESITE WNDW 4X4.5

## (undated) DEVICE — STRAP STIRUP WO/ RNG

## (undated) DEVICE — 3M™ IOBAN™ 2 ANTIMICROBIAL INCISE DRAPE 6651EZ: Brand: IOBAN™ 2

## (undated) DEVICE — LOU D & C HYSTEROSCOPY: Brand: MEDLINE INDUSTRIES, INC.

## (undated) DEVICE — BOWL AND CEMENT CARTRIDGE: Brand: ACM

## (undated) DEVICE — BOWL PLSTC LG 32OZ BLU STRL

## (undated) DEVICE — PK KN TOTL 90

## (undated) DEVICE — SPNG LAP 18X18IN LF STRL PK/5

## (undated) DEVICE — GLV SURG SENSICARE ORTHO PF LF 8.5 STRL

## (undated) DEVICE — TBG PENCL TELESCP MEGADYNE SMOKE EVAC 10FT

## (undated) DEVICE — GAUZE,SPONGE,4"X4",16PLY,XRAY,STRL,LF: Brand: MEDLINE

## (undated) DEVICE — UNDYED BRAIDED (POLYGLACTIN 910), SYNTHETIC ABSORBABLE SUTURE: Brand: COATED VICRYL

## (undated) DEVICE — DECANT BG O JET

## (undated) DEVICE — COAXIAL HIGH FLOW TIP WITH SOFT SHIELD